# Patient Record
Sex: MALE | Race: WHITE | NOT HISPANIC OR LATINO
[De-identification: names, ages, dates, MRNs, and addresses within clinical notes are randomized per-mention and may not be internally consistent; named-entity substitution may affect disease eponyms.]

---

## 2014-11-19 RX ORDER — SOTALOL HCL 120 MG
0.5 TABLET ORAL
Qty: 0 | Refills: 0 | COMMUNITY
Start: 2014-11-19

## 2017-04-03 ENCOUNTER — RESULT REVIEW (OUTPATIENT)
Age: 82
End: 2017-04-03

## 2017-04-03 ENCOUNTER — INPATIENT (INPATIENT)
Facility: HOSPITAL | Age: 82
LOS: 4 days | Discharge: NURSING, REHAB OR LONG-TERM CARE | DRG: 378 | End: 2017-04-08
Attending: FAMILY MEDICINE | Admitting: FAMILY MEDICINE
Payer: MEDICARE

## 2017-04-03 VITALS
RESPIRATION RATE: 18 BRPM | SYSTOLIC BLOOD PRESSURE: 112 MMHG | HEIGHT: 71 IN | HEART RATE: 96 BPM | OXYGEN SATURATION: 98 % | TEMPERATURE: 98 F | DIASTOLIC BLOOD PRESSURE: 64 MMHG | WEIGHT: 164.91 LBS

## 2017-04-03 DIAGNOSIS — N18.9 CHRONIC KIDNEY DISEASE, UNSPECIFIED: ICD-10-CM

## 2017-04-03 DIAGNOSIS — K27.7 CHRONIC PEPTIC ULCER, SITE UNSPECIFIED, WITHOUT HEMORRHAGE OR PERFORATION: Chronic | ICD-10-CM

## 2017-04-03 DIAGNOSIS — E03.9 HYPOTHYROIDISM, UNSPECIFIED: ICD-10-CM

## 2017-04-03 DIAGNOSIS — D64.9 ANEMIA, UNSPECIFIED: ICD-10-CM

## 2017-04-03 DIAGNOSIS — I48.91 UNSPECIFIED ATRIAL FIBRILLATION: ICD-10-CM

## 2017-04-03 DIAGNOSIS — K92.2 GASTROINTESTINAL HEMORRHAGE, UNSPECIFIED: ICD-10-CM

## 2017-04-03 LAB
ALBUMIN SERPL ELPH-MCNC: 3.1 G/DL — LOW (ref 3.3–5)
ALP SERPL-CCNC: 70 U/L — SIGNIFICANT CHANGE UP (ref 30–120)
ALT FLD-CCNC: 26 U/L DA — SIGNIFICANT CHANGE UP (ref 10–60)
ANION GAP SERPL CALC-SCNC: 5 MMOL/L — SIGNIFICANT CHANGE UP (ref 5–17)
APTT BLD: 38.1 SEC — HIGH (ref 27.5–37.4)
AST SERPL-CCNC: 25 U/L — SIGNIFICANT CHANGE UP (ref 10–40)
BASOPHILS # BLD AUTO: 0.1 K/UL — SIGNIFICANT CHANGE UP (ref 0–0.2)
BASOPHILS NFR BLD AUTO: 0.7 % — SIGNIFICANT CHANGE UP (ref 0–2)
BILIRUB SERPL-MCNC: 0.3 MG/DL — SIGNIFICANT CHANGE UP (ref 0.2–1.2)
BUN SERPL-MCNC: 60 MG/DL — HIGH (ref 7–23)
CALCIUM SERPL-MCNC: 8.9 MG/DL — SIGNIFICANT CHANGE UP (ref 8.4–10.5)
CHLORIDE SERPL-SCNC: 109 MMOL/L — HIGH (ref 96–108)
CK SERPL-CCNC: 72 U/L — SIGNIFICANT CHANGE UP (ref 39–308)
CO2 SERPL-SCNC: 28 MMOL/L — SIGNIFICANT CHANGE UP (ref 22–31)
CREAT SERPL-MCNC: 1.92 MG/DL — HIGH (ref 0.5–1.3)
D DIMER BLD IA.RAPID-MCNC: <150 NG/ML DDU — SIGNIFICANT CHANGE UP
EOSINOPHIL # BLD AUTO: 0.1 K/UL — SIGNIFICANT CHANGE UP (ref 0–0.5)
EOSINOPHIL NFR BLD AUTO: 1.5 % — SIGNIFICANT CHANGE UP (ref 0–6)
GLUCOSE SERPL-MCNC: 106 MG/DL — HIGH (ref 70–99)
HCT VFR BLD CALC: 25.3 % — LOW (ref 39–50)
HGB BLD-MCNC: 7.7 G/DL — LOW (ref 13–17)
INR BLD: 2.45 RATIO — HIGH (ref 0.88–1.16)
LYMPHOCYTES # BLD AUTO: 1.8 K/UL — SIGNIFICANT CHANGE UP (ref 1–3.3)
LYMPHOCYTES # BLD AUTO: 20.6 % — SIGNIFICANT CHANGE UP (ref 13–44)
MCHC RBC-ENTMCNC: 30.5 PG — SIGNIFICANT CHANGE UP (ref 27–34)
MCHC RBC-ENTMCNC: 30.6 GM/DL — LOW (ref 32–36)
MCV RBC AUTO: 99.5 FL — SIGNIFICANT CHANGE UP (ref 80–100)
MONOCYTES # BLD AUTO: 0.6 K/UL — SIGNIFICANT CHANGE UP (ref 0–0.9)
MONOCYTES NFR BLD AUTO: 6.9 % — SIGNIFICANT CHANGE UP (ref 2–14)
NEUTROPHILS # BLD AUTO: 6.1 K/UL — SIGNIFICANT CHANGE UP (ref 1.8–7.4)
NEUTROPHILS NFR BLD AUTO: 70.2 % — SIGNIFICANT CHANGE UP (ref 43–77)
NT-PROBNP SERPL-SCNC: 1886 PG/ML — HIGH (ref 0–450)
PLATELET # BLD AUTO: 120 K/UL — LOW (ref 150–400)
POTASSIUM SERPL-MCNC: 4.3 MMOL/L — SIGNIFICANT CHANGE UP (ref 3.5–5.3)
POTASSIUM SERPL-SCNC: 4.3 MMOL/L — SIGNIFICANT CHANGE UP (ref 3.5–5.3)
PROT SERPL-MCNC: 6.2 G/DL — SIGNIFICANT CHANGE UP (ref 6–8.3)
PROTHROM AB SERPL-ACNC: 27.2 SEC — HIGH (ref 9.8–12.7)
RBC # BLD: 2.54 M/UL — LOW (ref 4.2–5.8)
RBC # FLD: 15.3 % — HIGH (ref 10.3–14.5)
SODIUM SERPL-SCNC: 142 MMOL/L — SIGNIFICANT CHANGE UP (ref 135–145)
TROPONIN I SERPL-MCNC: 0.04 NG/ML — SIGNIFICANT CHANGE UP (ref 0.02–0.06)
WBC # BLD: 8.7 K/UL — SIGNIFICANT CHANGE UP (ref 3.8–10.5)
WBC # FLD AUTO: 8.7 K/UL — SIGNIFICANT CHANGE UP (ref 3.8–10.5)

## 2017-04-03 PROCEDURE — 71020: CPT | Mod: 26

## 2017-04-03 PROCEDURE — 93010 ELECTROCARDIOGRAM REPORT: CPT

## 2017-04-03 PROCEDURE — 99284 EMERGENCY DEPT VISIT MOD MDM: CPT

## 2017-04-03 PROCEDURE — 74176 CT ABD & PELVIS W/O CONTRAST: CPT | Mod: 26

## 2017-04-03 PROCEDURE — 99223 1ST HOSP IP/OBS HIGH 75: CPT | Mod: AI

## 2017-04-03 RX ORDER — PHYTONADIONE (VIT K1) 5 MG
5 TABLET ORAL ONCE
Qty: 0 | Refills: 0 | Status: DISCONTINUED | OUTPATIENT
Start: 2017-04-03 | End: 2017-04-03

## 2017-04-03 RX ORDER — DOCUSATE SODIUM 100 MG
100 CAPSULE ORAL THREE TIMES A DAY
Qty: 0 | Refills: 0 | Status: DISCONTINUED | OUTPATIENT
Start: 2017-04-03 | End: 2017-04-08

## 2017-04-03 RX ORDER — PHYTONADIONE (VIT K1) 5 MG
5 TABLET ORAL ONCE
Qty: 0 | Refills: 0 | Status: COMPLETED | OUTPATIENT
Start: 2017-04-03 | End: 2017-04-03

## 2017-04-03 RX ORDER — TAMSULOSIN HYDROCHLORIDE 0.4 MG/1
0.4 CAPSULE ORAL AT BEDTIME
Qty: 0 | Refills: 0 | Status: DISCONTINUED | OUTPATIENT
Start: 2017-04-03 | End: 2017-04-08

## 2017-04-03 RX ORDER — SODIUM CHLORIDE 9 MG/ML
1000 INJECTION, SOLUTION INTRAVENOUS
Qty: 0 | Refills: 0 | Status: DISCONTINUED | OUTPATIENT
Start: 2017-04-03 | End: 2017-04-04

## 2017-04-03 RX ORDER — SODIUM CHLORIDE 9 MG/ML
3 INJECTION INTRAMUSCULAR; INTRAVENOUS; SUBCUTANEOUS ONCE
Qty: 0 | Refills: 0 | Status: COMPLETED | OUTPATIENT
Start: 2017-04-03 | End: 2017-04-03

## 2017-04-03 RX ORDER — LEVOTHYROXINE SODIUM 125 MCG
75 TABLET ORAL DAILY
Qty: 0 | Refills: 0 | Status: DISCONTINUED | OUTPATIENT
Start: 2017-04-03 | End: 2017-04-08

## 2017-04-03 RX ORDER — SENNA PLUS 8.6 MG/1
2 TABLET ORAL AT BEDTIME
Qty: 0 | Refills: 0 | Status: DISCONTINUED | OUTPATIENT
Start: 2017-04-03 | End: 2017-04-08

## 2017-04-03 RX ORDER — SODIUM CHLORIDE 9 MG/ML
500 INJECTION INTRAMUSCULAR; INTRAVENOUS; SUBCUTANEOUS ONCE
Qty: 0 | Refills: 0 | Status: DISCONTINUED | OUTPATIENT
Start: 2017-04-03 | End: 2017-04-03

## 2017-04-03 RX ORDER — PANTOPRAZOLE SODIUM 20 MG/1
8 TABLET, DELAYED RELEASE ORAL
Qty: 80 | Refills: 0 | Status: COMPLETED | OUTPATIENT
Start: 2017-04-03 | End: 2017-04-06

## 2017-04-03 RX ORDER — FUROSEMIDE 40 MG
40 TABLET ORAL ONCE
Qty: 0 | Refills: 0 | Status: DISCONTINUED | OUTPATIENT
Start: 2017-04-03 | End: 2017-04-03

## 2017-04-03 RX ORDER — ONDANSETRON 8 MG/1
4 TABLET, FILM COATED ORAL EVERY 6 HOURS
Qty: 0 | Refills: 0 | Status: DISCONTINUED | OUTPATIENT
Start: 2017-04-03 | End: 2017-04-08

## 2017-04-03 RX ORDER — SOTALOL HCL 120 MG
80 TABLET ORAL EVERY 12 HOURS
Qty: 0 | Refills: 0 | Status: DISCONTINUED | OUTPATIENT
Start: 2017-04-03 | End: 2017-04-08

## 2017-04-03 RX ADMIN — Medication 100 MILLIGRAM(S): at 21:26

## 2017-04-03 RX ADMIN — SODIUM CHLORIDE 3 MILLILITER(S): 9 INJECTION INTRAMUSCULAR; INTRAVENOUS; SUBCUTANEOUS at 11:15

## 2017-04-03 RX ADMIN — TAMSULOSIN HYDROCHLORIDE 0.4 MILLIGRAM(S): 0.4 CAPSULE ORAL at 21:26

## 2017-04-03 RX ADMIN — SODIUM CHLORIDE 60 MILLILITER(S): 9 INJECTION, SOLUTION INTRAVENOUS at 16:58

## 2017-04-03 RX ADMIN — PANTOPRAZOLE SODIUM 10 MG/HR: 20 TABLET, DELAYED RELEASE ORAL at 16:35

## 2017-04-03 RX ADMIN — Medication 5 MILLIGRAM(S): at 18:22

## 2017-04-03 RX ADMIN — Medication 75 MICROGRAM(S): at 18:22

## 2017-04-03 NOTE — PROGRESS NOTE ADULT - SUBJECTIVE AND OBJECTIVE BOX
Patient is a 88y old  Male who presents with a chief complaint of SOB/weakness (03 Apr 2017 15:54)    PAST MEDICAL & SURGICAL HISTORY:  Mitral valve disorder  Kidney stone  Diverticulosis  CKD (chronic kidney disease)  Afib  Hypothyroidism  BPH (benign prostatic hyperplasia)  Chronic peptic ulcer: S/P surgery more than 10 yrs ago  No significant past surgical history      BRIEF HOSPITAL COURSE:    88M hx a –fib on Coumadin hypothyroid CKD PUD  Melena HGB7.7 tarry stools .  Hgb was 13 10 days ago,  FFP VIT K prbc's to be given.       Review of Systems:       Mild SOB on exertion         All other ROS are negative.    ICU Vital Signs Last 24 Hrs  T(C): 36.4, Max: 36.9 (04-03 @ 16:50)  T(F): 97.6, Max: 98.4 (04-03 @ 16:50)  HR: 87 (72 - 96)  BP: 111/57 (93/59 - 113/72)  BP(mean): 69 (69 - 86)  ABP: --  ABP(mean): --  RR: 19 (16 - 19)  SpO2: 91% (91% - 100%)    Physical Examination:    General: no distress    HEENT: no JVD     PULM: bilateral BS clear    CVS: irreg     ABD: soft    EXT: no edema     SKIN: warm    Neuro: alert non focal          LABS:                        7.7    8.7   )-----------( 120      ( 03 Apr 2017 12:24 )             25.3     03 Apr 2017 12:24    142    |  109    |  60     ----------------------------<  106    4.3     |  28     |  1.92     Ca    8.9        03 Apr 2017 12:24    TPro  6.2    /  Alb  3.1    /  TBili  0.3    /  DBili  x      /  AST  25     /  ALT  26     /  AlkPhos  70     03 Apr 2017 12:24      CARDIAC MARKERS ( 03 Apr 2017 12:24 )  .037 ng/mL / x     / 72 U/L / x     / x          CAPILLARY BLOOD GLUCOSE    CAPILLARY BLOOD GLUCOSE    PT/INR - ( 03 Apr 2017 12:24 )   PT: 27.2 sec;   INR: 2.45 ratio         PTT - ( 03 Apr 2017 12:24 )  PTT:38.1 sec    CULTURES:      Medications:    furosemide   Injectable 40milliGRAM(s) IV Push once  tamsulosin 0.4milliGRAM(s) Oral at bedtime  sotalol 80milliGRAM(s) Oral every 12 hours  ondansetron Injectable 4milliGRAM(s) IV Push every 6 hours PRN  pantoprazole Infusion 8mG/Hr IV Continuous <Continuous>  senna 2Tablet(s) Oral at bedtime PRN  docusate sodium 100milliGRAM(s) Oral three times a day  levothyroxine 75MICROGram(s) Oral daily  dextrose 5% + sodium chloride 0.45%. 1000milliLiter(s) IV Continuous <Continuous>  sodium chloride 0.9% Bolus 500milliLiter(s) IV Bolus once            RADIOLOGY/IMAGING/ECHO      CT of the Abdomen and Pelvis was performed without intravenous contrast.  Intravenous contrast: None.  Oral contrast: None.  Sagittal and coronal reformats were performed.    FINDINGS:    LOWER CHEST: Within normal limits.    LIVER: Within normal limits.  BILE DUCTS: Normal caliber.  GALLBLADDER: Within normal limits.  SPLEEN: Within normal limits.  PANCREAS: Within normal limits.  ADRENALS: Within normal limits.  KIDNEYS/URETERS: Left renal cysts. No hydronephrosis.    BLADDER: Within normal limits.  REPRODUCTIVE ORGANS: The prostate is enlarged, measuring 6.5 cm in  transverse diameter.    BOWEL: Surgical clips at the gastroesophageal junction. No bowel  obstruction. Normal appendix.  PERITONEUM: No ascites.  VESSELS: Atherosclerotic change of the abdominal aorta and its branches.  RETROPERITONEUM: No lymphadenopathy.  ABDOMINAL WALL: Within normal limits.  BONES: Degenerative changes of the spine.     No retroperitoneal hematoma.    Critical care point of care ultrasound:N/A    Assessment/Plan:    88M hx a –fib on Coumadin hypothyroid CKD PUD  Melena HGB7.7 tarry stools .  Hgb was 13 10 days ago.    Sounds like UGIB  ABLA with coagulopathy due to warfarin.  Massive duodenal GIB 30yrs ago requiring sx (?[facility]roth).   Volume depletion resulting in low BP      PPI drip  Transfuse FFP PRBC to HGB ~8 ovoid overtransfusion.   Serial CBC   upper endoscopy in AM      Critical Care time: 35  (Reviewing data, imaging, discussing with multidisciplinary team, non inclusive of procedures, discussing goals of care with patient/family)

## 2017-04-03 NOTE — H&P ADULT - NEGATIVE NEUROLOGICAL SYMPTOMS
no vertigo/no generalized seizures/no tremors/no syncope/no transient paralysis/no paresthesias/no focal seizures

## 2017-04-03 NOTE — CONSULT NOTE ADULT - PROBLEM SELECTOR RECOMMENDATION 9
GI Bleed  on AC  s/p FFP, PRBC and Vit K  am LABS ordered  LASIX between transfusions  GI consult reviewed  for possible EGD in am  serial H and H  oral hygiene  IVF for GABO, has hx of CKD,   monitor on tele in ICU  I and Os  SCD for DVT P

## 2017-04-03 NOTE — ED PROVIDER NOTE - GASTROINTESTINAL, MLM
Abdomen soft, non-tender, no guarding. Abdomen soft, non-tender, no guarding. Rectal exam stool dark brown positive for Guiac

## 2017-04-03 NOTE — H&P ADULT - PROBLEM SELECTOR PLAN 1
Suspected UGIB.  Will transfuse 2 units of RBC, 1 unit of FFP, and Po Vit K.  Risks, benefits, and alternative of blood transfusion were discussed with patient and wife in details.  Seems to understand, and in agreement.  Will start patient on clear Liquid diet, and IV PPI.  Chronic thrombocytopenia as per PMD.  Monitor H/H closely.  GI consult.

## 2017-04-03 NOTE — H&P ADULT - ATTENDING COMMENTS
Code status full  DVT proph compression stocking  Plan of care was discussed with patient, and wife in great details, All questions were answered to their satisfaction.  Seems to understand, and in agreement

## 2017-04-03 NOTE — ED PROVIDER NOTE - CARE PLAN
Principal Discharge DX:	Gastrointestinal hemorrhage, unspecified gastrointestinal hemorrhage type  Secondary Diagnosis:	Anemia, unspecified type

## 2017-04-03 NOTE — ED PROVIDER NOTE - NS ED MD SCRIBE ATTENDING SCRIBE SECTIONS
VITAL SIGNS( Pullset)/REVIEW OF SYSTEMS/PHYSICAL EXAM/PAST MEDICAL/SURGICAL/SOCIAL HISTORY/DISPOSITION/HISTORY OF PRESENT ILLNESS

## 2017-04-03 NOTE — ED PROVIDER NOTE - MEDICAL DECISION MAKING DETAILS
determine the cause of increasing exertional dyspnea ordered blood work and ekg and corollate finding clinically

## 2017-04-03 NOTE — H&P ADULT - HISTORY OF PRESENT ILLNESS
Patient is 89 yo male with hx of Atrial fib, and CKD presenting with a complaint of SOB on exertion, and Generalized weakness that has been going on for the past several days, and has been progressively worsen.  Patient reports that he feel against a wooden fence several days ago. Hemoglobin was 13.1 on 3/21 as per PMD.  + dark tarry stool over the past several days.      Patient denies any headache, dizziness, blurry vision, chest pain, palpitation, abdominal pain, N/v/D, fever, chills, numbness fever, chills, or localized weakness

## 2017-04-03 NOTE — H&P ADULT - PMH
Afib    BPH (benign prostatic hyperplasia)    CKD (chronic kidney disease)    Diverticulosis    Hypothyroidism    Kidney stone    Mitral valve disorder

## 2017-04-03 NOTE — H&P ADULT - PROBLEM SELECTOR PLAN 4
Continue with sotalol with holding parameter.  Hold anticoagulation in the setting of GIB.  Risks, and benefits of holding medications were discussed with patient, and wife in details.  Seems to understand, and in agreement

## 2017-04-03 NOTE — H&P ADULT - PROBLEM SELECTOR PLAN 2
Baseline creatinine 1.5.  Creatinine mildly elevated, Probably due to dehydration.  Continue with Slow hydration, and monitor renal function

## 2017-04-04 ENCOUNTER — TRANSCRIPTION ENCOUNTER (OUTPATIENT)
Age: 82
End: 2017-04-04

## 2017-04-04 LAB
ANION GAP SERPL CALC-SCNC: 7 MMOL/L — SIGNIFICANT CHANGE UP (ref 5–17)
APTT BLD: 32.7 SEC — SIGNIFICANT CHANGE UP (ref 27.5–37.4)
BUN SERPL-MCNC: 54 MG/DL — HIGH (ref 7–23)
CALCIUM SERPL-MCNC: 8 MG/DL — LOW (ref 8.4–10.5)
CHLORIDE SERPL-SCNC: 110 MMOL/L — HIGH (ref 96–108)
CO2 SERPL-SCNC: 25 MMOL/L — SIGNIFICANT CHANGE UP (ref 22–31)
CREAT SERPL-MCNC: 1.45 MG/DL — HIGH (ref 0.5–1.3)
GLUCOSE SERPL-MCNC: 125 MG/DL — HIGH (ref 70–99)
HCT VFR BLD CALC: 23.8 % — LOW (ref 39–50)
HCT VFR BLD CALC: 24.9 % — LOW (ref 39–50)
HCT VFR BLD CALC: 26.2 % — LOW (ref 39–50)
HGB BLD-MCNC: 8.1 G/DL — LOW (ref 13–17)
HGB BLD-MCNC: 8.2 G/DL — LOW (ref 13–17)
HGB BLD-MCNC: 8.8 G/DL — LOW (ref 13–17)
INR BLD: 1.58 RATIO — HIGH (ref 0.88–1.16)
INR BLD: 1.66 RATIO — HIGH (ref 0.88–1.16)
INR BLD: 1.93 RATIO — HIGH (ref 0.88–1.16)
MCHC RBC-ENTMCNC: 31.4 PG — SIGNIFICANT CHANGE UP (ref 27–34)
MCHC RBC-ENTMCNC: 31.7 PG — SIGNIFICANT CHANGE UP (ref 27–34)
MCHC RBC-ENTMCNC: 32 PG — SIGNIFICANT CHANGE UP (ref 27–34)
MCHC RBC-ENTMCNC: 32.8 GM/DL — SIGNIFICANT CHANGE UP (ref 32–36)
MCHC RBC-ENTMCNC: 33.8 GM/DL — SIGNIFICANT CHANGE UP (ref 32–36)
MCHC RBC-ENTMCNC: 33.8 GM/DL — SIGNIFICANT CHANGE UP (ref 32–36)
MCV RBC AUTO: 93.8 FL — SIGNIFICANT CHANGE UP (ref 80–100)
MCV RBC AUTO: 94.5 FL — SIGNIFICANT CHANGE UP (ref 80–100)
MCV RBC AUTO: 95.6 FL — SIGNIFICANT CHANGE UP (ref 80–100)
PLATELET # BLD AUTO: 96 K/UL — LOW (ref 150–400)
PLATELET # BLD AUTO: 98 K/UL — LOW (ref 150–400)
PLATELET # BLD AUTO: 99 K/UL — LOW (ref 150–400)
POTASSIUM SERPL-MCNC: 3.8 MMOL/L — SIGNIFICANT CHANGE UP (ref 3.5–5.3)
POTASSIUM SERPL-SCNC: 3.8 MMOL/L — SIGNIFICANT CHANGE UP (ref 3.5–5.3)
PROTHROM AB SERPL-ACNC: 17.4 SEC — HIGH (ref 9.8–12.7)
PROTHROM AB SERPL-ACNC: 18.3 SEC — HIGH (ref 9.8–12.7)
PROTHROM AB SERPL-ACNC: 21.3 SEC — HIGH (ref 9.8–12.7)
RBC # BLD: 2.52 M/UL — LOW (ref 4.2–5.8)
RBC # BLD: 2.6 M/UL — LOW (ref 4.2–5.8)
RBC # BLD: 2.79 M/UL — LOW (ref 4.2–5.8)
RBC # FLD: 14.8 % — HIGH (ref 10.3–14.5)
RBC # FLD: 15.4 % — HIGH (ref 10.3–14.5)
RBC # FLD: 15.4 % — HIGH (ref 10.3–14.5)
SODIUM SERPL-SCNC: 142 MMOL/L — SIGNIFICANT CHANGE UP (ref 135–145)
WBC # BLD: 13 K/UL — HIGH (ref 3.8–10.5)
WBC # BLD: 13.2 K/UL — HIGH (ref 3.8–10.5)
WBC # BLD: 9.7 K/UL — SIGNIFICANT CHANGE UP (ref 3.8–10.5)
WBC # FLD AUTO: 13 K/UL — HIGH (ref 3.8–10.5)
WBC # FLD AUTO: 13.2 K/UL — HIGH (ref 3.8–10.5)
WBC # FLD AUTO: 9.7 K/UL — SIGNIFICANT CHANGE UP (ref 3.8–10.5)

## 2017-04-04 PROCEDURE — 99233 SBSQ HOSP IP/OBS HIGH 50: CPT

## 2017-04-04 PROCEDURE — 88305 TISSUE EXAM BY PATHOLOGIST: CPT | Mod: 26

## 2017-04-04 PROCEDURE — 88312 SPECIAL STAINS GROUP 1: CPT | Mod: 26

## 2017-04-04 RX ORDER — PHYTONADIONE (VIT K1) 5 MG
1.25 TABLET ORAL ONCE
Qty: 0 | Refills: 0 | Status: COMPLETED | OUTPATIENT
Start: 2017-04-04 | End: 2017-04-04

## 2017-04-04 RX ORDER — SUCRALFATE 1 G
1 TABLET ORAL THREE TIMES A DAY
Qty: 0 | Refills: 0 | Status: DISCONTINUED | OUTPATIENT
Start: 2017-04-04 | End: 2017-04-08

## 2017-04-04 RX ORDER — PHYTONADIONE (VIT K1) 5 MG
2.5 TABLET ORAL ONCE
Qty: 0 | Refills: 0 | Status: DISCONTINUED | OUTPATIENT
Start: 2017-04-04 | End: 2017-04-04

## 2017-04-04 RX ORDER — SODIUM CHLORIDE 9 MG/ML
1000 INJECTION, SOLUTION INTRAVENOUS
Qty: 0 | Refills: 0 | Status: DISCONTINUED | OUTPATIENT
Start: 2017-04-04 | End: 2017-04-04

## 2017-04-04 RX ORDER — ACETAMINOPHEN 500 MG
650 TABLET ORAL EVERY 6 HOURS
Qty: 0 | Refills: 0 | Status: DISCONTINUED | OUTPATIENT
Start: 2017-04-04 | End: 2017-04-08

## 2017-04-04 RX ORDER — SODIUM CHLORIDE 9 MG/ML
1000 INJECTION, SOLUTION INTRAVENOUS
Qty: 0 | Refills: 0 | Status: DISCONTINUED | OUTPATIENT
Start: 2017-04-04 | End: 2017-04-05

## 2017-04-04 RX ADMIN — Medication 100 MILLIGRAM(S): at 06:07

## 2017-04-04 RX ADMIN — Medication 1.25 MILLIGRAM(S): at 11:10

## 2017-04-04 RX ADMIN — Medication 75 MICROGRAM(S): at 06:07

## 2017-04-04 RX ADMIN — SODIUM CHLORIDE 80 MILLILITER(S): 9 INJECTION, SOLUTION INTRAVENOUS at 18:20

## 2017-04-04 RX ADMIN — Medication 80 MILLIGRAM(S): at 17:40

## 2017-04-04 RX ADMIN — Medication 1 GRAM(S): at 17:40

## 2017-04-04 RX ADMIN — SODIUM CHLORIDE 80 MILLILITER(S): 9 INJECTION, SOLUTION INTRAVENOUS at 18:04

## 2017-04-04 RX ADMIN — PANTOPRAZOLE SODIUM 10 MG/HR: 20 TABLET, DELAYED RELEASE ORAL at 01:54

## 2017-04-04 RX ADMIN — SODIUM CHLORIDE 30 MILLILITER(S): 9 INJECTION, SOLUTION INTRAVENOUS at 10:44

## 2017-04-04 RX ADMIN — Medication 650 MILLIGRAM(S): at 06:07

## 2017-04-04 RX ADMIN — TAMSULOSIN HYDROCHLORIDE 0.4 MILLIGRAM(S): 0.4 CAPSULE ORAL at 22:05

## 2017-04-04 RX ADMIN — PANTOPRAZOLE SODIUM 10 MG/HR: 20 TABLET, DELAYED RELEASE ORAL at 16:41

## 2017-04-04 NOTE — PROGRESS NOTE ADULT - SUBJECTIVE AND OBJECTIVE BOX
Patient is a 88y old  Male who presents with a chief complaint of SOB/weakness (03 Apr 2017 15:54)      BRIEF HOSPITAL COURSE: 87 yo male, PMHx CKF, AFib on coumadin, hypothyroid, PUD, presented with shortness of breath, weakness, and melanotic tarry stools. Found to have acute blood loss anemia, received 2 units PRBCs and 3 units FFP to reverse Coumadin.     Events last 24 hours: S/p EDG with nonbleeding ulcer at GJ junction, bleeds when touched, no clips due to location, injected with dilute epi. 1 melanotic stool during day shift, none since.     PAST MEDICAL & SURGICAL HISTORY:  Mitral valve disorder  Kidney stone  Diverticulosis  CKD (chronic kidney disease)  Afib  Hypothyroidism  BPH (benign prostatic hyperplasia)  Chronic peptic ulcer: S/P surgery more than 10 yrs ago  No significant past surgical history      Review of Systems:  CONSTITUTIONAL: No fever, chills, or fatigue  EYES: No eye pain, visual disturbances, or discharge  ENMT:  No difficulty hearing, tinnitus, vertigo; No sinus or throat pain  NECK: No pain or stiffness  RESPIRATORY: No cough, wheezing, chills or hemoptysis; No shortness of breath  CARDIOVASCULAR: No chest pain, palpitations, dizziness, or leg swelling  GASTROINTESTINAL: No abdominal or epigastric pain. No nausea, vomiting, or hematemesis; No diarrhea or constipation. No melena or hematochezia.  GENITOURINARY: No dysuria, frequency, hematuria, or incontinence  NEUROLOGICAL: No headaches, memory loss, loss of strength, numbness, or tremors  SKIN: No itching, burning, rashes, or lesions   MUSCULOSKELETAL: No joint pain or swelling; No muscle, back, or extremity pain  PSYCHIATRIC: No depression, anxiety, mood swings, or difficulty sleeping      Medications:    tamsulosin 0.4milliGRAM(s) Oral at bedtime  sotalol 80milliGRAM(s) Oral every 12 hours    ondansetron Injectable 4milliGRAM(s) IV Push every 6 hours PRN  acetaminophen   Tablet 650milliGRAM(s) Oral every 6 hours PRN  acetaminophen   Tablet. 650milliGRAM(s) Oral every 6 hours PRN    pantoprazole Infusion 8mG/Hr IV Continuous <Continuous>  senna 2Tablet(s) Oral at bedtime PRN  docusate sodium 100milliGRAM(s) Oral three times a day  sucralfate suspension 1Gram(s) Oral three times a day    levothyroxine 75MICROGram(s) Oral daily    lactated ringers. 1000milliLiter(s) IV Continuous <Continuous>      ICU Vital Signs Last 24 Hrs  T(C): 37.4, Max: 37.7 (04-04 @ 04:27)  T(F): 99.4, Max: 99.8 (04-04 @ 04:27)  HR: 63 (57 - 100)  BP: 111/63 (92/54 - 140/83)  BP(mean): 78 (66 - 99)  ABP: --  ABP(mean): --  RR: 17 (12 - 24)  SpO2: 95% (92% - 100%)      I&O's Detail  I & Os for 24h ending 04 Apr 2017 07:00  =============================================  IN:    FFP (Fresh Frozen Plasma): 1007 ml    dextrose 5% + sodium chloride 0.45%.: 960 ml    Oral Fluid: 300 ml    Packed Red Blood Cells: 298 ml    pantoprazole Infusion: 160 ml    Total IN: 2725 ml  ---------------------------------------------  OUT:    Voided: 200 ml    Total OUT: 200 ml  ---------------------------------------------  Total NET: 2525 ml    I & Os for current day (as of 04 Apr 2017 22:45)  =============================================  IN:    Oral Fluid: 460 ml    lactated ringers.: 320 ml    dextrose 5% + sodium chloride 0.45%.: 300 ml    lactated ringers.: 200 ml    pantoprazole Infusion: 140 ml    Total IN: 1420 ml  ---------------------------------------------  OUT:    Voided: 1200 ml    Total OUT: 1200 ml  ---------------------------------------------  Total NET: 220 ml        LABS:                        8.2    13.2  )-----------( 96       ( 04 Apr 2017 11:42 )             24.9     04 Apr 2017 03:27    142    |  110    |  54     ----------------------------<  125    3.8     |  25     |  1.45     Ca    8.0        04 Apr 2017 03:27    TPro  6.2    /  Alb  3.1    /  TBili  0.3    /  DBili  x      /  AST  25     /  ALT  26     /  AlkPhos  70     03 Apr 2017 12:24      CARDIAC MARKERS ( 03 Apr 2017 12:24 )  .037 ng/mL / x     / 72 U/L / x     / x          CAPILLARY BLOOD GLUCOSE    PT/INR - ( 04 Apr 2017 11:42 )   PT: 17.4 sec;   INR: 1.58 ratio         PTT - ( 04 Apr 2017 11:42 )  PTT:32.7 sec    CULTURES:      Physical Examination:    General: No acute distress.      HEENT: Pupils equal, reactive to light. Symmetric.    PULM: Decreased inspiratory effort. Clear to auscultation bilaterally, no significant sputum production    CVS: Regular rate and rhythm, +S1, S2, no murmurs, rubs, or gallops    ABD: Soft, nondistended, nontender, normoactive bowel sounds, no masses    EXT: No edema, nontender    SKIN: Warm and well perfused, no rashes noted.    NEURO: Alert, oriented, interactive, nonfocal    RADIOLOGY:   INTERPRETATION:  EXAMINATION DATE: April 3, 2017 at 1646 hours.  CLINICAL INFORMATION: Fall, anemia.    COMPARISON: None.    PROCEDURE:   CT of the Abdomen and Pelvis was performed without intravenous contrast.   Intravenous contrast: None.  Oral contrast: None.  Sagittal and coronal reformats were performed.    FINDINGS:    LOWER CHEST: Within normal limits.    LIVER: Within normal limits.  BILE DUCTS: Normal caliber.   GALLBLADDER: Within normal limits.  SPLEEN: Within normal limits.  PANCREAS: Within normal limits.  ADRENALS: Within normal limits.  KIDNEYS/URETERS: Left renal cysts. No hydronephrosis.     BLADDER: Within normal limits.  REPRODUCTIVE ORGANS: The prostate is enlarged, measuring 6.5 cm in   transverse diameter.    BOWEL: Surgical clips at the gastroesophageal junction. No bowel   obstruction. Normal appendix.    PERITONEUM: No ascites.  VESSELS:  Atherosclerotic change of the abdominal aorta and its branches.  RETROPERITONEUM: No lymphadenopathy.    ABDOMINAL WALL: Within normal limits.  BONES: Degenerative changes of the spine.    IMPRESSION: No retroperitoneal hematoma.    CRITICAL CARE TIME SPENT: I have spent 32 minutes of critical care time evaluating and treating this patient, including reviewing charts, labs, imagining studies, and collaborating with interdisciplinary team.

## 2017-04-04 NOTE — PROGRESS NOTE ADULT - PROBLEM SELECTOR PLAN 1
Suspected UGIB.  Received 2 units of RBC, 3 unit of FFP, and Po Vit K.  INR is 1.6.  Vit K X 1 dose.  repeat CBC, INR/PT/PTT in afternoon.  Possible EGD today as per cardiology.  Continue with IV PPI, and NPO.  Cleared from cardiac point of view for procedure.  t. Suspected UGIB.  Received 2 units of RBC, 3 unit of FFP, and Po Vit K.  INR is 1.6.  Vit K X 1 dose.  repeat CBC, INR/PT/PTT in afternoon.  Possible EGD today as per cardiology.  Continue with IV PPI, and NPO.  Cleared from cardiac point of view for procedure.  No contraindication for procedure

## 2017-04-04 NOTE — PROGRESS NOTE ADULT - SUBJECTIVE AND OBJECTIVE BOX
Date/Time Patient Seen:  		  Referring MD:   Data Reviewed	       Patient is a 88y old  Male who presents with a chief complaint of SOB/weakness (03 Apr 2017 15:54)  pt in bed  alert  s/p 2 PRBC  awaiting next FFP transfusion  got Vit K yesterday, INR remains elevated, H and H post transfusion noted  no Bloody BM overnight      Subjective/HPI       Medication list         MEDICATIONS  (STANDING):  pantoprazole Infusion 8mG/Hr IV Continuous <Continuous>  docusate sodium 100milliGRAM(s) Oral three times a day  dextrose 5% + sodium chloride 0.45%. 1000milliLiter(s) IV Continuous <Continuous>  tamsulosin 0.4milliGRAM(s) Oral at bedtime  sotalol 80milliGRAM(s) Oral every 12 hours  levothyroxine 75MICROGram(s) Oral daily    MEDICATIONS  (PRN):  ondansetron Injectable 4milliGRAM(s) IV Push every 6 hours PRN Nausea  senna 2Tablet(s) Oral at bedtime PRN Constipation  acetaminophen   Tablet 650milliGRAM(s) Oral every 6 hours PRN For Temp greater than 38 C (100.4 F)  acetaminophen   Tablet. 650milliGRAM(s) Oral every 6 hours PRN Mild Pain (1 - 3)         Vitals log        ICU Vital Signs Last 24 Hrs  T(C): 37.7, Max: 37.7 (04-04 @ 04:27)  T(F): 99.8, Max: 99.8 (04-04 @ 04:27)  HR: 70 (55 - 96)  BP: 104/55 (81/54 - 116/64)  BP(mean): 70 (62 - 86)  ABP: --  ABP(mean): --  RR: 22 (14 - 22)  SpO2: 95% (91% - 100%)           Input and Output:  I&O's Detail    I & Os for current day (as of 04 Apr 2017 05:52)  =============================================  IN:    dextrose 5% + sodium chloride 0.45%.: 840 ml    FFP (Fresh Frozen Plasma): 310 ml    Oral Fluid: 300 ml    Packed Red Blood Cells: 298 ml    pantoprazole Infusion: 140 ml    Total IN: 1888 ml  ---------------------------------------------  OUT:    Voided: 200 ml    Total OUT: 200 ml  ---------------------------------------------  Total NET: 1688 ml      Lab Data                        8.8    13.0  )-----------( 98       ( 04 Apr 2017 03:27 )             26.2     04 Apr 2017 03:27    142    |  110    |  54     ----------------------------<  125    3.8     |  25     |  1.45     Ca    8.0        04 Apr 2017 03:27    TPro  6.2    /  Alb  3.1    /  TBili  0.3    /  DBili  x      /  AST  25     /  ALT  26     /  AlkPhos  70     03 Apr 2017 12:24      CARDIAC MARKERS ( 03 Apr 2017 12:24 )  .037 ng/mL / x     / 72 U/L / x     / x            Review of Systems	      Objective       Resp dec BS    CV s1s2       GI    Extremities chr changes       Neuro       Skin         Pertinent Lab findings & Imaging      Apoorva:  NO   Adequate UO     I&O's Detail    I & Os for current day (as of 04 Apr 2017 05:52)  =============================================  IN:    dextrose 5% + sodium chloride 0.45%.: 840 ml    FFP (Fresh Frozen Plasma): 310 ml    Oral Fluid: 300 ml    Packed Red Blood Cells: 298 ml    pantoprazole Infusion: 140 ml    Total IN: 1888 ml  ---------------------------------------------  OUT:    Voided: 200 ml    Total OUT: 200 ml  ---------------------------------------------  Total NET: 1688 ml           Discussed with:     Cultures:	        Radiology

## 2017-04-04 NOTE — DIETITIAN INITIAL EVALUATION ADULT. - OTHER INFO
88M adm c sob, weakness and dark tarry stools x 1 week. Pt states pt had been constipated about a week ago and was straining to produce a BM and when he did it was dark in color which continued on/off for the past week. Pt noted to have loose stool today that was tarry as well. Pt npo at present for endoscopy. Noted GIB is suspected to be upper. Pt on coumadin pta for hx of afib, states wife does the cooking and monitors his vit k intake at mealtimes. Coumadin held at present. Pt reports wt has been stable, generally has a good appetite pta. Briefly discussed diet progression. Skin intact; geovanny score 17.

## 2017-04-04 NOTE — PROGRESS NOTE ADULT - PROBLEM SELECTOR PLAN 1
serial INR  serial H and H  vit k  ffp  prbc  GI will scope today  pt is NPO except meds  on AC for AF, holding AC, cvs regimen, BP control, on IVF for GABO  monitor I and O  oral and skin hygiene  SCD for DVT p - since pt will be completely reversed  assess for pain  assess for discomfort  cont supportive ICU care  will follow  Pall care eval for MOLST

## 2017-04-04 NOTE — CONSULT NOTE ADULT - ASSESSMENT
Dyspnea, melenic stools on coumadin w/ anemia  Transfuse 2 unit prbc, FFP/vit k given per medicine  Holding coumadin  f/u CT as ordered by medicine  PPI drip, 8mg /hr  clear liquids, npo after MN-for EGD to evaluate further in am- risks reviewed.   CBC in am
The patient is an 88 year old male with a history of chronic AF on warfarin, hypothyroidism, BPH, CKD who presents with dyspnea on exertion and weakness in the setting of GI bleed.    Plan:  - Hold warfarin given active bleeding. Resumption of anticoagulation to be based on endoscopic findings.  - Continue sotalol 80 mg q12h for maintenance of sinus rhythm (ECG on admission likely AF, today in sinus rhythm on telemetry)  - There are no active cardiac issues. The patient is optimized to proceed for endoscopic procedures without additional cardiac testing.

## 2017-04-04 NOTE — PROGRESS NOTE ADULT - ASSESSMENT
89 yo male, PMHx CKF, AFib on coumadin, hypothyroid, PUD, presented with shortness of breath, weakness, and melanotic tarry stools. Found to have acute blood loss anemia, received 2 units PRBCs and 3 units FFP to reverse Coumadin. Now s/p EDG with ulcer at GJ junction, no clips due to location, injected with dilute epi.   UGIB- No further 87 yo male, PMHx CKF, AFib on coumadin, hypothyroid, PUD, presented with shortness of breath, weakness, and melanotic tarry stools. Found to have acute blood loss anemia, received 2 units PRBCs and 3 units FFP to reverse Coumadin. Now s/p EDG with ulcer at GJ junction, no clips due to location, injected with dilute epi.   UGIB- No further bleeding at this time. Continue protonix ggt, carafate, NPO. Trend CBC 87 yo male, PMHx CKF, AFib on coumadin, hypothyroid, PUD, presented with shortness of breath, weakness, and melanotic tarry stools. Found to have acute blood loss anemia, received 2 units PRBCs and 3 units FFP to reverse Coumadin. Now s/p EDG with ulcer at GJ junction, no clips due to location, injected with dilute epi.    UGIB- No further bleeding at this time but will monitor closely for rebleeding. Continue protonix ggt, carafate, NPO. Will trend serial H/H and transfuse PRN symptomatic anemia or Hgb < 7. Continue to trend serial INR and give FFP PRN for full reversal. Continue to hold Coumadin x 7 days.    AFib- Coumadin on hold; INR fully reversed due to UGIB.     GABO- has baseline CKD. Continue IV fluids, trend BUN/Cr and monitor electrolytes; will replace electrolytes as needed    Hypothyroid- continue home levothyroxine

## 2017-04-04 NOTE — PROGRESS NOTE ADULT - SUBJECTIVE AND OBJECTIVE BOX
Patient reports that he feels better after blood tranfusion.  Offers no other complaints.  No BM overnight        Constitutional: No fever, fatigue or weight loss.  Skin: No rash.  Eyes: No recent vision problems or eye pain.  ENT: No congestion, ear pain, or sore throat.  Endocrine: No thyroid problems.  Cardiovascular: No chest pain or palpation.  Respiratory: No cough, shortness of breath, congestion, or wheezing.  Gastrointestinal: No abdominal pain, nausea, vomiting, or diarrhea.  Genitourinary: No dysuria.  Musculoskeletal: No joint swelling.  Neurologic: No headache.        T 99 P 76  /61 RR 19 SPO2 97% on RA      PHYSICAL EXAM-  GENERAL: NAD, well-groomed, well-developed  HEAD:  Atraumatic, Normocephalic  EYES: EOMI, PERRLA, conjunctiva and sclera clear  NECK: Supple, No JVD, Normal thyroid  NERVOUS SYSTEM:  Alert & Oriented X3, Motor Strength 5/5 B/L upper and lower extremities; DTRs 2+ intact and symmetric  CHEST/LUNG: Clear to percussion bilaterally; No rales, rhonchi, wheezing, or rubs  HEART: Regular rate and rhythm; No murmurs, rubs, or gallops  ABDOMEN: Soft, Nontender, Nondistended; Bowel sounds present  SKIN: No rashes or lesions                              8.8    13.0  )-----------( 98       ( 04 Apr 2017 03:27 )             26.2     04 Apr 2017 03:27    142    |  110    |  54     ----------------------------<  125    3.8     |  25     |  1.45     Ca    8.0        04 Apr 2017 03:27    TPro  6.2    /  Alb  3.1    /  TBili  0.3    /  DBili  x      /  AST  25     /  ALT  26     /  AlkPhos  70     03 Apr 2017 12:24    CARDIAC MARKERS ( 03 Apr 2017 12:24 )  .037 ng/mL / x     / 72 U/L / x     / x              PT/INR - ( 04 Apr 2017 08:58 )   PT: 18.3 sec;   INR: 1.66 ratio         PTT - ( 03 Apr 2017 12:24 )  PTT:38.1 sec      MEDICATIONS  (STANDING):  pantoprazole Infusion 8mG/Hr IV Continuous <Continuous>  docusate sodium 100milliGRAM(s) Oral three times a day  dextrose 5% + sodium chloride 0.45%. 1000milliLiter(s) IV Continuous <Continuous>  tamsulosin 0.4milliGRAM(s) Oral at bedtime  sotalol 80milliGRAM(s) Oral every 12 hours  levothyroxine 75MICROGram(s) Oral daily    MEDICATIONS  (PRN):  ondansetron Injectable 4milliGRAM(s) IV Push every 6 hours PRN Nausea  senna 2Tablet(s) Oral at bedtime PRN Constipation  acetaminophen   Tablet 650milliGRAM(s) Oral every 6 hours PRN For Temp greater than 38 C (100.4 F)  acetaminophen   Tablet. 650milliGRAM(s) Oral every 6 hours PRN Mild Pain (1 - 3)

## 2017-04-04 NOTE — CHART NOTE - NSCHARTNOTEFT_GEN_A_CORE
post EGD  ulcer at gastrojejunostomy, not actively bleeding but did bleed with contact  bx for HP  injected ulcer w/ 6cc dilute epi  unable to clip due to position.     iv ppi drip  carafate  cbc daily  hold coumadin 7 days  clear liquids  f/u HP path

## 2017-04-04 NOTE — CONSULT NOTE ADULT - SUBJECTIVE AND OBJECTIVE BOX
Chief Complaint:  Patient is a 88y old  Male who presents with a chief complaint of SOB/weakness (03 Apr 2017 15:54)    Mitral valve disorder  Kidney stone  Diverticulosis  CKD (chronic kidney disease)  Afib  Hypothyroidism  BPH (benign prostatic hyperplasia)  Chronic peptic ulcer  No significant past surgical history     HPI:  Patient is 87 yo male with hx of Atrial fib on coumadin, and CKD presenting with a complaint of SOB on exertion, and Generalized weakness that has been going on for the past several days, and has been progressively worsen.  Hemoglobin was 13.1 on 3/21 as per PMD.  + dark tarry stool over the past several days.  on exam today stools in rectal vault brown.   SBP 80-95 in ER  awake and alert, comfortable.   History includes duodenal ulcer 20 yr ago requiring surgery for bleeding. no recent EGD or colonoscopy    Patient denies any headache, dizziness, blurry vision, chest pain, palpitation, abdominal pain, N/v/D, fever, chills, numbness fever, chills, or localized weakness (03 Apr 2017 15:54)      No Known Allergies      pantoprazole Infusion 8mG/Hr IV Continuous <Continuous>  ondansetron Injectable 4milliGRAM(s) IV Push every 6 hours PRN  senna 2Tablet(s) Oral at bedtime PRN  docusate sodium 100milliGRAM(s) Oral three times a day  phytonadione   Solution 5milliGRAM(s) Oral once  furosemide   Injectable 40milliGRAM(s) IV Push once  dextrose 5% + sodium chloride 0.45%. 1000milliLiter(s) IV Continuous <Continuous>  tamsulosin 0.4milliGRAM(s) Oral at bedtime  sotalol 80milliGRAM(s) Oral every 12 hours  levothyroxine 75MICROGram(s) Oral daily        FAMILY HISTORY:  No pertinent family history in first degree relatives        Review of Systems:    General:  No wt loss, fevers, chills, night sweats,fatigue,   Eyes:  Good vision, no reported pain  ENT:  No sore throat, pain, runny nose, dysphagia  CV:  No pain, palpitatioins, hypo/hypertension  Resp:  No dyspnea, cough, tachypnea, wheezing  GI:  No pain, No nausea, No vomiting, No diarrhea, No constipatiion, No weight loss, No fever, No pruritis, No rectal bleeding, No tarry stools, No dysphagia,  :  No pain, bleeding, incontinence, nocturia  Muscle:  No pain, weakness  Breast:  No pain, abscess, mass, discharge  Neuro:  No weakness, tingling, memory problems  Psych:  No fatigue, insomnia, mood problems, depression  Endocrine:  No polyuria, polydypsia, cold/heat intolerance  Heme:  No petechiae, ecchymosis, easy bruisability  Skin:  No rash, tattoos, scars, edema    Relevant Family History:       Relevant Social History:       Physical Exam:    Vital Signs:  Vital Signs Last 24 Hrs  T(C): 36.7, Max: 36.7 (04-03 @ 11:22)  T(F): 98.1, Max: 98.1 (04-03 @ 11:22)  HR: 73 (73 - 96)  BP: 93/59 (93/59 - 112/64)  BP(mean): 70 (70 - 70)  RR: 16 (16 - 18)  SpO2: 98% (98% - 100%)  Daily Height in cm: 180.34 (03 Apr 2017 11:22)    Daily     General:  Appears stated age, well-groomed, well-nourished, no distress  HEENT:  NC/AT,  conjunctivae clear and pink, no thyromegaly, nodules, adenopathy, no JVD  Chest:  Full & symmetric excursion, no increased effort, breath sounds clear  Cardiovascular:  Regular rhythm, S1, S2, no murmur/rub/S3/S4, no abdominal bruit, no edema  Abdomen:  Soft, non-tender, non-distended, normoactive bowel sounds,  no masses ,no hepatosplenomeagaly, no signs of chronic liver disease  Extremities:  no cyanosis,clubbing or edema  Skin:  No rash/erythema/ecchymoses/petechiae/wounds/abscess/warm/dry  Neuro/Psych:  Alert, oriented, no asterixis, no tremor, no encephalopathy  Rectal - no mass, good tone, stools liquid brown  Laboratory:                            7.7    8.7   )-----------( 120      ( 03 Apr 2017 12:24 )             25.3     03 Apr 2017 12:24    142    |  109    |  60     ----------------------------<  106    4.3     |  28     |  1.92     Ca    8.9        03 Apr 2017 12:24    TPro  6.2    /  Alb  3.1    /  TBili  0.3    /  DBili  x      /  AST  25     /  ALT  26     /  AlkPhos  70     03 Apr 2017 12:24    LIVER FUNCTIONS - ( 03 Apr 2017 12:24 )  Alb: 3.1 g/dL / Pro: 6.2 g/dL / ALK PHOS: 70 U/L / ALT: 26 U/L DA / AST: 25 U/L / GGT: x           PT/INR - ( 03 Apr 2017 12:24 )   PT: 27.2 sec;   INR: 2.45 ratio         PTT - ( 03 Apr 2017 12:24 )  PTT:38.1 sec      Imaging:      Assessment:      Plan:
Critical Care  CONSULT NOTE      HARSHAL [place]  [id number]    Patient is a 88y old  Male who presents with a chief complaint of SOB/weakness (03 Apr 2017 15:54)  pt in bed  seen and examined  admitted for GI Bleed  progressive SOB a/w dark tarry stool  pt has hx of PUD  on AC for AFIB  found to have low HGB in ED  got FFP and Vit K, scheduled for 2 units of PRBC  NPO after MN for possible EGD, GI consult noted  pt is awake and alert x 3      HISTORY OF PRESENT ILLNESS:    MEDICATIONS  (STANDING):  pantoprazole Infusion 8mG/Hr IV Continuous <Continuous>  docusate sodium 100milliGRAM(s) Oral three times a day  furosemide   Injectable 40milliGRAM(s) IV Push once  dextrose 5% + sodium chloride 0.45%. 1000milliLiter(s) IV Continuous <Continuous>  tamsulosin 0.4milliGRAM(s) Oral at bedtime  sotalol 80milliGRAM(s) Oral every 12 hours  levothyroxine 75MICROGram(s) Oral daily      MEDICATIONS  (PRN):  ondansetron Injectable 4milliGRAM(s) IV Push every 6 hours PRN Nausea  senna 2Tablet(s) Oral at bedtime PRN Constipation      Allergies    No Known Allergies    Intolerances        PAST MEDICAL & SURGICAL HISTORY:  Mitral valve disorder  Kidney stone  Diverticulosis  CKD (chronic kidney disease)  Afib  Hypothyroidism  BPH (benign prostatic hyperplasia)  Chronic peptic ulcer: S/P surgery more than 10 yrs ago  No significant past surgical history      FAMILY HISTORY:  No pertinent family history in first degree relatives      SOCIAL HISTORY  Smoking History:     REVIEW OF SYSTEMS:    REVIEW OF SYSTEMS      General:	alert    Skin/Breast:  	  Ophthalmologic:  	  ENMT:	    Respiratory and Thorax: MCCAULEY  	  Cardiovascular:	s1s2    Gastrointestinal:	 dark stool    Genitourinary:	    Musculoskeletal:	    Neurological:	    Psychiatric:	    Hematology/Lymphatics:	    Endocrine:	    Allergic/Immunologic:	    Vital Signs Last 24 Hrs  T(C): 36.4, Max: 36.9 (04-03 @ 16:50)  T(F): 97.6, Max: 98.4 (04-03 @ 16:50)  HR: 87 (72 - 96)  BP: 111/57 (93/59 - 113/72)  BP(mean): 69 (69 - 86)  RR: 19 (16 - 19)  SpO2: 91% (91% - 100%)    PHYSICAL EXAMINATION:  PHYSICAL EXAM:      Constitutional:    Eyes:    ENMT:    Neck:    Breasts:    Back:    Respiratory: dec BS    Cardiovascular: s1s2    Gastrointestinal: soft and positive BS    Genitourinary:    Rectal:    Extremities: edema is noted in LE    Vascular:    Neurological:    Skin:    Lymph Nodes:    Musculoskeletal:    Psychiatric:          LABS:                        7.7    8.7   )-----------( 120      ( 03 Apr 2017 12:24 )             25.3     03 Apr 2017 12:24    142    |  109    |  60     ----------------------------<  106    4.3     |  28     |  1.92     Ca    8.9        03 Apr 2017 12:24    TPro  6.2    /  Alb  3.1    /  TBili  0.3    /  DBili  x      /  AST  25     /  ALT  26     /  AlkPhos  70     03 Apr 2017 12:24    PT/INR - ( 03 Apr 2017 12:24 )   PT: 27.2 sec;   INR: 2.45 ratio         PTT - ( 03 Apr 2017 12:24 )  PTT:38.1 sec      CARDIAC MARKERS ( 03 Apr 2017 12:24 )  .037 ng/mL / x     / 72 U/L / x     / x          D-Dimer Assay, Quantitative: <150 ng/mL DDU (04-03 @ 12:24)    Serum Pro-Brain Natriuretic Peptide: 1886 pg/mL (04-03 @ 12:24)          MICROBIOLOGY:    RADIOLOGY & ADDITIONAL STUDIES:
History of Present Illness: The patient is an 88 year old male with a history of chronic AF on warfarin, hypothyroidism, BPH, CKD who presents with dyspnea on exertion when walking up stairs and weakness. His symptoms have progressed over the past few days. He denies chest pain, dizziness, palpitations. Last week, he noted dark stools that later turned pink in color. No recent cardiac testing. No prior bleeding on warfarin. Yesterday received pRBC, FFP, vitamin K.    Past Medica/Surgical History:  Chronic AF on warfarin, hypothyroidism, BPH, CKD    Medications:  MEDICATIONS  (STANDING):  pantoprazole Infusion 8mG/Hr IV Continuous <Continuous>  docusate sodium 100milliGRAM(s) Oral three times a day  dextrose 5% + sodium chloride 0.45%. 1000milliLiter(s) IV Continuous <Continuous>  tamsulosin 0.4milliGRAM(s) Oral at bedtime  sotalol 80milliGRAM(s) Oral every 12 hours  levothyroxine 75MICROGram(s) Oral daily    MEDICATIONS  (PRN):  ondansetron Injectable 4milliGRAM(s) IV Push every 6 hours PRN Nausea  senna 2Tablet(s) Oral at bedtime PRN Constipation  acetaminophen   Tablet 650milliGRAM(s) Oral every 6 hours PRN For Temp greater than 38 C (100.4 F)  acetaminophen   Tablet. 650milliGRAM(s) Oral every 6 hours PRN Mild Pain (1 - 3)      Family History: Non-contributory family history of premature cardiovascular atherosclerotic disease    Social History: No tobacco, alcohol or drug use    Review of Systems:  General: No fevers, chills, weight loss or gain  Skin: No rashes, color changes  Cardiovascular: No chest pain, orthopnea  Respiratory: No shortness of breath, cough  Gastrointestinal: No nausea, abdominal pain  Genitourinary: No incontinence, pain with urination  Musculoskeletal: No pain, swelling, decreased range of motion  Neurological: No headache, weakness  Psychiatric: No depression, anxiety  Endocrine: No weight loss or gain, increased thirst  All other systems are comprehensively negative.    Physical Exam:  Vitals:        Vital Signs Last 24 Hrs  T(C): 37.7, Max: 37.7 (04-04 @ 04:27)  T(F): 99.8, Max: 99.8 (04-04 @ 04:27)  HR: 66 (55 - 96)  BP: 92/54 (81/54 - 116/64)  BP(mean): 66 (62 - 86)  RR: 22 (14 - 24)  SpO2: 97% (91% - 100%)  General: NAD  Neck: No JVD, no carotid bruit  Lungs: CTAB  CV: RRR, nl S1/S2, no M/R/G  Abdomen: S/NT/ND, +BS  Extremities: 1+ LE edema, no cyanosis    Labs:                        8.8    13.0  )-----------( 98       ( 04 Apr 2017 03:27 )             26.2     04 Apr 2017 03:27    142    |  110    |  54     ----------------------------<  125    3.8     |  25     |  1.45     Ca    8.0        04 Apr 2017 03:27    TPro  6.2    /  Alb  3.1    /  TBili  0.3    /  DBili  x      /  AST  25     /  ALT  26     /  AlkPhos  70     03 Apr 2017 12:24    CARDIAC MARKERS ( 03 Apr 2017 12:24 )  .037 ng/mL / x     / 72 U/L / x     / x          PT/INR - ( 04 Apr 2017 04:09 )   PT: 21.3 sec;   INR: 1.93 ratio         PTT - ( 03 Apr 2017 12:24 )  PTT:38.1 sec    ECG: AF

## 2017-04-05 LAB
ANION GAP SERPL CALC-SCNC: 6 MMOL/L — SIGNIFICANT CHANGE UP (ref 5–17)
BUN SERPL-MCNC: 39 MG/DL — HIGH (ref 7–23)
CALCIUM SERPL-MCNC: 8.4 MG/DL — SIGNIFICANT CHANGE UP (ref 8.4–10.5)
CHLORIDE SERPL-SCNC: 113 MMOL/L — HIGH (ref 96–108)
CO2 SERPL-SCNC: 27 MMOL/L — SIGNIFICANT CHANGE UP (ref 22–31)
CREAT SERPL-MCNC: 1.7 MG/DL — HIGH (ref 0.5–1.3)
GLUCOSE SERPL-MCNC: 102 MG/DL — HIGH (ref 70–99)
HCT VFR BLD CALC: 23.7 % — LOW (ref 39–50)
HCT VFR BLD CALC: 28.3 % — LOW (ref 39–50)
HGB BLD-MCNC: 7.9 G/DL — LOW (ref 13–17)
HGB BLD-MCNC: 9 G/DL — LOW (ref 13–17)
INR BLD: 1.44 RATIO — HIGH (ref 0.88–1.16)
MCHC RBC-ENTMCNC: 30.3 PG — SIGNIFICANT CHANGE UP (ref 27–34)
MCHC RBC-ENTMCNC: 31.6 GM/DL — LOW (ref 32–36)
MCHC RBC-ENTMCNC: 32.2 PG — SIGNIFICANT CHANGE UP (ref 27–34)
MCHC RBC-ENTMCNC: 33.3 GM/DL — SIGNIFICANT CHANGE UP (ref 32–36)
MCV RBC AUTO: 96 FL — SIGNIFICANT CHANGE UP (ref 80–100)
MCV RBC AUTO: 96.8 FL — SIGNIFICANT CHANGE UP (ref 80–100)
PLATELET # BLD AUTO: 94 K/UL — LOW (ref 150–400)
PLATELET # BLD AUTO: 99 K/UL — LOW (ref 150–400)
POTASSIUM SERPL-MCNC: 4 MMOL/L — SIGNIFICANT CHANGE UP (ref 3.5–5.3)
POTASSIUM SERPL-SCNC: 4 MMOL/L — SIGNIFICANT CHANGE UP (ref 3.5–5.3)
PROTHROM AB SERPL-ACNC: 15.8 SEC — HIGH (ref 9.8–12.7)
RBC # BLD: 2.44 M/UL — LOW (ref 4.2–5.8)
RBC # BLD: 2.95 M/UL — LOW (ref 4.2–5.8)
RBC # FLD: 15.1 % — HIGH (ref 10.3–14.5)
RBC # FLD: 15.7 % — HIGH (ref 10.3–14.5)
SODIUM SERPL-SCNC: 146 MMOL/L — HIGH (ref 135–145)
WBC # BLD: 10.6 K/UL — HIGH (ref 3.8–10.5)
WBC # BLD: 9.8 K/UL — SIGNIFICANT CHANGE UP (ref 3.8–10.5)
WBC # FLD AUTO: 10.6 K/UL — HIGH (ref 3.8–10.5)
WBC # FLD AUTO: 9.8 K/UL — SIGNIFICANT CHANGE UP (ref 3.8–10.5)

## 2017-04-05 PROCEDURE — 99233 SBSQ HOSP IP/OBS HIGH 50: CPT

## 2017-04-05 RX ORDER — SODIUM CHLORIDE 9 MG/ML
1000 INJECTION, SOLUTION INTRAVENOUS
Qty: 0 | Refills: 0 | Status: DISCONTINUED | OUTPATIENT
Start: 2017-04-05 | End: 2017-04-07

## 2017-04-05 RX ADMIN — SODIUM CHLORIDE 80 MILLILITER(S): 9 INJECTION, SOLUTION INTRAVENOUS at 06:15

## 2017-04-05 RX ADMIN — Medication 80 MILLIGRAM(S): at 06:16

## 2017-04-05 RX ADMIN — Medication 100 MILLIGRAM(S): at 06:17

## 2017-04-05 RX ADMIN — Medication 1 GRAM(S): at 21:34

## 2017-04-05 RX ADMIN — Medication 80 MILLIGRAM(S): at 18:11

## 2017-04-05 RX ADMIN — PANTOPRAZOLE SODIUM 10 MG/HR: 20 TABLET, DELAYED RELEASE ORAL at 13:54

## 2017-04-05 RX ADMIN — Medication 1 GRAM(S): at 13:37

## 2017-04-05 RX ADMIN — Medication 1 GRAM(S): at 06:16

## 2017-04-05 RX ADMIN — TAMSULOSIN HYDROCHLORIDE 0.4 MILLIGRAM(S): 0.4 CAPSULE ORAL at 21:34

## 2017-04-05 RX ADMIN — Medication 75 MICROGRAM(S): at 06:16

## 2017-04-05 RX ADMIN — SODIUM CHLORIDE 60 MILLILITER(S): 9 INJECTION, SOLUTION INTRAVENOUS at 12:37

## 2017-04-05 RX ADMIN — PANTOPRAZOLE SODIUM 10 MG/HR: 20 TABLET, DELAYED RELEASE ORAL at 01:55

## 2017-04-05 NOTE — PROGRESS NOTE ADULT - PROBLEM SELECTOR PLAN 2
Baseline creatinine 1.5.  Renal function is improving.  continue with IVF, and monitor renal function.

## 2017-04-05 NOTE — PROGRESS NOTE ADULT - SUBJECTIVE AND OBJECTIVE BOX
Chief Complaint: GI bleed    Interval Events: Underwent EGD yesterday showing ulcer. No complaints today.    Review of Systems:  General: No fevers, chills, weight loss or gain  Skin: No rashes, color changes  Cardiovascular: No chest pain, orthopnea  Respiratory: No shortness of breath, cough  Gastrointestinal: No nausea, abdominal pain  Genitourinary: No incontinence, pain with urination  Musculoskeletal: No pain, swelling, decreased range of motion  Neurological: No headache, weakness  Psychiatric: No depression, anxiety  Endocrine: No weight loss or gain, increased thirst  All other systems are comprehensively negative.    Physical Exam:  Vitals:        Vital Signs Last 24 Hrs  T(C): 36.9, Max: 37.4 (04-04 @ 20:01)  T(F): 98.4, Max: 99.4 (04-04 @ 20:01)  HR: 67 (63 - 100)  BP: 123/68 (103/52 - 140/83)  BP(mean): 85 (66 - 99)  RR: 15 (12 - 22)  SpO2: 100% (90% - 100%)  General: NAD  Neck: No JVD, no carotid bruit  Lungs: CTAB  CV: RRR, nl S1/S2, no M/R/G  Abdomen: S/NT/ND, +BS  Extremities: No LE edema, no cyanosis    Labs:                        7.9    9.8   )-----------( 94       ( 05 Apr 2017 06:40 )             23.7     04-05    146<H>  |  113<H>  |  39<H>  ----------------------------<  102<H>  4.0   |  27  |  1.70<H>    Ca    8.4      05 Apr 2017 06:40    TPro  6.2  /  Alb  3.1<L>  /  TBili  0.3  /  DBili  x   /  AST  25  /  ALT  26  /  AlkPhos  70  04-03    CARDIAC MARKERS ( 03 Apr 2017 12:24 )  .037 ng/mL / x     / 72 U/L / x     / x          PT/INR - ( 05 Apr 2017 06:40 )   PT: 15.8 sec;   INR: 1.44 ratio         PTT - ( 04 Apr 2017 11:42 )  PTT:32.7 sec

## 2017-04-05 NOTE — PROGRESS NOTE ADULT - ASSESSMENT
The patient is an 88 year old male with a history of chronic AF on warfarin, hypothyroidism, BPH, CKD who presents with dyspnea on exertion and weakness in the setting of GI bleed.    Plan:  - Holding warfarin for 7 days as per GI  - Continue sotalol 80 mg q12h  - Monitor hemoglobin  - GI follow-up

## 2017-04-05 NOTE — PROGRESS NOTE ADULT - PROBLEM SELECTOR PLAN 1
GE junction ulcer  injected   cont PPI gtt  cont carafate  GI follow up   trend H and H  VKA on hold for 7 days  scd dvt p  dec IVF  I and O  out of bed  consider downgrade to [facility]  overall doing better

## 2017-04-05 NOTE — PROGRESS NOTE ADULT - ASSESSMENT
gi bleeding, anemia w/ melena in pt on coumadin  s/p prbc   history of bilroth II  - EGD w/ gastrojej ulcer w/o active bleeding s/p injection w/ epi   _f/u path re HP   - full liquids today    - f/u CBC daily   -Holding coumadin for 7 days   -cont w/ IV PPI drip today w/ Carafate

## 2017-04-05 NOTE — PROGRESS NOTE ADULT - SUBJECTIVE AND OBJECTIVE BOX
Date/Time Patient Seen:  		  Referring MD:   Data Reviewed	       Patient is a 88y old  Male who presents with a chief complaint of SOB/weakness (03 Apr 2017 15:54)  pt in bed  seen and examined  EGD report reviewed  GI recs appreciated  cont PPI and Carafate, AC VKA on hold for 7 days  vs and meds reviewed      Subjective/HPI       Medication list         MEDICATIONS  (STANDING):  pantoprazole Infusion 8mG/Hr IV Continuous <Continuous>  docusate sodium 100milliGRAM(s) Oral three times a day  tamsulosin 0.4milliGRAM(s) Oral at bedtime  sotalol 80milliGRAM(s) Oral every 12 hours  levothyroxine 75MICROGram(s) Oral daily  sucralfate suspension 1Gram(s) Oral three times a day  lactated ringers. 1000milliLiter(s) IV Continuous <Continuous>    MEDICATIONS  (PRN):  ondansetron Injectable 4milliGRAM(s) IV Push every 6 hours PRN Nausea  senna 2Tablet(s) Oral at bedtime PRN Constipation  acetaminophen   Tablet 650milliGRAM(s) Oral every 6 hours PRN For Temp greater than 38 C (100.4 F)  acetaminophen   Tablet. 650milliGRAM(s) Oral every 6 hours PRN Mild Pain (1 - 3)         Vitals log        ICU Vital Signs Last 24 Hrs  T(C): 36.9, Max: 37.4 (04-04 @ 20:01)  T(F): 98.4, Max: 99.4 (04-04 @ 20:01)  HR: 77 (57 - 100)  BP: 132/73 (93/54 - 140/83)  BP(mean): 92 (66 - 99)  ABP: --  ABP(mean): --  RR: 22 (12 - 23)  SpO2: 96% (90% - 100%)           Input and Output:  I&O's Detail  I & Os for 24h ending 04 Apr 2017 07:00  =============================================  IN:    FFP (Fresh Frozen Plasma): 1007 ml    dextrose 5% + sodium chloride 0.45%.: 960 ml    Oral Fluid: 300 ml    Packed Red Blood Cells: 298 ml    pantoprazole Infusion: 160 ml    Total IN: 2725 ml  ---------------------------------------------  OUT:    Voided: 200 ml    Total OUT: 200 ml  ---------------------------------------------  Total NET: 2525 ml    I & Os for current day (as of 05 Apr 2017 06:28)  =============================================  IN:    lactated ringers.: 1040 ml    Oral Fluid: 580 ml    dextrose 5% + sodium chloride 0.45%.: 300 ml    pantoprazole Infusion: 230 ml    lactated ringers.: 200 ml    Total IN: 2350 ml  ---------------------------------------------  OUT:    Voided: 1600 ml    Total OUT: 1600 ml  ---------------------------------------------  Total NET: 750 ml      Lab Data                        8.1    9.7   )-----------( 99       ( 04 Apr 2017 22:39 )             23.8     04 Apr 2017 03:27    142    |  110    |  54     ----------------------------<  125    3.8     |  25     |  1.45     Ca    8.0        04 Apr 2017 03:27    TPro  6.2    /  Alb  3.1    /  TBili  0.3    /  DBili  x      /  AST  25     /  ALT  26     /  AlkPhos  70     03 Apr 2017 12:24      CARDIAC MARKERS ( 03 Apr 2017 12:24 )  .037 ng/mL / x     / 72 U/L / x     / x            Review of Systems	      Objective       Resp dec BS    CV       GI    Extremities edema       Neuro       Skin         Pertinent Lab findings & Imaging      Apoorva:  NO   Adequate UO     I&O's Detail  I & Os for 24h ending 04 Apr 2017 07:00  =============================================  IN:    FFP (Fresh Frozen Plasma): 1007 ml    dextrose 5% + sodium chloride 0.45%.: 960 ml    Oral Fluid: 300 ml    Packed Red Blood Cells: 298 ml    pantoprazole Infusion: 160 ml    Total IN: 2725 ml  ---------------------------------------------  OUT:    Voided: 200 ml    Total OUT: 200 ml  ---------------------------------------------  Total NET: 2525 ml    I & Os for current day (as of 05 Apr 2017 06:28)  =============================================  IN:    lactated ringers.: 1040 ml    Oral Fluid: 580 ml    dextrose 5% + sodium chloride 0.45%.: 300 ml    pantoprazole Infusion: 230 ml    lactated ringers.: 200 ml    Total IN: 2350 ml  ---------------------------------------------  OUT:    Voided: 1600 ml    Total OUT: 1600 ml  ---------------------------------------------  Total NET: 750 ml           Discussed with:     Cultures:	        Radiology

## 2017-04-05 NOTE — PROGRESS NOTE ADULT - SUBJECTIVE AND OBJECTIVE BOX
Patient reports that he feels better.  +fatigue.  SOB resolved.  Denies any chest pain, SOB, palpitation, abdominal pain, N/V/D, numbness or weakness.  + dark tarry stool overnight        Constitutional: No fever  Skin: No rash.  Eyes: No recent vision problems or eye pain.  ENT: No congestion, ear pain, or sore throat.  Cardiovascular: No chest pain or palpation.  Respiratory: No cough, shortness of breath, congestion, or wheezing.  Gastrointestinal: No abdominal pain, nausea, vomiting, or diarrhea.  Genitourinary: No dysuria.  Musculoskeletal: No joint swelling.  Neurologic: No headache.    T 98.4 P 82  /69  RR 17 Spo2 97 RA      PHYSICAL EXAM-  GENERAL: NAD, well-groomed  HEAD:  Atraumatic, Normocephalic  EYES: EOMI, PERRLA, conjunctiva and sclera clear  NECK: Supple, No JVD, Normal thyroid  NERVOUS SYSTEM:  Alert & Oriented X3, Motor Strength 5/5 B/L upper and lower extremities  CHEST/LUNG: Clear to percussion bilaterally; No rales, rhonchi, wheezing, or rubs  HEART: Regular rate and rhythm; No murmurs, rubs, or gallops  ABDOMEN: Soft, Nontender, Nondistended; Bowel sounds present  EXTREMITIES:  2+ Peripheral Pulses, No clubbing, cyanosis, or edema  SKIN: No rashes or lesions                            7.9    9.8   )-----------( 94       ( 05 Apr 2017 06:40 )             23.7     04-05    146<H>  |  113<H>  |  39<H>  ----------------------------<  102<H>  4.0   |  27  |  1.70<H>    Ca    8.4      05 Apr 2017 06:40    TPro  6.2  /  Alb  3.1<L>  /  TBili  0.3  /  DBili  x   /  AST  25  /  ALT  26  /  AlkPhos  70  04-03    CARDIAC MARKERS ( 03 Apr 2017 12:24 )  .037 ng/mL / x     / 72 U/L / x     / x              PT/INR - ( 05 Apr 2017 06:40 )   PT: 15.8 sec;   INR: 1.44 ratio         PTT - ( 04 Apr 2017 11:42 )  PTT:32.7 sec      MEDICATIONS  (STANDING):  pantoprazole Infusion 8mG/Hr IV Continuous <Continuous>  docusate sodium 100milliGRAM(s) Oral three times a day  tamsulosin 0.4milliGRAM(s) Oral at bedtime  sotalol 80milliGRAM(s) Oral every 12 hours  levothyroxine 75MICROGram(s) Oral daily  sucralfate suspension 1Gram(s) Oral three times a day  dextrose 5% + sodium chloride 0.45%. 1000milliLiter(s) IV Continuous <Continuous>    MEDICATIONS  (PRN):  ondansetron Injectable 4milliGRAM(s) IV Push every 6 hours PRN Nausea  senna 2Tablet(s) Oral at bedtime PRN Constipation  acetaminophen   Tablet 650milliGRAM(s) Oral every 6 hours PRN For Temp greater than 38 C (100.4 F)  acetaminophen   Tablet. 650milliGRAM(s) Oral every 6 hours PRN Mild Pain (1 - 3)

## 2017-04-05 NOTE — PROGRESS NOTE ADULT - SUBJECTIVE AND OBJECTIVE BOX
Chief Complaint:  stools dark today  post egd 4/4      INTERVAL HPI/OVERNIGHT EVENTS:  none      MEDICATIONS  (STANDING):  pantoprazole Infusion 8mG/Hr IV Continuous <Continuous>  docusate sodium 100milliGRAM(s) Oral three times a day  tamsulosin 0.4milliGRAM(s) Oral at bedtime  sotalol 80milliGRAM(s) Oral every 12 hours  levothyroxine 75MICROGram(s) Oral daily  sucralfate suspension 1Gram(s) Oral three times a day  dextrose 5% + sodium chloride 0.45%. 1000milliLiter(s) IV Continuous <Continuous>    MEDICATIONS  (PRN):  ondansetron Injectable 4milliGRAM(s) IV Push every 6 hours PRN Nausea  senna 2Tablet(s) Oral at bedtime PRN Constipation  acetaminophen   Tablet 650milliGRAM(s) Oral every 6 hours PRN For Temp greater than 38 C (100.4 F)  acetaminophen   Tablet. 650milliGRAM(s) Oral every 6 hours PRN Mild Pain (1 - 3)            Vital Signs Last 24 Hrs  T(C): 36.9, Max: 37.4 (04-04 @ 20:01)  T(F): 98.4, Max: 99.4 (04-04 @ 20:01)  HR: 82 (57 - 100)  BP: 125/69 (93/54 - 140/83)  BP(mean): 87 (66 - 99)  RR: 17 (12 - 23)  SpO2: 97% (90% - 100%)    Physical exam:            LABS:                        7.9    9.8   )-----------( 94       ( 05 Apr 2017 06:40 )             23.7     04-05    146<H>  |  113<H>  |  39<H>  ----------------------------<  102<H>  4.0   |  27  |  1.70<H>    Ca    8.4      05 Apr 2017 06:40    TPro  6.2  /  Alb  3.1<L>  /  TBili  0.3  /  DBili  x   /  AST  25  /  ALT  26  /  AlkPhos  70  04-03    PT/INR - ( 05 Apr 2017 06:40 )   PT: 15.8 sec;   INR: 1.44 ratio         PTT - ( 04 Apr 2017 11:42 )  PTT:32.7 sec      RADIOLOGY & ADDITIONAL TESTS:

## 2017-04-06 ENCOUNTER — TRANSCRIPTION ENCOUNTER (OUTPATIENT)
Age: 82
End: 2017-04-06

## 2017-04-06 LAB
ANION GAP SERPL CALC-SCNC: 6 MMOL/L — SIGNIFICANT CHANGE UP (ref 5–17)
BUN SERPL-MCNC: 32 MG/DL — HIGH (ref 7–23)
CALCIUM SERPL-MCNC: 8.2 MG/DL — LOW (ref 8.4–10.5)
CHLORIDE SERPL-SCNC: 111 MMOL/L — HIGH (ref 96–108)
CO2 SERPL-SCNC: 27 MMOL/L — SIGNIFICANT CHANGE UP (ref 22–31)
CREAT SERPL-MCNC: 1.61 MG/DL — HIGH (ref 0.5–1.3)
GLUCOSE SERPL-MCNC: 120 MG/DL — HIGH (ref 70–99)
HCT VFR BLD CALC: 26.5 % — LOW (ref 39–50)
HGB BLD-MCNC: 8.8 G/DL — LOW (ref 13–17)
INR BLD: 1.31 RATIO — HIGH (ref 0.88–1.16)
MCHC RBC-ENTMCNC: 31.8 PG — SIGNIFICANT CHANGE UP (ref 27–34)
MCHC RBC-ENTMCNC: 33.4 GM/DL — SIGNIFICANT CHANGE UP (ref 32–36)
MCV RBC AUTO: 95.5 FL — SIGNIFICANT CHANGE UP (ref 80–100)
PLATELET # BLD AUTO: 93 K/UL — LOW (ref 150–400)
POTASSIUM SERPL-MCNC: 3.7 MMOL/L — SIGNIFICANT CHANGE UP (ref 3.5–5.3)
POTASSIUM SERPL-SCNC: 3.7 MMOL/L — SIGNIFICANT CHANGE UP (ref 3.5–5.3)
PROTHROM AB SERPL-ACNC: 14.4 SEC — HIGH (ref 9.8–12.7)
RBC # BLD: 2.77 M/UL — LOW (ref 4.2–5.8)
RBC # FLD: 14.8 % — HIGH (ref 10.3–14.5)
SODIUM SERPL-SCNC: 144 MMOL/L — SIGNIFICANT CHANGE UP (ref 135–145)
WBC # BLD: 8.9 K/UL — SIGNIFICANT CHANGE UP (ref 3.8–10.5)
WBC # FLD AUTO: 8.9 K/UL — SIGNIFICANT CHANGE UP (ref 3.8–10.5)

## 2017-04-06 PROCEDURE — 99232 SBSQ HOSP IP/OBS MODERATE 35: CPT

## 2017-04-06 RX ORDER — FERROUS SULFATE 325(65) MG
1 TABLET ORAL
Qty: 90 | Refills: 0 | OUTPATIENT
Start: 2017-04-06 | End: 2017-05-06

## 2017-04-06 RX ORDER — SENNA PLUS 8.6 MG/1
2 TABLET ORAL
Qty: 0 | Refills: 0 | COMMUNITY
Start: 2017-04-06

## 2017-04-06 RX ORDER — SUCRALFATE 1 G
10 TABLET ORAL
Qty: 900 | Refills: 0 | OUTPATIENT
Start: 2017-04-06 | End: 2017-05-06

## 2017-04-06 RX ORDER — PANTOPRAZOLE SODIUM 20 MG/1
1 TABLET, DELAYED RELEASE ORAL
Qty: 60 | Refills: 0 | OUTPATIENT
Start: 2017-04-06 | End: 2017-05-06

## 2017-04-06 RX ORDER — ACETAMINOPHEN 500 MG
2 TABLET ORAL
Qty: 0 | Refills: 0 | COMMUNITY
Start: 2017-04-06

## 2017-04-06 RX ORDER — DOCUSATE SODIUM 100 MG
1 CAPSULE ORAL
Qty: 0 | Refills: 0 | COMMUNITY
Start: 2017-04-06

## 2017-04-06 RX ORDER — WARFARIN SODIUM 2.5 MG/1
1 TABLET ORAL
Qty: 0 | Refills: 0 | COMMUNITY

## 2017-04-06 RX ADMIN — Medication 80 MILLIGRAM(S): at 05:32

## 2017-04-06 RX ADMIN — TAMSULOSIN HYDROCHLORIDE 0.4 MILLIGRAM(S): 0.4 CAPSULE ORAL at 21:47

## 2017-04-06 RX ADMIN — PANTOPRAZOLE SODIUM 10 MG/HR: 20 TABLET, DELAYED RELEASE ORAL at 17:04

## 2017-04-06 RX ADMIN — SODIUM CHLORIDE 60 MILLILITER(S): 9 INJECTION, SOLUTION INTRAVENOUS at 17:04

## 2017-04-06 RX ADMIN — PANTOPRAZOLE SODIUM 10 MG/HR: 20 TABLET, DELAYED RELEASE ORAL at 05:32

## 2017-04-06 RX ADMIN — SODIUM CHLORIDE 60 MILLILITER(S): 9 INJECTION, SOLUTION INTRAVENOUS at 05:32

## 2017-04-06 RX ADMIN — Medication 1 GRAM(S): at 05:32

## 2017-04-06 RX ADMIN — Medication 1 GRAM(S): at 11:06

## 2017-04-06 RX ADMIN — Medication 1 GRAM(S): at 21:47

## 2017-04-06 RX ADMIN — Medication 75 MICROGRAM(S): at 05:32

## 2017-04-06 NOTE — DISCHARGE NOTE ADULT - HOME CARE AGENCY
[facility] - [phone]  Nurse to visit the day after hospital discharge. Please contact the home care agency at the above phone number if you have not heard from them by 12 noon on the day after your hospital discharge. [facility] - [phone]  Nurse to visit the day after hospital discharge. Please contact the home care agency at the above phone number if you have not heard from them by 12 noon on the day after your hospital discharge.

## 2017-04-06 NOTE — PROGRESS NOTE ADULT - PROBLEM SELECTOR PLAN 1
anemia  post EGD  PUD  off VKA  monitor H and H  am LABS pending  transfer pt to [facility]

## 2017-04-06 NOTE — DISCHARGE NOTE ADULT - CARE PROVIDER_API CALL
Donald Mariee), Internal Medicine  [address]  [place] [address]  Phone: [phone]  Fax: [phone]    Rip Beavers), Gastroenterology  [address]  [place] [address]  Phone: [phone]  Fax: [phone]

## 2017-04-06 NOTE — PROGRESS NOTE ADULT - SUBJECTIVE AND OBJECTIVE BOX
Feels better.  Ambulating ok W/O Any SOB.  Offers no other complaints      Constitutional: No fever, fatigue or weight loss.  Skin: No rash.  Eyes: No recent vision problems or eye pain.  ENT: No congestion, ear pain, or sore throat.  Endocrine: No thyroid problems.  Cardiovascular: No chest pain or palpation.  Respiratory: No cough, shortness of breath, congestion, or wheezing.  Gastrointestinal: No abdominal pain, nausea, vomiting, or diarrhea.  No further blood per stool  Genitourinary: No dysuria.  Musculoskeletal: No joint swelling.  Neurologic: No headache.      T 98.2 P 58  RR 23  /76  SPO2 100 Ra      PHYSICAL EXAM-  GENERAL: NAD  HEAD:  Atraumatic, Normocephalic  EYES: EOMI, PERRLA, conjunctiva and sclera clear  NECK: Supple, No JVD, Normal thyroid  NERVOUS SYSTEM:  Alert & Oriented X3, Motor Strength 5/5 B/L upper and lower extremities; DTRs 2+ intact and symmetric  CHEST/LUNG: Clear to percussion bilaterally; No rales, rhonchi, wheezing, or rubs  HEART: Irregular rhythm normal rate systolic murmur  ABDOMEN: Soft, Nontender, Nondistended; Bowel sounds present  SKIN: No rashes or lesions                              8.8    8.9   )-----------( 93       ( 06 Apr 2017 06:40 )             26.5     04-06    144  |  111<H>  |  32<H>  ----------------------------<  120<H>  3.7   |  27  |  1.61<H>    Ca    8.2<L>      06 Apr 2017 06:40              PT/INR - ( 06 Apr 2017 06:40 )   PT: 14.4 sec;   INR: 1.31 ratio        MEDICATIONS  (STANDING):  pantoprazole Infusion 8mG/Hr IV Continuous <Continuous>  docusate sodium 100milliGRAM(s) Oral three times a day  tamsulosin 0.4milliGRAM(s) Oral at bedtime  sotalol 80milliGRAM(s) Oral every 12 hours  levothyroxine 75MICROGram(s) Oral daily  sucralfate suspension 1Gram(s) Oral three times a day  dextrose 5% + sodium chloride 0.45%. 1000milliLiter(s) IV Continuous <Continuous>    MEDICATIONS  (PRN):  ondansetron Injectable 4milliGRAM(s) IV Push every 6 hours PRN Nausea  senna 2Tablet(s) Oral at bedtime PRN Constipation  acetaminophen   Tablet 650milliGRAM(s) Oral every 6 hours PRN For Temp greater than 38 C (100.4 F)  acetaminophen   Tablet. 650milliGRAM(s) Oral every 6 hours PRN Mild Pain (1 - 3)         PTT - ( 04 Apr 2017 11:42 )  PTT:32.7 sec

## 2017-04-06 NOTE — DISCHARGE NOTE ADULT - [id number]_GEN_ALL_CORE
rehabilitation services/observation and assessment/medication teaching and assessment/teaching and training

## 2017-04-06 NOTE — DISCHARGE NOTE ADULT - ADDITIONAL INSTRUCTIONS
Please call to schedule an appointment with your doctor in one week  Please call to schedule an appointment with gastroenterology in 1-2 weeks  Please come back to the hospital if you notice any blood per stool. dizziness, weakness. or any new symptoms or concerns Please call to schedule an appointment with your PCP in one week  Please call to schedule an appointment with gastroenterology in 1-2 weeks  Please come back to the hospital if you notice any blood per stool. dizziness, weakness. or any new symptoms or concerns

## 2017-04-06 NOTE — PROGRESS NOTE ADULT - ASSESSMENT
The patient is an 88 year old male with a history of chronic AF on warfarin, hypothyroidism, BPH, CKD who presents with dyspnea on exertion and weakness in the setting of GI bleed.    Plan:  - Holding warfarin for 7 days as per GI  - Continue sotalol 80 mg q12h  - Monitor hemoglobin  - GI follow-up  - Likely discharge in am

## 2017-04-06 NOTE — PROGRESS NOTE ADULT - SUBJECTIVE AND OBJECTIVE BOX
Chief Complaint:no bleeding overnight  hgb stable        INTERVAL HPI/OVERNIGHT EVENTS:  none      MEDICATIONS  (STANDING):  pantoprazole Infusion 8mG/Hr IV Continuous <Continuous>  docusate sodium 100milliGRAM(s) Oral three times a day  tamsulosin 0.4milliGRAM(s) Oral at bedtime  sotalol 80milliGRAM(s) Oral every 12 hours  levothyroxine 75MICROGram(s) Oral daily  sucralfate suspension 1Gram(s) Oral three times a day  dextrose 5% + sodium chloride 0.45%. 1000milliLiter(s) IV Continuous <Continuous>    MEDICATIONS  (PRN):  ondansetron Injectable 4milliGRAM(s) IV Push every 6 hours PRN Nausea  senna 2Tablet(s) Oral at bedtime PRN Constipation  acetaminophen   Tablet 650milliGRAM(s) Oral every 6 hours PRN For Temp greater than 38 C (100.4 F)  acetaminophen   Tablet. 650milliGRAM(s) Oral every 6 hours PRN Mild Pain (1 - 3)            Vital Signs Last 24 Hrs  T(C): 36.8, Max: 37.2 (04-05 @ 16:04)  T(F): 98.2, Max: 99 (04-05 @ 16:04)  HR: 58 (53 - 67)  BP: 111/52 (93/47 - 130/67)  BP(mean): 70 (60 - 86)  RR: 21 (15 - 24)  SpO2: 100% (91% - 100%)    Physical exam:      abd soft, non tender. bs+      LABS:                        8.8    8.9   )-----------( 93       ( 06 Apr 2017 06:40 )             26.5     04-06    144  |  111<H>  |  32<H>  ----------------------------<  120<H>  3.7   |  27  |  1.61<H>    Ca    8.2<L>      06 Apr 2017 06:40      PT/INR - ( 06 Apr 2017 06:40 )   PT: 14.4 sec;   INR: 1.31 ratio         PTT - ( 04 Apr 2017 11:42 )  PTT:32.7 sec      RADIOLOGY & ADDITIONAL TESTS:
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
## 2017-04-06 NOTE — DISCHARGE NOTE ADULT - MEDICATION SUMMARY - MEDICATIONS TO STOP TAKING
I will STOP taking the medications listed below when I get home from the hospital:    Coumadin 5 mg oral tablet  -- 1 tab(s) by mouth 2 times a week    Coumadin 2.5 mg oral tablet  -- 1 tab(s) by mouth 5 times a week

## 2017-04-06 NOTE — DISCHARGE NOTE ADULT - HOSPITAL COURSE
Patient is 89 yo male with hx of PUD, Atrial Fib on coumadin presenting with     1.  Symptomatic anemia, EGD shows Gastrojej ulcer w/o active bleeding s/p injection w/ epi.  S/P 3 units of RBC, 3 unit of FFP, and Po Vit K.  H/H stable.  Continue with IV PPI, Carafate and advance diet as tolerated.  Monitor H/H.     2.  Acute on CKD, Baseline Creatinine 1.5.  Renal function is improving.  continue with IVF, and monitor renal function.     3.  Hypothyroidism, continue with levothyroxine.    4. Afib, Continue with sotalol with holding parameter.  Hold coumadin for 7 days as per GI.       Plan of care was discussed with patient, wife, and PMD in details.  Seems to understand, and in agreement Patient is 87 yo male with hx of PUD, Atrial Fib on coumadin presenting with     1.  Symptomatic anemia, EGD shows Gastrojej ulcer w/o active bleeding s/p injection w/ epi.  S/P 3 units of RBC, 3 unit of FFP, and Po Vit K.  H/H stable.  Continue with IV PPI, Carafate and advance diet as tolerated.  Monitor Hb weekly for now.    2.  Acute on CKD, Baseline Creatinine 1.5.  Renal function stable.    3.  Hypothyroidism, continue with levothyroxine.    4. Afib, Continue with sotalol with holding parameter.  Resume coumadin April 13.      Plan of care was discussed with patient, daughter, and PMD in details.  Seems to understand, and in agreement.  High risk for CVA until coumadin resumed - but needs to be off coumadin to help with ulcer healing.  Family understands. Patient is 89 yo male with hx of PUD, Atrial Fib on coumadin presenting with     1.  Symptomatic anemia, EGD shows Gastrojej ulcer w/o active bleeding s/p injection w/ epi.  S/P 3 units of RBC, 3 unit of FFP, and Po Vit K.  H/H stable.  Continue with IV PPI, Carafate and advance diet as tolerated.  Monitor Hb weekly for now.    2.  Acute on CKD, Baseline Creatinine 1.5.  Renal function stable.    3.  Hypothyroidism, continue with levothyroxine.    4. Afib, Continue with sotalol with holding parameter.  Resume coumadin April 13.      Plan of care was discussed with patient, daughter, and PMD in details.  Seems to understand, and in agreement.  High risk for CVA until coumadin resumed - but needs to be off coumadin to help with ulcer healing.  Family understands.    pt seen on day of discharge.  vitals stable  NAD  lungs clear  irregular rhythm  abd soft - had brown stool earlier today  chronic mild pitting edema - for months per patient

## 2017-04-06 NOTE — DISCHARGE NOTE ADULT - CARE PLAN
Principal Discharge DX:	Gastrointestinal hemorrhage, unspecified gastrointestinal hemorrhage type  Goal:	Resolved.  Continue with Protonix, and Carafate.  Follow up with Gastroenterology in 1 week  Instructions for follow-up, activity and diet:	cardiac diet  Secondary Diagnosis:	Afib  Goal:	Continue with sotalol.  hold coumadin for 7 days  Secondary Diagnosis:	CKD (chronic kidney disease)  Goal:	stable  Secondary Diagnosis:	Symptomatic anemia  Goal:	Continue with iron supplement Principal Discharge DX:	Gastrointestinal hemorrhage, unspecified gastrointestinal hemorrhage type  Goal:	Resolved.  Continue with Protonix, and Carafate.  Follow up with Gastroenterology in 1 week  Instructions for follow-up, activity and diet:	cardiac diet  Secondary Diagnosis:	Afib  Goal:	Continue with sotalol.  hold coumadin until April 12  Secondary Diagnosis:	CKD (chronic kidney disease)  Goal:	stable  Secondary Diagnosis:	Symptomatic anemia  Goal:	Continue with iron supplement

## 2017-04-06 NOTE — PROGRESS NOTE ADULT - PROBLEM SELECTOR PLAN 2
pt has CKD  caution with FLUIDS  monitor for volume overload  currently on PO clears, consider tapering off on the IVF

## 2017-04-06 NOTE — DISCHARGE NOTE ADULT - CARE PROVIDERS DIRECT ADDRESSES
[email or web address],DirectAddress_Unknown

## 2017-04-06 NOTE — PROGRESS NOTE ADULT - ASSESSMENT
· Assessment		  gi bleeding, anemia w/ melena in pt on coumadin  s/p prbc   history of bilroth II  - EGD w/ gastrojej ulcer w/o active bleeding s/p injection w/ epi   _f/u path re HP   - to regular diet today   - f/u CBC daily   -Holding coumadin for 7 days   -cont w/ IV PPI drip today w/ Carafate

## 2017-04-06 NOTE — PROGRESS NOTE ADULT - SUBJECTIVE AND OBJECTIVE BOX
Chief Complaint: GI bleed    Interval Events: No events overnight. No complaints this am.    Review of Systems:  General: No fevers, chills, weight loss or gain  Skin: No rashes, color changes  Cardiovascular: No chest pain, orthopnea  Respiratory: No shortness of breath, cough  Gastrointestinal: No nausea, abdominal pain  Genitourinary: No incontinence, pain with urination  Musculoskeletal: No pain, swelling, decreased range of motion  Neurological: No headache, weakness  Psychiatric: No depression, anxiety  Endocrine: No weight loss or gain, increased thirst  All other systems are comprehensively negative.    Physical Exam:  Vital Signs Last 24 Hrs  T(C): 36.8, Max: 37.2 (04-05 @ 16:04)  T(F): 98.2, Max: 99 (04-05 @ 16:04)  HR: 60 (53 - 66)  BP: 125/76 (93/47 - 130/67)  BP(mean): 92 (60 - 92)  RR: 23 (15 - 24)  SpO2: 100% (91% - 100%)  General: NAD  Neck: No JVD, no carotid bruit  Lungs: CTAB  CV: RRR, nl S1/S2, no M/R/G  Abdomen: S/NT/ND, +BS  Extremities: No LE edema, no cyanosis    Labs:    04-06    144  |  111<H>  |  32<H>  ----------------------------<  120<H>  3.7   |  27  |  1.61<H>    Ca    8.2<L>      06 Apr 2017 06:40                          8.8    8.9   )-----------( 93       ( 06 Apr 2017 06:40 )             26.5

## 2017-04-06 NOTE — DISCHARGE NOTE ADULT - PATIENT PORTAL LINK FT
“You can access the [employer] Patient Portal, offered by [facility], by registering with the following website: [email or web address]”

## 2017-04-06 NOTE — DISCHARGE NOTE ADULT - PLAN OF CARE
Resolved.  Continue with Protonix, and Carafate.  Follow up with Gastroenterology in 1 week cardiac diet Continue with sotalol.  hold coumadin for 7 days stable Continue with iron supplement Continue with sotalol.  hold coumadin until April 12

## 2017-04-06 NOTE — PHYSICAL THERAPY INITIAL EVALUATION ADULT - BED MOBILITY TRAINING, PT EVAL
Goals: (3-5 days): Sup<->sit independent             Stairs:Up/down 1 flight with railing independently

## 2017-04-06 NOTE — PROGRESS NOTE ADULT - SUBJECTIVE AND OBJECTIVE BOX
Date/Time Patient Seen:  		  Referring MD:   Data Reviewed	       Patient is a 88y old  Male who presents with a chief complaint of SOB/weakness (03 Apr 2017 15:54)  pt in bed  on room air  on IVF  awake and alert  vs and meds reviewed      Subjective/HPI       Medication list         MEDICATIONS  (STANDING):  pantoprazole Infusion 8mG/Hr IV Continuous <Continuous>  docusate sodium 100milliGRAM(s) Oral three times a day  tamsulosin 0.4milliGRAM(s) Oral at bedtime  sotalol 80milliGRAM(s) Oral every 12 hours  levothyroxine 75MICROGram(s) Oral daily  sucralfate suspension 1Gram(s) Oral three times a day  dextrose 5% + sodium chloride 0.45%. 1000milliLiter(s) IV Continuous <Continuous>    MEDICATIONS  (PRN):  ondansetron Injectable 4milliGRAM(s) IV Push every 6 hours PRN Nausea  senna 2Tablet(s) Oral at bedtime PRN Constipation  acetaminophen   Tablet 650milliGRAM(s) Oral every 6 hours PRN For Temp greater than 38 C (100.4 F)  acetaminophen   Tablet. 650milliGRAM(s) Oral every 6 hours PRN Mild Pain (1 - 3)         Vitals log        ICU Vital Signs Last 24 Hrs  T(C): 37.1, Max: 37.2 (04-05 @ 16:04)  T(F): 98.8, Max: 99 (04-05 @ 16:04)  HR: 54 (53 - 82)  BP: 112/65 (93/47 - 130/67)  BP(mean): 79 (60 - 87)  ABP: --  ABP(mean): --  RR: 17 (15 - 24)  SpO2: 97% (91% - 100%)           Input and Output:  I&O's Detail  I & Os for 24h ending 05 Apr 2017 07:00  =============================================  IN:    lactated ringers.: 1000 ml    Oral Fluid: 580 ml    dextrose 5% + sodium chloride 0.45%.: 300 ml    pantoprazole Infusion: 230 ml    lactated ringers.: 200 ml    Total IN: 2310 ml  ---------------------------------------------  OUT:    Voided: 1600 ml    Total OUT: 1600 ml  ---------------------------------------------  Total NET: 710 ml    I & Os for current day (as of 06 Apr 2017 06:11)  =============================================  IN:    dextrose 5% + sodium chloride 0.45%.: 960 ml    Packed Red Blood Cells: 360 ml    Oral Fluid: 270 ml    pantoprazole Infusion: 230 ml    lactated ringers.: 80 ml    Total IN: 1900 ml  ---------------------------------------------  OUT:    Voided: 500 ml    Total OUT: 500 ml  ---------------------------------------------  Total NET: 1400 ml      Lab Data                        9.0    10.6  )-----------( 99       ( 05 Apr 2017 20:17 )             28.3     04-05    146<H>  |  113<H>  |  39<H>  ----------------------------<  102<H>  4.0   |  27  |  1.70<H>    Ca    8.4      05 Apr 2017 06:40              Review of Systems	      Objective       Resp no wheezing    CV s1s2       GI    Extremities edema       Neuro       Skin         Pertinent Lab findings & Imaging      Apoorva:  NO   Adequate UO     I&O's Detail  I & Os for 24h ending 05 Apr 2017 07:00  =============================================  IN:    lactated ringers.: 1000 ml    Oral Fluid: 580 ml    dextrose 5% + sodium chloride 0.45%.: 300 ml    pantoprazole Infusion: 230 ml    lactated ringers.: 200 ml    Total IN: 2310 ml  ---------------------------------------------  OUT:    Voided: 1600 ml    Total OUT: 1600 ml  ---------------------------------------------  Total NET: 710 ml    I & Os for current day (as of 06 Apr 2017 06:11)  =============================================  IN:    dextrose 5% + sodium chloride 0.45%.: 960 ml    Packed Red Blood Cells: 360 ml    Oral Fluid: 270 ml    pantoprazole Infusion: 230 ml    lactated ringers.: 80 ml    Total IN: 1900 ml  ---------------------------------------------  OUT:    Voided: 500 ml    Total OUT: 500 ml  ---------------------------------------------  Total NET: 1400 ml           Discussed with:     Cultures:	        Radiology

## 2017-04-06 NOTE — PROGRESS NOTE ADULT - PROBLEM SELECTOR PLAN 1
EGD shows Gastrojej ulcer w/o active bleeding s/p injection w/ epi.  S/P 3 units of RBC, 3 unit of FFP, and Po Vit K.  H/H stable.  Continue with IV PPI, Carafate, and advance diet as tolerated.  Monitor H/H

## 2017-04-07 LAB
ANION GAP SERPL CALC-SCNC: 9 MMOL/L — SIGNIFICANT CHANGE UP (ref 5–17)
BUN SERPL-MCNC: 27 MG/DL — HIGH (ref 7–23)
CALCIUM SERPL-MCNC: 7.8 MG/DL — LOW (ref 8.4–10.5)
CHLORIDE SERPL-SCNC: 109 MMOL/L — HIGH (ref 96–108)
CO2 SERPL-SCNC: 26 MMOL/L — SIGNIFICANT CHANGE UP (ref 22–31)
CREAT SERPL-MCNC: 1.51 MG/DL — HIGH (ref 0.5–1.3)
GLUCOSE SERPL-MCNC: 104 MG/DL — HIGH (ref 70–99)
HCT VFR BLD CALC: 26.8 % — LOW (ref 39–50)
HGB BLD-MCNC: 8.8 G/DL — LOW (ref 13–17)
MCHC RBC-ENTMCNC: 31.8 PG — SIGNIFICANT CHANGE UP (ref 27–34)
MCHC RBC-ENTMCNC: 32.6 GM/DL — SIGNIFICANT CHANGE UP (ref 32–36)
MCV RBC AUTO: 97.5 FL — SIGNIFICANT CHANGE UP (ref 80–100)
PLATELET # BLD AUTO: 98 K/UL — LOW (ref 150–400)
POTASSIUM SERPL-MCNC: 3.4 MMOL/L — LOW (ref 3.5–5.3)
POTASSIUM SERPL-SCNC: 3.4 MMOL/L — LOW (ref 3.5–5.3)
RBC # BLD: 2.75 M/UL — LOW (ref 4.2–5.8)
RBC # FLD: 14.6 % — HIGH (ref 10.3–14.5)
SODIUM SERPL-SCNC: 144 MMOL/L — SIGNIFICANT CHANGE UP (ref 135–145)
WBC # BLD: 7.7 K/UL — SIGNIFICANT CHANGE UP (ref 3.8–10.5)
WBC # FLD AUTO: 7.7 K/UL — SIGNIFICANT CHANGE UP (ref 3.8–10.5)

## 2017-04-07 PROCEDURE — 99232 SBSQ HOSP IP/OBS MODERATE 35: CPT

## 2017-04-07 RX ORDER — PANTOPRAZOLE SODIUM 20 MG/1
40 TABLET, DELAYED RELEASE ORAL
Qty: 0 | Refills: 0 | Status: DISCONTINUED | OUTPATIENT
Start: 2017-04-07 | End: 2017-04-08

## 2017-04-07 RX ORDER — FERROUS SULFATE 325(65) MG
325 TABLET ORAL
Qty: 0 | Refills: 0 | Status: DISCONTINUED | OUTPATIENT
Start: 2017-04-07 | End: 2017-04-08

## 2017-04-07 RX ORDER — POTASSIUM CHLORIDE 20 MEQ
20 PACKET (EA) ORAL ONCE
Qty: 0 | Refills: 0 | Status: COMPLETED | OUTPATIENT
Start: 2017-04-07 | End: 2017-04-07

## 2017-04-07 RX ADMIN — TAMSULOSIN HYDROCHLORIDE 0.4 MILLIGRAM(S): 0.4 CAPSULE ORAL at 21:50

## 2017-04-07 RX ADMIN — Medication 1 GRAM(S): at 06:45

## 2017-04-07 RX ADMIN — PANTOPRAZOLE SODIUM 40 MILLIGRAM(S): 20 TABLET, DELAYED RELEASE ORAL at 16:08

## 2017-04-07 RX ADMIN — Medication 325 MILLIGRAM(S): at 17:00

## 2017-04-07 RX ADMIN — Medication 75 MICROGRAM(S): at 06:45

## 2017-04-07 RX ADMIN — Medication 80 MILLIGRAM(S): at 06:45

## 2017-04-07 RX ADMIN — Medication 1 GRAM(S): at 13:33

## 2017-04-07 RX ADMIN — Medication 20 MILLIEQUIVALENT(S): at 09:16

## 2017-04-07 RX ADMIN — Medication 1 GRAM(S): at 21:50

## 2017-04-07 RX ADMIN — PANTOPRAZOLE SODIUM 40 MILLIGRAM(S): 20 TABLET, DELAYED RELEASE ORAL at 09:16

## 2017-04-07 NOTE — PROGRESS NOTE ADULT - PROBLEM SELECTOR PLAN 1
off VKA  serial LABS  h and h stable  on Carafate  CVS regimen  out of bed  mobilize  transfer to 1 e  consider dc IVF  pt has CKD most likely

## 2017-04-07 NOTE — PROGRESS NOTE ADULT - PROBLEM SELECTOR PLAN 2
Baseline creatinine 1.5.  Renal function is back to baseline.  Will D/C IVF.  Monitor renal function.

## 2017-04-07 NOTE — PROGRESS NOTE ADULT - ASSESSMENT
gi bleeding, anemia w/ melena in pt on coumadin  s/p prbc   history of bilroth II  - EGD w/ gastrojej ulcer w/o active bleeding s/p injection w/ epi   _f/u path re HP   - regular diet tolerated   - Hgb noted stable   -Holding coumadin for 7 days   -cont w/ PPI BID  w/ Carafate for 2 weeks- daily PPI thereafter.   outpt f/u in 2 wks.   d/c planning for am

## 2017-04-07 NOTE — PROGRESS NOTE ADULT - PROBLEM SELECTOR PLAN 1
EGD shows Gastrojej ulcer w/o active bleeding s/p injection w/ epi.  S/P 3 units of RBC, 3 unit of FFP, and Po Vit K.  H/H stable.  Continue with Carafate, current diet, and will switch IV PPI to PO PPI.  Monitor patient for one more day as per GI.  Monitor H/H EGD shows Gastrojej ulcer w/o active bleeding s/p injection w/ epi.  S/P 3 units of RBC, 3 unit of FFP, and Po Vit K.  H/H stable.  Continue with Carafate, current diet, and will switch IV PPI to PO PPI.  Monitor patient for one more day as per GI.  Monitor H/H  Iron supplement

## 2017-04-07 NOTE — PROGRESS NOTE ADULT - SUBJECTIVE AND OBJECTIVE BOX
Patient feels better.  Offers no complaints.  Stool is brown in color.  denies any blood per stool, N/V/D, abdominal pain, fever, chills, numbness or weakness      Constitutional: No fever, fatigue or weight loss.  Skin: No rash.  Eyes: No recent vision problems or eye pain.  ENT: No congestion, ear pain, or sore throat.  Endocrine: No thyroid problems.  Cardiovascular: No chest pain or palpation.  Respiratory: No cough, shortness of breath, congestion, or wheezing.  Gastrointestinal: No abdominal pain, nausea, vomiting, or diarrhea.  Genitourinary: No dysuria.  Musculoskeletal: No joint swelling.  Neurologic: No headache.      T 97.9 P 51  /74  RR 15  SPO2 95 RA      PHYSICAL EXAM-  GENERAL: NAD, well-groomed, well-developed  HEAD:  Atraumatic, Normocephalic  EYES: EOMI, PERRLA, conjunctiva and sclera clear  NECK: Supple, No JVD, Normal thyroid  NERVOUS SYSTEM:  Alert & Oriented X3, Motor Strength 5/5 B/L upper and lower extremities; DTRs 2+ intact and symmetric  CHEST/LUNG: Clear to percussion bilaterally; No rales, rhonchi, wheezing, or rubs  HEART: Regular rate and rhythm; No murmurs, rubs, or gallops  ABDOMEN: Soft, Nontender, Nondistended; Bowel sounds present  EXTREMITIES:  2+ Peripheral Pulses, No clubbing, cyanosis, or edema  SKIN: No rashes or lesions                              8.8    7.7   )-----------( 98       ( 07 Apr 2017 06:39 )             26.8     04-07    144  |  109<H>  |  27<H>  ----------------------------<  104<H>  3.4<L>   |  26  |  1.51<H>    Ca    7.8<L>      07 Apr 2017 06:39              PT/INR - ( 06 Apr 2017 06:40 )   PT: 14.4 sec;   INR: 1.31 ratio             MEDICATIONS  (STANDING):  docusate sodium 100milliGRAM(s) Oral three times a day  tamsulosin 0.4milliGRAM(s) Oral at bedtime  sotalol 80milliGRAM(s) Oral every 12 hours  levothyroxine 75MICROGram(s) Oral daily  sucralfate suspension 1Gram(s) Oral three times a day  dextrose 5% + sodium chloride 0.45%. 1000milliLiter(s) IV Continuous <Continuous>  ferrous    sulfate 325milliGRAM(s) Oral two times a day with meals  pantoprazole    Tablet 40milliGRAM(s) Oral two times a day before meals    MEDICATIONS  (PRN):  ondansetron Injectable 4milliGRAM(s) IV Push every 6 hours PRN Nausea  senna 2Tablet(s) Oral at bedtime PRN Constipation  acetaminophen   Tablet 650milliGRAM(s) Oral every 6 hours PRN For Temp greater than 38 C (100.4 F)  acetaminophen   Tablet. 650milliGRAM(s) Oral every 6 hours PRN Mild Pain (1 - 3)

## 2017-04-07 NOTE — PROGRESS NOTE ADULT - SUBJECTIVE AND OBJECTIVE BOX
Date/Time Patient Seen:  		  Referring MD:   Data Reviewed	       Patient is a 88y old  Male who presents with a chief complaint of SOB/weakness (06 Apr 2017 09:58)  in bed  seen and examined  vs and meds reviewed  on Carafate      Subjective/HPI       Medication list         MEDICATIONS  (STANDING):  docusate sodium 100milliGRAM(s) Oral three times a day  tamsulosin 0.4milliGRAM(s) Oral at bedtime  sotalol 80milliGRAM(s) Oral every 12 hours  levothyroxine 75MICROGram(s) Oral daily  sucralfate suspension 1Gram(s) Oral three times a day  dextrose 5% + sodium chloride 0.45%. 1000milliLiter(s) IV Continuous <Continuous>    MEDICATIONS  (PRN):  ondansetron Injectable 4milliGRAM(s) IV Push every 6 hours PRN Nausea  senna 2Tablet(s) Oral at bedtime PRN Constipation  acetaminophen   Tablet 650milliGRAM(s) Oral every 6 hours PRN For Temp greater than 38 C (100.4 F)  acetaminophen   Tablet. 650milliGRAM(s) Oral every 6 hours PRN Mild Pain (1 - 3)         Vitals log        ICU Vital Signs Last 24 Hrs  T(C): 36.6, Max: 36.8 (04-06 @ 08:58)  T(F): 97.9, Max: 98.3 (04-06 @ 12:09)  HR: 83 (52 - 83)  BP: 133/74 (105/63 - 133/74)  BP(mean): 91 (70 - 92)  ABP: --  ABP(mean): --  RR: 26 (14 - 26)  SpO2: 93% (93% - 100%)           Input and Output:  I&O's Detail  I & Os for 24h ending 06 Apr 2017 07:00  =============================================  IN:    dextrose 5% + sodium chloride 0.45%.: 1020 ml    Oral Fluid: 390 ml    Packed Red Blood Cells: 360 ml    pantoprazole Infusion: 240 ml    lactated ringers.: 80 ml    Total IN: 2090 ml  ---------------------------------------------  OUT:    Voided: 800 ml    Voided: 300 ml    Total OUT: 1100 ml  ---------------------------------------------  Total NET: 990 ml    I & Os for current day (as of 07 Apr 2017 06:07)  =============================================  IN:    dextrose 5% + sodium chloride 0.45%.: 1200 ml    Oral Fluid: 240 ml    pantoprazole Infusion: 90 ml    Total IN: 1530 ml  ---------------------------------------------  OUT:    Voided: 300 ml    Total OUT: 300 ml  ---------------------------------------------  Total NET: 1230 ml      Lab Data                        8.8    8.9   )-----------( 93       ( 06 Apr 2017 06:40 )             26.5     04-06    144  |  111<H>  |  32<H>  ----------------------------<  120<H>  3.7   |  27  |  1.61<H>    Ca    8.2<L>      06 Apr 2017 06:40              Review of Systems	      Objective       Resp    CV       GI    Extremities       Neuro       Skin         Pertinent Lab findings & Imaging      Apoorva:  NO   Adequate UO     I&O's Detail  I & Os for 24h ending 06 Apr 2017 07:00  =============================================  IN:    dextrose 5% + sodium chloride 0.45%.: 1020 ml    Oral Fluid: 390 ml    Packed Red Blood Cells: 360 ml    pantoprazole Infusion: 240 ml    lactated ringers.: 80 ml    Total IN: 2090 ml  ---------------------------------------------  OUT:    Voided: 800 ml    Voided: 300 ml    Total OUT: 1100 ml  ---------------------------------------------  Total NET: 990 ml    I & Os for current day (as of 07 Apr 2017 06:07)  =============================================  IN:    dextrose 5% + sodium chloride 0.45%.: 1200 ml    Oral Fluid: 240 ml    pantoprazole Infusion: 90 ml    Total IN: 1530 ml  ---------------------------------------------  OUT:    Voided: 300 ml    Total OUT: 300 ml  ---------------------------------------------  Total NET: 1230 ml           Discussed with:     Cultures:	        Radiology

## 2017-04-07 NOTE — PROGRESS NOTE ADULT - PROBLEM SELECTOR PROBLEM 2
CKD (chronic kidney disease)

## 2017-04-07 NOTE — PROGRESS NOTE ADULT - ASSESSMENT
The patient is an 88 year old male with a history of chronic AF on warfarin, hypothyroidism, BPH, CKD who presents with dyspnea on exertion and weakness in the setting of GI bleed.    Plan:  - Holding warfarin for 5 more days  - Continue sotalol 80 mg q12h  - Discharge planning

## 2017-04-07 NOTE — PROGRESS NOTE ADULT - SUBJECTIVE AND OBJECTIVE BOX
INTERVAL HPI/OVERNIGHT EVENTS:  no significant events overnight reported  stools brown      MEDICATIONS  (STANDING):  docusate sodium 100milliGRAM(s) Oral three times a day  tamsulosin 0.4milliGRAM(s) Oral at bedtime  sotalol 80milliGRAM(s) Oral every 12 hours  levothyroxine 75MICROGram(s) Oral daily  sucralfate suspension 1Gram(s) Oral three times a day  ferrous    sulfate 325milliGRAM(s) Oral two times a day with meals  pantoprazole    Tablet 40milliGRAM(s) Oral two times a day before meals    MEDICATIONS  (PRN):  ondansetron Injectable 4milliGRAM(s) IV Push every 6 hours PRN Nausea  senna 2Tablet(s) Oral at bedtime PRN Constipation  acetaminophen   Tablet 650milliGRAM(s) Oral every 6 hours PRN For Temp greater than 38 C (100.4 F)  acetaminophen   Tablet. 650milliGRAM(s) Oral every 6 hours PRN Mild Pain (1 - 3)            Vital Signs Last 24 Hrs  T(C): 36.3, Max: 36.8 (04-06 @ 12:09)  T(F): 97.4, Max: 98.3 (04-06 @ 12:09)  HR: 63 (51 - 83)  BP: 115/66 (105/63 - 133/74)  BP(mean): 82 (77 - 91)  RR: 22 (14 - 26)  SpO2: 96% (93% - 100%)    Physical exam:    abd soft,n on tender. bs+        LABS:                        8.8    7.7   )-----------( 98       ( 07 Apr 2017 06:39 )             26.8     04-07    144  |  109<H>  |  27<H>  ----------------------------<  104<H>  3.4<L>   |  26  |  1.51<H>    Ca    7.8<L>      07 Apr 2017 06:39      PT/INR - ( 06 Apr 2017 06:40 )   PT: 14.4 sec;   INR: 1.31 ratio               RADIOLOGY & ADDITIONAL TESTS:

## 2017-04-07 NOTE — PROGRESS NOTE ADULT - SUBJECTIVE AND OBJECTIVE BOX
Chief Complaint: GI bleed    Interval Events: No events overnight. No complaints this am.    Review of Systems:  General: No fevers, chills, weight loss or gain  Skin: No rashes, color changes  Cardiovascular: No chest pain, orthopnea  Respiratory: No shortness of breath, cough  Gastrointestinal: No nausea, abdominal pain  Genitourinary: No incontinence, pain with urination  Musculoskeletal: No pain, swelling, decreased range of motion  Neurological: No headache, weakness  Psychiatric: No depression, anxiety  Endocrine: No weight loss or gain, increased thirst  All other systems are comprehensively negative.    Physical Exam:  Vital Signs Last 24 Hrs  T(C): 36.3, Max: 36.8 (04-06 @ 12:09)  T(F): 97.4, Max: 98.3 (04-06 @ 12:09)  HR: 61 (51 - 83)  BP: 127/69 (105/63 - 133/74)  BP(mean): 87 (77 - 92)  RR: 19 (14 - 26)  SpO2: 100% (93% - 100%)  General: NAD  Neck: No JVD, no carotid bruit  Lungs: CTAB  CV: RRR, nl S1/S2, no M/R/G  Abdomen: S/NT/ND, +BS  Extremities: No LE edema, no cyanosis    Labs:    04-07    144  |  109<H>  |  27<H>  ----------------------------<  104<H>  3.4<L>   |  26  |  1.51<H>    Ca    7.8<L>      07 Apr 2017 06:39                          8.8    7.7   )-----------( 98       ( 07 Apr 2017 06:39 )             26.8

## 2017-04-08 VITALS
HEART RATE: 63 BPM | TEMPERATURE: 98 F | RESPIRATION RATE: 19 BRPM | DIASTOLIC BLOOD PRESSURE: 77 MMHG | SYSTOLIC BLOOD PRESSURE: 123 MMHG | OXYGEN SATURATION: 100 %

## 2017-04-08 LAB
ANION GAP SERPL CALC-SCNC: 9 MMOL/L — SIGNIFICANT CHANGE UP (ref 5–17)
BUN SERPL-MCNC: 23 MG/DL — SIGNIFICANT CHANGE UP (ref 7–23)
CALCIUM SERPL-MCNC: 8.4 MG/DL — SIGNIFICANT CHANGE UP (ref 8.4–10.5)
CHLORIDE SERPL-SCNC: 108 MMOL/L — SIGNIFICANT CHANGE UP (ref 96–108)
CO2 SERPL-SCNC: 25 MMOL/L — SIGNIFICANT CHANGE UP (ref 22–31)
CREAT SERPL-MCNC: 1.42 MG/DL — HIGH (ref 0.5–1.3)
GLUCOSE SERPL-MCNC: 91 MG/DL — SIGNIFICANT CHANGE UP (ref 70–99)
HCT VFR BLD CALC: 27.7 % — LOW (ref 39–50)
HGB BLD-MCNC: 9.5 G/DL — LOW (ref 13–17)
MCHC RBC-ENTMCNC: 32.9 PG — SIGNIFICANT CHANGE UP (ref 27–34)
MCHC RBC-ENTMCNC: 34.4 GM/DL — SIGNIFICANT CHANGE UP (ref 32–36)
MCV RBC AUTO: 95.7 FL — SIGNIFICANT CHANGE UP (ref 80–100)
PLATELET # BLD AUTO: 124 K/UL — LOW (ref 150–400)
POTASSIUM SERPL-MCNC: 3.5 MMOL/L — SIGNIFICANT CHANGE UP (ref 3.5–5.3)
POTASSIUM SERPL-SCNC: 3.5 MMOL/L — SIGNIFICANT CHANGE UP (ref 3.5–5.3)
RBC # BLD: 2.9 M/UL — LOW (ref 4.2–5.8)
RBC # FLD: 14.7 % — HIGH (ref 10.3–14.5)
SODIUM SERPL-SCNC: 142 MMOL/L — SIGNIFICANT CHANGE UP (ref 135–145)
WBC # BLD: 6.9 K/UL — SIGNIFICANT CHANGE UP (ref 3.8–10.5)
WBC # FLD AUTO: 6.9 K/UL — SIGNIFICANT CHANGE UP (ref 3.8–10.5)

## 2017-04-08 PROCEDURE — P9016: CPT

## 2017-04-08 PROCEDURE — 97161 PT EVAL LOW COMPLEX 20 MIN: CPT

## 2017-04-08 PROCEDURE — 88305 TISSUE EXAM BY PATHOLOGIST: CPT

## 2017-04-08 PROCEDURE — 85027 COMPLETE CBC AUTOMATED: CPT

## 2017-04-08 PROCEDURE — 85730 THROMBOPLASTIN TIME PARTIAL: CPT

## 2017-04-08 PROCEDURE — 97530 THERAPEUTIC ACTIVITIES: CPT

## 2017-04-08 PROCEDURE — 82550 ASSAY OF CK (CPK): CPT

## 2017-04-08 PROCEDURE — 84484 ASSAY OF TROPONIN QUANT: CPT

## 2017-04-08 PROCEDURE — P9059: CPT

## 2017-04-08 PROCEDURE — 86900 BLOOD TYPING SEROLOGIC ABO: CPT

## 2017-04-08 PROCEDURE — 86901 BLOOD TYPING SEROLOGIC RH(D): CPT

## 2017-04-08 PROCEDURE — 97116 GAIT TRAINING THERAPY: CPT

## 2017-04-08 PROCEDURE — 99285 EMERGENCY DEPT VISIT HI MDM: CPT | Mod: 25

## 2017-04-08 PROCEDURE — 85379 FIBRIN DEGRADATION QUANT: CPT

## 2017-04-08 PROCEDURE — 83880 ASSAY OF NATRIURETIC PEPTIDE: CPT

## 2017-04-08 PROCEDURE — 36415 COLL VENOUS BLD VENIPUNCTURE: CPT

## 2017-04-08 PROCEDURE — 93005 ELECTROCARDIOGRAM TRACING: CPT

## 2017-04-08 PROCEDURE — 36430 TRANSFUSION BLD/BLD COMPNT: CPT

## 2017-04-08 PROCEDURE — 80048 BASIC METABOLIC PNL TOTAL CA: CPT

## 2017-04-08 PROCEDURE — 71046 X-RAY EXAM CHEST 2 VIEWS: CPT

## 2017-04-08 PROCEDURE — 85610 PROTHROMBIN TIME: CPT

## 2017-04-08 PROCEDURE — 88312 SPECIAL STAINS GROUP 1: CPT

## 2017-04-08 PROCEDURE — 80053 COMPREHEN METABOLIC PANEL: CPT

## 2017-04-08 PROCEDURE — 74176 CT ABD & PELVIS W/O CONTRAST: CPT

## 2017-04-08 PROCEDURE — 86920 COMPATIBILITY TEST SPIN: CPT

## 2017-04-08 PROCEDURE — 99239 HOSP IP/OBS DSCHRG MGMT >30: CPT

## 2017-04-08 PROCEDURE — 86850 RBC ANTIBODY SCREEN: CPT

## 2017-04-08 RX ORDER — FERROUS SULFATE 325(65) MG
1 TABLET ORAL
Qty: 60 | Refills: 0 | OUTPATIENT
Start: 2017-04-08 | End: 2017-05-08

## 2017-04-08 RX ORDER — SUCRALFATE 1 G
10 TABLET ORAL
Qty: 420 | Refills: 0 | OUTPATIENT
Start: 2017-04-08 | End: 2017-04-22

## 2017-04-08 RX ORDER — POTASSIUM CHLORIDE 20 MEQ
20 PACKET (EA) ORAL ONCE
Qty: 0 | Refills: 0 | Status: COMPLETED | OUTPATIENT
Start: 2017-04-08 | End: 2017-04-08

## 2017-04-08 RX ADMIN — Medication 325 MILLIGRAM(S): at 16:28

## 2017-04-08 RX ADMIN — Medication 20 MILLIEQUIVALENT(S): at 12:42

## 2017-04-08 RX ADMIN — Medication 1 GRAM(S): at 14:35

## 2017-04-08 RX ADMIN — PANTOPRAZOLE SODIUM 40 MILLIGRAM(S): 20 TABLET, DELAYED RELEASE ORAL at 16:28

## 2017-04-08 RX ADMIN — Medication 100 MILLIGRAM(S): at 05:54

## 2017-04-08 RX ADMIN — Medication 1 GRAM(S): at 05:54

## 2017-04-08 RX ADMIN — Medication 80 MILLIGRAM(S): at 05:54

## 2017-04-08 RX ADMIN — Medication 100 MILLIGRAM(S): at 14:35

## 2017-04-08 RX ADMIN — PANTOPRAZOLE SODIUM 40 MILLIGRAM(S): 20 TABLET, DELAYED RELEASE ORAL at 05:53

## 2017-04-08 RX ADMIN — Medication 325 MILLIGRAM(S): at 07:41

## 2017-04-08 RX ADMIN — Medication 75 MICROGRAM(S): at 05:53

## 2017-04-08 NOTE — PROGRESS NOTE ADULT - PROBLEM SELECTOR PROBLEM 1
Symptomatic anemia
Gastrointestinal hemorrhage, unspecified gastrointestinal hemorrhage type

## 2017-04-08 NOTE — PROGRESS NOTE ADULT - SUBJECTIVE AND OBJECTIVE BOX
no significant events overnight reported            MEDICATIONS  (STANDING):  docusate sodium 100milliGRAM(s) Oral three times a day  tamsulosin 0.4milliGRAM(s) Oral at bedtime  sotalol 80milliGRAM(s) Oral every 12 hours  levothyroxine 75MICROGram(s) Oral daily  sucralfate suspension 1Gram(s) Oral three times a day  ferrous    sulfate 325milliGRAM(s) Oral two times a day with meals  pantoprazole    Tablet 40milliGRAM(s) Oral two times a day before meals    MEDICATIONS  (PRN):  ondansetron Injectable 4milliGRAM(s) IV Push every 6 hours PRN Nausea  senna 2Tablet(s) Oral at bedtime PRN Constipation  acetaminophen   Tablet 650milliGRAM(s) Oral every 6 hours PRN For Temp greater than 38 C (100.4 F)  acetaminophen   Tablet. 650milliGRAM(s) Oral every 6 hours PRN Mild Pain (1 - 3)            Vital Signs Last 24 Hrs  T(C): 36.8, Max: 36.9 (04-07 @ 15:58)  T(F): 98.3, Max: 98.4 (04-07 @ 15:58)  HR: 72 (55 - 72)  BP: 114/59 (111/53 - 127/69)  BP(mean): 74 (66 - 90)  RR: 20 (16 - 22)  SpO2: 100% (96% - 100%)    Physical exam:      abd soft, non tender.       LABS:                        9.5    6.9   )-----------( 124      ( 08 Apr 2017 06:36 )             27.7     04-08    142  |  108  |  23  ----------------------------<  91  3.5   |  25  |  1.42<H>    Ca    8.4      08 Apr 2017 06:36            RADIOLOGY & ADDITIONAL TESTS:

## 2017-04-08 NOTE — PROGRESS NOTE ADULT - SUBJECTIVE AND OBJECTIVE BOX
Patient is a 88y Male with a known history of :  Gastrointestinal hemorrhage, unspecified gastrointestinal hemorrhage type (K92.2)  Afib (I48.91)  Hypothyroidism (E03.9)  CKD (chronic kidney disease) (N18.9)  Symptomatic anemia (D64.9)    HPI:  Patient is 89 yo male with hx of Atrial fib, and CKD presenting with a complaint of SOB on exertion, and Generalized weakness that has been going on for the past several days, and has been progressively worsen.  Patient reports that he feel against a wooden fence several days ago. Hemoglobin was 13.1 on 3/21 as per PMD.  + dark tarry stool over the past several days.      Patient denies any headache, dizziness, blurry vision, chest pain, palpitation, abdominal pain, N/v/D, fever, chills, numbness fever, chills, or localized weakness (03 Apr 2017 15:54)      REVIEW OF SYSTEMS:    CONSTITUTIONAL: No fever, weight loss, or fatigue  EYES: No eye pain, visual disturbances, or discharge  ENMT:  No difficulty hearing, tinnitus, vertigo; No sinus or throat pain  NECK: No pain or stiffness  BREASTS: No pain, masses, or nipple discharge  RESPIRATORY: No cough, wheezing, chills or hemoptysis; No shortness of breath  CARDIOVASCULAR: No chest pain, palpitations, dizziness, or leg swelling  GASTROINTESTINAL: No abdominal or epigastric pain. No nausea, vomiting, or hematemesis; No diarrhea or constipation. No melena or hematochezia.  GENITOURINARY: No dysuria, frequency, hematuria, or incontinence  NEUROLOGICAL: No headaches, memory loss, loss of strength, numbness, or tremors  SKIN: No itching, burning, rashes, or lesions   LYMPH NODES: No enlarged glands  ENDOCRINE: No heat or cold intolerance; No hair loss  MUSCULOSKELETAL: No joint pain or swelling; No muscle, back, or extremity pain  PSYCHIATRIC: No depression, anxiety, mood swings, or difficulty sleeping  HEME/LYMPH: No easy bruising, or bleeding gums  ALLERGY AND IMMUNOLOGIC: No hives or eczema    MEDICATIONS  (STANDING):  docusate sodium 100milliGRAM(s) Oral three times a day  tamsulosin 0.4milliGRAM(s) Oral at bedtime  sotalol 80milliGRAM(s) Oral every 12 hours  levothyroxine 75MICROGram(s) Oral daily  sucralfate suspension 1Gram(s) Oral three times a day  ferrous    sulfate 325milliGRAM(s) Oral two times a day with meals  pantoprazole    Tablet 40milliGRAM(s) Oral two times a day before meals    MEDICATIONS  (PRN):  ondansetron Injectable 4milliGRAM(s) IV Push every 6 hours PRN Nausea  senna 2Tablet(s) Oral at bedtime PRN Constipation  acetaminophen   Tablet 650milliGRAM(s) Oral every 6 hours PRN For Temp greater than 38 C (100.4 F)  acetaminophen   Tablet. 650milliGRAM(s) Oral every 6 hours PRN Mild Pain (1 - 3)      ALLERGIES: No Known Allergies      FAMILY HISTORY:  No pertinent family history in first degree relatives      Social history:  Alochol:   Smoking:   Drug Use:   Marital Status:     PHYSICAL EXAMINATION:  -----------------------------  T(C): 36.6, Max: 36.9 (04-07 @ 15:58)  HR: 67 (55 - 72)  BP: 117/79 (112/68 - 121/77)  RR: 18 (16 - 20)  SpO2: 98% (98% - 100%)  Wt(kg): --    I & Os for current day (as of 04-08 @ 12:43)  =============================================  IN:    Oral Fluid: 620 ml    dextrose 5% + sodium chloride 0.45%.: 60 ml    Total IN: 680 ml  ---------------------------------------------  OUT:    Voided: 1000 ml    Total OUT: 1000 ml  ---------------------------------------------  Total NET: -320 ml        Constitutional: well developed, normal appearance, well groomed, well nourished, no deformities and no acute distress.   Eyes: the conjunctiva exhibited no abnormalities and the eyelids demonstrated no xanthelasmas.   HEENT: normal oral mucosa, no oral pallor and no oral cyanosis.   Neck: normal jugular venous A waves present, normal jugular venous V waves present and no jugular venous geller A waves.   Pulmonary: no respiratory distress, normal respiratory rhythm and effort, no accessory muscle use and lungs were clear to auscultation bilaterally.   Cardiovascular: heart rate and rhythm were normal, normal S1 and S2 and no murmur, gallop, rub, heave or thrill are present.   Abdomen: soft, non-tender, no hepato-splenomegaly and no abdominal mass palpated.   Musculoskeletal: the gait could not be assessed..   Extremities: no clubbing of the fingernails, no localized cyanosis, no petechial hemorrhages and no ischemic changes.   Skin: normal skin color and pigmentation, no rash, no venous stasis, no skin lesions, no skin ulcer and no xanthoma was observed.   Psychiatric: oriented to person, place, and time, the affect was normal, the mood was normal and not feeling anxious.     LABS:   --------  04-08    142  |  108  |  23  ----------------------------<  91  3.5   |  25  |  1.42<H>    Ca    8.4      08 Apr 2017 06:36                           9.5    6.9   )-----------( 124      ( 08 Apr 2017 06:36 )             27.7                   Radiology: Patient is a 88y Male with a known history of :  Gastrointestinal hemorrhage, unspecified gastrointestinal hemorrhage type (K92.2)  Afib (I48.91)  Hypothyroidism (E03.9)  CKD (chronic kidney disease) (N18.9)  Symptomatic anemia (D64.9)    HPI:  Patient is 87 yo male with hx of Atrial fib, and CKD presenting with a complaint of SOB on exertion, and Generalized weakness that has been going on for the past several days, and has been progressively worsen.  Patient reports that he feel against a wooden fence several days ago. Hemoglobin was 13.1 on 3/21 as per PMD.  + dark tarry stool over the past several days.      Patient denies any headache, dizziness, blurry vision, chest pain, palpitation, abdominal pain, N/v/D, fever, chills, numbness fever, chills, or localized weakness (03 Apr 2017 15:54)      REVIEW OF SYSTEMS:    CONSTITUTIONAL: No fever, weight loss, or fatigue  EYES: No eye pain, visual disturbances, or discharge  ENMT:  No difficulty hearing, tinnitus, vertigo; No sinus or throat pain  NECK: No pain or stiffness  BREASTS: No pain, masses, or nipple discharge  RESPIRATORY: No cough, wheezing, chills or hemoptysis; No shortness of breath  CARDIOVASCULAR: No chest pain, palpitations, dizziness, or leg swelling  GASTROINTESTINAL: No abdominal or epigastric pain. No nausea, vomiting, or hematemesis; No diarrhea or constipation. No melena or hematochezia.  GENITOURINARY: No dysuria, frequency, hematuria, or incontinence  NEUROLOGICAL: No headaches, memory loss, loss of strength, numbness, or tremors  SKIN: No itching, burning, rashes, or lesions   LYMPH NODES: No enlarged glands  ENDOCRINE: No heat or cold intolerance; No hair loss  MUSCULOSKELETAL: No joint pain or swelling; No muscle, back, or extremity pain  PSYCHIATRIC: No depression, anxiety, mood swings, or difficulty sleeping  HEME/LYMPH: No easy bruising, or bleeding gums  ALLERGY AND IMMUNOLOGIC: No hives or eczema    MEDICATIONS  (STANDING):  docusate sodium 100milliGRAM(s) Oral three times a day  tamsulosin 0.4milliGRAM(s) Oral at bedtime  sotalol 80milliGRAM(s) Oral every 12 hours  levothyroxine 75MICROGram(s) Oral daily  sucralfate suspension 1Gram(s) Oral three times a day  ferrous    sulfate 325milliGRAM(s) Oral two times a day with meals  pantoprazole    Tablet 40milliGRAM(s) Oral two times a day before meals    MEDICATIONS  (PRN):  ondansetron Injectable 4milliGRAM(s) IV Push every 6 hours PRN Nausea  senna 2Tablet(s) Oral at bedtime PRN Constipation  acetaminophen   Tablet 650milliGRAM(s) Oral every 6 hours PRN For Temp greater than 38 C (100.4 F)  acetaminophen   Tablet. 650milliGRAM(s) Oral every 6 hours PRN Mild Pain (1 - 3)      ALLERGIES: No Known Allergies      FAMILY HISTORY:  No pertinent family history in first degree relatives      Social history:  Alochol:   Smoking:   Drug Use:   Marital Status:     PHYSICAL EXAMINATION:  -----------------------------  T(C): 36.6, Max: 36.9 (04-07 @ 15:58)  HR: 67 (55 - 72)  BP: 117/79 (112/68 - 121/77)  RR: 18 (16 - 20)  SpO2: 98% (98% - 100%)  Wt(kg): --    I & Os for current day (as of 04-08 @ 12:43)  =============================================  IN:    Oral Fluid: 620 ml    dextrose 5% + sodium chloride 0.45%.: 60 ml    Total IN: 680 ml  ---------------------------------------------  OUT:    Voided: 1000 ml    Total OUT: 1000 ml  ---------------------------------------------  Total NET: -320 ml        Constitutional: well developed, normal appearance, well groomed, well nourished, no deformities and no acute distress.   Eyes: the conjunctiva exhibited no abnormalities and the eyelids demonstrated no xanthelasmas.   HEENT: normal oral mucosa, no oral pallor and no oral cyanosis.   Neck: normal jugular venous A waves present, normal jugular venous V waves present and no jugular venous geller A waves.   Pulmonary: no respiratory distress, normal respiratory rhythm and effort, no accessory muscle use and lungs were clear to auscultation bilaterally.   Cardiovascular: heart rate and rhythm were normal, normal S1 and S2 and no murmur, gallop, rub, heave or thrill are present.   Abdomen: soft, non-tender, no hepato-splenomegaly and no abdominal mass palpated.   Musculoskeletal: the gait could not be assessed..   Extremities: B/L 1-Plus edema.  Skin: normal skin color and pigmentation, no rash, no venous stasis, no skin lesions, no skin ulcer and no xanthoma was observed.   Psychiatric: oriented to person, place, and time, the affect was normal, the mood was normal and not feeling anxious.     LABS:   --------  04-08    142  |  108  |  23  ----------------------------<  91  3.5   |  25  |  1.42<H>    Ca    8.4      08 Apr 2017 06:36                           9.5    6.9   )-----------( 124      ( 08 Apr 2017 06:36 )             27.7                   Radiology:

## 2017-04-08 NOTE — PROGRESS NOTE ADULT - ASSESSMENT
The patient is an 88 year old male with a history of chronic AF on warfarin, hypothyroidism, BPH, CKD who presents with dyspnea on exertion and weakness in the setting of GI bleed.  Patient with prior history s/p Bilroth II    4/8/17 Patient sitting in chair.  No complaints.  Daughter at bedside.    Plan:  - Holding warfarin for 5 more days per GI and then question which anticoagulant to use.  - Continue sotalol 80 mg q12h  - Discharge planning The patient is an 88 year old male with a history of chronic AF on warfarin, hypothyroidism, BPH, CKD who presents with dyspnea on exertion and weakness in the setting of GI bleed.  Patient with prior history s/p Bilroth II    4/8/17 Patient sitting in chair.  No complaints.  Monitor compatible with NSR, APC  Daughter at bedside.    Plan:  - Holding warfarin for 5 more days per GI and then question which anticoagulant to use.  - Continue sotalol 80 mg q12h  - Discharge planning

## 2017-04-08 NOTE — PROGRESS NOTE ADULT - PROBLEM SELECTOR PLAN 1
PUD  s/p EGD  H and H stable  cont PPI  oral and skin care  out of bed  PT  monitor Sat  supportive icu care  AM Labs pending  overall much better  dc planning

## 2017-04-08 NOTE — PROGRESS NOTE ADULT - ASSESSMENT
· Assessment		  gi bleeding, anemia w/ melena in pt on coumadin  s/p prbc   history of bilroth II  - EGD w/ gastrojej ulcer w/o active bleeding s/p injection w/ epi   _f/u path re HP----negative.    - regular diet tolerated   - Hgb noted stable   -Holding coumadin for 7 days- can resume this coming wednesday.    -cont w/ PPI BID  w/ Carafate for 2 weeks- daily PPI thereafter.   outpt f/u in 2 wks.   d/c planning, cleared for d/c today from gi perspective.

## 2017-04-08 NOTE — PROGRESS NOTE ADULT - PROVIDER SPECIALTY LIST ADULT
Cardiology
Critical Care
Gastroenterology
Hospitalist
Pulmonology
Critical Care

## 2017-04-08 NOTE — PROGRESS NOTE ADULT - SUBJECTIVE AND OBJECTIVE BOX
Date/Time Patient Seen:  		  Referring MD:   Data Reviewed	       Patient is a 88y old  Male who presents with a chief complaint of SOB/weakness (06 Apr 2017 09:58)    pt in bed  seen and examined  vs and meds reviewed  on PPI  H and H stable    Subjective/HPI       Medication list         MEDICATIONS  (STANDING):  docusate sodium 100milliGRAM(s) Oral three times a day  tamsulosin 0.4milliGRAM(s) Oral at bedtime  sotalol 80milliGRAM(s) Oral every 12 hours  levothyroxine 75MICROGram(s) Oral daily  sucralfate suspension 1Gram(s) Oral three times a day  ferrous    sulfate 325milliGRAM(s) Oral two times a day with meals  pantoprazole    Tablet 40milliGRAM(s) Oral two times a day before meals    MEDICATIONS  (PRN):  ondansetron Injectable 4milliGRAM(s) IV Push every 6 hours PRN Nausea  senna 2Tablet(s) Oral at bedtime PRN Constipation  acetaminophen   Tablet 650milliGRAM(s) Oral every 6 hours PRN For Temp greater than 38 C (100.4 F)  acetaminophen   Tablet. 650milliGRAM(s) Oral every 6 hours PRN Mild Pain (1 - 3)         Vitals log        ICU Vital Signs Last 24 Hrs  T(C): 36.8, Max: 36.9 (04-07 @ 15:58)  T(F): 98.3, Max: 98.4 (04-07 @ 15:58)  HR: 72 (51 - 72)  BP: 114/59 (111/53 - 127/69)  BP(mean): 74 (66 - 90)  ABP: --  ABP(mean): --  RR: 20 (15 - 22)  SpO2: 100% (95% - 100%)           Input and Output:  I&O's Detail  I & Os for 24h ending 07 Apr 2017 07:00  =============================================  IN:    dextrose 5% + sodium chloride 0.45%.: 1260 ml    Oral Fluid: 240 ml    pantoprazole Infusion: 90 ml    Total IN: 1590 ml  ---------------------------------------------  OUT:    Voided: 300 ml    Total OUT: 300 ml  ---------------------------------------------  Total NET: 1290 ml    I & Os for current day (as of 08 Apr 2017 06:10)  =============================================  IN:    Oral Fluid: 620 ml    dextrose 5% + sodium chloride 0.45%.: 60 ml    Total IN: 680 ml  ---------------------------------------------  OUT:    Voided: 1000 ml    Total OUT: 1000 ml  ---------------------------------------------  Total NET: -320 ml      Lab Data                        8.8    7.7   )-----------( 98       ( 07 Apr 2017 06:39 )             26.8     04-07    144  |  109<H>  |  27<H>  ----------------------------<  104<H>  3.4<L>   |  26  |  1.51<H>    Ca    7.8<L>      07 Apr 2017 06:39              Review of Systems	      Objective       Resp  dec BS  no wheezing  no resp distress    CV       GI    Extremities     edema  Neuro       Skin         Pertinent Lab findings & Imaging      Apoorva:  NO   Adequate UO     I&O's Detail  I & Os for 24h ending 07 Apr 2017 07:00  =============================================  IN:    dextrose 5% + sodium chloride 0.45%.: 1260 ml    Oral Fluid: 240 ml    pantoprazole Infusion: 90 ml    Total IN: 1590 ml  ---------------------------------------------  OUT:    Voided: 300 ml    Total OUT: 300 ml  ---------------------------------------------  Total NET: 1290 ml    I & Os for current day (as of 08 Apr 2017 06:10)  =============================================  IN:    Oral Fluid: 620 ml    dextrose 5% + sodium chloride 0.45%.: 60 ml    Total IN: 680 ml  ---------------------------------------------  OUT:    Voided: 1000 ml    Total OUT: 1000 ml  ---------------------------------------------  Total NET: -320 ml           Discussed with:     Cultures:	        Radiology

## 2017-04-25 ENCOUNTER — INPATIENT (INPATIENT)
Facility: HOSPITAL | Age: 82
LOS: 5 days | Discharge: HOME | DRG: 291 | End: 2017-05-01
Attending: FAMILY MEDICINE | Admitting: INTERNAL MEDICINE
Payer: MEDICARE

## 2017-04-25 VITALS
OXYGEN SATURATION: 97 % | RESPIRATION RATE: 18 BRPM | HEIGHT: 73 IN | HEART RATE: 69 BPM | WEIGHT: 162.04 LBS | DIASTOLIC BLOOD PRESSURE: 74 MMHG | TEMPERATURE: 98 F | SYSTOLIC BLOOD PRESSURE: 119 MMHG

## 2017-04-25 DIAGNOSIS — N18.9 CHRONIC KIDNEY DISEASE, UNSPECIFIED: ICD-10-CM

## 2017-04-25 DIAGNOSIS — D50.9 IRON DEFICIENCY ANEMIA, UNSPECIFIED: ICD-10-CM

## 2017-04-25 DIAGNOSIS — S72.009A FRACTURE OF UNSPECIFIED PART OF NECK OF UNSPECIFIED FEMUR, INITIAL ENCOUNTER FOR CLOSED FRACTURE: ICD-10-CM

## 2017-04-25 DIAGNOSIS — J18.9 PNEUMONIA, UNSPECIFIED ORGANISM: ICD-10-CM

## 2017-04-25 DIAGNOSIS — I50.9 HEART FAILURE, UNSPECIFIED: ICD-10-CM

## 2017-04-25 DIAGNOSIS — Z29.9 ENCOUNTER FOR PROPHYLACTIC MEASURES, UNSPECIFIED: ICD-10-CM

## 2017-04-25 DIAGNOSIS — N40.0 BENIGN PROSTATIC HYPERPLASIA WITHOUT LOWER URINARY TRACT SYMPTOMS: ICD-10-CM

## 2017-04-25 DIAGNOSIS — K92.2 GASTROINTESTINAL HEMORRHAGE, UNSPECIFIED: ICD-10-CM

## 2017-04-25 DIAGNOSIS — I48.91 UNSPECIFIED ATRIAL FIBRILLATION: ICD-10-CM

## 2017-04-25 DIAGNOSIS — E03.9 HYPOTHYROIDISM, UNSPECIFIED: ICD-10-CM

## 2017-04-25 DIAGNOSIS — K27.7 CHRONIC PEPTIC ULCER, SITE UNSPECIFIED, WITHOUT HEMORRHAGE OR PERFORATION: Chronic | ICD-10-CM

## 2017-04-25 LAB
ALBUMIN SERPL ELPH-MCNC: 2.6 G/DL — LOW (ref 3.3–5)
ALP SERPL-CCNC: 75 U/L — SIGNIFICANT CHANGE UP (ref 30–120)
ALT FLD-CCNC: 12 U/L DA — SIGNIFICANT CHANGE UP (ref 10–60)
ANION GAP SERPL CALC-SCNC: 9 MMOL/L — SIGNIFICANT CHANGE UP (ref 5–17)
APTT BLD: 30.9 SEC — SIGNIFICANT CHANGE UP (ref 27.5–37.4)
AST SERPL-CCNC: 22 U/L — SIGNIFICANT CHANGE UP (ref 10–40)
BASOPHILS # BLD AUTO: 0 K/UL — SIGNIFICANT CHANGE UP (ref 0–0.2)
BASOPHILS NFR BLD AUTO: 0.3 % — SIGNIFICANT CHANGE UP (ref 0–2)
BILIRUB SERPL-MCNC: 0.7 MG/DL — SIGNIFICANT CHANGE UP (ref 0.2–1.2)
BUN SERPL-MCNC: 31 MG/DL — HIGH (ref 7–23)
CALCIUM SERPL-MCNC: 9.3 MG/DL — SIGNIFICANT CHANGE UP (ref 8.4–10.5)
CHLORIDE SERPL-SCNC: 106 MMOL/L — SIGNIFICANT CHANGE UP (ref 96–108)
CK SERPL-CCNC: 52 U/L — SIGNIFICANT CHANGE UP (ref 39–308)
CO2 SERPL-SCNC: 27 MMOL/L — SIGNIFICANT CHANGE UP (ref 22–31)
CREAT SERPL-MCNC: 1.61 MG/DL — HIGH (ref 0.5–1.3)
EOSINOPHIL # BLD AUTO: 0.1 K/UL — SIGNIFICANT CHANGE UP (ref 0–0.5)
EOSINOPHIL NFR BLD AUTO: 1.1 % — SIGNIFICANT CHANGE UP (ref 0–6)
GLUCOSE SERPL-MCNC: 107 MG/DL — HIGH (ref 70–99)
HCT VFR BLD CALC: 35.6 % — LOW (ref 39–50)
HGB BLD-MCNC: 11.4 G/DL — LOW (ref 13–17)
INR BLD: 1.4 RATIO — HIGH (ref 0.88–1.16)
LACTATE SERPL-SCNC: 1.1 MMOL/L — SIGNIFICANT CHANGE UP (ref 0.7–2)
LYMPHOCYTES # BLD AUTO: 1.2 K/UL — SIGNIFICANT CHANGE UP (ref 1–3.3)
LYMPHOCYTES # BLD AUTO: 8.8 % — LOW (ref 13–44)
MCHC RBC-ENTMCNC: 31.2 PG — SIGNIFICANT CHANGE UP (ref 27–34)
MCHC RBC-ENTMCNC: 31.9 GM/DL — LOW (ref 32–36)
MCV RBC AUTO: 97.9 FL — SIGNIFICANT CHANGE UP (ref 80–100)
MONOCYTES # BLD AUTO: 0.9 K/UL — SIGNIFICANT CHANGE UP (ref 0–0.9)
MONOCYTES NFR BLD AUTO: 6.7 % — SIGNIFICANT CHANGE UP (ref 2–14)
NEUTROPHILS # BLD AUTO: 11.6 K/UL — HIGH (ref 1.8–7.4)
NEUTROPHILS NFR BLD AUTO: 83.2 % — HIGH (ref 43–77)
NT-PROBNP SERPL-SCNC: HIGH PG/ML (ref 0–450)
PLATELET # BLD AUTO: 100 K/UL — LOW (ref 150–400)
POTASSIUM SERPL-MCNC: 3.6 MMOL/L — SIGNIFICANT CHANGE UP (ref 3.5–5.3)
POTASSIUM SERPL-SCNC: 3.6 MMOL/L — SIGNIFICANT CHANGE UP (ref 3.5–5.3)
PROCALCITONIN SERPL-MCNC: 27.52 NG/ML — HIGH (ref 0–0.05)
PROT SERPL-MCNC: 6.3 G/DL — SIGNIFICANT CHANGE UP (ref 6–8.3)
PROTHROM AB SERPL-ACNC: 15.4 SEC — HIGH (ref 9.8–12.7)
RBC # BLD: 3.64 M/UL — LOW (ref 4.2–5.8)
RBC # FLD: 14.4 % — SIGNIFICANT CHANGE UP (ref 10.3–14.5)
SODIUM SERPL-SCNC: 142 MMOL/L — SIGNIFICANT CHANGE UP (ref 135–145)
TROPONIN I SERPL-MCNC: 0.73 NG/ML — HIGH (ref 0.02–0.06)
WBC # BLD: 14 K/UL — HIGH (ref 3.8–10.5)
WBC # FLD AUTO: 14 K/UL — HIGH (ref 3.8–10.5)

## 2017-04-25 PROCEDURE — 71250 CT THORAX DX C-: CPT | Mod: 26

## 2017-04-25 PROCEDURE — 99284 EMERGENCY DEPT VISIT MOD MDM: CPT

## 2017-04-25 PROCEDURE — 93010 ELECTROCARDIOGRAM REPORT: CPT

## 2017-04-25 PROCEDURE — 71020: CPT | Mod: 26

## 2017-04-25 RX ORDER — PIPERACILLIN AND TAZOBACTAM 4; .5 G/20ML; G/20ML
3.38 INJECTION, POWDER, LYOPHILIZED, FOR SOLUTION INTRAVENOUS ONCE
Qty: 0 | Refills: 0 | Status: COMPLETED | OUTPATIENT
Start: 2017-04-25 | End: 2017-04-25

## 2017-04-25 RX ORDER — SODIUM CHLORIDE 9 MG/ML
3 INJECTION INTRAMUSCULAR; INTRAVENOUS; SUBCUTANEOUS ONCE
Qty: 0 | Refills: 0 | Status: COMPLETED | OUTPATIENT
Start: 2017-04-25 | End: 2017-04-25

## 2017-04-25 RX ORDER — SOTALOL HCL 120 MG
80 TABLET ORAL EVERY 12 HOURS
Qty: 0 | Refills: 0 | Status: DISCONTINUED | OUTPATIENT
Start: 2017-04-25 | End: 2017-05-01

## 2017-04-25 RX ORDER — TAMSULOSIN HYDROCHLORIDE 0.4 MG/1
0.4 CAPSULE ORAL AT BEDTIME
Qty: 0 | Refills: 0 | Status: DISCONTINUED | OUTPATIENT
Start: 2017-04-25 | End: 2017-05-01

## 2017-04-25 RX ORDER — LACTOBACILLUS ACIDOPHILUS 100MM CELL
1 CAPSULE ORAL
Qty: 0 | Refills: 0 | Status: DISCONTINUED | OUTPATIENT
Start: 2017-04-25 | End: 2017-05-01

## 2017-04-25 RX ORDER — FUROSEMIDE 40 MG
40 TABLET ORAL EVERY 12 HOURS
Qty: 0 | Refills: 0 | Status: DISCONTINUED | OUTPATIENT
Start: 2017-04-25 | End: 2017-04-26

## 2017-04-25 RX ORDER — FUROSEMIDE 40 MG
60 TABLET ORAL ONCE
Qty: 0 | Refills: 0 | Status: COMPLETED | OUTPATIENT
Start: 2017-04-25 | End: 2017-04-25

## 2017-04-25 RX ORDER — VANCOMYCIN HCL 1 G
1000 VIAL (EA) INTRAVENOUS EVERY 24 HOURS
Qty: 0 | Refills: 0 | Status: DISCONTINUED | OUTPATIENT
Start: 2017-04-25 | End: 2017-04-27

## 2017-04-25 RX ORDER — WARFARIN SODIUM 2.5 MG/1
2.5 TABLET ORAL ONCE
Qty: 0 | Refills: 0 | Status: COMPLETED | OUTPATIENT
Start: 2017-04-25 | End: 2017-04-25

## 2017-04-25 RX ORDER — FERROUS SULFATE 325(65) MG
325 TABLET ORAL
Qty: 0 | Refills: 0 | Status: DISCONTINUED | OUTPATIENT
Start: 2017-04-25 | End: 2017-05-01

## 2017-04-25 RX ORDER — IPRATROPIUM/ALBUTEROL SULFATE 18-103MCG
3 AEROSOL WITH ADAPTER (GRAM) INHALATION EVERY 6 HOURS
Qty: 0 | Refills: 0 | Status: DISCONTINUED | OUTPATIENT
Start: 2017-04-25 | End: 2017-05-01

## 2017-04-25 RX ORDER — LEVOTHYROXINE SODIUM 125 MCG
75 TABLET ORAL DAILY
Qty: 0 | Refills: 0 | Status: DISCONTINUED | OUTPATIENT
Start: 2017-04-25 | End: 2017-05-01

## 2017-04-25 RX ORDER — SENNA PLUS 8.6 MG/1
2 TABLET ORAL AT BEDTIME
Qty: 0 | Refills: 0 | Status: DISCONTINUED | OUTPATIENT
Start: 2017-04-25 | End: 2017-05-01

## 2017-04-25 RX ORDER — CHOLECALCIFEROL (VITAMIN D3) 125 MCG
1000 CAPSULE ORAL DAILY
Qty: 0 | Refills: 0 | Status: DISCONTINUED | OUTPATIENT
Start: 2017-04-25 | End: 2017-05-01

## 2017-04-25 RX ORDER — SUCRALFATE 1 G
1 TABLET ORAL
Qty: 0 | Refills: 0 | Status: DISCONTINUED | OUTPATIENT
Start: 2017-04-25 | End: 2017-05-01

## 2017-04-25 RX ORDER — PIPERACILLIN AND TAZOBACTAM 4; .5 G/20ML; G/20ML
3.38 INJECTION, POWDER, LYOPHILIZED, FOR SOLUTION INTRAVENOUS EVERY 12 HOURS
Qty: 0 | Refills: 0 | Status: DISCONTINUED | OUTPATIENT
Start: 2017-04-25 | End: 2017-04-30

## 2017-04-25 RX ORDER — DOCUSATE SODIUM 100 MG
100 CAPSULE ORAL THREE TIMES A DAY
Qty: 0 | Refills: 0 | Status: DISCONTINUED | OUTPATIENT
Start: 2017-04-25 | End: 2017-05-01

## 2017-04-25 RX ORDER — ENOXAPARIN SODIUM 100 MG/ML
70 INJECTION SUBCUTANEOUS DAILY
Qty: 0 | Refills: 0 | Status: DISCONTINUED | OUTPATIENT
Start: 2017-04-25 | End: 2017-04-30

## 2017-04-25 RX ORDER — PANTOPRAZOLE SODIUM 20 MG/1
40 TABLET, DELAYED RELEASE ORAL
Qty: 0 | Refills: 0 | Status: DISCONTINUED | OUTPATIENT
Start: 2017-04-25 | End: 2017-05-01

## 2017-04-25 RX ORDER — ACETAMINOPHEN 500 MG
650 TABLET ORAL EVERY 6 HOURS
Qty: 0 | Refills: 0 | Status: DISCONTINUED | OUTPATIENT
Start: 2017-04-25 | End: 2017-05-01

## 2017-04-25 RX ADMIN — Medication 60 MILLIGRAM(S): at 16:34

## 2017-04-25 RX ADMIN — Medication 1 GRAM(S): at 23:50

## 2017-04-25 RX ADMIN — Medication 40 MILLIGRAM(S): at 17:56

## 2017-04-25 RX ADMIN — PIPERACILLIN AND TAZOBACTAM 200 GRAM(S): 4; .5 INJECTION, POWDER, LYOPHILIZED, FOR SOLUTION INTRAVENOUS at 17:13

## 2017-04-25 RX ADMIN — TAMSULOSIN HYDROCHLORIDE 0.4 MILLIGRAM(S): 0.4 CAPSULE ORAL at 22:59

## 2017-04-25 RX ADMIN — WARFARIN SODIUM 2.5 MILLIGRAM(S): 2.5 TABLET ORAL at 22:58

## 2017-04-25 RX ADMIN — Medication 1 TABLET(S): at 17:56

## 2017-04-25 RX ADMIN — ENOXAPARIN SODIUM 70 MILLIGRAM(S): 100 INJECTION SUBCUTANEOUS at 17:55

## 2017-04-25 RX ADMIN — Medication 80 MILLIGRAM(S): at 17:56

## 2017-04-25 RX ADMIN — Medication 250 MILLIGRAM(S): at 18:00

## 2017-04-25 RX ADMIN — Medication 100 MILLIGRAM(S): at 22:59

## 2017-04-25 RX ADMIN — Medication 325 MILLIGRAM(S): at 17:56

## 2017-04-25 RX ADMIN — Medication 1 GRAM(S): at 17:56

## 2017-04-25 RX ADMIN — SODIUM CHLORIDE 3 MILLILITER(S): 9 INJECTION INTRAMUSCULAR; INTRAVENOUS; SUBCUTANEOUS at 16:23

## 2017-04-25 NOTE — ED PROVIDER NOTE - MEDICAL DECISION MAKING DETAILS
Evaluate the cause of sob and coughing and leg edema getting worse, corollate clinical finding with lab and Chest x ray.

## 2017-04-25 NOTE — CONSULT NOTE ADULT - SUBJECTIVE AND OBJECTIVE BOX
Date/Time Patient Seen:  		  Referring MD:   Data Reviewed	       Patient is a 88y old  Male who presents with a chief complaint of Shortness of breath (25 Apr 2017 17:14)  in bed  seen and examined  vs and meds reviewed  family at the BS  pt was in rehab recently, now with sob sequeira cough and weakness and decreased appetite  pt is known to me from recent stay in [facility], pmd is dr ratliff and cardiologist is dr adames      Subjective/HPI       Medication list         MEDICATIONS  (STANDING):  piperacillin/tazobactam IVPB. 3.375Gram(s) IV Intermittent every 12 hours  vancomycin  IVPB 1000milliGRAM(s) IV Intermittent every 24 hours  furosemide   Injectable 40milliGRAM(s) IV Push every 12 hours  tamsulosin 0.4milliGRAM(s) Oral at bedtime  sotalol 80milliGRAM(s) Oral every 12 hours  ferrous    sulfate 325milliGRAM(s) Oral two times a day with meals  docusate sodium 100milliGRAM(s) Oral three times a day  sucralfate suspension 1Gram(s) Oral four times a day  pantoprazole    Tablet 40milliGRAM(s) Oral before breakfast  levothyroxine 75MICROGram(s) Oral daily  multivitamin 1Tablet(s) Oral daily  cholecalciferol 1000Unit(s) Oral daily  warfarin 2.5milliGRAM(s) Oral once  enoxaparin Injectable 70milliGRAM(s) SubCutaneous daily  lactobacillus acidophilus 1Tablet(s) Oral three times a day with meals    MEDICATIONS  (PRN):  acetaminophen   Tablet. 650milliGRAM(s) Oral every 6 hours PRN Mild Pain (1 - 3)  senna 2Tablet(s) Oral at bedtime PRN Constipation  ALBUTerol/ipratropium for Nebulization 3milliLiter(s) Nebulizer every 6 hours PRN Shortness of Breath and/or Wheezing         Vitals log        ICU Vital Signs Last 24 Hrs  T(C): 36.9, Max: 36.9 (04-25 @ 14:54)  T(F): 98.4, Max: 98.4 (04-25 @ 14:54)  HR: 66 (61 - 69)  BP: 131/67 (118/65 - 131/67)  BP(mean): --  ABP: --  ABP(mean): --  RR: 16 (16 - 18)  SpO2: 100% (95% - 100%)           Input and Output:  I&O's Detail    I & Os for current day (as of 25 Apr 2017 19:02)  =============================================  IN:    Total IN: 0 ml  ---------------------------------------------  OUT:    Voided: 800 ml    Total OUT: 800 ml  ---------------------------------------------  Total NET: -800 ml      Lab Data                        11.4   14.0  )-----------( 100      ( 25 Apr 2017 16:19 )             35.6     04-25    142  |  106  |  31<H>  ----------------------------<  107<H>  3.6   |  27  |  1.61<H>    Ca    9.3      25 Apr 2017 16:19    TPro  6.3  /  Alb  2.6<L>  /  TBili  0.7  /  DBili  x   /  AST  22  /  ALT  12  /  AlkPhos  75  04-25      CARDIAC MARKERS ( 25 Apr 2017 16:19 )  .731 ng/mL / x     / 52 U/L / x     / x          non smoker,   non drinker  no recr. drugs    retired  lives at home      Review of Systems	  sob, cough, weakness, dec appetite      Objective     Physical Examination  extr - chr changes, some edema, lungs - dec BS, abd - soft, cn grossly int, moves all extr, verbalizes without difficulty, on room air        Pertinent Lab findings & Imaging      Guerin:  NO   Adequate UO     I&O's Detail    I & Os for current day (as of 25 Apr 2017 19:02)  =============================================  IN:    Total IN: 0 ml  ---------------------------------------------  OUT:    Voided: 800 ml    Total OUT: 800 ml  ---------------------------------------------  Total NET: -800 ml           Discussed with:     Cultures:	        Radiology

## 2017-04-25 NOTE — H&P ADULT - PROBLEM SELECTOR PLAN 1
Possible Acute on chronic diastolic heart failure with valvular heart disease.   Will place patient on Lasix IV and monitor I/O.  Continue beta blocker (sotalol). No ACE/ARB due to renal failure.   Will check 2 D Echo to assess LV fx.  Cardiology consult called.   Monitor serial CPK and Troponin.   Further management as per patient's clinical course.

## 2017-04-25 NOTE — H&P ADULT - PROBLEM SELECTOR PLAN 2
Patient with possible HCAP vs aspiration pneumonia.  Will place on broad spectrum IV antibiotics.  Will get CT chest for further evaluation of pneumonia.   Will get ID and Pulmonary to see patient.  Further management as per patient's clinical course.

## 2017-04-25 NOTE — ED PROVIDER NOTE - CARE PLAN
Principal Discharge DX:	Acute on chronic congestive heart failure, unspecified congestive heart failure type  Secondary Diagnosis:	Pneumonia due to infectious organism, unspecified laterality, unspecified part of lung

## 2017-04-25 NOTE — H&P ADULT - ASSESSMENT
88 years old male with history of Atrial Fibrillation, Hypertension, recent GI bleed with anemia of blood loss, valvular heart disease, who now comes in with shortness of breath. Patient has possible combination of CHF and Pneumonia. He is being admitted for further care.

## 2017-04-25 NOTE — ED PROVIDER NOTE - OBJECTIVE STATEMENT
89 y/o M pt with hx of AFib, CKD, hypothyroidism presents to ED c/o cough, SOB for 1 week. Pt was recently seen and placed in Rehab facility, he states his symptoms are worsening. No fevers, chills, chest pain. No further complaints at this time.   Cardiology: Dr. Cabrera

## 2017-04-25 NOTE — H&P ADULT - NSHPPHYSICALEXAM_GEN_ALL_CORE
PHYSICAL EXAM:  GENERAL: well-groomed, well-developed  HEAD:  Atraumatic, Normocephalic  EYES: EOMI, PERRLA, conjunctiva and sclera clear, Pallor +  ENMT: No tonsillar erythema, exudates, or enlargement; Moist mucous membranes, Good dentition, No lesions  NECK: Supple, No JVD, Normal thyroid  CHEST/LUNG: Decreased breath sounds at bases, L>R, occasional rhonchi +  HEART: Irregularly, irregular, SM +  ABDOMEN: Soft, Nontender, Nondistended; Bowel sounds present  EXTREMITIES:  1+ Peripheral Pulses, 2 + Edema of ankles.   MS: No joint swelling or deformity.   LYMPH: No lymphadenopathy noted  SKIN: No rashes or lesions  NERVOUS SYSTEM:  Motor Strength 4/5 B/L upper and lower extremities; DTRs 2+ intact and symmetric  PSYCH:  Alert & Oriented X3, Good concentration.

## 2017-04-25 NOTE — H&P ADULT - NSHPLABSRESULTS_GEN_ALL_CORE
11.4   14.0  )-----------( 100      ( 25 Apr 2017 16:19 )             35.6     04-25    142  |  106  |  31<H>  ----------------------------<  107<H>  3.6   |  27  |  1.61<H>    Ca    9.3      25 Apr 2017 16:19    TPro  6.3  /  Alb  2.6<L>  /  TBili  0.7  /  DBili  x   /  AST  22  /  ALT  12  /  AlkPhos  75  04-25    CAPILLARY BLOOD GLUCOSE    PT/INR - ( 25 Apr 2017 16:19 )   PT: 15.4 sec;   INR: 1.40 ratio         PTT - ( 25 Apr 2017 16:19 )  PTT:30.9 sec        Thyroid

## 2017-04-25 NOTE — H&P ADULT - HISTORY OF PRESENT ILLNESS
88 years old male with history of Atrial Fibrillation, Hypertension, recent GI bleed with anemia of blood loss, 88 years old male with history of Atrial Fibrillation, Hypertension, recent GI bleed with anemia of blood loss, valvular heart disease, who was at rehab and developed vomiting. Patient then developed increasing shortness of breath and cough. He also had some fever. Patient was placed on antibiotics at [facility] but he was brought in to ER at family's request for further eval and management.     In ER patient continues to c/o cough and some shortness of breath on minimal activity. He also c/o being weak and tired. He denies any chest pain or pressure. No nausea or vomiting now.     patient is found to have possible pneumonia and possible CHF exacerbation. He is being admitted for same.

## 2017-04-25 NOTE — H&P ADULT - NSHPREVIEWOFSYSTEMS_GEN_ALL_CORE
REVIEW OF SYSTEMS:  CONSTITUTIONAL: + fatigue. + fever yesterday at [facility]  EYES: No eye pain, visual disturbances, or discharge  ENMT:  No difficulty hearing, tinnitus, vertigo; No sinus or throat pain  NECK: No pain or stiffness  BREASTS: No pain, masses, or nipple discharge  RESPIRATORY: + Cough, + Shortness of breath.   CARDIOVASCULAR: No chest pain, palpitations, dizziness, or leg swelling  GASTROINTESTINAL: No abdominal or epigastric pain. No nausea, vomiting, or hematemesis; No diarrhea or constipation. No melena or hematochezia.  GENITOURINARY: No dysuria, frequency, hematuria, or incontinence  NEUROLOGICAL: No headaches, memory loss, loss of strength, numbness, or tremors  SKIN: No itching, burning, rashes, or lesions   LYMPH NODES: No enlarged glands  ENDOCRINE: No heat or cold intolerance; No hair loss; No polydipsia or polyuria  MUSCULOSKELETAL: No joint pain or swelling; No muscle, back, or extremity pain  PSYCHIATRIC: No depression, anxiety, mood swings, or difficulty sleeping  HEME/LYMPH: No easy bruising, or bleeding gums  ALLERGY AND IMMUNOLOGIC: No hives or eczema

## 2017-04-25 NOTE — ED PROVIDER NOTE - NS ED MD SCRIBE ATTENDING SCRIBE SECTIONS
PHYSICAL EXAM/REVIEW OF SYSTEMS/HISTORY OF PRESENT ILLNESS/PAST MEDICAL/SURGICAL/SOCIAL HISTORY/DISPOSITION/VITAL SIGNS( Pullset) HISTORY OF PRESENT ILLNESS/PHYSICAL EXAM/RESULTS/REVIEW OF SYSTEMS/PAST MEDICAL/SURGICAL/SOCIAL HISTORY/VITAL SIGNS( Pullset)/DISPOSITION

## 2017-04-25 NOTE — CONSULT NOTE ADULT - PROBLEM SELECTOR RECOMMENDATION 3
on LASIX  cvs regimen  monitor cr and lytes  I and O  echo done, report pending, has hx of diastolic HF and valvular heart disease

## 2017-04-25 NOTE — H&P ADULT - PROBLEM SELECTOR PLAN 3
Continue Sotalol for rate control.  Will place patient on Lovenox and Coumadin till INR therapeutic.   Monitor on tele.

## 2017-04-25 NOTE — INPATIENT CERTIFICATION FOR MEDICARE PATIENTS - RISKS OF ADVERSE EVENTS
Concern for delay in diagnosis and treatment/Concern for cardiopulmonary deterioration/Concern for worsening infectious process

## 2017-04-26 DIAGNOSIS — E83.42 HYPOMAGNESEMIA: ICD-10-CM

## 2017-04-26 DIAGNOSIS — E87.6 HYPOKALEMIA: ICD-10-CM

## 2017-04-26 DIAGNOSIS — N18.3 CHRONIC KIDNEY DISEASE, STAGE 3 (MODERATE): ICD-10-CM

## 2017-04-26 DIAGNOSIS — R06.02 SHORTNESS OF BREATH: ICD-10-CM

## 2017-04-26 DIAGNOSIS — R74.8 ABNORMAL LEVELS OF OTHER SERUM ENZYMES: ICD-10-CM

## 2017-04-26 DIAGNOSIS — R06.00 DYSPNEA, UNSPECIFIED: ICD-10-CM

## 2017-04-26 DIAGNOSIS — J18.9 PNEUMONIA, UNSPECIFIED ORGANISM: ICD-10-CM

## 2017-04-26 DIAGNOSIS — I38 ENDOCARDITIS, VALVE UNSPECIFIED: ICD-10-CM

## 2017-04-26 DIAGNOSIS — I48.0 PAROXYSMAL ATRIAL FIBRILLATION: ICD-10-CM

## 2017-04-26 LAB
ANION GAP SERPL CALC-SCNC: 8 MMOL/L — SIGNIFICANT CHANGE UP (ref 5–17)
ANION GAP SERPL CALC-SCNC: 8 MMOL/L — SIGNIFICANT CHANGE UP (ref 5–17)
BASOPHILS # BLD AUTO: 0 K/UL — SIGNIFICANT CHANGE UP (ref 0–0.2)
BASOPHILS NFR BLD AUTO: 0.4 % — SIGNIFICANT CHANGE UP (ref 0–2)
BUN SERPL-MCNC: 29 MG/DL — HIGH (ref 7–23)
BUN SERPL-MCNC: 35 MG/DL — HIGH (ref 7–23)
CALCIUM SERPL-MCNC: 9.1 MG/DL — SIGNIFICANT CHANGE UP (ref 8.4–10.5)
CALCIUM SERPL-MCNC: 9.4 MG/DL — SIGNIFICANT CHANGE UP (ref 8.4–10.5)
CHLORIDE SERPL-SCNC: 100 MMOL/L — SIGNIFICANT CHANGE UP (ref 96–108)
CHLORIDE SERPL-SCNC: 101 MMOL/L — SIGNIFICANT CHANGE UP (ref 96–108)
CHOLEST SERPL-MCNC: 99 MG/DL — SIGNIFICANT CHANGE UP (ref 10–199)
CK SERPL-CCNC: 26 U/L — LOW (ref 39–308)
CK SERPL-CCNC: 35 U/L — LOW (ref 39–308)
CK SERPL-CCNC: 41 U/L — SIGNIFICANT CHANGE UP (ref 39–308)
CO2 SERPL-SCNC: 30 MMOL/L — SIGNIFICANT CHANGE UP (ref 22–31)
CO2 SERPL-SCNC: 32 MMOL/L — HIGH (ref 22–31)
CREAT SERPL-MCNC: 1.88 MG/DL — HIGH (ref 0.5–1.3)
CREAT SERPL-MCNC: 2.15 MG/DL — HIGH (ref 0.5–1.3)
CRP SERPL-MCNC: 15.81 MG/DL — HIGH (ref 0–0.4)
EOSINOPHIL # BLD AUTO: 0.2 K/UL — SIGNIFICANT CHANGE UP (ref 0–0.5)
EOSINOPHIL NFR BLD AUTO: 2.1 % — SIGNIFICANT CHANGE UP (ref 0–6)
GLUCOSE SERPL-MCNC: 113 MG/DL — HIGH (ref 70–99)
GLUCOSE SERPL-MCNC: 160 MG/DL — HIGH (ref 70–99)
HBA1C BLD-MCNC: 4.9 % — SIGNIFICANT CHANGE UP (ref 4–5.6)
HCT VFR BLD CALC: 37.6 % — LOW (ref 39–50)
HDLC SERPL-MCNC: 35 MG/DL — LOW (ref 40–125)
HGB BLD-MCNC: 12.7 G/DL — LOW (ref 13–17)
INR BLD: 1.32 RATIO — HIGH (ref 0.88–1.16)
LIPID PNL WITH DIRECT LDL SERPL: 45 MG/DL — SIGNIFICANT CHANGE UP
LYMPHOCYTES # BLD AUTO: 1.2 K/UL — SIGNIFICANT CHANGE UP (ref 1–3.3)
LYMPHOCYTES # BLD AUTO: 10.1 % — LOW (ref 13–44)
MAGNESIUM SERPL-MCNC: 1.4 MG/DL — LOW (ref 1.6–2.6)
MAGNESIUM SERPL-MCNC: 2.2 MG/DL — SIGNIFICANT CHANGE UP (ref 1.6–2.6)
MCHC RBC-ENTMCNC: 32.5 PG — SIGNIFICANT CHANGE UP (ref 27–34)
MCHC RBC-ENTMCNC: 33.8 GM/DL — SIGNIFICANT CHANGE UP (ref 32–36)
MCV RBC AUTO: 96 FL — SIGNIFICANT CHANGE UP (ref 80–100)
MONOCYTES # BLD AUTO: 0.7 K/UL — SIGNIFICANT CHANGE UP (ref 0–0.9)
MONOCYTES NFR BLD AUTO: 5.9 % — SIGNIFICANT CHANGE UP (ref 2–14)
NEUTROPHILS # BLD AUTO: 9.6 K/UL — HIGH (ref 1.8–7.4)
NEUTROPHILS NFR BLD AUTO: 81.5 % — HIGH (ref 43–77)
PHOSPHATE SERPL-MCNC: 3.6 MG/DL — SIGNIFICANT CHANGE UP (ref 2.5–4.5)
PLATELET # BLD AUTO: 107 K/UL — LOW (ref 150–400)
POTASSIUM SERPL-MCNC: 2.6 MMOL/L — CRITICAL LOW (ref 3.5–5.3)
POTASSIUM SERPL-MCNC: 3.4 MMOL/L — LOW (ref 3.5–5.3)
POTASSIUM SERPL-SCNC: 2.6 MMOL/L — CRITICAL LOW (ref 3.5–5.3)
POTASSIUM SERPL-SCNC: 3.4 MMOL/L — LOW (ref 3.5–5.3)
PROTHROM AB SERPL-ACNC: 14.5 SEC — HIGH (ref 9.8–12.7)
RBC # BLD: 3.91 M/UL — LOW (ref 4.2–5.8)
RBC # FLD: 13.9 % — SIGNIFICANT CHANGE UP (ref 10.3–14.5)
SODIUM SERPL-SCNC: 139 MMOL/L — SIGNIFICANT CHANGE UP (ref 135–145)
SODIUM SERPL-SCNC: 140 MMOL/L — SIGNIFICANT CHANGE UP (ref 135–145)
T3 SERPL-MCNC: 50 NG/DL — LOW (ref 80–200)
T4 AB SER-ACNC: 6.2 UG/DL — SIGNIFICANT CHANGE UP (ref 4.6–12)
TOTAL CHOLESTEROL/HDL RATIO MEASUREMENT: 2.8 RATIO — LOW (ref 3.4–9.6)
TRIGL SERPL-MCNC: 97 MG/DL — SIGNIFICANT CHANGE UP (ref 10–149)
TROPONIN I SERPL-MCNC: 0.62 NG/ML — HIGH (ref 0.02–0.06)
TROPONIN I SERPL-MCNC: 0.73 NG/ML — HIGH (ref 0.02–0.06)
TROPONIN I SERPL-MCNC: 0.9 NG/ML — HIGH (ref 0.02–0.06)
TSH SERPL-MCNC: 4.1 UIU/ML — SIGNIFICANT CHANGE UP (ref 0.27–4.2)
WBC # BLD: 11.8 K/UL — HIGH (ref 3.8–10.5)
WBC # FLD AUTO: 11.8 K/UL — HIGH (ref 3.8–10.5)

## 2017-04-26 PROCEDURE — 93010 ELECTROCARDIOGRAM REPORT: CPT

## 2017-04-26 PROCEDURE — 93306 TTE W/DOPPLER COMPLETE: CPT | Mod: 26

## 2017-04-26 PROCEDURE — 99223 1ST HOSP IP/OBS HIGH 75: CPT | Mod: 25

## 2017-04-26 PROCEDURE — 76770 US EXAM ABDO BACK WALL COMP: CPT | Mod: 26

## 2017-04-26 RX ORDER — POTASSIUM CHLORIDE 20 MEQ
10 PACKET (EA) ORAL
Qty: 0 | Refills: 0 | Status: COMPLETED | OUTPATIENT
Start: 2017-04-26 | End: 2017-04-26

## 2017-04-26 RX ORDER — POTASSIUM CHLORIDE 20 MEQ
40 PACKET (EA) ORAL EVERY 4 HOURS
Qty: 0 | Refills: 0 | Status: DISCONTINUED | OUTPATIENT
Start: 2017-04-26 | End: 2017-04-26

## 2017-04-26 RX ORDER — POTASSIUM CHLORIDE 20 MEQ
40 PACKET (EA) ORAL ONCE
Qty: 0 | Refills: 0 | Status: COMPLETED | OUTPATIENT
Start: 2017-04-26 | End: 2017-04-26

## 2017-04-26 RX ORDER — MAGNESIUM SULFATE 500 MG/ML
2 VIAL (ML) INJECTION ONCE
Qty: 0 | Refills: 0 | Status: COMPLETED | OUTPATIENT
Start: 2017-04-26 | End: 2017-04-26

## 2017-04-26 RX ORDER — WARFARIN SODIUM 2.5 MG/1
2.5 TABLET ORAL ONCE
Qty: 0 | Refills: 0 | Status: COMPLETED | OUTPATIENT
Start: 2017-04-26 | End: 2017-04-26

## 2017-04-26 RX ADMIN — Medication 1 GRAM(S): at 17:25

## 2017-04-26 RX ADMIN — Medication 75 MICROGRAM(S): at 05:40

## 2017-04-26 RX ADMIN — PIPERACILLIN AND TAZOBACTAM 25 GRAM(S): 4; .5 INJECTION, POWDER, LYOPHILIZED, FOR SOLUTION INTRAVENOUS at 00:17

## 2017-04-26 RX ADMIN — Medication 40 MILLIEQUIVALENT(S): at 19:40

## 2017-04-26 RX ADMIN — Medication 250 MILLIGRAM(S): at 17:25

## 2017-04-26 RX ADMIN — Medication 325 MILLIGRAM(S): at 17:25

## 2017-04-26 RX ADMIN — Medication 1 GRAM(S): at 05:39

## 2017-04-26 RX ADMIN — Medication 100 MILLIEQUIVALENT(S): at 11:29

## 2017-04-26 RX ADMIN — TAMSULOSIN HYDROCHLORIDE 0.4 MILLIGRAM(S): 0.4 CAPSULE ORAL at 21:37

## 2017-04-26 RX ADMIN — Medication 1 TABLET(S): at 17:25

## 2017-04-26 RX ADMIN — Medication 40 MILLIGRAM(S): at 05:40

## 2017-04-26 RX ADMIN — WARFARIN SODIUM 2.5 MILLIGRAM(S): 2.5 TABLET ORAL at 21:37

## 2017-04-26 RX ADMIN — Medication 325 MILLIGRAM(S): at 08:25

## 2017-04-26 RX ADMIN — Medication 50 GRAM(S): at 08:31

## 2017-04-26 RX ADMIN — ENOXAPARIN SODIUM 70 MILLIGRAM(S): 100 INJECTION SUBCUTANEOUS at 11:31

## 2017-04-26 RX ADMIN — Medication 1 TABLET(S): at 08:25

## 2017-04-26 RX ADMIN — Medication 1 TABLET(S): at 12:51

## 2017-04-26 RX ADMIN — Medication 40 MILLIEQUIVALENT(S): at 09:07

## 2017-04-26 RX ADMIN — Medication 100 MILLIEQUIVALENT(S): at 10:25

## 2017-04-26 RX ADMIN — Medication 1000 UNIT(S): at 11:30

## 2017-04-26 RX ADMIN — Medication 100 MILLIGRAM(S): at 05:40

## 2017-04-26 RX ADMIN — PANTOPRAZOLE SODIUM 40 MILLIGRAM(S): 20 TABLET, DELAYED RELEASE ORAL at 05:39

## 2017-04-26 RX ADMIN — Medication 1 TABLET(S): at 11:30

## 2017-04-26 RX ADMIN — Medication 100 MILLIGRAM(S): at 21:37

## 2017-04-26 RX ADMIN — Medication 1 GRAM(S): at 11:31

## 2017-04-26 RX ADMIN — PIPERACILLIN AND TAZOBACTAM 25 GRAM(S): 4; .5 INJECTION, POWDER, LYOPHILIZED, FOR SOLUTION INTRAVENOUS at 12:51

## 2017-04-26 NOTE — DIETITIAN INITIAL EVALUATION ADULT. - PERTINENT LABORATORY DATA
4/26- WBC 11.8H, H/H 12.7L/37.6L, Chol/Trig WNL, HgbA1c 4.9% WNL, K+ 2.6L, BUN 29H, Creat 1.88H, Glucose 113H, GFR 31L

## 2017-04-26 NOTE — PROVIDER CONTACT NOTE (CRITICAL VALUE NOTIFICATION) - ACTION/TREATMENT ORDERED:
Dr. Yancey made aware. Continue cardiac monitoring and cardiac enzyme levels.
Ordered KCL 40 meq q4h [id number] and Mg 2 gm IVPB x1 dose
ordered 40 meq KCL po

## 2017-04-26 NOTE — CONSULT NOTE ADULT - SUBJECTIVE AND OBJECTIVE BOX
HPI:  88 years old male with history of Atrial Fibrillation, Hypertension, recent GI bleed with anemia of blood loss, valvular heart disease, who was at rehab and developed vomiting. Patient then developed increasing shortness of breath and cough. He also had some fever. Patient was placed on antibiotics at [facility] but he was brought in to ER at family's request for further eval and management.     In ER patient continues to c/o cough and some shortness of breath on minimal activity. He also c/o being weak and tired. He denies  chest pain, pressure or discomfort. No  nausea or recurrence of vomiting. He was felt to have possible pneumonia and is being treated with antibiotics. There is concern that he may also have CHF. Mild elevation of troponin was demonstrated prompting cardiology evaluation.    Patient denies exertional chest pain, palpitations, presyncope or syncope. Occasional ankle edema.     He presently relates that he is feeling " better". Denies SOB at rest. No chest discomfort. Still with mild cough. No hemoptysis.      PAST MEDICAL & SURGICAL HISTORY:  Mitral valve disorder  Kidney stone  Diverticulosis  CKD (chronic kidney disease)  Afib   Hypothyroidism  BPH (benign prostatic hyperplasia)  Chronic peptic ulcer: S/P surgery more than 10 yrs ago  No significant past surgical history      Allergies    No Known Allergies    SOCIAL HISTORY: remote smoking hx. No ETOH.     FAMILY HISTORY:  No pertinent family history in first degree relatives      MEDICATIONS:  acetaminophen   Tablet. 650milliGRAM(s) Oral every 6 hours PRN  piperacillin/tazobactam IVPB. 3.375Gram(s) IV Intermittent every 12 hours  vancomycin  IVPB 1000milliGRAM(s) IV Intermittent every 24 hours  furosemide   Injectable 40milliGRAM(s) IV Push every 12 hours  tamsulosin 0.4milliGRAM(s) Oral at bedtime  sotalol 80milliGRAM(s) Oral every 12 hours  ferrous    sulfate 325milliGRAM(s) Oral two times a day with meals  docusate sodium 100milliGRAM(s) Oral three times a day  senna 2Tablet(s) Oral at bedtime PRN  sucralfate suspension 1Gram(s) Oral four times a day  pantoprazole    Tablet 40milliGRAM(s) Oral before breakfast  levothyroxine 75MICROGram(s) Oral daily  multivitamin 1Tablet(s) Oral daily  cholecalciferol 1000Unit(s) Oral daily  enoxaparin Injectable 70milliGRAM(s) SubCutaneous daily  ALBUTerol/ipratropium for Nebulization 3milliLiter(s) Nebulizer every 6 hours PRN  lactobacillus acidophilus 1Tablet(s) Oral three times a day with meals  guaiFENesin    Syrup 200milliGRAM(s) Oral every 6 hours PRN      REVIEW OF SYSTEMS:    CONSTITUTIONAL: generalized  weakness, Had chills prior to admission.   EYES/ENT: No visual changes;   RESPIRATORY: see HPI.   CARDIOVASCULAR: see HPI  GASTROINTESTINAL: No abdominal pain. See HPI.     All other review of systems is negative/ not contributory  unless indicated above    Vital Signs Last 24 Hrs  T(C): 36.5, Max: 37 (04-25 @ 20:09)  T(F): 97.7, Max: 98.6 (04-25 @ 20:09)  HR: 75 (56 - 80)  BP: 92/64 (92/64 - 138/80)  BP(mean): --  RR: 18 (16 - 19)  SpO2: 92% (92% - 100%)    I&O's Summary  I & Os for 24h ending 26 Apr 2017 07:00  =============================================  IN: 460 ml / OUT: 3650 ml / NET: -3190 ml    I & Os for current day (as of 26 Apr 2017 12:48)  =============================================  IN: 240 ml / OUT: 0 ml / NET: 240 ml      PHYSICAL EXAM:    Constitutional: NAD, awake and alert, well-developed  HEENT: NC/AT; anicteric . No oral cyanosis.  Neck:  No JVD  Respiratory: Breath sounds are clear with mild decrease at the left base.   Cardiovascular: S1 and S2, regular rate and rhythm, 1/6 systolic murmur LSB.   Gastrointestinal: Bowel Sounds present, soft, nontender.   Extremities: 1+  peripheral edema at the ankles.  No clubbing or cyanosis.  Neurological: Awake and alert.   Musculoskeletal: no calf tenderness.  Skin: No rashes.      LABS: All Labs Reviewed:                        12.7   11.8  )-----------( 107      ( 26 Apr 2017 07:08 )             37.6                         11.4   14.0  )-----------( 100      ( 25 Apr 2017 16:19 )             35.6     26 Apr 2017 07:08    140    |  100    |  29     ----------------------------<  113    2.6     |  32     |  1.88   25 Apr 2017 16:19    142    |  106    |  31     ----------------------------<  107    3.6     |  27     |  1.61     Ca    9.4        26 Apr 2017 07:08  Ca    9.3        25 Apr 2017 16:19  Phos  3.6       26 Apr 2017 07:08  Mg     1.4       26 Apr 2017 07:08    TPro  6.3    /  Alb  2.6    /  TBili  0.7    /  DBili  x      /  AST  22     /  ALT  12     /  AlkPhos  75     25 Apr 2017 16:19    PT/INR - ( 26 Apr 2017 07:08 )   PT: 14.5 sec;   INR: 1.32 ratio         PTT - ( 25 Apr 2017 16:19 )  PTT:30.9 sec  CARDIAC MARKERS ( 26 Apr 2017 07:10 )  .729 ng/mL / x     / 35 U/L / x     / x      CARDIAC MARKERS ( 26 Apr 2017 00:21 )  .900 ng/mL / x     / 41 U/L / x     / x      CARDIAC MARKERS ( 25 Apr 2017 16:19 )  .731 ng/mL / x     / 52 U/L / x     / x          Blood Culture:   04-25 @ 16:19  Pro Bnp [id number]      EXAM:  CHEST PA & LAT                          PROCEDURE DATE:  04/25/2017        INTERPRETATION:  Clinical information: Chest pain    2 views, frontal and lateral    Comparison study dated 4/3/2017.    Airspace disease at the left lung base, effusion-consolidation. Right   lung normally expanded. No vascular congestion or cardiac enlargement.   Hilar regions, mediastinal contours intact. No acute osseous   abnormalities. Surgical clips visible midline upper abdomen.    IMPRESSION:    See above report.    CONCLUSIONS: Echocardiogram :    1. Left ventricle appears normal in size with mild concentric left   ventricular hypertrophy. The endocardial surface is not reliably   demonstrated; left ventricular ejection fraction estimated at 50%.  2. Right ventricle seen in limited fashion; grossly normal size and   systolic function.  3. Calcified mitral annulus. Mitral valve leaflets are thickened. No   significant stenosis noted. Mild to moderate mitral regurgitation.   4 sclerotic aortic valve without stenosis. Minimal aortic insufficiency.  5. Mild tricuspid regurgitation  6. Trace pulmonic insufficiency  7 borderline pulmonary hypertension      STEFANO HERNANDEZ M.D., ATTENDING CARDIOLOGIST  This document has been electronically signed. Apr 26 2017  8:14AM        EKG - 04/25/17 - Sinus rhythm 69 BPM. Low voltage. Cannot r/o septal infarct indeterminate age. Prolonged QT interval.     EKG - 04/26/17 - No significant change    rhythm - sinus,     Lexiscan nuclear stress test - 12/2014 - no ischemia.

## 2017-04-26 NOTE — PROGRESS NOTE ADULT - SUBJECTIVE AND OBJECTIVE BOX
Patient is a 88y old  Male who presents with a chief complaint of Shortness of breath (25 Apr 2017 17:14)      INTERVAL HPI/OVERNIGHT EVENTS: Patient seen and examined. NAD. No complaints.      Vital Signs Last 24 Hrs  T(C): 36.5, Max: 37 (04-25 @ 20:09)  T(F): 97.7, Max: 98.6 (04-25 @ 20:09)  HR: 75 (56 - 80)  BP: 92/64 (92/64 - 138/80)  BP(mean): --  RR: 18 (16 - 19)  SpO2: 92% (92% - 100%)I&O's Summary  I & Os for 24h ending 26 Apr 2017 07:00  =============================================  IN: 460 ml / OUT: 3650 ml / NET: -3190 ml    I & Os for current day (as of 26 Apr 2017 14:02)  =============================================  IN: 240 ml / OUT: 0 ml / NET: 240 ml      LABS:                        12.7   11.8  )-----------( 107      ( 26 Apr 2017 07:08 )             37.6     04-26    140  |  100  |  29<H>  ----------------------------<  113<H>  2.6<LL>   |  32<H>  |  1.88<H>    Ca    9.4      26 Apr 2017 07:08  Phos  3.6     04-26  Mg     1.4     04-26    TPro  6.3  /  Alb  2.6<L>  /  TBili  0.7  /  DBili  x   /  AST  22  /  ALT  12  /  AlkPhos  75  04-25    PT/INR - ( 26 Apr 2017 07:08 )   PT: 14.5 sec;   INR: 1.32 ratio         PTT - ( 25 Apr 2017 16:19 )  PTT:30.9 sec    CAPILLARY BLOOD GLUCOSE          acetaminophen   Tablet. 650milliGRAM(s) Oral every 6 hours PRN  piperacillin/tazobactam IVPB. 3.375Gram(s) IV Intermittent every 12 hours  vancomycin  IVPB 1000milliGRAM(s) IV Intermittent every 24 hours  furosemide   Injectable 40milliGRAM(s) IV Push every 12 hours  tamsulosin 0.4milliGRAM(s) Oral at bedtime  sotalol 80milliGRAM(s) Oral every 12 hours  ferrous    sulfate 325milliGRAM(s) Oral two times a day with meals  docusate sodium 100milliGRAM(s) Oral three times a day  senna 2Tablet(s) Oral at bedtime PRN  sucralfate suspension 1Gram(s) Oral four times a day  pantoprazole    Tablet 40milliGRAM(s) Oral before breakfast  levothyroxine 75MICROGram(s) Oral daily  multivitamin 1Tablet(s) Oral daily  cholecalciferol 1000Unit(s) Oral daily  enoxaparin Injectable 70milliGRAM(s) SubCutaneous daily  ALBUTerol/ipratropium for Nebulization 3milliLiter(s) Nebulizer every 6 hours PRN  lactobacillus acidophilus 1Tablet(s) Oral three times a day with meals  guaiFENesin    Syrup 200milliGRAM(s) Oral every 6 hours PRN      REVIEW OF SYSTEMS:  CONSTITUTIONAL: No fever, weight loss, or fatigue  NECK: No pain or stiffness  RESPIRATORY: No cough, wheezing, chills or hemoptysis; No shortness of breath  CARDIOVASCULAR: No chest pain, palpitations, dizziness, or leg swelling  GASTROINTESTINAL: No abdominal or epigastric pain. No nausea, vomiting, or hematemesis; No diarrhea or constipation. No melena or hematochezia.  GENITOURINARY: No dysuria, frequency, hematuria, or incontinence  NEUROLOGICAL: No headaches, loss of strength, numbness, or tremors  SKIN: No itching, burning  MUSCULOSKELETAL: No joint pain or swelling; No muscle, back, or extremity pain  PSYCHIATRIC: No depression, mood swings, HEME/LYMPH: No easy bruising, or bleeding gums  ALLERY AND IMMUNOLOGIC: No hives     RADIOLOGY & ADDITIONAL TESTS:    Imaging Personally Reviewed:  [ ] YES  [ ] NO    Consultant(s) Notes Reviewed:  [ ] YES  [ ] NO    PHYSICAL EXAM:  GENERAL: NAD, well-groomed, well-developed  HEAD:  Atraumatic, Normocephalic  EYES: EOMI, PERRLA, conjunctiva and sclera clear  ENMT: No tonsillar erythema, exudates, or enlargement; Moist mucous membranes  NECK: Supple, No JVD  NERVOUS SYSTEM:  Awake & alert, Good concentration;   CHEST/LUNG: Clear to auscultation bilaterally; No rales, rhonchi, wheezing,  HEART: Regular rate and rhythm  ABDOMEN: Soft, Nontender, Nondistended; Bowel sounds present  EXTREMITIES:  No clubbing, cyanosis  LYMPH: No lymphadenopathy noted  SKIN: No rashes    Care Discussed with Consultants/Other Providers [ ] YES  [ ] NO

## 2017-04-26 NOTE — CONSULT NOTE ADULT - PROBLEM SELECTOR RECOMMENDATION 4
No chest pain. EKG is without ischemic changes ( similar to old EKG other than prolonged QTc). Not consistent with ACS. Is present in the setting of renal insufficiency;  consider demand ischemia. Will repeat level  and EKG in am. Discussed with Dr. Pino. Would defer use of  antiplatelet agents  in this setting  as he is systemically anticoagulated for hx. of atrial  fibrillation and has recent hx. of GI bleed.

## 2017-04-26 NOTE — PROGRESS NOTE ADULT - SUBJECTIVE AND OBJECTIVE BOX
Date/Time Patient Seen:  		  Referring MD:   Data Reviewed	       Patient is a 88y old  Male who presents with a chief complaint of Shortness of breath (25 Apr 2017 17:14)  in bed  seen and examined  on room air  vs and meds reviewed  procalcitonin result reviewed      Subjective/HPI       Medication list         MEDICATIONS  (STANDING):  piperacillin/tazobactam IVPB. 3.375Gram(s) IV Intermittent every 12 hours  vancomycin  IVPB 1000milliGRAM(s) IV Intermittent every 24 hours  furosemide   Injectable 40milliGRAM(s) IV Push every 12 hours  tamsulosin 0.4milliGRAM(s) Oral at bedtime  sotalol 80milliGRAM(s) Oral every 12 hours  ferrous    sulfate 325milliGRAM(s) Oral two times a day with meals  docusate sodium 100milliGRAM(s) Oral three times a day  sucralfate suspension 1Gram(s) Oral four times a day  pantoprazole    Tablet 40milliGRAM(s) Oral before breakfast  levothyroxine 75MICROGram(s) Oral daily  multivitamin 1Tablet(s) Oral daily  cholecalciferol 1000Unit(s) Oral daily  enoxaparin Injectable 70milliGRAM(s) SubCutaneous daily  lactobacillus acidophilus 1Tablet(s) Oral three times a day with meals    MEDICATIONS  (PRN):  acetaminophen   Tablet. 650milliGRAM(s) Oral every 6 hours PRN Mild Pain (1 - 3)  senna 2Tablet(s) Oral at bedtime PRN Constipation  ALBUTerol/ipratropium for Nebulization 3milliLiter(s) Nebulizer every 6 hours PRN Shortness of Breath and/or Wheezing  guaiFENesin    Syrup 200milliGRAM(s) Oral every 6 hours PRN Cough         Vitals log        ICU Vital Signs Last 24 Hrs  T(C): 36.8, Max: 37 (04-25 @ 20:09)  T(F): 98.3, Max: 98.6 (04-25 @ 20:09)  HR: 56 (56 - 80)  BP: 111/68 (111/68 - 138/80)  BP(mean): --  ABP: --  ABP(mean): --  RR: 18 (16 - 19)  SpO2: 96% (94% - 100%)           Input and Output:  I&O's Detail    I & Os for current day (as of 26 Apr 2017 07:17)  =============================================  IN:    Oral Fluid: 360 ml    IV PiggyBack: 100 ml    Total IN: 460 ml  ---------------------------------------------  OUT:    Voided: 3650 ml    Total OUT: 3650 ml  ---------------------------------------------  Total NET: -3190 ml      Lab Data                        11.4   14.0  )-----------( 100      ( 25 Apr 2017 16:19 )             35.6     04-25    142  |  106  |  31<H>  ----------------------------<  107<H>  3.6   |  27  |  1.61<H>    Ca    9.3      25 Apr 2017 16:19    TPro  6.3  /  Alb  2.6<L>  /  TBili  0.7  /  DBili  x   /  AST  22  /  ALT  12  /  AlkPhos  75  04-25      CARDIAC MARKERS ( 26 Apr 2017 00:21 )  .900 ng/mL / x     / 41 U/L / x     / x      CARDIAC MARKERS ( 25 Apr 2017 16:19 )  .731 ng/mL / x     / 52 U/L / x     / x            Review of Systems	      Objective     Physical Examination    head t  heart - s1s2  lungs - no wheezing  abd - soft  moves all extr  cn grossly int      Pertinent Lab findings & Imaging      Apoorva:  NO   Adequate UO     I&O's Detail    I & Os for current day (as of 26 Apr 2017 07:17)  =============================================  IN:    Oral Fluid: 360 ml    IV PiggyBack: 100 ml    Total IN: 460 ml  ---------------------------------------------  OUT:    Voided: 3650 ml    Total OUT: 3650 ml  ---------------------------------------------  Total NET: -3190 ml           Discussed with:     Cultures:	        Radiology

## 2017-04-26 NOTE — CONSULT NOTE ADULT - SUBJECTIVE AND OBJECTIVE BOX
HARSHAL [id number] 88y Male      HPI:  88 years old male with history of Atrial Fibrillation, Hypertension, recent GI bleed with anemia of blood loss, valvular heart disease admitted from [facility]   with chief complaint of  increasing shortness of breath and cough +fever. Per pt he had n/v/ few days ago. Patient was placed on antibiotics at [facility] but he was   brought in to ER at family's request for further eval and management.     In ER pt is afebrile. WBC 14K. +cough and some shortness of breath on minimal activity. He also c/o being weak and tired. He denies any chest pain or pressure. No nausea or vomiting now.   procalcitonin 27. CT chest with multifocal pneumonia. No recent travel or sick contacts.    Infectious Disease consult was called to evaluate pt and for antibiotic management.      PAST MEDICAL & SURGICAL HISTORY:  Mitral valve disorder  Kidney stone  Diverticulosis  CKD (chronic kidney disease)  Afib  Hypothyroidism  BPH (benign prostatic hyperplasia)  Chronic peptic ulcer: S/P surgery more than 10 yrs ago  No significant past surgical history      Home Medications:   * Patient Currently Takes Medications as of 08-Apr-2017 10:44 documented in Prescription Writer  · 	sucralfate 1 g/10 mL oral suspension: 10 milliliter(s) orally 3 times a day for 14 days  · 	ferrous sulfate 325 mg (65 mg elemental iron) oral delayed release tablet: 1 tab(s) orally 2 times a day  · 	acetaminophen 325 mg oral tablet: 2 tab(s) orally every 6 hours, As needed, For Temp greater than 38 C (100.4 F)  · 	senna oral tablet: 2 tab(s) orally once a day (at bedtime), As needed, Constipation  · 	docusate sodium 100 mg oral capsule: 1 cap(s) orally 3 times a day  · 	pantoprazole 40 mg oral delayed release tablet: 1 tab(s) orally 2 times a day  · 	sotalol 80 mg oral tablet: 1 tab(s) orally every 12 hours  · 	Vitamin D3 1000 intl units oral capsule: 1 cap(s) orally once a day  · 	Synthroid 75 mcg (0.075 mg) oral tablet: 1 tab(s) orally once a day  · 	Flomax 0.4 mg oral capsule: 1 cap(s) orally once a day  · 	multivitamin: 1 tab(s) orally once a day    Allergies  No Known Allergies    MEDICATIONS  (STANDING):  piperacillin/tazobactam IVPB. 3.375Gram(s) IV Intermittent every 12 hours  vancomycin  IVPB 1000milliGRAM(s) IV Intermittent every 24 hours  furosemide   Injectable 40milliGRAM(s) IV Push every 12 hours  tamsulosin 0.4milliGRAM(s) Oral at bedtime  sotalol 80milliGRAM(s) Oral every 12 hours  ferrous    sulfate 325milliGRAM(s) Oral two times a day with meals  docusate sodium 100milliGRAM(s) Oral three times a day  sucralfate suspension 1Gram(s) Oral four times a day  pantoprazole    Tablet 40milliGRAM(s) Oral before breakfast  levothyroxine 75MICROGram(s) Oral daily  multivitamin 1Tablet(s) Oral daily  cholecalciferol 1000Unit(s) Oral daily  enoxaparin Injectable 70milliGRAM(s) SubCutaneous daily  lactobacillus acidophilus 1Tablet(s) Oral three times a day with meals    MEDICATIONS  (PRN):  acetaminophen   Tablet. 650milliGRAM(s) Oral every 6 hours PRN Mild Pain (1 - 3)  senna 2Tablet(s) Oral at bedtime PRN Constipation  ALBUTerol/ipratropium for Nebulization 3milliLiter(s) Nebulizer every 6 hours PRN Shortness of Breath and/or Wheezing  guaiFENesin    Syrup 200milliGRAM(s) Oral every 6 hours PRN Cough      ANTIBIOTICS  piperacillin/tazobactam IVPB. 3.375Gram(s) IV Intermittent every 12 hours  vancomycin  IVPB 1000milliGRAM(s) IV Intermittent every 24 hours    SOCIAL HISTORY:  No smoking   NO ETOH  Retired     FAMILY HISTORY:  No pertinent family history in first degree relatives    Drug Dosing Weight  Height (cm): 182.9 (25 Apr 2017 21:00)  Weight (kg): 75.2 (25 Apr 2017 21:00)  BMI (kg/m2): 22.5 (25 Apr 2017 21:00)  BSA (m2): 1.97 (25 Apr 2017 21:00)      REVIEW OF SYSTEMS:    CONSTITUTIONAL:  As per HPI. Fever weakness     HEENT:  Eyes:  No diplopia or blurred vision. ENT:  No earache, sore throat or runny nose.    CARDIOVASCULAR:  No pressure, squeezing, strangling, tightness, heaviness or aching about the chest, neck, axilla or epigastrium.    RESPIRATORY:  + cough, shortness of breath, No PND or orthopnea.    GASTROINTESTINAL:  No nausea, vomiting or diarrhea.    GENITOURINARY:  No dysuria, frequency or urgency.    MUSCULOSKELETAL:  As per HPI.    SKIN:  No change in skin, hair or nails.    NEUROLOGIC:  No paresthesias, fasciculations, seizures or weakness.    PSYCHIATRIC:  No disorder of thought or mood.    ENDOCRINE:  No heat or cold intolerance, polyuria or polydipsia.    HEMATOLOGICAL:  No easy bruising or bleeding.       Vital Signs Last 24 Hrs  T(C): 36.5, Max: 37 (04-25 @ 20:09)  T(F): 97.7, Max: 98.6 (04-25 @ 20:09)  HR: 75 (56 - 80)  BP: 92/64 (92/64 - 138/80)  BP(mean): --  RR: 18 (16 - 19)  SpO2: 92% (92% - 100%)    PHYSICAL EXAMINATION:    GENERAL: The patient is a well-developed, well-nourished in no apparent distress. Alert and oriented x3.    HEENT: Head is normocephalic and atraumatic. Extraocular muscles are intact. Pupils are equal, round, and reactive to light and accommodation. Nares appeared normal. Mouth is well hydrated and without lesions.     NECK: Supple. No carotid bruits.  No lymphadenopathy or thyromegaly.    LUNGS: Decreased ramses to auscultation.    HEART: Regular rate and rhythm without murmur.    ABDOMEN: Soft, nontender, and nondistended.  Positive bowel sounds.  No hepatosplenomegaly was noted.    EXTREMITIES: Without any cyanosis, clubbing, rash, lesions or edema.    NEUROLOGIC: Cranial nerves II through XII are grossly intact. No focal deficit    SKIN: No ulceration or induration present.    LABS:                        12.7   11.8  )-----------( 107      ( 26 Apr 2017 07:08 )             37.6     04-26    140  |  100  |  29<H>  ----------------------------<  113<H>  2.6<LL>   |  32<H>  |  1.88<H>    Ca    9.4      26 Apr 2017 07:08  Phos  3.6     04-26  Mg     1.4     04-26    TPro  6.3  /  Alb  2.6<L>  /  TBili  0.7  /  DBili  x   /  AST  22  /  ALT  12  /  AlkPhos  75  04-25    PT/INR - ( 26 Apr 2017 07:08 )   PT: 14.5 sec;   INR: 1.32 ratio         PTT - ( 25 Apr 2017 16:19 )  PTT:30.9 sec      RADIOLOGY & ADDITIONAL STUDIES:    PROCEDURE DATE:  04/25/2017        INTERPRETATION:  Clinical information: Shortness of breath    Axial images obtained, coronal and sagittal images computer reformatted.   Noncontrast study limits evaluation of hilar and mediastinal regions.    Comparison study dated 11/14/2014.   Small bibasilar effusions present. Multifocal airspace disease present,   groundglass opacities present. Findings most pronounced at the left lung   base adjacent to the effusion. Pleural parenchymal scarring present both   lung apices with calcifications. No pneumothorax. No pneumomediastinum.   No vascular congestion.    No evidence of pericardial effusion. Coronary artery calcifications   present. Central airway intact. Thyroid gland not enlarged. No   mediastinal or hilar lesions, evaluation limited due to lack of IV   contrast.    Surgical clips visible in the upper abdomen at approximately the EG   junction, correlate clinically. The spleen is not enlarged.   Postcholecystectomy clips are present. No adrenal lesions are identified.    No acute osseous abnormalities.    IMPRESSION: Small bibasilar effusions. Multifocal airspace disease,   findings most pronounced at the left lung base.        ASSESSMENT:  88 year old male admitted from rehab with multifocal pna  ?aspiration vs HCAP  + troponin likely demand ischemia  CHF  CKD    PLAN:  Cont Vancomycin and Zosyn  Monitor Vancomycin trough  Trend temps and wbc  Fu cultures  Get legionella and pneumoccal urinary antigen  Increase activity

## 2017-04-26 NOTE — CONSULT NOTE ADULT - SUBJECTIVE AND OBJECTIVE BOX
Patient is a 88y old  Male who presents with a chief complaint of Shortness of breath.     HPI:  88 years old male with history of Atrial Fibrillation, Hypertension, recent GI bleed with anemia of blood loss, valvular heart disease, who was at rehab and developed vomiting. Patient then developed increasing shortness of breath and cough. He also had some fever. Patient was placed on antibiotics at [facility] but he was brought in to ER at family's request for further eval and management.     In ER patient continues to c/o cough and some shortness of breath on minimal activity. He also c/o being weak and tired. He denies any chest pain or pressure. No nausea or vomiting now.     patient is found to have possible pneumonia and possible CHF exacerbation. Renal consult called for worsening renal function. On IV lasix. Pt states his breathing is better.       PAST MEDICAL HISTORY:  Mitral valve disorder  Kidney stone  Diverticulosis  CKD (chronic kidney disease)  Afib  Hypothyroidism  BPH (benign prostatic hyperplasia)      PAST SURGICAL HISTORY:  Chronic peptic ulcer  No significant past surgical history      FAMILY HISTORY:  No pertinent family history in first degree relatives      SOCIAL HISTORY: No smoking or alcohol use.     Allergies    No Known Allergies    Intolerances      MEDICATIONS  (STANDING):  piperacillin/tazobactam IVPB. 3.375Gram(s) IV Intermittent every 12 hours  vancomycin  IVPB 1000milliGRAM(s) IV Intermittent every 24 hours  furosemide   Injectable 40milliGRAM(s) IV Push every 12 hours  tamsulosin 0.4milliGRAM(s) Oral at bedtime  sotalol 80milliGRAM(s) Oral every 12 hours  ferrous    sulfate 325milliGRAM(s) Oral two times a day with meals  docusate sodium 100milliGRAM(s) Oral three times a day  sucralfate suspension 1Gram(s) Oral four times a day  pantoprazole    Tablet 40milliGRAM(s) Oral before breakfast  levothyroxine 75MICROGram(s) Oral daily  multivitamin 1Tablet(s) Oral daily  cholecalciferol 1000Unit(s) Oral daily  enoxaparin Injectable 70milliGRAM(s) SubCutaneous daily  lactobacillus acidophilus 1Tablet(s) Oral three times a day with meals  potassium chloride    Tablet ER 40milliEquivalent(s) Oral every 4 hours    MEDICATIONS  (PRN):  acetaminophen   Tablet. 650milliGRAM(s) Oral every 6 hours PRN Mild Pain (1 - 3)  senna 2Tablet(s) Oral at bedtime PRN Constipation  ALBUTerol/ipratropium for Nebulization 3milliLiter(s) Nebulizer every 6 hours PRN Shortness of Breath and/or Wheezing  guaiFENesin    Syrup 200milliGRAM(s) Oral every 6 hours PRN Cough      REVIEW OF SYSTEMS:  General: NAD  Respiratory: + SOB  Cardiovascular: No CP or Palpitations	  Gastrointestinal: No nausea, Vomiting. No diarrhea  Genitourinary: No urinary complaints	  Musculoskeletal: No new rash or lesions	      T(F): 98.3, Max: 98.6 (04-25 @ 20:09)  HR: 56 (56 - 80)  BP: 111/68 (111/68 - 138/80)  RR: 18 (16 - 19)  SpO2: 96% (94% - 100%)  Wt(kg): --    PHYSICAL EXAM:  General: NAD  Respiratory: b/l air entry  Cardiovascular: S1 S2  Gastrointestinal: soft  Extremities: Edema        04-26    140  |  100  |  29<H>  ----------------------------<  113<H>  2.6<LL>   |  32<H>  |  1.88<H>    Ca    9.4      26 Apr 2017 07:08  Phos  3.6     04-26  Mg     1.4     04-26    TPro  6.3  /  Alb  2.6<L>  /  TBili  0.7  /  DBili  x   /  AST  22  /  ALT  12  /  AlkPhos  75  04-25                          12.7   11.8  )-----------( 107      ( 26 Apr 2017 07:08 )             37.6       Calcium, Total Serum: 9.4 mg/dL (04-26 @ 07:08)  Potassium, Serum: 2.6 mmol/L (04-26 @ 07:08)  Blood Urea Nitrogen, Serum: 29 mg/dL (04-26 @ 07:08)  Hematocrit: 37.6 % (04-26 @ 07:08)      Creatinine, Serum: 1.88 (04-26 @ 07:08)  Creatinine, Serum: 1.61 (04-25 @ 16:19)        LIVER FUNCTIONS - ( 25 Apr 2017 16:19 )  Alb: 2.6 g/dL / Pro: 6.3 g/dL / ALK PHOS: 75 U/L / ALT: 12 U/L DA / AST: 22 U/L / GGT: x           CARDIAC MARKERS ( 26 Apr 2017 07:10 )  .729 ng/mL / x     / 35 U/L / x     / x      CARDIAC MARKERS ( 26 Apr 2017 00:21 )  .900 ng/mL / x     / 41 U/L / x     / x      CARDIAC MARKERS ( 25 Apr 2017 16:19 )  .731 ng/mL / x     / 52 U/L / x     / x              I&O's Detail  I & Os for 24h ending 26 Apr 2017 07:00  =============================================  IN:    Oral Fluid: 360 ml    IV PiggyBack: 100 ml    Total IN: 460 ml  ---------------------------------------------  OUT:    Voided: 3650 ml    Total OUT: 3650 ml  ---------------------------------------------  Total NET: -3190 ml    I & Os for current day (as of 26 Apr 2017 09:30)  =============================================  IN:    Oral Fluid: 240 ml    Total IN: 240 ml  ---------------------------------------------  OUT:    Total OUT: 0 ml  ---------------------------------------------  Total NET: 240 ml    EXAM:  CT CHEST                                  PROCEDURE DATE:  04/25/2017        INTERPRETATION:  Clinical information: Shortness of breath    Axial images obtained, coronal and sagittal images computer reformatted.   Noncontrast study limits evaluation of hilar and mediastinal regions.    Comparison study dated 11/14/2014.   Small bibasilar effusions present. Multifocal airspace disease present,   groundglass opacities present. Findings most pronounced at the left lung   base adjacent to the effusion. Pleural parenchymal scarring present both   lung apices with calcifications. No pneumothorax. No pneumomediastinum.   No vascular congestion.    No evidence of pericardial effusion. Coronary artery calcifications   present. Central airway intact. Thyroid gland not enlarged. No   mediastinal or hilar lesions, evaluation limited due to lack of IV   contrast.    Surgical clips visible in the upper abdomen at approximately the EG   junction, correlate clinically. The spleen is not enlarged.   Postcholecystectomy clips are present. No adrenal lesions are identified.    No acute osseous abnormalities.    IMPRESSION: Small bibasilar effusions. Multifocal airspace disease,   findings most pronounced at the left lung base.

## 2017-04-26 NOTE — DIETITIAN INITIAL EVALUATION ADULT. - OTHER INFO
88yr old male admit with CHF/poss PNA; DASH/TLC diet is appropriate; pt admits to using salt @ home; educated pt on the risks associated with high salt intake & CHF exaceration; also, pt aware of coumadin drug & drug interactions, as he has been on coumadin > 6 months; provided written edu for pt and wife to review; pt claims that appetite is not great, but slowly improving; monitored for +3 edema ramses feet; minor wt loss anticipated due to edema;

## 2017-04-26 NOTE — CONSULT NOTE ADULT - PROBLEM SELECTOR RECOMMENDATION 9
likely to be multifactorial. Appears to be predominantly pulmonary given the history and cxr findings. May have certainly had a component of diastolic heart failure. He has received diuretic with excellent negative fluid balance. Renal function is worsening. Would suggest holding further diuretic for now and monitoring clinically, radiographically. Monitor mg and BMP ( deficiencies have been supplemented; will repeat later today). Is recieving antibiotics. ID will be seeing.

## 2017-04-26 NOTE — CONSULT NOTE ADULT - PROBLEM SELECTOR RECOMMENDATION 9
Stable renal function. Creatinine close to baseline. Mild elevation on crea with IV lasix. To continue current meds. Avoid nephrotoxic meds as possible.

## 2017-04-27 LAB
ANION GAP SERPL CALC-SCNC: 8 MMOL/L — SIGNIFICANT CHANGE UP (ref 5–17)
BASOPHILS # BLD AUTO: 0 K/UL — SIGNIFICANT CHANGE UP (ref 0–0.2)
BASOPHILS NFR BLD AUTO: 0.5 % — SIGNIFICANT CHANGE UP (ref 0–2)
BUN SERPL-MCNC: 34 MG/DL — HIGH (ref 7–23)
CALCIUM SERPL-MCNC: 9 MG/DL — SIGNIFICANT CHANGE UP (ref 8.4–10.5)
CHLORIDE SERPL-SCNC: 103 MMOL/L — SIGNIFICANT CHANGE UP (ref 96–108)
CO2 SERPL-SCNC: 32 MMOL/L — HIGH (ref 22–31)
CREAT SERPL-MCNC: 2.13 MG/DL — HIGH (ref 0.5–1.3)
EOSINOPHIL # BLD AUTO: 0.3 K/UL — SIGNIFICANT CHANGE UP (ref 0–0.5)
EOSINOPHIL NFR BLD AUTO: 2.6 % — SIGNIFICANT CHANGE UP (ref 0–6)
GLUCOSE SERPL-MCNC: 97 MG/DL — SIGNIFICANT CHANGE UP (ref 70–99)
HCT VFR BLD CALC: 32.4 % — LOW (ref 39–50)
HGB BLD-MCNC: 10.8 G/DL — LOW (ref 13–17)
INR BLD: 1.43 RATIO — HIGH (ref 0.88–1.16)
LYMPHOCYTES # BLD AUTO: 1.6 K/UL — SIGNIFICANT CHANGE UP (ref 1–3.3)
LYMPHOCYTES # BLD AUTO: 15.3 % — SIGNIFICANT CHANGE UP (ref 13–44)
MAGNESIUM SERPL-MCNC: 1.9 MG/DL — SIGNIFICANT CHANGE UP (ref 1.6–2.6)
MCHC RBC-ENTMCNC: 31.4 PG — SIGNIFICANT CHANGE UP (ref 27–34)
MCHC RBC-ENTMCNC: 33.2 GM/DL — SIGNIFICANT CHANGE UP (ref 32–36)
MCV RBC AUTO: 94.6 FL — SIGNIFICANT CHANGE UP (ref 80–100)
MONOCYTES # BLD AUTO: 0.9 K/UL — SIGNIFICANT CHANGE UP (ref 0–0.9)
MONOCYTES NFR BLD AUTO: 8.2 % — SIGNIFICANT CHANGE UP (ref 2–14)
NEUTROPHILS # BLD AUTO: 7.8 K/UL — HIGH (ref 1.8–7.4)
NEUTROPHILS NFR BLD AUTO: 73.4 % — SIGNIFICANT CHANGE UP (ref 43–77)
NT-PROBNP SERPL-SCNC: 5945 PG/ML — HIGH (ref 0–450)
PLATELET # BLD AUTO: 104 K/UL — LOW (ref 150–400)
POTASSIUM SERPL-MCNC: 3.6 MMOL/L — SIGNIFICANT CHANGE UP (ref 3.5–5.3)
POTASSIUM SERPL-SCNC: 3.6 MMOL/L — SIGNIFICANT CHANGE UP (ref 3.5–5.3)
PROTHROM AB SERPL-ACNC: 15.7 SEC — HIGH (ref 9.8–12.7)
RBC # BLD: 3.42 M/UL — LOW (ref 4.2–5.8)
RBC # FLD: 14 % — SIGNIFICANT CHANGE UP (ref 10.3–14.5)
SODIUM SERPL-SCNC: 143 MMOL/L — SIGNIFICANT CHANGE UP (ref 135–145)
TROPONIN I SERPL-MCNC: 0.42 NG/ML — HIGH (ref 0.02–0.06)
WBC # BLD: 10.7 K/UL — HIGH (ref 3.8–10.5)
WBC # FLD AUTO: 10.7 K/UL — HIGH (ref 3.8–10.5)

## 2017-04-27 PROCEDURE — 93010 ELECTROCARDIOGRAM REPORT: CPT | Mod: 76

## 2017-04-27 PROCEDURE — 99233 SBSQ HOSP IP/OBS HIGH 50: CPT

## 2017-04-27 RX ORDER — POTASSIUM CHLORIDE 20 MEQ
20 PACKET (EA) ORAL
Qty: 0 | Refills: 0 | Status: COMPLETED | OUTPATIENT
Start: 2017-04-27 | End: 2017-04-27

## 2017-04-27 RX ORDER — WARFARIN SODIUM 2.5 MG/1
3.5 TABLET ORAL ONCE
Qty: 0 | Refills: 0 | Status: COMPLETED | OUTPATIENT
Start: 2017-04-27 | End: 2017-04-27

## 2017-04-27 RX ADMIN — Medication 1 TABLET(S): at 11:05

## 2017-04-27 RX ADMIN — Medication 1 GRAM(S): at 17:41

## 2017-04-27 RX ADMIN — Medication 1 TABLET(S): at 17:41

## 2017-04-27 RX ADMIN — Medication 100 MILLIGRAM(S): at 14:00

## 2017-04-27 RX ADMIN — Medication 1 GRAM(S): at 23:51

## 2017-04-27 RX ADMIN — Medication 325 MILLIGRAM(S): at 07:24

## 2017-04-27 RX ADMIN — ENOXAPARIN SODIUM 70 MILLIGRAM(S): 100 INJECTION SUBCUTANEOUS at 11:05

## 2017-04-27 RX ADMIN — Medication 1 GRAM(S): at 11:05

## 2017-04-27 RX ADMIN — TAMSULOSIN HYDROCHLORIDE 0.4 MILLIGRAM(S): 0.4 CAPSULE ORAL at 21:47

## 2017-04-27 RX ADMIN — Medication 75 MICROGRAM(S): at 05:45

## 2017-04-27 RX ADMIN — WARFARIN SODIUM 3.5 MILLIGRAM(S): 2.5 TABLET ORAL at 21:55

## 2017-04-27 RX ADMIN — Medication 1 TABLET(S): at 07:24

## 2017-04-27 RX ADMIN — PIPERACILLIN AND TAZOBACTAM 25 GRAM(S): 4; .5 INJECTION, POWDER, LYOPHILIZED, FOR SOLUTION INTRAVENOUS at 01:01

## 2017-04-27 RX ADMIN — Medication 100 MILLIGRAM(S): at 05:47

## 2017-04-27 RX ADMIN — Medication 1000 UNIT(S): at 11:05

## 2017-04-27 RX ADMIN — Medication 1 GRAM(S): at 05:47

## 2017-04-27 RX ADMIN — Medication 325 MILLIGRAM(S): at 17:41

## 2017-04-27 RX ADMIN — Medication 80 MILLIGRAM(S): at 17:41

## 2017-04-27 RX ADMIN — PIPERACILLIN AND TAZOBACTAM 25 GRAM(S): 4; .5 INJECTION, POWDER, LYOPHILIZED, FOR SOLUTION INTRAVENOUS at 14:00

## 2017-04-27 RX ADMIN — Medication 20 MILLIEQUIVALENT(S): at 17:41

## 2017-04-27 RX ADMIN — Medication 20 MILLIEQUIVALENT(S): at 14:00

## 2017-04-27 RX ADMIN — PANTOPRAZOLE SODIUM 40 MILLIGRAM(S): 20 TABLET, DELAYED RELEASE ORAL at 06:42

## 2017-04-27 RX ADMIN — Medication 100 MILLIGRAM(S): at 21:47

## 2017-04-27 NOTE — PROGRESS NOTE ADULT - SUBJECTIVE AND OBJECTIVE BOX
[facility] examined and chart reviewed.   Patient being followed for multilobar pneumonia    Subjective:  Doing well.  Afebrile.  + dry cough   Denies pleuritic chest pain.      ROS:  ROS:  CONSTITUTIONAL:  Negative fever or chills, feels well, good appetite  EYES:  Negative  blurry vision or double vision  CARDIOVASCULAR:  Negative for chest pain or palpitations  RESPIRATORY:  Negative for cough, wheezing, or SOB   GASTROINTESTINAL:  Negative for nausea, vomiting, diarrhea, constipation, or abdominal pain  GENITOURINARY:  Negative frequency, urgency or dysuria  NEUROLOGIC:  No headache, confusion, dizziness, lightheadedness    No Known Allergies      ANTIBIOTICS/RELEVANT:  piperacillin/tazobactam IVPB. 3.375Gram(s) IV Intermittent every 12 hours  vancomycin  IVPB 1000milliGRAM(s) IV Intermittent every 24 hours  lactobacillus acidophilus 1Tablet(s) Oral three times a day with meals      acetaminophen   Tablet. 650milliGRAM(s) Oral every 6 hours PRN  tamsulosin 0.4milliGRAM(s) Oral at bedtime  sotalol 80milliGRAM(s) Oral every 12 hours  ferrous    sulfate 325milliGRAM(s) Oral two times a day with meals  docusate sodium 100milliGRAM(s) Oral three times a day  senna 2Tablet(s) Oral at bedtime PRN  sucralfate suspension 1Gram(s) Oral four times a day  pantoprazole    Tablet 40milliGRAM(s) Oral before breakfast  levothyroxine 75MICROGram(s) Oral daily  multivitamin 1Tablet(s) Oral daily  cholecalciferol 1000Unit(s) Oral daily  enoxaparin Injectable 70milliGRAM(s) SubCutaneous daily  ALBUTerol/ipratropium for Nebulization 3milliLiter(s) Nebulizer every 6 hours PRN  guaiFENesin    Syrup 200milliGRAM(s) Oral every 6 hours PRN  potassium chloride    Tablet ER 20milliEquivalent(s) Oral every 2 hours      Objective:  I & Os for 24h ending 04-27 @ 07:00  =============================================  IN: 590 ml / OUT: 1000 ml / NET: -410 ml    I & Os for current day (as of 04-27 @ 11:34)  =============================================  IN: 420 ml / OUT: 200 ml / NET: 220 ml    T(C): 36.9, Max: 37 (04-26 @ 21:25)  HR: 61 (53 - 73)  BP: 95/64 (91/59 - 167/91)  RR: 18 (16 - 19)  SpO2: 93% (92% - 97%)  Wt(kg): --    PHYSICAL EXAM:  Constitutional: NAD  Eyes:EV, EOMI  Ear/Nose/Throat: no oral lesion, no sinus tenderness on percussion	  Neck:no JVD, no lymphadenopathy, supple  Respiratory: CTA ramses  Cardiovascular: S1S2 RRR, no murmurs  Gastrointestinal:soft, (+) BS, NTND  Extremities:no e/e/c  CNS: No focal deficit    LABS:                        10.8   10.7  )-----------( 104      ( 27 Apr 2017 07:23 )             32.4     04-27    143  |  103  |  34<H>  ----------------------------<  97  3.6   |  32<H>  |  2.13<H>    Ca    9.0      27 Apr 2017 07:23  Phos  3.6     04-26  Mg     1.9     04-27    TPro  6.3  /  Alb  2.6<L>  /  TBili  0.7  /  DBili  x   /  AST  22  /  ALT  12  /  AlkPhos  75  04-25    PT/INR - ( 27 Apr 2017 07:23 )   PT: 15.7 sec;   INR: 1.43 ratio         PTT - ( 25 Apr 2017 16:19 )  PTT:30.9 sec      04-25 @ 21:41  Culture-urine --  Culture results   No growth to date.  method type --  Organism --  Organism Identification --  Specimen source .Blood Blood-Venous  04-25 @ 21:38  Culture-urine --  Culture results   No growth to date.  method type --  Organism --  Organism Identification --  Specimen source .Blood Blood-Venous           04-25 @ 21:41  Culture blood --  Culture results   No growth to date.  Gram stain --  Gram stain blood --  Method type --  Organism --  Organism identification --  Specimen source .Blood Blood-Venous   04-25 @ 21:38  Culture blood --  Culture results   No growth to date.  Gram stain --  Gram stain blood --  Method type --  Organism --  Organism identification --  Specimen source .Blood Blood-Venous        RADIOLOGY & ADDITIONAL STUDIES:  PROCEDURE DATE:  04/25/2017        INTERPRETATION:  Clinical information: Shortness of breath    Axial images obtained, coronal and sagittal images computer reformatted.   Noncontrast study limits evaluation of hilar and mediastinal regions.    Comparison study dated 11/14/2014.   Small bibasilar effusions present. Multifocal airspace disease present,   groundglass opacities present. Findings most pronounced at the left lung   base adjacent to the effusion. Pleural parenchymal scarring present both   lung apices with calcifications. No pneumothorax. No pneumomediastinum.   No vascular congestion.    No evidence of pericardial effusion. Coronary artery calcifications   present. Central airway intact. Thyroid gland not enlarged. No   mediastinal or hilar lesions, evaluation limited due to lack of IV   contrast.    Surgical clips visible in the upper abdomen at approximately the EG   junction, correlate clinically. The spleen is not enlarged.   Postcholecystectomy clips are present. No adrenal lesions are identified.    No acute osseous abnormalities.    IMPRESSION: Small bibasilar effusions. Multifocal airspace disease,   findings most pronounced at the left lung base.        ASSESSMENT:  88 year old male admitted from rehab with multifocal pna  Had N/V few days prior   ?aspiration vs HCAP  + troponin likely demand ischemia  CHF  GABO on CKD    PLAN:  Cont  Zosyn day 2  Will dc Vancomycin   Trend temps and wbc  Fu cultures  Fu legionella and pneumoccal urinary antigen  Increase activity

## 2017-04-27 NOTE — CONSULT NOTE ADULT - PROBLEM SELECTOR RECOMMENDATION 2
He is in sinus rhythm. Is receiving Sotalol. QTc is prolonged and contributed to by low K and Mg levels. ( can also occur re: to Sotalol). Continue Sotalol and replete k to >= 4.0 and Mg to >= 2.o. Repeat EKG in the am. Continue rhythm monitoring.
Replete potassium. Get repeat potassium levels after supplementation.   Encourage PO intake.
possible PNA, although clinically does not appear like a PNA  Ct chest reviewed - multifocal airspace disease, diff dx - viral illness, and or CHF  on empiric ABX  cx pending  lactate normal  will await biomarkers  will consider de escalation of ABX when cx and lab data are back  discussed with family  monitor o2 sat and resp rate and increase activity as tolerated
diures & f/u RFT as per cardio

## 2017-04-27 NOTE — PROGRESS NOTE ADULT - SUBJECTIVE AND OBJECTIVE BOX
Date/Time Patient Seen:  		  Referring MD:   Data Reviewed	       Patient is a 88y old  Male who presents with a chief complaint of Shortness of breath (25 Apr 2017 17:14)  in bed  on o2  vs and meds reviewed  verbalizes without diff  id follow up noted      Subjective/HPI       Medication list         MEDICATIONS  (STANDING):  piperacillin/tazobactam IVPB. 3.375Gram(s) IV Intermittent every 12 hours  vancomycin  IVPB 1000milliGRAM(s) IV Intermittent every 24 hours  tamsulosin 0.4milliGRAM(s) Oral at bedtime  sotalol 80milliGRAM(s) Oral every 12 hours  ferrous    sulfate 325milliGRAM(s) Oral two times a day with meals  docusate sodium 100milliGRAM(s) Oral three times a day  sucralfate suspension 1Gram(s) Oral four times a day  pantoprazole    Tablet 40milliGRAM(s) Oral before breakfast  levothyroxine 75MICROGram(s) Oral daily  multivitamin 1Tablet(s) Oral daily  cholecalciferol 1000Unit(s) Oral daily  enoxaparin Injectable 70milliGRAM(s) SubCutaneous daily  lactobacillus acidophilus 1Tablet(s) Oral three times a day with meals    MEDICATIONS  (PRN):  acetaminophen   Tablet. 650milliGRAM(s) Oral every 6 hours PRN Mild Pain (1 - 3)  senna 2Tablet(s) Oral at bedtime PRN Constipation  ALBUTerol/ipratropium for Nebulization 3milliLiter(s) Nebulizer every 6 hours PRN Shortness of Breath and/or Wheezing  guaiFENesin    Syrup 200milliGRAM(s) Oral every 6 hours PRN Cough         Vitals log        ICU Vital Signs Last 24 Hrs  T(C): 36.5, Max: 37 (04-26 @ 21:25)  T(F): 97.7, Max: 98.6 (04-26 @ 21:25)  HR: 62 (60 - 75)  BP: 98/59 (91/59 - 167/91)  BP(mean): --  ABP: --  ABP(mean): --  RR: 17 (16 - 19)  SpO2: 92% (92% - 96%)           Input and Output:  I&O's Detail  I & Os for 24h ending 26 Apr 2017 07:00  =============================================  IN:    Oral Fluid: 360 ml    IV PiggyBack: 100 ml    Total IN: 460 ml  ---------------------------------------------  OUT:    Voided: 3650 ml    Total OUT: 3650 ml  ---------------------------------------------  Total NET: -3190 ml    I & Os for current day (as of 27 Apr 2017 06:34)  =============================================  IN:    IV PiggyBack: 275 ml    Oral Fluid: 240 ml    Total IN: 515 ml  ---------------------------------------------  OUT:    Voided: 1000 ml    Total OUT: 1000 ml  ---------------------------------------------  Total NET: -485 ml      Lab Data                        12.7   11.8  )-----------( 107      ( 26 Apr 2017 07:08 )             37.6     04-26    139  |  101  |  35<H>  ----------------------------<  160<H>  3.4<L>   |  30  |  2.15<H>    Ca    9.1      26 Apr 2017 18:03  Phos  3.6     04-26  Mg     2.2     04-26    TPro  6.3  /  Alb  2.6<L>  /  TBili  0.7  /  DBili  x   /  AST  22  /  ALT  12  /  AlkPhos  75  04-25      CARDIAC MARKERS ( 26 Apr 2017 17:24 )  .622 ng/mL / x     / 26 U/L / x     / x      CARDIAC MARKERS ( 26 Apr 2017 07:10 )  .729 ng/mL / x     / 35 U/L / x     / x      CARDIAC MARKERS ( 26 Apr 2017 00:21 )  .900 ng/mL / x     / 41 U/L / x     / x      CARDIAC MARKERS ( 25 Apr 2017 16:19 )  .731 ng/mL / x     / 52 U/L / x     / x            Review of Systems	      Objective     Physical Examination  heart - s1s2  lungs - dec BS  abd - soft  extr - no gross edema        Pertinent Lab findings & Imaging      Apoorva:  NO   Adequate UO     I&O's Detail  I & Os for 24h ending 26 Apr 2017 07:00  =============================================  IN:    Oral Fluid: 360 ml    IV PiggyBack: 100 ml    Total IN: 460 ml  ---------------------------------------------  OUT:    Voided: 3650 ml    Total OUT: 3650 ml  ---------------------------------------------  Total NET: -3190 ml    I & Os for current day (as of 27 Apr 2017 06:34)  =============================================  IN:    IV PiggyBack: 275 ml    Oral Fluid: 240 ml    Total IN: 515 ml  ---------------------------------------------  OUT:    Voided: 1000 ml    Total OUT: 1000 ml  ---------------------------------------------  Total NET: -485 ml           Discussed with:     Cultures:	        Radiology

## 2017-04-27 NOTE — CONSULT NOTE ADULT - SUBJECTIVE AND OBJECTIVE BOX
88 years old male with history of Atrial Fibrillation, Hypertension, recent GI bleed with anemia of blood loss, valvular heart disease, who was at rehab and developed vomiting. Patient then developed increasing shortness of breath and cough. He also had some fever. Patient was placed on antibiotics at [facility] but he was brought in to ER at family's request for further eval and management.     In ER patient continues to c/o cough and some shortness of breath on minimal activity. He also c/o being weak and tired. He denies any chest pain or pressure. No nausea or vomiting now.     patient is found to have possible pneumonia and possible CHF exacerbation. He is being admitted for same.     Home Medications:   * Patient Currently Takes Medications as of 08-Apr-2017 10:44 documented in Prescription Writer  · 	sucralfate 1 g/10 mL oral suspension: 10 milliliter(s) orally 3 times a day for 14 days  · 	ferrous sulfate 325 mg (65 mg elemental iron) oral delayed release tablet: 1 tab(s) orally 2 times a day  · 	acetaminophen 325 mg oral tablet: 2 tab(s) orally every 6 hours, As needed, For Temp greater than 38 C (100.4 F)  · 	senna oral tablet: 2 tab(s) orally once a day (at bedtime), As needed, Constipation  · 	docusate sodium 100 mg oral capsule: 1 cap(s) orally 3 times a day  · 	pantoprazole 40 mg oral delayed release tablet: 1 tab(s) orally 2 times a day  · 	sotalol 80 mg oral tablet: 1 tab(s) orally every 12 hours  · 	Vitamin D3 1000 intl units oral capsule: 1 cap(s) orally once a day  · 	Synthroid 75 mcg (0.075 mg) oral tablet: 1 tab(s) orally once a day  · 	Flomax 0.4 mg oral capsule: 1 cap(s) orally once a day  · 	multivitamin: 1 tab(s) orally once a day  Past Medical History:  Afib    BPH (benign prostatic hyperplasia)    CKD (chronic kidney disease)    Diverticulosis    Hypothyroidism    Kidney stone    Mitral valve disorder.    Past Surgical History:  Chronic peptic ulcer  S/P surgery more than 10 yrs ago.    Present MEDICATIONS:  acetaminophen   Tablet. 650milliGRAM(s) Oral every 6 hours PRN  piperacillin/tazobactam IVPB. 3.375Gram(s) IV Intermittent every 12 hours  vancomycin  IVPB 1000milliGRAM(s) IV Intermittent every 24 hours  furosemide   Injectable 40milliGRAM(s) IV Push every 12 hours  tamsulosin 0.4milliGRAM(s) Oral at bedtime  sotalol 80milliGRAM(s) Oral every 12 hours  ferrous    sulfate 325milliGRAM(s) Oral two times a day with meals  docusate sodium 100milliGRAM(s) Oral three times a day  senna 2Tablet(s) Oral at bedtime PRN  sucralfate suspension 1Gram(s) Oral four times a day  pantoprazole    Tablet 40milliGRAM(s) Oral before breakfast  levothyroxine 75MICROGram(s) Oral daily  multivitamin 1Tablet(s) Oral daily  cholecalciferol 1000Unit(s) Oral daily  enoxaparin Injectable 70milliGRAM(s) SubCutaneous daily  Albuterol/ ipratropium for Nebulization 3milliLiter(s) Nebulizer every 6 hours PRN  lactobacillus acidophilus 1Tablet(s) Oral three times a day with meals  guaiFENesin    Syrup 200milliGRAM(s) Oral every 6 hours PRN      REVIEW OF SYSTEMS:    CONSTITUTIONAL: generalized  weakness, Had chills prior to admission.   EYES/ENT: No visual changes;   RESPIRATORY: see HPI.   CARDIOVASCULAR: see HPI  GASTROINTESTINAL: No abdominal pain. See HPI    I  Vital Signs Last 24 Hrs  T(C): 36.6, Max: 37 (04-26 @ 21:25)  T(F): 97.9, Max: 98.6 (04-26 @ 21:25)  HR: 93 (53 - 93)  BP: 105/66 (95/64 - 167/91)  BP(mean): --  RR: 18 (16 - 18)  SpO2: 96% (92% - 97%)  Weight  Weight (kg): 75.2 (04-25 @ 21:00)    PHYSICAL EXAM:    Constitutional: NAD, awake and alert, well-developed  HEENT: No central fascial weakness; apale, anicteric, normal oral hydration, coating ( no thrush, aphthous), halithosis.  Neck:  supple,  No JVD/ bruit. No LN or thyroid mass or pain.  Respiratory: Breath sounds are clear bilaterally, No wheezing, rales or rhonchi.  Cardiovascular: S1 and S2, regular rate and rhythm, no S3-4, Murmurs, gallops or rubs,   Gastrointestinal: Bowel Sounds present, soft, nontender, No HS^, HJR, bruit or pulsation, organomegaly or hernia.  Extremities: No peripheral edema. No clubbing or cyanosis, DVT (Sky's sx or cord), or cellulitis.  Vascular: 2+ peripheral pulses  Neurological: A/O x 3, no focal deficits  Psych: appropriate affect & behaviour.  Musculoskeletal: no calf tenderness, erythema, calor, effusion, LROM.  Skin: Nl turgor & integrity. No rashes or decubitus  Tele:    I&O's Detail  I & Os for 24h ending 27 Apr 2017 07:00  =============================================  IN:    IV PiggyBack: 350 ml    Oral Fluid: 240 ml    Total IN: 590 ml  ---------------------------------------------  OUT:    Voided: 1000 ml    Total OUT: 1000 ml  ---------------------------------------------  Total NET: -410 ml    I & Os for current day (as of 27 Apr 2017 18:19)  =============================================  IN:    Oral Fluid: 420 ml    Total IN: 420 ml  ---------------------------------------------  OUT:    Voided: 200 ml    Total OUT: 200 ml  ---------------------------------------------  Total NET: 220 ml      LABS:  CAPILLARY BLOOD GLUCOSE                          10.8   10.7  )-----------( 104      ( 27 Apr 2017 07:23 )             32.4     04-27    143  |  103  |  34<H>  ----------------------------<  97  3.6   |  32<H>  |  2.13<H>    Ca    9.0      27 Apr 2017 07:23  Phos  3.6     04-26  Mg     1.9     04-27      PT/INR - ( 27 Apr 2017 07:23 )   PT: 15.7 sec;   INR: 1.43 ratio      CARDIAC MARKERS ( 26 Apr 2017 07:10 )  .729 ng/mL / x     / 35 U/L / x     / x      CARDIAC MARKERS ( 26 Apr 2017 00:21 )  .900 ng/mL / x     / 41 U/L / x     / x      CARDIAC MARKERS ( 25 Apr 2017 16:19 )  .731 ng/mL / x     / 52 U/L / x     / x              RADIOLOGY & ADDITIONAL TESTS:    EXAM:  CT CHEST  IMPRESSION: Small bibasilar effusions. Multifocal airspace disease,   findings most pronounced at the left lung base.    04/25/2017  Airspace disease at the left lung base, effusion-consolidation. Right   lung normally expanded. No vascular congestion or cardiac enlargement.   Hilar regions, mediastinal contours intact. No acute osseous   abnormalities. Surgical clips visible midline upper abdomen 88 years old male with history of Atrial Fibrillation, Hypertension, recent GI bleed with anemia of blood loss, valvular heart disease, who was at rehab and developed vomiting. Patient then developed increasing shortness of breath and cough. He also had some fever. Patient was placed on antibiotics at [facility] but he was brought in to ER at family's request for further eval and management.     In ER patient continues to c/o cough and some shortness of breath on minimal activity. He also c/o being weak and tired. He denies any chest pain or pressure. No nausea or vomiting now.     patient is found to have possible pneumonia and possible CHF exacerbation. He is being admitted for same.     Home Medications:   * Patient Currently Takes Medications as of 08-Apr-2017 10:44 documented in Prescription Writer  · 	sucralfate 1 g/10 mL oral suspension: 10 milliliter(s) orally 3 times a day for 14 days  · 	ferrous sulfate 325 mg (65 mg elemental iron) oral delayed release tablet: 1 tab(s) orally 2 times a day  · 	acetaminophen 325 mg oral tablet: 2 tab(s) orally every 6 hours, As needed, For Temp greater than 38 C (100.4 F)  · 	senna oral tablet: 2 tab(s) orally once a day (at bedtime), As needed, Constipation  · 	docusate sodium 100 mg oral capsule: 1 cap(s) orally 3 times a day  · 	pantoprazole 40 mg oral delayed release tablet: 1 tab(s) orally 2 times a day  · 	sotalol 80 mg oral tablet: 1 tab(s) orally every 12 hours  · 	Vitamin D3 1000 intl units oral capsule: 1 cap(s) orally once a day  · 	Synthroid 75 mcg (0.075 mg) oral tablet: 1 tab(s) orally once a day  · 	Flomax 0.4 mg oral capsule: 1 cap(s) orally once a day  · 	multivitamin: 1 tab(s) orally once a day  Past Medical History:  Afib    BPH (benign prostatic hyperplasia)    CKD (chronic kidney disease)    Diverticulosis    Hypothyroidism    Kidney stone    Mitral valve disorder.    Past Surgical History:  Chronic peptic ulcer  S/P surgery more than 10 yrs ago.    Present MEDICATIONS:  acetaminophen   Tablet. 650milliGRAM(s) Oral every 6 hours PRN  piperacillin/tazobactam IVPB. 3.375Gram(s) IV Intermittent every 12 hours  vancomycin  IVPB 1000milliGRAM(s) IV Intermittent every 24 hours  furosemide   Injectable 40milliGRAM(s) IV Push every 12 hours  tamsulosin 0.4milliGRAM(s) Oral at bedtime  sotalol 80milliGRAM(s) Oral every 12 hours  ferrous    sulfate 325milliGRAM(s) Oral two times a day with meals  docusate sodium 100milliGRAM(s) Oral three times a day  senna 2Tablet(s) Oral at bedtime PRN  sucralfate suspension 1Gram(s) Oral four times a day  pantoprazole    Tablet 40milliGRAM(s) Oral before breakfast  levothyroxine 75MICROGram(s) Oral daily  multivitamin 1Tablet(s) Oral daily  cholecalciferol 1000Unit(s) Oral daily  enoxaparin Injectable 70milliGRAM(s) SubCutaneous daily  Albuterol/ ipratropium for Nebulization 3milliLiter(s) Nebulizer every 6 hours PRN  lactobacillus acidophilus 1Tablet(s) Oral three times a day with meals  guaiFENesin    Syrup 200milliGRAM(s) Oral every 6 hours PRN      REVIEW OF SYSTEMS:    CONSTITUTIONAL: generalized  weakness, Had chills prior to admission.   EYES/ENT: No visual changes;   RESPIRATORY: see HPI.   CARDIOVASCULAR: see HPI  GASTROINTESTINAL: No abdominal pain. See HPI    I  Vital Signs Last 24 Hrs  T(C): 36.6, Max: 37 (04-26 @ 21:25)  T(F): 97.9, Max: 98.6 (04-26 @ 21:25)  HR: 93 (53 - 93)  BP: 105/66 (95/64 - 167/91)  BP(mean): --  RR: 18 (16 - 18)  SpO2: 96% (92% - 97%)  Weight  Weight (kg): 75.2 (04-25 @ 21:00)    PHYSICAL EXAM:    Constitutional: NAD, awake and alert, thin but well-developed  HEENT: No central fascial weakness; apale, anicteric, normal oral hydration, coating ( no thrush, aphthous), halithosis.  Neck:  supple,  No JVD/ bruit. No LN or thyroid mass or pain.  Respiratory: Breath sounds diminished in left base /c egophony & some coarse sound, No wheezing, rales or rhonchi.  Cardiovascular: S1 and S2, MVR  appears rate and rhythm, no S3-4, Murmurs, gallops or rubs,   Gastrointestinal: Bowel Sounds present, soft, nontender, No HS^, HJR, bruit or pulsation, organomegaly or hernia.  Extremities: 1+ distal premalleolar & pedal edema Lt>lt pitting peripheral edema. No clubbing or cyanosis, DVT (Sky's sx or cord), or cellulitis.  Vascular: 2+ peripheral pulses  Neurological: A/O x 3, no focal deficits  Psych: appropriate affect & behaviour.  Musculoskeletal: no calf tenderness, erythema, calor, effusion, LROM.  Skin: Nl turgor & integrity. No rashes or decubitus  Tele:    I&O's Detail  I & Os for 24h ending 27 Apr 2017 07:00  =============================================  IN:    IV PiggyBack: 350 ml    Oral Fluid: 240 ml    Total IN: 590 ml  ---------------------------------------------  OUT:    Voided: 1000 ml    Total OUT: 1000 ml  ---------------------------------------------  Total NET: -410 ml    I & Os for current day (as of 27 Apr 2017 18:19)  =============================================  IN:    Oral Fluid: 420 ml    Total IN: 420 ml  ---------------------------------------------  OUT:    Voided: 200 ml    Total OUT: 200 ml  ---------------------------------------------  Total NET: 220 ml      LABS:  CAPILLARY BLOOD GLUCOSE                          10.8   10.7  )-----------( 104      ( 27 Apr 2017 07:23 )             32.4     04-27    143  |  103  |  34<H>  ----------------------------<  97  3.6   |  32<H>  |  2.13<H>    Ca    9.0      27 Apr 2017 07:23  Phos  3.6     04-26  Mg     1.9     04-27      PT/INR - ( 27 Apr 2017 07:23 )   PT: 15.7 sec;   INR: 1.43 ratio      CARDIAC MARKERS ( 26 Apr 2017 07:10 )  .729 ng/mL / x     / 35 U/L / x     / x      CARDIAC MARKERS ( 26 Apr 2017 00:21 )  .900 ng/mL / x     / 41 U/L / x     / x      CARDIAC MARKERS ( 25 Apr 2017 16:19 )  .731 ng/mL / x     / 52 U/L / x     / x              RADIOLOGY & ADDITIONAL TESTS:    EXAM:  CT CHEST  IMPRESSION: Small bibasilar effusions. Multifocal airspace disease,   findings most pronounced at the left lung base.    04/25/2017  Airspace disease at the left lung base, effusion-consolidation. Right   lung normally expanded. No vascular congestion or cardiac enlargement.   Hilar regions, mediastinal contours intact. No acute osseous   abnormalities. Surgical clips visible midline upper abdomen

## 2017-04-27 NOTE — CONSULT NOTE ADULT - CONSULT REASON
Asked to admit transfer of physician service from [facility] Rendon to my service, as per family/ pt request. Case discussed /c primary caretaker Dr Rendon.  Admission from [facility] for PNA/ CHF where he was being treated. Asked to admit transfer of physician service from [facility] Pino to my service, as per family/ pt request. Case discussed /c primary caretaker Dr Pino.  Admission from [facility] for PNA/ CHF where he was being treated.

## 2017-04-27 NOTE — PROGRESS NOTE ADULT - SUBJECTIVE AND OBJECTIVE BOX
Patient seen in follow up for     PAST MEDICAL HISTORY:  Mitral valve disorder  Kidney stone  Diverticulosis  CKD (chronic kidney disease)  Afib  Hypothyroidism  BPH (benign prostatic hyperplasia)    MEDICATIONS  (STANDING):  piperacillin/tazobactam IVPB. 3.375Gram(s) IV Intermittent every 12 hours  tamsulosin 0.4milliGRAM(s) Oral at bedtime  sotalol 80milliGRAM(s) Oral every 12 hours  ferrous    sulfate 325milliGRAM(s) Oral two times a day with meals  docusate sodium 100milliGRAM(s) Oral three times a day  sucralfate suspension 1Gram(s) Oral four times a day  pantoprazole    Tablet 40milliGRAM(s) Oral before breakfast  levothyroxine 75MICROGram(s) Oral daily  multivitamin 1Tablet(s) Oral daily  cholecalciferol 1000Unit(s) Oral daily  enoxaparin Injectable 70milliGRAM(s) SubCutaneous daily  lactobacillus acidophilus 1Tablet(s) Oral three times a day with meals  potassium chloride    Tablet ER 20milliEquivalent(s) Oral every 2 hours    MEDICATIONS  (PRN):  acetaminophen   Tablet. 650milliGRAM(s) Oral every 6 hours PRN Mild Pain (1 - 3)  senna 2Tablet(s) Oral at bedtime PRN Constipation  ALBUTerol/ipratropium for Nebulization 3milliLiter(s) Nebulizer every 6 hours PRN Shortness of Breath and/or Wheezing  guaiFENesin    Syrup 200milliGRAM(s) Oral every 6 hours PRN Cough    T(C): 36.9, Max: 37 (04-26 @ 21:25)  HR: 61 (53 - 75)  BP: 95/64 (91/59 - 167/91)  RR: 18 (16 - 19)  SpO2: 93% (92% - 97%)  Wt(kg): --  I&O's Detail  I & Os for 24h ending 27 Apr 2017 07:00  =============================================  IN:    IV PiggyBack: 350 ml    Oral Fluid: 240 ml    Total IN: 590 ml  ---------------------------------------------  OUT:    Voided: 1000 ml    Total OUT: 1000 ml  ---------------------------------------------  Total NET: -410 ml    I & Os for current day (as of 27 Apr 2017 14:20)  =============================================  IN:    Oral Fluid: 420 ml    Total IN: 420 ml  ---------------------------------------------  OUT:    Voided: 200 ml    Total OUT: 200 ml  ---------------------------------------------  Total NET: 220 ml      PHYSICAL EXAM:  General: NAD  Respiratory: b/l air entry  Cardiovascular: S1 S2  Gastrointestinal: soft  Extremities:                            10.8   10.7  )-----------( 104      ( 27 Apr 2017 07:23 )             32.4     04-27    143  |  103  |  34<H>  ----------------------------<  97  3.6   |  32<H>  |  2.13<H>    Ca    9.0      27 Apr 2017 07:23  Phos  3.6     04-26  Mg     1.9     04-27    TPro  6.3  /  Alb  2.6<L>  /  TBili  0.7  /  DBili  x   /  AST  22  /  ALT  12  /  AlkPhos  75  04-25    CARDIAC MARKERS ( 27 Apr 2017 07:23 )  .423 ng/mL / x     / x     / x     / x      CARDIAC MARKERS ( 26 Apr 2017 17:24 )  .622 ng/mL / x     / 26 U/L / x     / x      CARDIAC MARKERS ( 26 Apr 2017 07:10 )  .729 ng/mL / x     / 35 U/L / x     / x      CARDIAC MARKERS ( 26 Apr 2017 00:21 )  .900 ng/mL / x     / 41 U/L / x     / x      CARDIAC MARKERS ( 25 Apr 2017 16:19 )  .731 ng/mL / x     / 52 U/L / x     / x          LIVER FUNCTIONS - ( 25 Apr 2017 16:19 )  Alb: 2.6 g/dL / Pro: 6.3 g/dL / ALK PHOS: 75 U/L / ALT: 12 U/L DA / AST: 22 U/L / GGT: x                   Sodium, Serum: 143 (04-27 @ 07:23)  Sodium, Serum: 139 (04-26 @ 18:03)  Sodium, Serum: 140 (04-26 @ 07:08)  Sodium, Serum: 142 (04-25 @ 16:19)    Creatinine, Serum: 2.13 (04-27 @ 07:23)  Creatinine, Serum: 2.15 (04-26 @ 18:03)  Creatinine, Serum: 1.88 (04-26 @ 07:08)  Creatinine, Serum: 1.61 (04-25 @ 16:19)    Potassium, Serum: 3.6 (04-27 @ 07:23)  Potassium, Serum: 3.4 (04-26 @ 18:03)  Potassium, Serum: 2.6 (04-26 @ 07:08)  Potassium, Serum: 3.6 (04-25 @ 16:19)    Hemoglobin: 10.8 (04-27 @ 07:23)  Hemoglobin: 12.7 (04-26 @ 07:08)  Hemoglobin: 11.4 (04-25 @ 16:19)

## 2017-04-27 NOTE — CHART NOTE - NSCHARTNOTEFT_GEN_A_CORE
Family requesting change in attending.  Dr. Sams is willing to take over care.  Case discussed with Dr. Sams on phone.  Will sign off care and transfer to Dr. Sams's service.

## 2017-04-27 NOTE — PROGRESS NOTE ADULT - SUBJECTIVE AND OBJECTIVE BOX
HPI:  88 years old male with history of Atrial Fibrillation, Hypertension, recent GI bleed with anemia of blood loss, valvular heart disease, who was at rehab and developed vomiting. Patient then developed increasing shortness of breath and cough. He also had some fever. Patient was placed on antibiotics at [facility] but he was brought in to ER at family's request for further eval and management.     In ER patient continues to c/o cough and some shortness of breath on minimal activity. He also c/o being weak and tired. He denies  chest pain, pressure or discomfort. No  nausea or recurrence of vomiting. He was felt to have possible pneumonia and is being treated with antibiotics. There is concern that he may also have CHF. Mild elevation of troponin was demonstrated prompting cardiology evaluation.    Patient denies exertional chest pain, palpitations, presyncope or syncope. Occasional ankle edema.     He presently relates that he is feeling " better". Denies SOB at rest. No chest discomfort. Still with mild cough. No hemoptysis.    4/27/2017  Patient is feeling better , denies any chest pain or shortness of breath , has mild cough ,  patients monitor showed brief pause ? AV block? low voltage QRS while he was asleep , patient denies any prior syncopal episodes.  His EKG showed improving QT interval this AM  ,      PAST MEDICAL & SURGICAL HISTORY:  Mitral valve disorder  Kidney stone  Diverticulosis  CKD (chronic kidney disease)  Afib   Hypothyroidism  BPH (benign prostatic hyperplasia)  Chronic peptic ulcer: S/P surgery more than 10 yrs ago  No significant past surgical history      Allergies    No Known Allergies    SOCIAL HISTORY: remote smoking hx. No ETOH.     FAMILY HISTORY:  No pertinent family history in first degree relatives      MEDICATIONS  (STANDING):  piperacillin/tazobactam IVPB. 3.375Gram(s) IV Intermittent every 12 hours  vancomycin  IVPB 1000milliGRAM(s) IV Intermittent every 24 hours  tamsulosin 0.4milliGRAM(s) Oral at bedtime  sotalol 80milliGRAM(s) Oral every 12 hours  ferrous    sulfate 325milliGRAM(s) Oral two times a day with meals  docusate sodium 100milliGRAM(s) Oral three times a day  sucralfate suspension 1Gram(s) Oral four times a day  pantoprazole    Tablet 40milliGRAM(s) Oral before breakfast  levothyroxine 75MICROGram(s) Oral daily  multivitamin 1Tablet(s) Oral daily  cholecalciferol 1000Unit(s) Oral daily  enoxaparin Injectable 70milliGRAM(s) SubCutaneous daily  lactobacillus acidophilus 1Tablet(s) Oral three times a day with meals    MEDICATIONS  (PRN):  acetaminophen   Tablet. 650milliGRAM(s) Oral every 6 hours PRN Mild Pain (1 - 3)  senna 2Tablet(s) Oral at bedtime PRN Constipation  ALBUTerol/ipratropium for Nebulization 3milliLiter(s) Nebulizer every 6 hours PRN Shortness of Breath and/or Wheezing  guaiFENesin    Syrup 200milliGRAM(s) Oral every 6 hours PRN Cough      REVIEW OF SYSTEMS:    CONSTITUTIONAL: generalized  weakness, Had chills prior to admission.   EYES/ENT: No visual changes;   RESPIRATORY: see HPI.   CARDIOVASCULAR: see HPI  GASTROINTESTINAL: No abdominal pain. See HPI.     All other review of systems is negative/ not contributory  unless indicated above    Vital Signs Last 24 Hrs  T(C): 36.9, Max: 37 (04-26 @ 21:25)  T(F): 98.4, Max: 98.6 (04-26 @ 21:25)  HR: 61 (53 - 73)  BP: 95/64 (91/59 - 167/91)  BP(mean): --  RR: 18 (16 - 19)  SpO2: 93% (92% - 97%)    I&O's Summary  I & Os for 24h ending 27 Apr 2017 07:00  =============================================  IN: 590 ml / OUT: 1000 ml / NET: -410 ml    I & Os for current day (as of 27 Apr 2017 10:44)  =============================================  IN: 420 ml / OUT: 200 ml / NET: 220 ml        PHYSICAL EXAM:    Constitutional: NAD, awake and alert, well-developed  HEENT: NC/AT; anicteric . No oral cyanosis.  Neck:  No JVD  Respiratory: Breath sounds are clear with mild decrease at the left base.   Cardiovascular: S1 and S2, regular rate and rhythm, 1/6 systolic murmur LSB.   Gastrointestinal: Bowel Sounds present, soft, nontender.   Extremities: 1+  peripheral edema at the ankles.  No clubbing or cyanosis.  Neurological: Awake and alert.   Musculoskeletal: no calf tenderness.  Skin: No rashes.      LABS: All Labs Reviewed:                         10.8   10.7  )-----------( 104      ( 27 Apr 2017 07:23 )             32.4     04-27    143  |  103  |  34<H>  ----------------------------<  97  3.6   |  32<H>  |  2.13<H>    Ca    9.0      27 Apr 2017 07:23  Phos  3.6     04-26  Mg     1.9     04-27    TPro  6.3  /  Alb  2.6<L>  /  TBili  0.7  /  DBili  x   /  AST  22  /  ALT  12  /  AlkPhos  75  04-25    CARDIAC MARKERS ( 27 Apr 2017 07:23 )  .423 ng/mL / x     / x     / x     / x      CARDIAC MARKERS ( 26 Apr 2017 17:24 )  .622 ng/mL / x     / 26 U/L / x     / x      CARDIAC MARKERS ( 26 Apr 2017 07:10 )  .729 ng/mL / x     / 35 U/L / x     / x      CARDIAC MARKERS ( 26 Apr 2017 00:21 )  .900 ng/mL / x     / 41 U/L / x     / x      CARDIAC MARKERS ( 25 Apr 2017 16:19 )  .731 ng/mL / x     / 52 U/L / x     / x          LIVER FUNCTIONS - ( 25 Apr 2017 16:19 )  Alb: 2.6 g/dL / Pro: 6.3 g/dL / ALK PHOS: 75 U/L / ALT: 12 U/L DA / AST: 22 U/L / GGT: x           PT/INR - ( 27 Apr 2017 07:23 )   PT: 15.7 sec;   INR: 1.43 ratio         PTT - ( 25 Apr 2017 16:19 )  PTT:30.9 sec      EXAM:  CHEST PA & LAT                          PROCEDURE DATE:  04/25/2017        INTERPRETATION:  Clinical information: Chest pain    2 views, frontal and lateral    Comparison study dated 4/3/2017.    Airspace disease at the left lung base, effusion-consolidation. Right   lung normally expanded. No vascular congestion or cardiac enlargement.   Hilar regions, mediastinal contours intact. No acute osseous   abnormalities. Surgical clips visible midline upper abdomen.    IMPRESSION:    See above report.    CONCLUSIONS: Echocardiogram :    1. Left ventricle appears normal in size with mild concentric left   ventricular hypertrophy. The endocardial surface is not reliably   demonstrated; left ventricular ejection fraction estimated at 50%.  2. Right ventricle seen in limited fashion; grossly normal size and   systolic function.  3. Calcified mitral annulus. Mitral valve leaflets are thickened. No   significant stenosis noted. Mild to moderate mitral regurgitation.   4 sclerotic aortic valve without stenosis. Minimal aortic insufficiency.  5. Mild tricuspid regurgitation  6. Trace pulmonic insufficiency  7 borderline pulmonary hypertension      STEFANO HERNANDEZ M.D., ATTENDING CARDIOLOGIST  This document has been electronically signed. Apr 26 2017  8:14AM        EKG - 04/25/17 - Sinus rhythm 69 BPM. Low voltage. Cannot r/o septal infarct indeterminate age. Prolonged QT interval.     EKG - 04/26/17 - No significant change    4/27/2017  nsr 62 bpm  qt 460 m sec   improved from prior QT linterval     rhythm - sinus,     Lexiscan nuclear stress test - 12/2014 - no ischemia.

## 2017-04-27 NOTE — CONSULT NOTE ADULT - ASSESSMENT
Assessment and Recommendation:   Problem/Plan - 1:  ·  Problem: HCAP (healthcare-associated pneumonia).  Plan: procalcitonin 27  on dual abx for HCAP  oral hygiene  BLOOD cx pending  am WBC pending  out of bed  monitor sat  clinically does not appear toxic   manage comorbidities, CHF, echo pending, on AC for AF  cvs regimen and LASIX, monitor cr and lytes  optimize nutrition and assess functional capacity.    Problem/Recommendation - 2:  Problem: SOB (shortness of breath). Recommendation: likely to be multifactorial. Appears to be predominantly pulmonary given the history and cxr findings. May have certainly had a component of diastolic heart failure. He has received diuretic with excellent negative fluid balance. Renal function is worsening. Would suggest holding further diuretic for now and monitoring clinically, radiographically. Monitor mg and BMP ( deficiencies have been supplemented; will repeat later today). Is recieving antibiotics. ID will be seeing.    Problem/Recommendation - 3:  ·  Problem: Paroxysmal a-fib.  Recommendation: He is in sinus rhythm. Is receiving Sotalol. QTc is prolonged and contributed to by low K and Mg levels. ( can also occur re: to Sotalol). Continue Sotalol and replete k to >= 4.0 and Mg to >= 2.o. Repeat EKG in the am. Continue rhythm monitoring.     Problem/Recommendation - 4:  ·  Problem: Valvular heart disease.  Recommendation: see echo report above.     Problem/Recommendation - 5:  ·  Problem: Troponin level elevated.  Recommendation: No chest pain. EKG is without ischemic changes ( similar to old EKG other than prolonged QTc). Not consistent with ACS. Is present in the setting of renal insufficiency;  consider demand ischemia. Will repeat level  and EKG in am. Discussed with Dr. Pino. Would defer use of  antiplatelet agents  in this setting  as he is systemically anticoagulated for hx. of atrial  fibrillation and has recent hx. of GI bleed.    Problem/Recommendation - 6:  Problem: Chronic kidney disease, stage III (moderate). Recommendation: Stable renal function. Creatinine close to baseline. Mild elevation on crea with IV lasix. To continue current meds. Avoid nephrotoxic meds as possible.    Problem/Recommendation - 7:  ·  Problem: Hypokalemia.  Recommendation: Replete potassium. Get repeat potassium levels after supplementation.   Encourage PO intake.       Problem/Recommendation - 8:  ·  Problem: Hypomagnesemia.  Recommendation: Replete magnesium as needed. Check repeat levels.

## 2017-04-28 DIAGNOSIS — I48.2 CHRONIC ATRIAL FIBRILLATION: ICD-10-CM

## 2017-04-28 DIAGNOSIS — R94.31 ABNORMAL ELECTROCARDIOGRAM [ECG] [EKG]: ICD-10-CM

## 2017-04-28 DIAGNOSIS — Z51.81 ENCOUNTER FOR THERAPEUTIC DRUG LEVEL MONITORING: ICD-10-CM

## 2017-04-28 LAB
INR BLD: 1.74 RATIO — HIGH (ref 0.88–1.16)
PROTHROM AB SERPL-ACNC: 19.2 SEC — HIGH (ref 9.8–12.7)

## 2017-04-28 PROCEDURE — 99233 SBSQ HOSP IP/OBS HIGH 50: CPT

## 2017-04-28 PROCEDURE — 93010 ELECTROCARDIOGRAM REPORT: CPT

## 2017-04-28 RX ORDER — POTASSIUM CHLORIDE 20 MEQ
20 PACKET (EA) ORAL
Qty: 0 | Refills: 0 | Status: COMPLETED | OUTPATIENT
Start: 2017-04-28 | End: 2017-04-28

## 2017-04-28 RX ORDER — WARFARIN SODIUM 2.5 MG/1
3 TABLET ORAL ONCE
Qty: 0 | Refills: 0 | Status: COMPLETED | OUTPATIENT
Start: 2017-04-28 | End: 2017-04-28

## 2017-04-28 RX ADMIN — WARFARIN SODIUM 3 MILLIGRAM(S): 2.5 TABLET ORAL at 21:08

## 2017-04-28 RX ADMIN — Medication 20 MILLIEQUIVALENT(S): at 11:01

## 2017-04-28 RX ADMIN — Medication 1 GRAM(S): at 17:35

## 2017-04-28 RX ADMIN — PANTOPRAZOLE SODIUM 40 MILLIGRAM(S): 20 TABLET, DELAYED RELEASE ORAL at 06:04

## 2017-04-28 RX ADMIN — Medication 1 GRAM(S): at 11:00

## 2017-04-28 RX ADMIN — Medication 20 MILLIEQUIVALENT(S): at 11:50

## 2017-04-28 RX ADMIN — Medication 100 MILLIGRAM(S): at 06:04

## 2017-04-28 RX ADMIN — Medication 1000 UNIT(S): at 11:01

## 2017-04-28 RX ADMIN — PIPERACILLIN AND TAZOBACTAM 25 GRAM(S): 4; .5 INJECTION, POWDER, LYOPHILIZED, FOR SOLUTION INTRAVENOUS at 12:08

## 2017-04-28 RX ADMIN — Medication 1 TABLET(S): at 11:51

## 2017-04-28 RX ADMIN — ENOXAPARIN SODIUM 70 MILLIGRAM(S): 100 INJECTION SUBCUTANEOUS at 11:01

## 2017-04-28 RX ADMIN — PIPERACILLIN AND TAZOBACTAM 25 GRAM(S): 4; .5 INJECTION, POWDER, LYOPHILIZED, FOR SOLUTION INTRAVENOUS at 01:13

## 2017-04-28 RX ADMIN — Medication 1 TABLET(S): at 17:35

## 2017-04-28 RX ADMIN — Medication 1 TABLET(S): at 07:59

## 2017-04-28 RX ADMIN — Medication 1 GRAM(S): at 06:04

## 2017-04-28 RX ADMIN — TAMSULOSIN HYDROCHLORIDE 0.4 MILLIGRAM(S): 0.4 CAPSULE ORAL at 21:07

## 2017-04-28 RX ADMIN — Medication 75 MICROGRAM(S): at 06:04

## 2017-04-28 RX ADMIN — Medication 325 MILLIGRAM(S): at 07:59

## 2017-04-28 RX ADMIN — Medication 325 MILLIGRAM(S): at 17:35

## 2017-04-28 RX ADMIN — Medication 1 TABLET(S): at 11:01

## 2017-04-28 NOTE — PROGRESS NOTE ADULT - ASSESSMENT
80 yo male s/p respiratory infection, complicated as left HCAP, reactive left pleural effusion, possible CHF (diastolic?), possible slight PHTN/ COPD exacerbation.  Subtherapeutic A/C x chronic AF  s/p Hypokalemia, corrected  CKD stg III-IV  Mild NN anemia & Thrombocytopenia of chronic & renal disease + infection    Mild ^ Trop I 2ary to Renal/ CHF  Wide QT 2ary to hypomagnesemia & hypokalemia.

## 2017-04-28 NOTE — PROGRESS NOTE ADULT - SUBJECTIVE AND OBJECTIVE BOX
INTERVAL HPI/OVERNIGHT EVENTS:CC  Patient is a 88y old  Male who presents with a chief complaint of Shortness of breath (25 Apr 2017 17:14)     HPI:  88 years old male with history of Atrial Fibrillation, Hypertension, recent GI bleed with anemia of blood loss, valvular heart disease, who was at rehab and developed vomiting. Patient then developed increasing shortness of breath and cough. He also had some fever. Patient was placed on antibiotics at [facility] but he was brought in to ER at family's request for further eval and management.     In ER patient continues to c/o cough and some shortness of breath on minimal activity. He also c/o being weak and tired. He denies any chest pain or pressure. No nausea or vomiting now.     patient is found to have possible pneumonia and possible CHF exacerbation. He is being admitted for same. (25 Apr 2017 17:14)    MEDICATIONS  (STANDING):  piperacillin/tazobactam IVPB. 3.375Gram(s) IV Intermittent every 12 hours  tamsulosin 0.4milliGRAM(s) Oral at bedtime  sotalol 80milliGRAM(s) Oral every 12 hours  ferrous    sulfate 325milliGRAM(s) Oral two times a day with meals  docusate sodium 100milliGRAM(s) Oral three times a day  sucralfate suspension 1Gram(s) Oral four times a day  pantoprazole    Tablet 40milliGRAM(s) Oral before breakfast  levothyroxine 75MICROGram(s) Oral daily  multivitamin 1Tablet(s) Oral daily  cholecalciferol 1000Unit(s) Oral daily  enoxaparin Injectable 70milliGRAM(s) SubCutaneous daily  lactobacillus acidophilus 1Tablet(s) Oral three times a day with meals    MEDICATIONS  (PRN):  acetaminophen   Tablet. 650milliGRAM(s) Oral every 6 hours PRN Mild Pain (1 - 3)  senna 2Tablet(s) Oral at bedtime PRN Constipation  ALBUTerol/ipratropium for Nebulization 3milliLiter(s) Nebulizer every 6 hours PRN Shortness of Breath and/or Wheezing  guaiFENesin    Syrup 200milliGRAM(s) Oral every 6 hours PRN Cough      Allergies    No Known Allergies    Intolerances          REVIEW OF SYSTEMS:    CONSTITUTIONAL: No weakness, fatigue, fevers or chills  EYES/ENT: No visual changes;  No vertigo or throat pain   NECK: No pain or stiffness  RESPIRATORY: No cough, wheezing, hemoptysis; No shortness of breath  CARDIOVASCULAR: No excertional, @ rest, postural or orthostatic  chest discomfort, SOB, dizziness, fluttering or palpitations, swelling, myalgias or claudication  GASTROINTESTINAL: No abdominal or epigastric pain. No nausea, vomiting, or hematemesis; No diarrhea or constipation. No melena or hematochezia. Date of last bm., & consistency.  GENITOURINARY: No dysuria, frequency or hematuria  NEUROLOGICAL: No numbness, weakness or tremors.  SKIN: No itching, burning, rashes, lesions or sores.   IMMUNOLOGY/ ALLERGY: Hives, wheezing, swelling lips/ tongue/ throat, face, rash- vasculitis, SOB, HA, dizziness, chest pain, abdominal pain, Nausea, vomiting, diarrhea, arthralgias- effusion  All other review of systems is negative unless indicated above    Vital Signs Last 24 Hrs  T(C): 36.7, Max: 36.9 (04-27 @ 21:14)  T(F): 98, Max: 98.5 (04-27 @ 21:14)  HR: 67 (60 - 93)  BP: 99/59 (94/59 - 114/70)  BP(mean): --  RR: 16 (16 - 19)  SpO2: 96% (96% - 97%)  Weight  Weight (kg): 75.2 (04-25 @ 21:00)    PHYSICAL EXAM:    Constitutional: NAD, awake and alert, well-developed  HEENT: No central fascial weakness; apale, anicteric, normal oral hydration, coating ( no thrush, aphthous), halithosis.  Neck:  supple,  No JVD/ bruit. No LN or thyroid mass or pain.  Respiratory: Breath sounds are clear bilaterally, No wheezing, rales or rhonchi.  Cardiovascular: S1 and S2, regular rate and rhythm, no S3-4, Murmurs, gallops or rubs,   Gastrointestinal: Bowel Sounds present, soft, nontender, No HS^, HJR, bruit or pulsation, organomegaly or hernia.  Extremities: No peripheral edema. No clubbing or cyanosis, DVT (Sky's sx or cord), or cellulitis.  Vascular: 2+ peripheral pulses  Neurological: A/O x 3, no focal deficits  Psych: appropriate affect & behaviour.  Musculoskeletal: no calf tenderness, erythema, calor, effusion, LROM.  Skin: Nl turgor & integrity. No rashes or decubitus  Tele:    I&O's Detail    I & Os for current day (as of 28 Apr 2017 13:07)  =============================================  IN:    Oral Fluid: 560 ml    IV PiggyBack: 25 ml    Total IN: 585 ml  ---------------------------------------------  OUT:    Voided: 1120 ml    Total OUT: 1120 ml  ---------------------------------------------  Total NET: -535 ml      LABS:  CAPILLARY BLOOD GLUCOSE                          10.8   10.7  )-----------( 104      ( 27 Apr 2017 07:23 )             32.4     04-27    143  |  103  |  34<H>  ----------------------------<  97  3.6   |  32<H>  |  2.13<H>    Ca    9.0      27 Apr 2017 07:23  Mg     1.9     04-27      PT/INR - ( 27 Apr 2017 07:23 )   PT: 15.7 sec;   INR: 1.43 ratio               RADIOLOGY & ADDITIONAL TESTS: INTERVAL HPI /OVERNIGHT EVENTS: CC Suspect PNA/ CHF/ COPD exacerbation admission  Patient is a 88y old  Male who presents with a chief complaint of Shortness of breath (25 Apr 2017 17:14)     HPI:  88 years old male with history of Atrial Fibrillation, Hypertension, recent GI bleed with anemia of blood loss, valvular heart disease, who was at rehab and developed vomiting. Patient then developed increasing shortness of breath and cough. He also had some fever. Patient was placed on antibiotics at [facility] but he was brought in to ER at family's request for further eval and management.     In ER patient continues to c/o cough and some shortness of breath on minimal activity. He also c/o being weak and tired. He denies any chest pain or pressure. No nausea or vomiting now.     patient is found to have possible pneumonia and possible CHF exacerbation. He is being admitted for same. (25 Apr 2017 17:14)    MEDICATIONS  (STANDING):  piperacillin/tazobactam IVPB. 3.375Gram(s) IV Intermittent every 12 hours  tamsulosin 0.4milliGRAM(s) Oral at bedtime  sotalol 80milliGRAM(s) Oral every 12 hours  ferrous    sulfate 325milliGRAM(s) Oral two times a day with meals  docusate sodium 100milliGRAM(s) Oral three times a day  sucralfate suspension 1Gram(s) Oral four times a day  pantoprazole    Tablet 40milliGRAM(s) Oral before breakfast  levothyroxine 75MICROGram(s) Oral daily  multivitamin 1Tablet(s) Oral daily  cholecalciferol 1000Unit(s) Oral daily  enoxaparin Injectable 70milliGRAM(s) SubCutaneous daily  lactobacillus acidophilus 1Tablet(s) Oral three times a day with meals    MEDICATIONS  (PRN):  acetaminophen   Tablet. 650milliGRAM(s) Oral every 6 hours PRN Mild Pain (1 - 3)  senna 2Tablet(s) Oral at bedtime PRN Constipation  Albuterol/ ipratropium for Nebulization 3milliLiter(s) Nebulizer every 6 hours PRN Shortness of Breath and/or Wheezing  Guaifenesin    Syrup 200milliGRAM(s) Oral every 6 hours PRN Cough      Allergies    No Known Allergies    Intolerances          REVIEW OF SYSTEMS:    CONSTITUTIONAL: No weakness, fatigue, fevers or chills  EYES/ENT: No visual changes;  No vertigo or throat pain   NECK: No pain or stiffness  RESPIRATORY: No cough, wheezing, hemoptysis; No shortness of breath  CARDIOVASCULAR: No exertional @ rest, postural or orthostatic  chest discomfort, SOB, dizziness, fluttering or palpitations, swelling, myalgias or claudication  GASTROINTESTINAL: No abdominal or epigastric pain. No nausea, vomiting, or hematemesis; No diarrhea or constipation. No melena or hematochezia. Date of last bm., & consistency.  GENITOURINARY: No dysuria, frequency or hematuria  NEUROLOGICAL: No numbness, weakness or tremors.  SKIN: No itching, burning, rashes, lesions or sores.   IMMUNOLOGY/ ALLERGY: Hives, wheezing, swelling lips/ tongue/ throat, face, rash- vasculitis, SOB, HA, dizziness, chest pain, abdominal pain, Nausea, vomiting, diarrhea, arthralgias- effusion  All other review of systems is negative unless indicated above    Vital Signs Last 24 Hrs  T(C): 36.7, Max: 36.9 (04-27 @ 21:14)  T(F): 98, Max: 98.5 (04-27 @ 21:14)  HR: 67 (60 - 93)  BP: 99/59 (94/59 - 114/70)  BP(mean): --  RR: 16 (16 - 19)  SpO2: 96% (96% - 97%)  Weight  Weight (kg): 75.2 (04-25 @ 21:00)    PHYSICAL EXAM:    Constitutional: NAD, awake and alert, well-developed  HEENT: No central fascial weakness; apale, anicteric, normal oral hydration, coating ( no thrush, aphthous), halitosis  Neck:  supple,  No JVD/ bruit. No LN or thyroid mass or pain.  Respiratory: Breath sounds are clear bilaterally but diminished left vs rt side, also egophonic on left base, No wheezing, rales or rhonchi.  Cardiovascular: S1 and S2, regular rate and rhythm, no S3-4, Murmurs, gallops or rubs,   Gastrointestinal: Bowel Sounds present, soft, nontender, No HS^, HJR, bruit or pulsation, organomegaly or hernia.  Extremities: No peripheral edema. No clubbing or cyanosis, DVT (Sky's sx or cord), or cellulitis.  Vascular: 2+ peripheral pulses  Neurological: A/O x 3, no focal deficits  Psych: appropriate affect & behaviour.  Musculoskeletal: no calf tenderness, erythema, calor, effusion, LROM.  Skin: Nl turgor & integrity. No rashes or decubitus  Tele:    I&O's Detail    I & Os for current day (as of 28 Apr 2017 13:07)  =============================================  IN:    Oral Fluid: 560 ml    IV PiggyBack: 25 ml    Total IN: 585 ml  ---------------------------------------------  OUT:    Voided: 1120 ml    Total OUT: 1120 ml  ---------------------------------------------  Total NET: -535 ml      LABS:  CAPILLARY BLOOD GLUCOSE                          10.8   10.7  )-----------( 104      ( 27 Apr 2017 07:23 )             32.4     04-27    143  |  103  |  34<H>  ----------------------------<  97  3.6/3.4|  32<H>  |  2.13<H>    Ca    9.0      27 Apr 2017 07:23y  Mg     1.9     04-27      PT/INR - ( 27 Apr 2017 07:23 )   PT: 15.7 sec;   INR: 1.43 ratio      proBNP- <6k             RADIOLOGY & ADDITIONAL TESTS:    EXAM:  US TTE W DOPPLER COMPLETE LVEF Approximately 50%  RVSP: Approximately 39 mmHgeft Ventricle: The left ventricle appears normal in size. There is mild   concentric left ventricular hypertrophy.Mild to moderate mitral   regurgitation is present mild tricuspid regurgitation.    EXAM:  US KIDNEYS AND BLADDER   No hydronephrotic changes, Urinary bladder contained 104 cc of urine prevoid, post void image shows   12 cc residual. Prostate is enlarged. Right pleural effusion  Pulmonic Valve: Trace pulmonic insufficiency.

## 2017-04-28 NOTE — PROGRESS NOTE ADULT - SUBJECTIVE AND OBJECTIVE BOX
Patient seen in follow up for CKD 3. No new complaints. Resting in bed. Comfortable.     PAST MEDICAL HISTORY:  Mitral valve disorder  Kidney stone  Diverticulosis  CKD (chronic kidney disease)  Afib  Hypothyroidism  BPH (benign prostatic hyperplasia)    MEDICATIONS  (STANDING):  piperacillin/tazobactam IVPB. 3.375Gram(s) IV Intermittent every 12 hours  tamsulosin 0.4milliGRAM(s) Oral at bedtime  sotalol 80milliGRAM(s) Oral every 12 hours  ferrous    sulfate 325milliGRAM(s) Oral two times a day with meals  docusate sodium 100milliGRAM(s) Oral three times a day  sucralfate suspension 1Gram(s) Oral four times a day  pantoprazole    Tablet 40milliGRAM(s) Oral before breakfast  levothyroxine 75MICROGram(s) Oral daily  multivitamin 1Tablet(s) Oral daily  cholecalciferol 1000Unit(s) Oral daily  enoxaparin Injectable 70milliGRAM(s) SubCutaneous daily  lactobacillus acidophilus 1Tablet(s) Oral three times a day with meals    MEDICATIONS  (PRN):  acetaminophen   Tablet. 650milliGRAM(s) Oral every 6 hours PRN Mild Pain (1 - 3)  senna 2Tablet(s) Oral at bedtime PRN Constipation  ALBUTerol/ipratropium for Nebulization 3milliLiter(s) Nebulizer every 6 hours PRN Shortness of Breath and/or Wheezing  guaiFENesin    Syrup 200milliGRAM(s) Oral every 6 hours PRN Cough    T(C): 36.7, Max: 37 (04-26 @ 21:25)  HR: 67 (53 - 93)  BP: 99/59 (91/59 - 167/91)  RR: 16  SpO2: 96%  Wt(kg): --  I&O's Detail    I & Os for current day (as of 28 Apr 2017 14:06)  =============================================  IN:    Oral Fluid: 560 ml    IV PiggyBack: 25 ml    Total IN: 585 ml  ---------------------------------------------  OUT:    Voided: 1120 ml    Total OUT: 1120 ml  ---------------------------------------------  Total NET: -535 ml    PHYSICAL EXAM:  General: NAD  Respiratory: b/l air entry  Cardiovascular: S1 S2  Gastrointestinal: soft  Extremities:       LABORATORY:                          10.8   10.7  )-----------( 104      ( 27 Apr 2017 07:23 )             32.4     04-27    143  |  103  |  34<H>  ----------------------------<  97  3.6   |  32<H>  |  2.13<H>    Ca    9.0      27 Apr 2017 07:23  Mg     1.9     04-27      CARDIAC MARKERS ( 27 Apr 2017 07:23 )  .423 ng/mL / x     / x     / x     / x      CARDIAC MARKERS ( 26 Apr 2017 17:24 )  .622 ng/mL / x     / 26 U/L / x     / x          Sodium, Serum: 143 mmol/L (04-27 @ 07:23)  Sodium, Serum: 139 mmol/L (04-26 @ 18:03)    Potassium, Serum: 3.6 mmol/L (04-27 @ 07:23)  Potassium, Serum: 3.4 mmol/L (04-26 @ 18:03)    Hemoglobin: 10.8 g/dL (04-27 @ 07:23)  Hemoglobin: 12.7 g/dL (04-26 @ 07:08)  Hemoglobin: 11.4 g/dL (04-25 @ 16:19)    Creatinine, Serum: 2704-27 @ 07:23  Creatinine, Serum: 2704-26 @ 18:03

## 2017-04-28 NOTE — PROGRESS NOTE ADULT - SUBJECTIVE AND OBJECTIVE BOX
[facility] [place] [facility] patient examined and chart reviewed.   Patient being followed for multilobar pneumonia    Subjective:  Doing well.  Afebrile.  Denies pleuritic chest pain.  No events.    ROS:  CONSTITUTIONAL:  Negative fever or chills, feels well, good appetite  EYES:  Negative  blurry vision or double vision  CARDIOVASCULAR:  Negative for chest pain or palpitations  RESPIRATORY:  Negative for cough, wheezing, or SOB   GASTROINTESTINAL:  Negative for nausea, vomiting, diarrhea, constipation, or abdominal pain  GENITOURINARY:  Negative frequency, urgency or dysuria  NEUROLOGIC:  No headache, confusion, dizziness, lightheadedness    No Known Allergies      ANTIBIOTICS/RELEVANT:  piperacillin/tazobactam IVPB. 3.375Gram(s) IV Intermittent every 12 hours  lactobacillus acidophilus 1Tablet(s) Oral three times a day with meals      acetaminophen   Tablet. 650milliGRAM(s) Oral every 6 hours PRN  tamsulosin 0.4milliGRAM(s) Oral at bedtime  sotalol 80milliGRAM(s) Oral every 12 hours  ferrous    sulfate 325milliGRAM(s) Oral two times a day with meals  docusate sodium 100milliGRAM(s) Oral three times a day  senna 2Tablet(s) Oral at bedtime PRN  sucralfate suspension 1Gram(s) Oral four times a day  pantoprazole    Tablet 40milliGRAM(s) Oral before breakfast  levothyroxine 75MICROGram(s) Oral daily  multivitamin 1Tablet(s) Oral daily  cholecalciferol 1000Unit(s) Oral daily  enoxaparin Injectable 70milliGRAM(s) SubCutaneous daily  ALBUTerol/ipratropium for Nebulization 3milliLiter(s) Nebulizer every 6 hours PRN  guaiFENesin    Syrup 200milliGRAM(s) Oral every 6 hours PRN      Objective:    I & Os for current day (as of 04-28 @ 11:58)  =============================================  IN: 585 ml / OUT: 1120 ml / NET: -535 ml    T(C): 36.7, Max: 36.9 (04-27 @ 21:14)  HR: 67 (60 - 93)  BP: 99/59 (94/59 - 114/70)  RR: 16 (16 - 19)  SpO2: 96% (96% - 97%)  Wt(kg): --    PHYSICAL EXAM:  Constitutional: NAD  Eyes:EV, EOMI  Ear/Nose/Throat: no oral lesion, no sinus tenderness on percussion	  Neck:no JVD, no lymphadenopathy, supple  Respiratory: Decreased ramses  Cardiovascular: S1S2 RRR, no murmurs  Gastrointestinal:soft, (+) BS, NTND  Extremities:no e/e/c  CNS: No focal deficit    LABS:                        10.8   10.7  )-----------( 104      ( 27 Apr 2017 07:23 )             32.4     04-27    143  |  103  |  34<H>  ----------------------------<  97  3.6   |  32<H>  |  2.13<H>    Ca    9.0      27 Apr 2017 07:23  Mg     1.9     04-27      PT/INR - ( 27 Apr 2017 07:23 )   PT: 15.7 sec;   INR: 1.43 ratio          04-25 @ 21:41  Culture-urine --  Culture results   No growth to date.  method type --  Organism --  Organism Identification --  Specimen source .Blood Blood-Venous  04-25 @ 21:38  Culture-urine --  Culture results   No growth to date.  method type --  Organism --  Organism Identification --  Specimen source .Blood Blood-Venous    04-25 @ 21:41  Culture blood --  Culture results   No growth to date.  Gram stain --  Gram stain blood --  Method type --  Organism --  Organism identification --  Specimen source .Blood Blood-Venous   04-25 @ 21:38  Culture blood --  Culture results   No growth to date.  Gram stain --  Gram stain blood --  Method type --  Organism --  Organism identification --  Specimen source .Blood Blood-Venous      ASSESSMENT:  88 year old male admitted from rehab with multifocal pna  Had N/V few days prior   ?aspiration vs HCAP  + troponin likely demand ischemia  CHF  GABO on CKD  Clinically better    PLAN:  Cont Zosyn day 3  If remains stable can switch to po antibx in 48 hours  Trend temps and wbc  Trend renal fx  Increase activity    Dr SU Snell to cover over weekend 4/29-30.

## 2017-04-28 NOTE — PROGRESS NOTE ADULT - SUBJECTIVE AND OBJECTIVE BOX
Date/Time Patient Seen:  		  Referring MD:   Data Reviewed	       Patient is a 88y old  Male who presents with a chief complaint of Shortness of breath (25 Apr 2017 17:14)  in bed  seen and examined  vs and meds reviewed  pt reports feeling better  on room air at present      Subjective/HPI       Medication list         MEDICATIONS  (STANDING):  piperacillin/tazobactam IVPB. 3.375Gram(s) IV Intermittent every 12 hours  tamsulosin 0.4milliGRAM(s) Oral at bedtime  sotalol 80milliGRAM(s) Oral every 12 hours  ferrous    sulfate 325milliGRAM(s) Oral two times a day with meals  docusate sodium 100milliGRAM(s) Oral three times a day  sucralfate suspension 1Gram(s) Oral four times a day  pantoprazole    Tablet 40milliGRAM(s) Oral before breakfast  levothyroxine 75MICROGram(s) Oral daily  multivitamin 1Tablet(s) Oral daily  cholecalciferol 1000Unit(s) Oral daily  enoxaparin Injectable 70milliGRAM(s) SubCutaneous daily  lactobacillus acidophilus 1Tablet(s) Oral three times a day with meals    MEDICATIONS  (PRN):  acetaminophen   Tablet. 650milliGRAM(s) Oral every 6 hours PRN Mild Pain (1 - 3)  senna 2Tablet(s) Oral at bedtime PRN Constipation  ALBUTerol/ipratropium for Nebulization 3milliLiter(s) Nebulizer every 6 hours PRN Shortness of Breath and/or Wheezing  guaiFENesin    Syrup 200milliGRAM(s) Oral every 6 hours PRN Cough         Vitals log        ICU Vital Signs Last 24 Hrs  T(C): 36.8, Max: 36.9 (04-27 @ 09:23)  T(F): 98.3, Max: 98.5 (04-27 @ 21:14)  HR: 60 (60 - 93)  BP: 114/70 (94/59 - 114/70)  BP(mean): --  ABP: --  ABP(mean): --  RR: 18 (16 - 19)  SpO2: 97% (93% - 97%)           Input and Output:  I&O's Detail  I & Os for 24h ending 27 Apr 2017 07:00  =============================================  IN:    IV PiggyBack: 350 ml    Oral Fluid: 240 ml    Total IN: 590 ml  ---------------------------------------------  OUT:    Voided: 1000 ml    Total OUT: 1000 ml  ---------------------------------------------  Total NET: -410 ml    I & Os for current day (as of 28 Apr 2017 05:59)  =============================================  IN:    Oral Fluid: 560 ml    IV PiggyBack: 25 ml    Total IN: 585 ml  ---------------------------------------------  OUT:    Voided: 520 ml    Total OUT: 520 ml  ---------------------------------------------  Total NET: 65 ml      Lab Data                        10.8   10.7  )-----------( 104      ( 27 Apr 2017 07:23 )             32.4     04-27    143  |  103  |  34<H>  ----------------------------<  97  3.6   |  32<H>  |  2.13<H>    Ca    9.0      27 Apr 2017 07:23  Phos  3.6     04-26  Mg     1.9     04-27        CARDIAC MARKERS ( 27 Apr 2017 07:23 )  .423 ng/mL / x     / x     / x     / x      CARDIAC MARKERS ( 26 Apr 2017 17:24 )  .622 ng/mL / x     / 26 U/L / x     / x      CARDIAC MARKERS ( 26 Apr 2017 07:10 )  .729 ng/mL / x     / 35 U/L / x     / x            Review of Systems	      Objective     Physical Examination    heart - s1s2  lungs - dec BS  abd - soft  extr - no gross edema  cn grossly int      Pertinent Lab findings & Imaging      Apoorva:  NO   Adequate UO     I&O's Detail  I & Os for 24h ending 27 Apr 2017 07:00  =============================================  IN:    IV PiggyBack: 350 ml    Oral Fluid: 240 ml    Total IN: 590 ml  ---------------------------------------------  OUT:    Voided: 1000 ml    Total OUT: 1000 ml  ---------------------------------------------  Total NET: -410 ml    I & Os for current day (as of 28 Apr 2017 05:59)  =============================================  IN:    Oral Fluid: 560 ml    IV PiggyBack: 25 ml    Total IN: 585 ml  ---------------------------------------------  OUT:    Voided: 520 ml    Total OUT: 520 ml  ---------------------------------------------  Total NET: 65 ml           Discussed with:     Cultures:	        Radiology

## 2017-04-28 NOTE — PROGRESS NOTE ADULT - SUBJECTIVE AND OBJECTIVE BOX
HPI:  88 years old male with history of Atrial Fibrillation, Hypertension, recent GI bleed with anemia of blood loss, valvular heart disease, who was at rehab and developed vomiting. Patient then developed increasing shortness of breath and cough. He also had some fever. Patient was placed on antibiotics at [facility] but he was brought in to ER at family's request for further eval and management.     In ER patient continues to c/o cough and some shortness of breath on minimal activity. He also c/o being weak and tired. He denies  chest pain, pressure or discomfort. No  nausea or recurrence of vomiting. He was felt to have possible pneumonia and is being treated with antibiotics. There is concern that he may also have CHF. Mild elevation of troponin was demonstrated prompting cardiology evaluation.    Patient denies exertional chest pain, palpitations, presyncope or syncope. Occasional ankle edema.     He presently relates that he is feeling " better". Denies SOB at rest. No chest discomfort. Still with mild cough. No hemoptysis.    4/27/2017  Patient is feeling better , denies any chest pain or shortness of breath , has mild cough ,  patients monitor showed brief pause ? AV block? low voltage QRS while he was asleep , patient denies any prior syncopal episodes.  His EKG showed improving QT interval this AM  ,    4/28/2017  Patient is feeling much better ,  rhythm is stable , denies any chest pain or shortness of breath or dizziness   INR is low       PAST MEDICAL & SURGICAL HISTORY:  Mitral valve disorder  Kidney stone  Diverticulosis  CKD (chronic kidney disease)  Afib   Hypothyroidism  BPH (benign prostatic hyperplasia)  Chronic peptic ulcer: S/P surgery more than 10 yrs ago  No significant past surgical history      Allergies    No Known Allergies  MEDICATIONS  (STANDING):  piperacillin/tazobactam IVPB. 3.375Gram(s) IV Intermittent every 12 hours  tamsulosin 0.4milliGRAM(s) Oral at bedtime  sotalol 80milliGRAM(s) Oral every 12 hours  ferrous    sulfate 325milliGRAM(s) Oral two times a day with meals  docusate sodium 100milliGRAM(s) Oral three times a day  sucralfate suspension 1Gram(s) Oral four times a day  pantoprazole    Tablet 40milliGRAM(s) Oral before breakfast  levothyroxine 75MICROGram(s) Oral daily  multivitamin 1Tablet(s) Oral daily  cholecalciferol 1000Unit(s) Oral daily  enoxaparin Injectable 70milliGRAM(s) SubCutaneous daily  lactobacillus acidophilus 1Tablet(s) Oral three times a day with meals    MEDICATIONS  (PRN):  acetaminophen   Tablet. 650milliGRAM(s) Oral every 6 hours PRN Mild Pain (1 - 3)  senna 2Tablet(s) Oral at bedtime PRN Constipation  ALBUTerol/ipratropium for Nebulization 3milliLiter(s) Nebulizer every 6 hours PRN Shortness of Breath and/or Wheezing  guaiFENesin    Syrup 200milliGRAM(s) Oral every 6 hours PRN Cough    ROS   All other review of systems is negative/ not contributory  unless indicated above    Vital Signs Last 24 Hrs  T(C): 36.8, Max: 36.9 (04-27 @ 21:14)  T(F): 98.3, Max: 98.5 (04-27 @ 21:14)  HR: 60 (60 - 93)  BP: 114/70 (94/59 - 114/70)  BP(mean): --  RR: 18 (16 - 19)  SpO2: 97% (96% - 97%)    I&O's Summary    I & Os for current day (as of 28 Apr 2017 09:36)  =============================================  IN: 585 ml / OUT: 1120 ml / NET: -535 ml        PHYSICAL EXAM:    Constitutional: NAD, awake and alert, well-developed  HEENT: NC/AT; anicteric . No oral cyanosis.  Neck:  No JVD  Respiratory: Breath sounds are clear with mild decrease at the left base.   Cardiovascular: S1 and S2, regular rate and rhythm, 1/6 systolic murmur LSB.   Gastrointestinal: Bowel Sounds present, soft, nontender.   Extremities: 1+  peripheral edema at the ankles.  No clubbing or cyanosis.  Neurological: Awake and alert.   Musculoskeletal: no calf tenderness.  Skin: No rashes.      LABS: All Labs Reviewed:                                         10.8   10.7  )-----------( 104      ( 27 Apr 2017 07:23 )             32.4     04-27    143  |  103  |  34<H>  ----------------------------<  97  3.6   |  32<H>  |  2.13<H>    Ca    9.0      27 Apr 2017 07:23  Mg     1.9     04-27      CARDIAC MARKERS ( 27 Apr 2017 07:23 )  .423 ng/mL / x     / x     / x     / x      CARDIAC MARKERS ( 26 Apr 2017 17:24 )  .622 ng/mL / x     / 26 U/L / x     / x            PT/INR - ( 27 Apr 2017 07:23 )   PT: 15.7 sec;   INR: 1.43 ratio           TPro  6.3  /  Alb  2.6<L>  /  TBili  0.7  /  DBili  x   /  AST  22  /  ALT  12  /  AlkPhos  75  04-25    CARDIAC MARKERS ( 27 Apr 2017 07:23 )  .423 ng/mL / x     / x     / x     / x      CARDIAC MARKERS ( 26 Apr 2017 17:24 )  .622 ng/mL / x     / 26 U/L / x     / x      CARDIAC MARKERS ( 26 Apr 2017 07:10 )  .729 ng/mL / x     / 35 U/L / x     / x      CARDIAC MARKERS ( 26 Apr 2017 00:21 )  .900 ng/mL / x     / 41 U/L / x     / x      CARDIAC MARKERS ( 25 Apr 2017 16:19 )  .731 ng/mL / x     / 52 U/L / x     / x          LIVER FUNCTIONS - ( 25 Apr 2017 16:19 )  Alb: 2.6 g/dL / Pro: 6.3 g/dL / ALK PHOS: 75 U/L / ALT: 12 U/L DA / AST: 22 U/L / GGT: x           PT/INR - ( 27 Apr 2017 07:23 )   PT: 15.7 sec;   INR: 1.43 ratio         PTT - ( 25 Apr 2017 16:19 )  PTT:30.9 sec      EXAM:  CHEST PA & LAT                          PROCEDURE DATE:  04/25/2017        INTERPRETATION:  Clinical information: Chest pain    2 views, frontal and lateral    Comparison study dated 4/3/2017.    Airspace disease at the left lung base, effusion-consolidation. Right   lung normally expanded. No vascular congestion or cardiac enlargement.   Hilar regions, mediastinal contours intact. No acute osseous   abnormalities. Surgical clips visible midline upper abdomen.    IMPRESSION:    See above report.    CONCLUSIONS: Echocardiogram :    1. Left ventricle appears normal in size with mild concentric left   ventricular hypertrophy. The endocardial surface is not reliably   demonstrated; left ventricular ejection fraction estimated at 50%.  2. Right ventricle seen in limited fashion; grossly normal size and   systolic function.  3. Calcified mitral annulus. Mitral valve leaflets are thickened. No   significant stenosis noted. Mild to moderate mitral regurgitation.   4 sclerotic aortic valve without stenosis. Minimal aortic insufficiency.  5. Mild tricuspid regurgitation  6. Trace pulmonic insufficiency  7 borderline pulmonary hypertension      STEFANO HERNANDEZ M.D., ATTENDING CARDIOLOGIST  This document has been electronically signed. Apr 26 2017  8:14AM        EKG - 04/25/17 - Sinus rhythm 69 BPM. Low voltage. Cannot r/o septal infarct indeterminate age. Prolonged QT interval.     EKG - 04/26/17 - No significant change    4/27/2017  nsr 62 bpm  qt 460 m sec   improved from prior QT linterval     rhythm - sinus rhythm episodes of  sinus bradycardia at night     Lexiscan nuclear stress test - 12/2014 - no ischemia.

## 2017-04-29 DIAGNOSIS — I24.8 OTHER FORMS OF ACUTE ISCHEMIC HEART DISEASE: ICD-10-CM

## 2017-04-29 LAB
ANION GAP SERPL CALC-SCNC: 7 MMOL/L — SIGNIFICANT CHANGE UP (ref 5–17)
BUN SERPL-MCNC: 30 MG/DL — HIGH (ref 7–23)
CALCIUM SERPL-MCNC: 9.2 MG/DL — SIGNIFICANT CHANGE UP (ref 8.4–10.5)
CHLORIDE SERPL-SCNC: 103 MMOL/L — SIGNIFICANT CHANGE UP (ref 96–108)
CO2 SERPL-SCNC: 31 MMOL/L — SIGNIFICANT CHANGE UP (ref 22–31)
CREAT SERPL-MCNC: 1.75 MG/DL — HIGH (ref 0.5–1.3)
GLUCOSE SERPL-MCNC: 89 MG/DL — SIGNIFICANT CHANGE UP (ref 70–99)
INR BLD: 1.81 RATIO — HIGH (ref 0.88–1.16)
POTASSIUM SERPL-MCNC: 4.1 MMOL/L — SIGNIFICANT CHANGE UP (ref 3.5–5.3)
POTASSIUM SERPL-SCNC: 4.1 MMOL/L — SIGNIFICANT CHANGE UP (ref 3.5–5.3)
PROTHROM AB SERPL-ACNC: 20 SEC — HIGH (ref 9.8–12.7)
SODIUM SERPL-SCNC: 141 MMOL/L — SIGNIFICANT CHANGE UP (ref 135–145)

## 2017-04-29 PROCEDURE — 99232 SBSQ HOSP IP/OBS MODERATE 35: CPT

## 2017-04-29 RX ORDER — WARFARIN SODIUM 2.5 MG/1
3 TABLET ORAL ONCE
Qty: 0 | Refills: 0 | Status: COMPLETED | OUTPATIENT
Start: 2017-04-29 | End: 2017-04-29

## 2017-04-29 RX ADMIN — PIPERACILLIN AND TAZOBACTAM 25 GRAM(S): 4; .5 INJECTION, POWDER, LYOPHILIZED, FOR SOLUTION INTRAVENOUS at 00:24

## 2017-04-29 RX ADMIN — Medication 1 GRAM(S): at 06:05

## 2017-04-29 RX ADMIN — WARFARIN SODIUM 3 MILLIGRAM(S): 2.5 TABLET ORAL at 21:44

## 2017-04-29 RX ADMIN — Medication 1 GRAM(S): at 17:10

## 2017-04-29 RX ADMIN — Medication 325 MILLIGRAM(S): at 17:11

## 2017-04-29 RX ADMIN — Medication 80 MILLIGRAM(S): at 17:11

## 2017-04-29 RX ADMIN — Medication 325 MILLIGRAM(S): at 07:55

## 2017-04-29 RX ADMIN — Medication 1 GRAM(S): at 11:13

## 2017-04-29 RX ADMIN — Medication 1000 UNIT(S): at 11:13

## 2017-04-29 RX ADMIN — TAMSULOSIN HYDROCHLORIDE 0.4 MILLIGRAM(S): 0.4 CAPSULE ORAL at 21:25

## 2017-04-29 RX ADMIN — ENOXAPARIN SODIUM 70 MILLIGRAM(S): 100 INJECTION SUBCUTANEOUS at 11:13

## 2017-04-29 RX ADMIN — Medication 1 TABLET(S): at 11:13

## 2017-04-29 RX ADMIN — Medication 1 TABLET(S): at 13:20

## 2017-04-29 RX ADMIN — PANTOPRAZOLE SODIUM 40 MILLIGRAM(S): 20 TABLET, DELAYED RELEASE ORAL at 06:05

## 2017-04-29 RX ADMIN — PIPERACILLIN AND TAZOBACTAM 25 GRAM(S): 4; .5 INJECTION, POWDER, LYOPHILIZED, FOR SOLUTION INTRAVENOUS at 13:20

## 2017-04-29 RX ADMIN — Medication 75 MICROGRAM(S): at 06:05

## 2017-04-29 RX ADMIN — Medication 1 TABLET(S): at 17:11

## 2017-04-29 RX ADMIN — Medication 1 GRAM(S): at 00:23

## 2017-04-29 RX ADMIN — Medication 1 GRAM(S): at 23:28

## 2017-04-29 RX ADMIN — Medication 1 TABLET(S): at 07:54

## 2017-04-29 NOTE — PROGRESS NOTE ADULT - SUBJECTIVE AND OBJECTIVE BOX
INTERVAL HPI/OVERNIGHT EVENTS:CC  Patient is a 88y old  Male who presents with a chief complaint of Shortness of breath (25 Apr 2017 17:14)     HPI:  88 years old male with history of Atrial Fibrillation, Hypertension, recent GI bleed with anemia of blood loss, valvular heart disease, who was at rehab and developed vomiting. Patient then developed increasing shortness of breath and cough. He also had some fever. Patient was placed on antibiotics at [facility] but he was brought in to ER at family's request for further eval and management.     In ER patient continues to c/o cough and some shortness of breath on minimal activity. He also c/o being weak and tired. He denies any chest pain or pressure. No nausea or vomiting now.     patient is found to have possible pneumonia and possible CHF exacerbation. He is being admitted for same. (25 Apr 2017 17:14)    MEDICATIONS  (STANDING):  piperacillin/tazobactam IVPB. 3.375Gram(s) IV Intermittent every 12 hours  tamsulosin 0.4milliGRAM(s) Oral at bedtime  sotalol 80milliGRAM(s) Oral every 12 hours  ferrous    sulfate 325milliGRAM(s) Oral two times a day with meals  docusate sodium 100milliGRAM(s) Oral three times a day  sucralfate suspension 1Gram(s) Oral four times a day  pantoprazole    Tablet 40milliGRAM(s) Oral before breakfast  levothyroxine 75MICROGram(s) Oral daily  multivitamin 1Tablet(s) Oral daily  cholecalciferol 1000Unit(s) Oral daily  enoxaparin Injectable 70milliGRAM(s) SubCutaneous daily  lactobacillus acidophilus 1Tablet(s) Oral three times a day with meals    MEDICATIONS  (PRN):  acetaminophen   Tablet. 650milliGRAM(s) Oral every 6 hours PRN Mild Pain (1 - 3)  senna 2Tablet(s) Oral at bedtime PRN Constipation  ALBUTerol/ipratropium for Nebulization 3milliLiter(s) Nebulizer every 6 hours PRN Shortness of Breath and/or Wheezing  guaiFENesin    Syrup 200milliGRAM(s) Oral every 6 hours PRN Cough      Allergies    No Known Allergies    Intolerances          REVIEW OF SYSTEMS:    CONSTITUTIONAL: No weakness, fatigue, fevers or chills  EYES/ENT: No visual changes;  No vertigo or throat pain   NECK: No pain or stiffness  RESPIRATORY: No cough, wheezing, hemoptysis; No shortness of breath  CARDIOVASCULAR: No excertional, @ rest, postural or orthostatic  chest discomfort, SOB, dizziness, fluttering or palpitations, swelling, myalgias or claudication  GASTROINTESTINAL: No abdominal or epigastric pain. No nausea, vomiting, or hematemesis; No diarrhea or constipation. No melena or hematochezia. Date of last bm., & consistency.  GENITOURINARY: No dysuria, frequency or hematuria  NEUROLOGICAL: No numbness, weakness or tremors.  SKIN: No itching, burning, rashes, lesions or sores.   IMMUNOLOGY/ ALLERGY: Hives, wheezing, swelling lips/ tongue/ throat, face, rash- vasculitis, SOB, HA, dizziness, chest pain, abdominal pain, Nausea, vomiting, diarrhea, arthralgias- effusion  All other review of systems is negative unless indicated above    Vital Signs Last 24 Hrs  T(C): 37, Max: 37 (04-29 @ 21:07)  T(F): 98.6, Max: 98.6 (04-29 @ 21:07)  HR: 62 (58 - 80)  BP: 109/76 (94/65 - 112/70)  BP(mean): --  RR: 16 (16 - 18)  SpO2: 98% (94% - 98%)  Weight      PHYSICAL EXAM:    Constitutional: NAD, awake and alert, well-developed  HEENT: No central fascial weakness; apale, anicteric, normal oral hydration, coating ( no thrush, aphthous), halithosis.  Neck:  supple,  No JVD/ bruit. No LN or thyroid mass or pain.  Respiratory: Breath sounds are clear bilaterally, No wheezing, rales or rhonchi.  Cardiovascular: S1 and S2, regular rate and rhythm, no S3-4, Murmurs, gallops or rubs,   Gastrointestinal: Bowel Sounds present, soft, nontender, No HS^, HJR, bruit or pulsation, organomegaly or hernia.  Extremities: No peripheral edema. No clubbing or cyanosis, DVT (Sky's sx or cord), or cellulitis.  Vascular: 2+ peripheral pulses  Neurological: A/O x 3, no focal deficits  Psych: appropriate affect & behaviour.  Musculoskeletal: no calf tenderness, erythema, calor, effusion, LROM.  Skin: Nl turgor & integrity. No rashes or decubitus  Tele:    I&O's Detail  I & Os for 24h ending 29 Apr 2017 07:00  =============================================  IN:    Oral Fluid: 220 ml    Total IN: 220 ml  ---------------------------------------------  OUT:    Voided: 740 ml    Total OUT: 740 ml  ---------------------------------------------  Total NET: -520 ml    I & Os for current day (as of 29 Apr 2017 22:06)  =============================================  IN:    Oral Fluid: 360 ml    IV PiggyBack: 75 ml    Total IN: 435 ml  ---------------------------------------------  OUT:    Voided: 850 ml    Total OUT: 850 ml  ---------------------------------------------  Total NET: -415 ml      LABS:  CAPILLARY BLOOD GLUCOSE      04-29    141  |  103  |  30<H>  ----------------------------<  89  4.1   |  31  |  1.75<H>    Ca    9.2      29 Apr 2017 07:40      PT/INR - ( 29 Apr 2017 07:40 )   PT: 20.0 sec;   INR: 1.81 ratio               RADIOLOGY & ADDITIONAL TESTS: INTERVAL HPI /OVERNIGHT EVENTS: CC Offers no complains, no longer SOB/ cough.  Patient is a 88y old  Male who presents with a chief complaint of Shortness of breath (25 Apr 2017 17:14)     HPI:  88 years old male with history of Atrial Fibrillation, Hypertension, recent GI bleed with anemia of blood loss, valvular heart disease, who was at rehab and developed vomiting. Patient then developed increasing shortness of breath and cough. He also had some fever. Patient was placed on antibiotics at [facility] but he was brought in to ER at family's request for further eval and management.     In ER patient continues to c/o cough and some shortness of breath on minimal activity. He also c/o being weak and tired. He denies any chest pain or pressure. No nausea or vomiting now.     patient is found to have possible pneumonia and possible CHF exacerbation. He is being admitted for same. (25 Apr 2017 17:14)    MEDICATIONS  (STANDING):  piperacillin/tazobactam IVPB. 3.375Gram(s) IV Intermittent every 12 hours  tamsulosin 0.4milliGRAM(s) Oral at bedtime  sotalol 80milliGRAM(s) Oral every 12 hours  ferrous    sulfate 325milliGRAM(s) Oral two times a day with meals  docusate sodium 100milliGRAM(s) Oral three times a day  sucralfate suspension 1Gram(s) Oral four times a day  pantoprazole    Tablet 40milliGRAM(s) Oral before breakfast  levothyroxine 75MICROGram(s) Oral daily  multivitamin 1Tablet(s) Oral daily  cholecalciferol 1000Unit(s) Oral daily  enoxaparin Injectable 70milliGRAM(s) SubCutaneous daily  lactobacillus acidophilus 1Tablet(s) Oral three times a day with meals    MEDICATIONS  (PRN):  acetaminophen   Tablet. 650milliGRAM(s) Oral every 6 hours PRN Mild Pain (1 - 3)  senna 2Tablet(s) Oral at bedtime PRN Constipation  Albuterol/ ipratropium for Nebulization 3milliLiter(s) Nebulizer every 6 hours PRN Shortness of Breath and/or Wheezing  Guaifenesin    Syrup 200milliGRAM(s) Oral every 6 hours PRN Cough      Allergies    No Known Allergies    Intolerances          REVIEW OF SYSTEMS:    CONSTITUTIONAL: No weakness, fatigue, fevers or chills  EYES/ENT: No visual changes;  No vertigo or throat pain   NECK: No pain or stiffness  RESPIRATORY: No cough, wheezing, hemoptysis; No shortness of breath  CARDIOVASCULAR: No exertional @ rest, postural or orthostatic  chest discomfort, SOB, dizziness, fluttering or palpitations, swelling, myalgias or claudication  GASTROINTESTINAL: No abdominal or epigastric pain. No nausea, vomiting, or hematemesis; No diarrhea or constipation. No melena or hematochezia. Date of last bm., & consistency.  GENITOURINARY: No dysuria, frequency or hematuria  NEUROLOGICAL: No numbness, weakness or tremors.  SKIN: No itching, burning, rashes, lesions or sores.   IMMUNOLOGY/ ALLERGY: Hives, wheezing, swelling lips/ tongue/ throat, face, rash- vasculitis, SOB, HA, dizziness, chest pain, abdominal pain, Nausea, vomiting, diarrhea, arthralgias- effusion  All other review of systems is negative unless indicated above    Vital Signs Last 24 Hrs  T(C): 37, Max: 37 (04-29 @ 21:07)  T(F): 98.6, Max: 98.6 (04-29 @ 21:07)  HR: 62 (58 - 80)  BP: 109/76 (94/65 - 112/70)  BP(mean): --  RR: 16 (16 - 18)  SpO2: 98% (94% - 98%)  Weight      PHYSICAL EXAM:    Constitutional: NAD, awake and alert, well-developed  HEENT: No central fascial weakness; apale, anicteric, normal oral hydration, coating ( no thrush, aphthous), halitosis  Neck:  supple,  No JVD/ bruit. No LN or thyroid mass or pain.  Respiratory: Breath sounds sonorous/ coarse left base diminished/ improved, egophony left base, No wheezing, rales or rhonchi.  Cardiovascular: S1 and S2, regular rate and rhythm, no S3-4, (+) JAVED gde I/V, (-) gallops or rubs, no presacral edema.  Gastrointestinal: Bowel Sounds present, soft, nontender, No HS^, HJR, bruit or pulsation, organomegaly or hernia.  Extremities: No peripheral edema. No clubbing or cyanosis, DVT (Sky's sx or cord), or cellulitis.  Vascular: 2+ peripheral pulses  Neurological: A/O x 3, no focal deficits  Psych: appropriate affect & behaviour.  Musculoskeletal: no calf tenderness, erythema, calor, effusion, LROM.  Skin: Nl turgor & integrity. No rashes or decubitus    Tele:   RSR, /c 1 run of 3 VT vs aberrant conducted (~150 bpm), moderate PVC's unifocal, coupling, bi/tri/quadrigeminy, short burst of A Fib. Not much different from priors. sinus bradycardia upper 40s while sleeping, of no significance.      I&O's Detail  I & Os for 24h ending 29 Apr 2017 07:00  =============================================  IN:    Oral Fluid: 220 ml    Total IN: 220 ml  ---------------------------------------------  OUT:    Voided: 740 ml    Total OUT: 740 ml  ---------------------------------------------  Total NET: -520 ml    I & Os for current day (as of 29 Apr 2017 22:06)  =============================================  IN:    Oral Fluid: 360 ml    IV PiggyBack: 75 ml    Total IN: 435 ml  ---------------------------------------------  OUT:    Voided: 850 ml    Total OUT: 850 ml  ---------------------------------------------  Total NET: -415 ml      LABS:  CAPILLARY BLOOD GLUCOSE      04-29    141  |  103  |  30<H>  ----------------------------<  89  4.1   |  31  |  1.75<H>    Ca    9.2      29 Apr 2017 07:40      PT/INR - ( 29 Apr 2017 07:40 )   PT: 20.0 sec;   INR: 1.81 ratio               RADIOLOGY & ADDITIONAL TESTS:

## 2017-04-29 NOTE — PROGRESS NOTE ADULT - SUBJECTIVE AND OBJECTIVE BOX
Date/Time Patient Seen:  		  Referring MD:   Data Reviewed	       Patient is a 88y old  Male who presents with a chief complaint of Shortness of breath (25 Apr 2017 17:14)  in bed  seen and examined  vs and meds reviewed  awake and alert  verbal      Subjective/HPI       Medication list         MEDICATIONS  (STANDING):  piperacillin/tazobactam IVPB. 3.375Gram(s) IV Intermittent every 12 hours  tamsulosin 0.4milliGRAM(s) Oral at bedtime  sotalol 80milliGRAM(s) Oral every 12 hours  ferrous    sulfate 325milliGRAM(s) Oral two times a day with meals  docusate sodium 100milliGRAM(s) Oral three times a day  sucralfate suspension 1Gram(s) Oral four times a day  pantoprazole    Tablet 40milliGRAM(s) Oral before breakfast  levothyroxine 75MICROGram(s) Oral daily  multivitamin 1Tablet(s) Oral daily  cholecalciferol 1000Unit(s) Oral daily  enoxaparin Injectable 70milliGRAM(s) SubCutaneous daily  lactobacillus acidophilus 1Tablet(s) Oral three times a day with meals    MEDICATIONS  (PRN):  acetaminophen   Tablet. 650milliGRAM(s) Oral every 6 hours PRN Mild Pain (1 - 3)  senna 2Tablet(s) Oral at bedtime PRN Constipation  ALBUTerol/ipratropium for Nebulization 3milliLiter(s) Nebulizer every 6 hours PRN Shortness of Breath and/or Wheezing  guaiFENesin    Syrup 200milliGRAM(s) Oral every 6 hours PRN Cough         Vitals log        ICU Vital Signs Last 24 Hrs  T(C): 36.7, Max: 36.8 (04-29 @ 00:26)  T(F): 98, Max: 98.3 (04-29 @ 00:26)  HR: 58 (58 - 71)  BP: 112/70 (94/60 - 121/78)  BP(mean): --  ABP: --  ABP(mean): --  RR: 18 (16 - 18)  SpO2: 98% (95% - 99%)           Input and Output:  I&O's Detail  I & Os for 24h ending 28 Apr 2017 07:00  =============================================  IN:    Oral Fluid: 560 ml    IV PiggyBack: 25 ml    Total IN: 585 ml  ---------------------------------------------  OUT:    Voided: 1120 ml    Total OUT: 1120 ml  ---------------------------------------------  Total NET: -535 ml    I & Os for current day (as of 29 Apr 2017 06:53)  =============================================  IN:    Oral Fluid: 220 ml    Total IN: 220 ml  ---------------------------------------------  OUT:    Voided: 740 ml    Total OUT: 740 ml  ---------------------------------------------  Total NET: -520 ml      Lab Data                        10.8   10.7  )-----------( 104      ( 27 Apr 2017 07:23 )             32.4     04-27    143  |  103  |  34<H>  ----------------------------<  97  3.6   |  32<H>  |  2.13<H>    Ca    9.0      27 Apr 2017 07:23  Mg     1.9     04-27        CARDIAC MARKERS ( 27 Apr 2017 07:23 )  .423 ng/mL / x     / x     / x     / x            Review of Systems	      Objective     Physical Examination  heart - s1s2  lungs - no wheezing no rhonchi  moves all extr        Pertinent Lab findings & Imaging      Apoorva:  NO   Adequate UO     I&O's Detail  I & Os for 24h ending 28 Apr 2017 07:00  =============================================  IN:    Oral Fluid: 560 ml    IV PiggyBack: 25 ml    Total IN: 585 ml  ---------------------------------------------  OUT:    Voided: 1120 ml    Total OUT: 1120 ml  ---------------------------------------------  Total NET: -535 ml    I & Os for current day (as of 29 Apr 2017 06:53)  =============================================  IN:    Oral Fluid: 220 ml    Total IN: 220 ml  ---------------------------------------------  OUT:    Voided: 740 ml    Total OUT: 740 ml  ---------------------------------------------  Total NET: -520 ml           Discussed with:     Cultures:	        Radiology

## 2017-04-29 NOTE — PROGRESS NOTE ADULT - SUBJECTIVE AND OBJECTIVE BOX
REASON FOR VISIT: AF, Medication management    HPI:  88 years old man with a history of Atrial Fibrillation, Hypertension, recent GI bleed, mild valvular heart disease, admitted 4/25/17 with multifocal pneumonia.    4/27/2017  Patient is feeling better , denies any chest pain or shortness of breath , has mild cough ,  patients monitor showed brief pause ? AV block? low voltage QRS while he was asleep , patient denies any prior syncopal episodes.  His EKG showed improving QT interval this AM  ,    4/28/2017  Patient is feeling much better ,  rhythm is stable , denies any chest pain or shortness of breath or dizziness   INR is low     4/29/17:  Comfortable; persistent cough; no SOB or angina.    MEDICATIONS  (STANDING):  piperacillin/tazobactam IVPB. 3.375Gram(s) IV Intermittent every 12 hours  tamsulosin 0.4milliGRAM(s) Oral at bedtime  sotalol 80milliGRAM(s) Oral every 12 hours  ferrous    sulfate 325milliGRAM(s) Oral two times a day with meals  docusate sodium 100milliGRAM(s) Oral three times a day  sucralfate suspension 1Gram(s) Oral four times a day  pantoprazole    Tablet 40milliGRAM(s) Oral before breakfast  levothyroxine 75MICROGram(s) Oral daily  multivitamin 1Tablet(s) Oral daily  cholecalciferol 1000Unit(s) Oral daily  enoxaparin Injectable 70milliGRAM(s) SubCutaneous daily  lactobacillus acidophilus 1Tablet(s) Oral three times a day with meals    Vital Signs Last 24 Hrs  T(C): 36.7, Max: 36.8 (04-29 @ 00:26)  T(F): 98, Max: 98.3 (04-29 @ 00:26)  HR: 58 (58 - 71)  BP: 112/70 (94/60 - 121/78)  BP(mean): --  RR: 18 (16 - 18)  SpO2: 98% (95% - 99%)    PHYSICAL EXAM:  Constitutional: NAD, awake and alert, seated in bedside chair  HEENT: No oral cyanosis.  Neck:  No JVD  Respiratory: Breath sounds are clear with mild decrease at the bases.   Cardiovascular: S1 and S2, regular rate and rhythm, 1/6 systolic murmur LSB.   Gastrointestinal: Bowel Sounds present, soft, nontender.   Musculoskeletal: no calf tenderness.  Skin: No rashes.    LABS:     141  |  103  |  30<H>  ----------------------------<  89  4.1   |  31  |  1.75<H>    Ca    9.2      29 Apr 2017 07:40    Echocardiogram :  1. Left ventricle appears normal in size with mild concentric left ventricular hypertrophy. The endocardial surface is not reliably demonstrated; left ventricular ejection fraction estimated at 50%.  2. Right ventricle seen in limited fashion; grossly normal size and systolic function.  3. Calcified mitral annulus. Mitral valve leaflets are thickened. No significant stenosis noted. Mild to moderate mitral regurgitation.   4 sclerotic aortic valve without stenosis. Minimal aortic insufficiency.  5. Mild tricuspid regurgitation  6. Trace pulmonic insufficiency  7 borderline pulmonary hypertension    EKG - 04/25/17 - Sinus rhythm 69 BPM. Low voltage. Cannot r/o septal infarct indeterminate age. Prolonged QT interval.     EKG - 04/26/17 - No significant change    4/27/2017  nsr 62 bpm  qt 460 m sec   improved from prior QT linterval     Tele: Sinus rhythm    Lexiscan nuclear stress test - 12/2014 - no ischemia.

## 2017-04-29 NOTE — PROGRESS NOTE ADULT - ASSESSMENT
88 white male with HTN, CAD, AF, HLD and CKD now admitted with SOB and cough.   has CKD with a baseline creatinine of 1.7 for over 3 years now. renal sonogram is normal  continue abx as ordered. medications reviewed. will follow.

## 2017-04-29 NOTE — PROGRESS NOTE ADULT - ASSESSMENT
Problem/Plan - 1:  ·  Problem: SOB (shortness of breath).  Plan: Improving dyspnea (predominant pulmonary etiology / pneumonia); continue antibiotics.     Problem/Plan - 2:  ·  Problem: Paroxysmal a-fib.  Plan: Maintaining sinus rhythm; continue Sotalol.     Problem/Plan - 3:  ·  Problem: Troponin level elevated.  Plan: No chest pain. EKG is without ischemic changes (similar to old EKG other than prolonged QTc); unlikely ACS (due to renal failure, demand ischemia)

## 2017-04-29 NOTE — PROGRESS NOTE ADULT - SUBJECTIVE AND OBJECTIVE BOX
HARSHAL COLROADO  88y  Male    Patient is a 88y old  Male who presents with a chief complaint of Shortness of breath. Feels better this AM. out of bed to chair.       PAST MEDICAL & SURGICAL HISTORY:  Mitral valve disorder  Kidney stone  Diverticulosis  CKD (chronic kidney disease)  Afib  Hypothyroidism  BPH (benign prostatic hyperplasia)  Chronic peptic ulcer: S/P surgery more than 10 yrs ago  No significant past surgical history      PHYSICAL EXAM:    T(C): 36.7, Max: 36.8 (04-29 @ 00:26)  HR: 58 (58 - 71)  BP: 112/70 (94/60 - 121/78)  RR: 18 (16 - 18)  SpO2: 98% (95% - 99%)  Wt(kg): --    I&O's Detail    I & Os for current day (as of 29 Apr 2017 09:53)  =============================================  IN:    Oral Fluid: 220 ml    Total IN: 220 ml  ---------------------------------------------  OUT:    Voided: 740 ml    Total OUT: 740 ml  ---------------------------------------------  Total NET: -520 ml      Respiratory: clear anteriorly, decreased BS at bases  Cardiovascular: S1 S2  Gastrointestinal: soft NT ND +BS  Extremities: edema   Neuro: Awake and alert    MEDICATIONS  (STANDING):  piperacillin/tazobactam IVPB. 3.375Gram(s) IV Intermittent every 12 hours  tamsulosin 0.4milliGRAM(s) Oral at bedtime  sotalol 80milliGRAM(s) Oral every 12 hours  ferrous    sulfate 325milliGRAM(s) Oral two times a day with meals  docusate sodium 100milliGRAM(s) Oral three times a day  sucralfate suspension 1Gram(s) Oral four times a day  pantoprazole    Tablet 40milliGRAM(s) Oral before breakfast  levothyroxine 75MICROGram(s) Oral daily  multivitamin 1Tablet(s) Oral daily  cholecalciferol 1000Unit(s) Oral daily  enoxaparin Injectable 70milliGRAM(s) SubCutaneous daily  lactobacillus acidophilus 1Tablet(s) Oral three times a day with meals    MEDICATIONS  (PRN):  acetaminophen   Tablet. 650milliGRAM(s) Oral every 6 hours PRN Mild Pain (1 - 3)  senna 2Tablet(s) Oral at bedtime PRN Constipation  ALBUTerol/ipratropium for Nebulization 3milliLiter(s) Nebulizer every 6 hours PRN Shortness of Breath and/or Wheezing  guaiFENesin    Syrup 200milliGRAM(s) Oral every 6 hours PRN Cough          04-29    141  |  103  |  30<H>  ----------------------------<  89  4.1   |  31  |  1.75<H>    Ca    9.2      29 Apr 2017 07:40    INTERPRETATION:  Clinical information: Acute kidney injury.    Transverse and longitudinal images obtained.    Right kidney measures 10 cm vertical height. Left kidney measures 10.7 cm   vertical height. No hydronephrotic changes, solid masses, or calculi   evident.    1.2 cm cyst present laterally situated mid polar region left kidney.    No perirenal collections present.    Urinary bladder contained 104 cc of urine prevoid, post void image shows   12 cc residual. Prostate is enlarged. Right pleural effusion incidentally   noted.    IMPRESSION: No hydronephrotic changes, see above report.

## 2017-04-30 DIAGNOSIS — R06.02 SHORTNESS OF BREATH: ICD-10-CM

## 2017-04-30 DIAGNOSIS — I47.9 PAROXYSMAL TACHYCARDIA, UNSPECIFIED: ICD-10-CM

## 2017-04-30 LAB
BASOPHILS # BLD AUTO: 0.1 K/UL — SIGNIFICANT CHANGE UP (ref 0–0.2)
BASOPHILS NFR BLD AUTO: 0.8 % — SIGNIFICANT CHANGE UP (ref 0–2)
CALCIUM SERPL-MCNC: 9.5 MG/DL — SIGNIFICANT CHANGE UP (ref 8.4–10.5)
CRP SERPL-MCNC: 2.78 MG/DL — HIGH (ref 0–0.4)
CULTURE RESULTS: SIGNIFICANT CHANGE UP
CULTURE RESULTS: SIGNIFICANT CHANGE UP
EOSINOPHIL # BLD AUTO: 0.3 K/UL — SIGNIFICANT CHANGE UP (ref 0–0.5)
EOSINOPHIL NFR BLD AUTO: 3.1 % — SIGNIFICANT CHANGE UP (ref 0–6)
ERYTHROCYTE [SEDIMENTATION RATE] IN BLOOD: 51 MM/HR — HIGH (ref 0–9)
HCT VFR BLD CALC: 37.6 % — LOW (ref 39–50)
HGB BLD-MCNC: 12.8 G/DL — LOW (ref 13–17)
INR BLD: 1.71 RATIO — HIGH (ref 0.88–1.16)
LYMPHOCYTES # BLD AUTO: 1.1 K/UL — SIGNIFICANT CHANGE UP (ref 1–3.3)
LYMPHOCYTES # BLD AUTO: 12.8 % — LOW (ref 13–44)
MCHC RBC-ENTMCNC: 32.4 PG — SIGNIFICANT CHANGE UP (ref 27–34)
MCHC RBC-ENTMCNC: 34.1 GM/DL — SIGNIFICANT CHANGE UP (ref 32–36)
MCV RBC AUTO: 94.9 FL — SIGNIFICANT CHANGE UP (ref 80–100)
MONOCYTES # BLD AUTO: 0.6 K/UL — SIGNIFICANT CHANGE UP (ref 0–0.9)
MONOCYTES NFR BLD AUTO: 7.2 % — SIGNIFICANT CHANGE UP (ref 2–14)
NEUTROPHILS # BLD AUTO: 6.5 K/UL — SIGNIFICANT CHANGE UP (ref 1.8–7.4)
NEUTROPHILS NFR BLD AUTO: 76 % — SIGNIFICANT CHANGE UP (ref 43–77)
PLATELET # BLD AUTO: 147 K/UL — LOW (ref 150–400)
PROTHROM AB SERPL-ACNC: 18.8 SEC — HIGH (ref 9.8–12.7)
RBC # BLD: 3.96 M/UL — LOW (ref 4.2–5.8)
RBC # FLD: 13.9 % — SIGNIFICANT CHANGE UP (ref 10.3–14.5)
SPECIMEN SOURCE: SIGNIFICANT CHANGE UP
SPECIMEN SOURCE: SIGNIFICANT CHANGE UP
URATE SERPL-MCNC: 6.4 MG/DL — SIGNIFICANT CHANGE UP (ref 3.4–8.8)
WBC # BLD: 8.6 K/UL — SIGNIFICANT CHANGE UP (ref 3.8–10.5)
WBC # FLD AUTO: 8.6 K/UL — SIGNIFICANT CHANGE UP (ref 3.8–10.5)

## 2017-04-30 PROCEDURE — 73630 X-RAY EXAM OF FOOT: CPT | Mod: 26,RT

## 2017-04-30 RX ORDER — KETOROLAC TROMETHAMINE 30 MG/ML
30 SYRINGE (ML) INJECTION ONCE
Qty: 0 | Refills: 0 | Status: DISCONTINUED | OUTPATIENT
Start: 2017-04-30 | End: 2017-05-01

## 2017-04-30 RX ORDER — COLCHICINE 0.6 MG
0.6 TABLET ORAL
Qty: 0 | Refills: 0 | Status: DISCONTINUED | OUTPATIENT
Start: 2017-04-30 | End: 2017-05-01

## 2017-04-30 RX ORDER — COLCHICINE 0.6 MG
0.6 TABLET ORAL
Qty: 0 | Refills: 0 | Status: DISCONTINUED | OUTPATIENT
Start: 2017-04-30 | End: 2017-04-30

## 2017-04-30 RX ORDER — WARFARIN SODIUM 2.5 MG/1
3.5 TABLET ORAL ONCE
Qty: 0 | Refills: 0 | Status: COMPLETED | OUTPATIENT
Start: 2017-04-30 | End: 2017-04-30

## 2017-04-30 RX ORDER — MULTIVIT-MIN/FERROUS GLUCONATE 9 MG/15 ML
1 LIQUID (ML) ORAL DAILY
Qty: 0 | Refills: 0 | Status: CANCELLED | OUTPATIENT
Start: 2017-04-30 | End: 2017-05-01

## 2017-04-30 RX ADMIN — Medication 0.6 MILLIGRAM(S): at 16:32

## 2017-04-30 RX ADMIN — Medication 1 TABLET(S): at 08:06

## 2017-04-30 RX ADMIN — Medication 0.6 MILLIGRAM(S): at 22:38

## 2017-04-30 RX ADMIN — Medication 1 GRAM(S): at 23:32

## 2017-04-30 RX ADMIN — WARFARIN SODIUM 3.5 MILLIGRAM(S): 2.5 TABLET ORAL at 21:22

## 2017-04-30 RX ADMIN — PIPERACILLIN AND TAZOBACTAM 25 GRAM(S): 4; .5 INJECTION, POWDER, LYOPHILIZED, FOR SOLUTION INTRAVENOUS at 00:00

## 2017-04-30 RX ADMIN — TAMSULOSIN HYDROCHLORIDE 0.4 MILLIGRAM(S): 0.4 CAPSULE ORAL at 21:14

## 2017-04-30 RX ADMIN — PANTOPRAZOLE SODIUM 40 MILLIGRAM(S): 20 TABLET, DELAYED RELEASE ORAL at 06:17

## 2017-04-30 RX ADMIN — Medication 1000 UNIT(S): at 11:03

## 2017-04-30 RX ADMIN — Medication 1 GRAM(S): at 17:40

## 2017-04-30 RX ADMIN — Medication 80 MILLIGRAM(S): at 17:40

## 2017-04-30 RX ADMIN — Medication 0.6 MILLIGRAM(S): at 20:10

## 2017-04-30 RX ADMIN — Medication 1 GRAM(S): at 06:17

## 2017-04-30 RX ADMIN — Medication 1 TABLET(S): at 11:02

## 2017-04-30 RX ADMIN — Medication 1 TABLET(S): at 17:40

## 2017-04-30 RX ADMIN — Medication 325 MILLIGRAM(S): at 17:39

## 2017-04-30 RX ADMIN — Medication 75 MICROGRAM(S): at 06:17

## 2017-04-30 RX ADMIN — Medication 0.6 MILLIGRAM(S): at 12:04

## 2017-04-30 RX ADMIN — Medication 100 MILLIGRAM(S): at 13:28

## 2017-04-30 RX ADMIN — Medication 0.6 MILLIGRAM(S): at 14:18

## 2017-04-30 RX ADMIN — ENOXAPARIN SODIUM 70 MILLIGRAM(S): 100 INJECTION SUBCUTANEOUS at 11:04

## 2017-04-30 RX ADMIN — Medication 325 MILLIGRAM(S): at 08:06

## 2017-04-30 RX ADMIN — Medication 1 GRAM(S): at 11:03

## 2017-04-30 RX ADMIN — Medication 1 TABLET(S): at 11:03

## 2017-04-30 NOTE — PROGRESS NOTE ADULT - ASSESSMENT
renal indices are close to baseline.  labs and meds reviewed.  on augmentin now.  will follow and check labs in am.

## 2017-04-30 NOTE — CHART NOTE - NSCHARTNOTEFT_GEN_A_CORE
Upon Nutritional Assessment by the Registered Dietitian your patient was determined to meet criteria / has evidence of the following diagnosis/diagnoses:          [ ]  Mild Protein Calorie Malnutrition        [ ]  Moderate Protein Calorie Malnutrition        [ x] Severe Protein Calorie Malnutrition        [ ] Unspecified Protein Calorie Malnutrition        [ ] Underweight / BMI <19        [ ] Morbid Obesity / BMI > 40      Findings as based on:  •  Comprehensive nutrition assessment and consultation  •  Calorie counts (nutrient intake analysis)  •  Food acceptance and intake status from observations by staff  •  Follow up  •  Patient education  •  Intervention secondary to interdisciplinary rounds  •   concerns  physical exam - prominent clavicles, severe loss of fat overlying ribs, moderate temporal wasting    Treatment:    The following diet has been recommended:  Ensure Enlive BID    PROVIDER Section:     By signing this assessment you are acknowledging and agree with the diagnosis/diagnoses assigned by the Registered Dietitian    Comments:

## 2017-04-30 NOTE — PROGRESS NOTE ADULT - SUBJECTIVE AND OBJECTIVE BOX
Date/Time Patient Seen:  		  Referring MD:   Data Reviewed	       Patient is a 88y old  Male who presents with a chief complaint of Shortness of breath (25 Apr 2017 17:14)  in bed  seen and examined  vs and meds reviewed  on room air      Subjective/HPI       Medication list         MEDICATIONS  (STANDING):  tamsulosin 0.4milliGRAM(s) Oral at bedtime  sotalol 80milliGRAM(s) Oral every 12 hours  ferrous    sulfate 325milliGRAM(s) Oral two times a day with meals  docusate sodium 100milliGRAM(s) Oral three times a day  sucralfate suspension 1Gram(s) Oral four times a day  pantoprazole    Tablet 40milliGRAM(s) Oral before breakfast  levothyroxine 75MICROGram(s) Oral daily  multivitamin 1Tablet(s) Oral daily  cholecalciferol 1000Unit(s) Oral daily  enoxaparin Injectable 70milliGRAM(s) SubCutaneous daily  lactobacillus acidophilus 1Tablet(s) Oral three times a day with meals  amoxicillin  875 milliGRAM(s)/clavulanate 1Tablet(s) Oral two times a day    MEDICATIONS  (PRN):  acetaminophen   Tablet. 650milliGRAM(s) Oral every 6 hours PRN Mild Pain (1 - 3)  senna 2Tablet(s) Oral at bedtime PRN Constipation  ALBUTerol/ipratropium for Nebulization 3milliLiter(s) Nebulizer every 6 hours PRN Shortness of Breath and/or Wheezing  guaiFENesin    Syrup 200milliGRAM(s) Oral every 6 hours PRN Cough         Vitals log        ICU Vital Signs Last 24 Hrs  T(C): 36.7, Max: 37 (04-29 @ 21:07)  T(F): 98, Max: 98.6 (04-29 @ 21:07)  HR: 61 (61 - 80)  BP: 108/66 (94/65 - 109/76)  BP(mean): --  ABP: --  ABP(mean): --  RR: 18 (16 - 18)  SpO2: 96% (94% - 98%)           Input and Output:  I&O's Detail  I & Os for 24h ending 29 Apr 2017 07:00  =============================================  IN:    Oral Fluid: 220 ml    Total IN: 220 ml  ---------------------------------------------  OUT:    Voided: 740 ml    Total OUT: 740 ml  ---------------------------------------------  Total NET: -520 ml    I & Os for current day (as of 30 Apr 2017 06:17)  =============================================  IN:    Oral Fluid: 360 ml    IV PiggyBack: 75 ml    Total IN: 435 ml  ---------------------------------------------  OUT:    Voided: 1050 ml    Total OUT: 1050 ml  ---------------------------------------------  Total NET: -615 ml      Lab Data    04-29    141  |  103  |  30<H>  ----------------------------<  89  4.1   |  31  |  1.75<H>    Ca    9.2      29 Apr 2017 07:40              Review of Systems	      Objective     Physical Examination  heart - s1s2  lungs - dec BS  abd - soft  cn grossly int  on room air        Pertinent Lab findings & Imaging      Apoorva:  NO   Adequate UO     I&O's Detail  I & Os for 24h ending 29 Apr 2017 07:00  =============================================  IN:    Oral Fluid: 220 ml    Total IN: 220 ml  ---------------------------------------------  OUT:    Voided: 740 ml    Total OUT: 740 ml  ---------------------------------------------  Total NET: -520 ml    I & Os for current day (as of 30 Apr 2017 06:17)  =============================================  IN:    Oral Fluid: 360 ml    IV PiggyBack: 75 ml    Total IN: 435 ml  ---------------------------------------------  OUT:    Voided: 1050 ml    Total OUT: 1050 ml  ---------------------------------------------  Total NET: -615 ml           Discussed with:     Cultures:	        Radiology

## 2017-04-30 NOTE — PROGRESS NOTE ADULT - PROBLEM SELECTOR PLAN 5
Continue /c cardiac/ pulmonary/ infectious tx
adjust, monitor
complete antibiotic, switched to Augmentin

## 2017-04-30 NOTE — PROGRESS NOTE ADULT - SUBJECTIVE AND OBJECTIVE BOX
HARSHAL [place]  88y  Male    Patient is a 88y old  Male who presents with a chief complaint of Shortness of breath   out of bed to chair. feels much better. ate well today.      PAST MEDICAL & SURGICAL HISTORY:  Mitral valve disorder  Kidney stone  Diverticulosis  CKD (chronic kidney disease)  Afib  Hypothyroidism  BPH (benign prostatic hyperplasia)  Chronic peptic ulcer: S/P surgery more than 10 yrs ago  No significant past surgical history      PHYSICAL EXAM:    T(C): 36.7, Max: 37 (04-29 @ 21:07)  HR: 61 (61 - 80)  BP: 108/66 (94/65 - 109/76)  RR: 18 (16 - 18)  SpO2: 96% (94% - 98%)  Wt(kg): --    I&O's Detail    I & Os for current day (as of 30 Apr 2017 08:41)  =============================================  IN:    Oral Fluid: 360 ml    IV PiggyBack: 75 ml    Total IN: 435 ml  ---------------------------------------------  OUT:    Voided: 1050 ml    Total OUT: 1050 ml  ---------------------------------------------  Total NET: -615 ml      Respiratory: clear anteriorly, decreased BS at bases  Cardiovascular: S1 S2  Gastrointestinal: soft NT ND +BS  Extremities: edema   Neuro: Awake and alert    MEDICATIONS  (STANDING):  tamsulosin 0.4milliGRAM(s) Oral at bedtime  sotalol 80milliGRAM(s) Oral every 12 hours  ferrous    sulfate 325milliGRAM(s) Oral two times a day with meals  docusate sodium 100milliGRAM(s) Oral three times a day  sucralfate suspension 1Gram(s) Oral four times a day  pantoprazole    Tablet 40milliGRAM(s) Oral before breakfast  levothyroxine 75MICROGram(s) Oral daily  multivitamin 1Tablet(s) Oral daily  cholecalciferol 1000Unit(s) Oral daily  enoxaparin Injectable 70milliGRAM(s) SubCutaneous daily  lactobacillus acidophilus 1Tablet(s) Oral three times a day with meals  amoxicillin  875 milliGRAM(s)/clavulanate 1Tablet(s) Oral two times a day    MEDICATIONS  (PRN):  acetaminophen   Tablet. 650milliGRAM(s) Oral every 6 hours PRN Mild Pain (1 - 3)  senna 2Tablet(s) Oral at bedtime PRN Constipation  ALBUTerol/ipratropium for Nebulization 3milliLiter(s) Nebulizer every 6 hours PRN Shortness of Breath and/or Wheezing  guaiFENesin    Syrup 200milliGRAM(s) Oral every 6 hours PRN Cough          04-29    141  |  103  |  30<H>  ----------------------------<  89  4.1   |  31  |  1.75<H>    Ca    9.2      29 Apr 2017 07:40

## 2017-04-30 NOTE — PROGRESS NOTE ADULT - SUBJECTIVE AND OBJECTIVE BOX
INTERVAL HPI /OVERNIGHT EVENTS: CC C/o big toe red, swollen & painful no h/o Gout or trauma, no fever or discharge.  Patient is a 88y old  Male who presents with a chief complaint of Shortness of breath (25 Apr 2017 17:14)     HPI:  88 years old male with history of Atrial Fibrillation, Hypertension, recent GI bleed with anemia of blood loss, valvular heart disease, who was at rehab and developed vomiting. Patient then developed increasing shortness of breath and cough. He also had some fever. Patient was placed on antibiotics at [facility] but he was brought in to ER at family's request for further eval and management.     In ER patient continues to c/o cough and some shortness of breath on minimal activity. He also c/o being weak and tired. He denies any chest pain or pressure. No nausea or vomiting now.     patient is found to have possible pneumonia and possible CHF exacerbation. He is being admitted for same. (25 Apr 2017 17:14)    MEDICATIONS  (STANDING):  tamsulosin 0.4milliGRAM(s) Oral at bedtime  Sotalol 80milliGRAM(s) Oral every 12 hours  ferrous    sulfate 325milliGRAM(s) Oral two times a day with meals  docusate sodium 100milliGRAM(s) Oral three times a day  Sucralfate suspension 1Gram(s) Oral four times a day  Pantoprazole    Tablet 40milliGRAM(s) Oral before breakfast  levothyroxine 75MICROGram(s) Oral daily  multivitamin 1Tablet(s) Oral daily  cholecalciferol 1000Unit(s) Oral daily  enoxaparin Injectable 70milliGRAM(s) SubCutaneous daily  lactobacillus acidophilus 1Tablet(s) Oral three times a day with meals  amoxicillin  875 milliGRAM(s)/clavulanate 1Tablet(s) Oral two times a day  colchicine 0.6milliGRAM(s) Oral every 2 hours    MEDICATIONS  (PRN):  acetaminophen   Tablet. 650milliGRAM(s) Oral every 6 hours PRN Mild Pain (1 - 3)  senna 2Tablet(s) Oral at bedtime PRN Constipation  ALBUTerol/ipratropium for Nebulization 3milliLiter(s) Nebulizer every 6 hours PRN Shortness of Breath and/or Wheezing  guaiFENesin    Syrup 200milliGRAM(s) Oral every 6 hours PRN Cough      Allergies    No Known Allergies    Intolerances          REVIEW OF SYSTEMS:    CONSTITUTIONAL: No weakness, fatigue, fevers or chills  EYES/ENT: No visual changes;  No vertigo or throat pain   NECK: No pain or stiffness  RESPIRATORY: No cough, wheezing, hemoptysis; No shortness of breath  CARDIOVASCULAR: No exertional @ rest, postural or orthostatic  chest discomfort, SOB, dizziness, fluttering or palpitations, swelling, myalgias or claudication  GASTROINTESTINAL: No abdominal or epigastric pain. No nausea, vomiting, or hematemesis; No diarrhea or constipation. No melena or hematochezia. Date of last bm., & consistency.  GENITOURINARY: No dysuria, frequency or hematuria  NEUROLOGICAL: No numbness, weakness or tremors.  MUSCULOSKELETAL: Painful big toe.  SKIN: No itching, burning, rashes, lesions or sores.   IMMUNOLOGY/ ALLERGY: Hives, wheezing, swelling lips/ tongue/ throat, face, rash- vasculitis, SOB, HA, dizziness, chest pain, abdominal pain, Nausea, vomiting, diarrhea, arthralgias- effusion  All other review of systems is negative unless indicated above    Vital Signs Last 24 Hrs  T(C): 36.3, Max: 37 (04-29 @ 21:07)  T(F): 97.4, Max: 98.6 (04-29 @ 21:07)  HR: 82 (61 - 82)  BP: 110/70 (94/65 - 110/70)  BP(mean): --  RR: 18 (16 - 18)  SpO2: 96% (94% - 98%)  Weight      PHYSICAL EXAM:    Constitutional: NAD, awake and alert, well-developed  HEENT: No central fascial weakness; apale, anicteric, normal oral hydration, coating ( no thrush, aphthous), halithosis.  Neck:  supple,  No JVD/ bruit. No LN or thyroid mass or pain.  Respiratory: Breath sounds are clear bilaterally, No wheezing, rales or rhonchi.  Cardiovascular: S1 and S2, regular rate and rhythm, no S3-4, Murmurs, gallops or rubs,   Gastrointestinal: Bowel Sounds present, soft, nontender, No HS^, HJR, bruit or pulsation, organomegaly or hernia.  Extremities: No peripheral edema. No clubbing or cyanosis, DVT (Sky's sx or cord), or cellulitis.  Vascular: 2+ peripheral pulses  Neurological: A/O x 3, no focal deficits  Psych: appropriate affect & behaviour.  Musculoskeletal: no calf tenderness, erythema, calor, effusion, LROM.  Skin: Nl turgor & integrity. No rashes or decubitus  Tele:    I&O's Detail    I & Os for current day (as of 30 Apr 2017 11:42)  =============================================  IN:    Oral Fluid: 360 ml    IV PiggyBack: 75 ml    Total IN: 435 ml  ---------------------------------------------  OUT:    Voided: 1050 ml    Total OUT: 1050 ml  ---------------------------------------------  Total NET: -615 ml      LABS:  CAPILLARY BLOOD GLUCOSE      04-29    141  |  103  |  30<H>  ----------------------------<  89  4.1   |  31  |  1.75<H>    Ca    9.2      29 Apr 2017 07:40      PT/INR - ( 30 Apr 2017 07:00 )   PT: 18.8 sec;   INR: 1.71/1.81/1.74 ratio               RADIOLOGY & ADDITIONAL TESTS:  INTERVAL HPI/OVERNIGHT EVENTS:CC     Prostate is enlarged. Right pleural effusion incidentally   noted. No hydronephrotic changes,    LVEF: Approximately 50%  RVSP: Approximately 39 mmHg  RA Pressure: 15 mmHg  IVC: Dilated without significant respiratory variation There is mild   concentric left ventricular hypertrophy.  Mild to moderate mitral   regurgitation is present mild tricuspid regurgitation INTERVAL HPI /OVERNIGHT EVENTS: CC C/o big right toe red, swollen & painful no h/o Gout or trauma, no fever or discharge.  Patient is a 88y old  Male who presents with a chief complaint of Shortness of breath (25 Apr 2017 17:14)     HPI:  88 years old male with history of Atrial Fibrillation, Hypertension, recent GI bleed with anemia of blood loss, valvular heart disease, who was at rehab and developed vomiting. Patient then developed increasing shortness of breath and cough. He also had some fever. Patient was placed on antibiotics at [facility] but he was brought in to ER at family's request for further eval and management.     In ER patient continues to c/o cough and some shortness of breath on minimal activity. He also c/o being weak and tired. He denies any chest pain or pressure. No nausea or vomiting now.     patient is found to have possible pneumonia and possible CHF exacerbation. He is being admitted for same. (25 Apr 2017 17:14)    MEDICATIONS  (STANDING):  tamsulosin 0.4milliGRAM(s) Oral at bedtime  Sotalol 80milliGRAM(s) Oral every 12 hours  ferrous    sulfate 325milliGRAM(s) Oral two times a day with meals  docusate sodium 100milliGRAM(s) Oral three times a day  Sucralfate suspension 1Gram(s) Oral four times a day  Pantoprazole    Tablet 40milliGRAM(s) Oral before breakfast  levothyroxine 75MICROGram(s) Oral daily  multivitamin 1Tablet(s) Oral daily  cholecalciferol 1000Unit(s) Oral daily  enoxaparin Injectable 70milliGRAM(s) SubCutaneous daily  lactobacillus acidophilus 1Tablet(s) Oral three times a day with meals  amoxicillin  875 milliGRAM(s)/clavulanate 1Tablet(s) Oral two times a day  colchicine 0.6milliGRAM(s) Oral every 2 hours (added now)    MEDICATIONS  (PRN):  acetaminophen   Tablet. 650milliGRAM(s) Oral every 6 hours PRN Mild Pain (1 - 3)  senna 2Tablet(s) Oral at bedtime PRN Constipation  Albuterol/ ipratropium for Nebulization 3milliLiter(s) Nebulizer every 6 hours PRN Shortness of Breath and/or Wheezing  Guaifenesin    Syrup 200milliGRAM(s) Oral every 6 hours PRN Cough      Allergies    No Known Allergies    Intolerances          REVIEW OF SYSTEMS:    CONSTITUTIONAL: No weakness, fatigue, fevers or chills  EYES/ENT: No visual changes;  No vertigo or throat pain   NECK: No pain or stiffness  RESPIRATORY: No cough, wheezing, hemoptysis; No shortness of breath  CARDIOVASCULAR: No exertional @ rest, postural or orthostatic  chest discomfort, SOB, dizziness, fluttering or palpitations, swelling, myalgias or claudication  GASTROINTESTINAL: No abdominal or epigastric pain. No nausea, vomiting, or hematemesis; No diarrhea or constipation. No melena or hematochezia. Date of last bm., & consistency.  GENITOURINARY: No dysuria, frequency or hematuria  NEUROLOGICAL: No numbness, weakness or tremors.  MUSCULOSKELETAL: Painful right big toe.  SKIN: Swollen, red rt 1st toe. No itching, burning, rashes, lesions or sores.   IMMUNOLOGY/ ALLERGY: Hives, wheezing, swelling lips/ tongue/ throat, face, rash- vasculitis, SOB, HA, dizziness, chest pain, abdominal pain, Nausea, vomiting, diarrhea, arthralgias- effusion  All other review of systems is negative unless indicated above    Vital Signs Last 24 Hrs  T(C): 36.3, Max: 37 (04-29 @ 21:07)  T(F): 97.4, Max: 98.6 (04-29 @ 21:07)  HR: 82 (61 - 82)  BP: 110/70 (94/65 - 110/70)  BP(mean): --  RR: 18 (16 - 18)  SpO2: 96% (94% - 98%)  Weight      PHYSICAL EXAM:    Constitutional: NAD, awake and alert, well-developed  HEENT: No central fascial weakness; apale, anicteric, normal oral hydration, coating ( no thrush, aphthous), halitosis  Neck:  supple,  No JVD/ bruit. No LN or thyroid mass or pain.  Respiratory: Breath sounds sonorous/ coarse left base diminished/ improved, egophony left base, No wheezing, rales or rhonchi.  Cardiovascular: S1 and S2, regular rate and rhythm, no S3-4, (+) JAVED gde I/V, (-) gallops or rubs, no presacral edema.  Gastrointestinal: Bowel Sounds present, soft, nontender, No HS^, HJR, bruit or pulsation, organomegaly or hernia.  Extremities: Swollen erythematous, warm, tender right 1st 1st toe & distal MT.No peripheral edema. No clubbing or cyanosis, DVT (Sky's sx or cord), or cellulitis.  Vascular: 2+ peripheral pulses  Neurological: A/O x 3, no focal deficits  Psych: appropriate affect & behaviour.  Musculoskeletal: no calf tenderness, erythema, calor, effusion, LROM.  Skin: Nl turgor & integrity. No rashes or decubitus    Tele: RSR, /c 1 run of 3 irregular wide complex VF vs AF aberrant conducted (~150 bpm) (yesterday regular NSVT vs aberrant conducted), moderate PVC's unifocal, coupling, bi/tri/quadrigeminy, short burst of A Fib. Similar to yesterdays? .Sinus bradycardia upper 40s while sleeping, of no significance.    I&O's Detail    I & Os for current day (as of 30 Apr 2017 11:42)  =============================================  IN:    Oral Fluid: 360 ml    IV PiggyBack: 75 ml    Total IN: 435 ml  ---------------------------------------------  OUT:    Voided: 1050 ml    Total OUT: 1050 ml  ---------------------------------------------  Total NET: -615 ml      LABS:  CAPILLARY BLOOD GLUCOSE      04-29    141  |  103  |  30<H>  ----------------------------<  89  4.1   |  31  |  1.75<H>    Ca    9.2      29 Apr 2017 07:40      PT/INR - ( 30 Apr 2017 07:00 )   PT: 18.8 sec;   INR: 1.71/1.81/1.74 ratio               RADIOLOGY & ADDITIONAL TESTS:  INTERVAL HPI /OVERNIGHT EVENTS: CC     Prostate is enlarged. Right pleural effusion incidentally   noted. No hydronephrotic changes,    LVEF: Approximately 50%  RVSP: Approximately 39 mmHg  RA Pressure: 15 mmHg  IVC: Dilated without significant respiratory variation There is mild   concentric left ventricular hypertrophy.  Mild to moderate mitral   regurgitation is present mild tricuspid regurgitation INTERVAL HPI /OVERNIGHT EVENTS: CC C/o big right toe red, swollen & painful no h/o Gout or trauma, no fever or discharge.  Patient is a 88y old  Male who presents with a chief complaint of Shortness of breath (25 Apr 2017 17:14)     HPI:  88 years old male with history of Atrial Fibrillation, Hypertension, recent GI bleed with anemia of blood loss, valvular heart disease, who was at rehab and developed vomiting. Patient then developed increasing shortness of breath and cough. He also had some fever. Patient was placed on antibiotics at [facility] but he was brought in to ER at family's request for further eval and management.     In ER patient continues to c/o cough and some shortness of breath on minimal activity. He also c/o being weak and tired. He denies any chest pain or pressure. No nausea or vomiting now.     patient is found to have possible pneumonia and possible CHF exacerbation. He is being admitted for same. (25 Apr 2017 17:14)    MEDICATIONS  (STANDING):  tamsulosin 0.4milliGRAM(s) Oral at bedtime  Sotalol 80milliGRAM(s) Oral every 12 hours  ferrous    sulfate 325milliGRAM(s) Oral two times a day with meals  docusate sodium 100milliGRAM(s) Oral three times a day  Sucralfate suspension 1Gram(s) Oral four times a day  Pantoprazole    Tablet 40milliGRAM(s) Oral before breakfast  levothyroxine 75MICROGram(s) Oral daily  multivitamin 1Tablet(s) Oral daily  cholecalciferol 1000Unit(s) Oral daily  enoxaparin Injectable 70milliGRAM(s) SubCutaneous daily  lactobacillus acidophilus 1Tablet(s) Oral three times a day with meals  amoxicillin  875 milliGRAM(s)/clavulanate 1Tablet(s) Oral two times a day  colchicine 0.6milliGRAM(s) Oral every 2 hours (added now)    MEDICATIONS  (PRN):  acetaminophen   Tablet. 650milliGRAM(s) Oral every 6 hours PRN Mild Pain (1 - 3)  senna 2Tablet(s) Oral at bedtime PRN Constipation  Albuterol/ ipratropium for Nebulization 3milliLiter(s) Nebulizer every 6 hours PRN Shortness of Breath and/or Wheezing  Guaifenesin    Syrup 200milliGRAM(s) Oral every 6 hours PRN Cough      Allergies    No Known Allergies    Intolerances          REVIEW OF SYSTEMS:    CONSTITUTIONAL: No weakness, fatigue, fevers or chills  EYES/ENT: No visual changes;  No vertigo or throat pain   NECK: No pain or stiffness  RESPIRATORY: No cough, wheezing, hemoptysis; No shortness of breath  CARDIOVASCULAR: No exertional @ rest, postural or orthostatic  chest discomfort, SOB, dizziness, fluttering or palpitations, swelling, myalgias or claudication  GASTROINTESTINAL: No abdominal or epigastric pain. No nausea, vomiting, or hematemesis; No diarrhea or constipation. No melena or hematochezia. Date of last bm., & consistency.  GENITOURINARY: No dysuria, frequency or hematuria  NEUROLOGICAL: No numbness, weakness or tremors.  MUSCULOSKELETAL: Painful right big toe.  SKIN: Swollen, red rt 1st toe. No itching, burning, rashes, lesions or sores.   IMMUNOLOGY/ ALLERGY: Hives, wheezing, swelling lips/ tongue/ throat, face, rash- vasculitis, SOB, HA, dizziness, chest pain, abdominal pain, Nausea, vomiting, diarrhea, arthralgias- effusion  All other review of systems is negative unless indicated above    Vital Signs Last 24 Hrs  T(C): 36.3, Max: 37 (04-29 @ 21:07)  T(F): 97.4, Max: 98.6 (04-29 @ 21:07)  HR: 82 (61 - 82)  BP: 110/70 (94/65 - 110/70)  BP(mean): --  RR: 18 (16 - 18)  SpO2: 96% (94% - 98%)  Weight      PHYSICAL EXAM:    Constitutional: NAD, awake and alert, well-developed  HEENT: No central fascial weakness; apale, anicteric, normal oral hydration, coating ( no thrush, aphthous), halitosis  Neck:  supple,  No JVD/ bruit. No LN or thyroid mass or pain.  Respiratory: Breath sounds left base diminished/ improved, egophony left base, No wheezing, rales or rhonchi.  Cardiovascular: S1 and S2, regular rate and rhythm, no S3-4, (+) JAVED gde I/V, (-) gallops or rubs, no presacral edema.  Gastrointestinal: Bowel Sounds present, soft, nontender, No HS^, HJR, bruit or pulsation, organomegaly or hernia.  Extremities: Swollen erythematous, warm, tender right 1st 1st toe & distal MT.No peripheral edema. No clubbing or cyanosis, DVT (Sky's sx or cord), or cellulitis.  Vascular: 2+ peripheral pulses  Neurological: A/O x 3, no focal deficits  Psych: appropriate affect & behaviour.  Musculoskeletal: no calf tenderness, erythema, calor, effusion, LROM.  Skin: Nl turgor & integrity. No rashes or decubitus    Tele: RSR, /c 1 run of 3 irregular wide complex VF vs AF aberrant conducted (~150 bpm) (yesterday regular NSVT vs aberrant conducted), moderate PVC's unifocal, coupling, bi/tri/quadrigeminy, short burst of A Fib. Similar to yesterdays? .Sinus bradycardia upper 40s while sleeping, of no significance.    I&O's Detail    I & Os for current day (as of 30 Apr 2017 11:42)  =============================================  IN:    Oral Fluid: 360 ml    IV PiggyBack: 75 ml    Total IN: 435 ml  ---------------------------------------------  OUT:    Voided: 1050 ml    Total OUT: 1050 ml  ---------------------------------------------  Total NET: -615 ml      LABS:  CAPILLARY BLOOD GLUCOSE      04-29    141  |  103  |  30<H>  ----------------------------<  89  4.1   |  31  |  1.75<H>    Ca    9.2      29 Apr 2017 07:40      PT/INR - ( 30 Apr 2017 07:00 )   PT: 18.8 sec;   INR: 1.71/1.81/1.74 ratio               RADIOLOGY & ADDITIONAL TESTS:  INTERVAL HPI /OVERNIGHT EVENTS: CC     Prostate is enlarged. Right pleural effusion incidentally   noted. No hydronephrotic changes,    LVEF: Approximately 50%  RVSP: Approximately 39 mmHg  RA Pressure: 15 mmHg  IVC: Dilated without significant respiratory variation There is mild   concentric left ventricular hypertrophy.  Mild to moderate mitral   regurgitation is present mild tricuspid regurgitation

## 2017-04-30 NOTE — PHARMACY COMMUNICATION NOTE - COMMENTS
md wants to treat therapeutic dose he checked INR and patient needs therapeutic one time dose does not want to change to 2.5 mg

## 2017-04-30 NOTE — PROGRESS NOTE ADULT - ASSESSMENT
Problem/Plan - 1:  ·  Problem: SOB (shortness of breath).  Plan: Improving dyspnea (predominant pulmonary etiology / pneumonia); continue antibiotics.     Problem/Plan - 2:  ·  Problem: Paroxysmal a-fib.  Plan: Maintaining sinus rhythm; continue Sotalol.     Problem/Plan - 3:  ·  Problem: Troponin level elevated.  Plan: No chest pain. EKG is without ischemic changes (similar to old EKG other than prolonged QTc); unlikely ACS (due to renal failure, demand ischemia) Problem/Plan - 1:  . Problem: swollen erythematous painful rt big toe, s/p diuresis, r/o gout.    Problem/Plan - 2:  ·  Problem: SOB (shortness of breath).  Plan: Improving dyspnea (predominant pulmonary etiology / pneumonia); continue antibiotics.     Problem/Plan - 3:  ·  Problem: Paroxysmal a-fib.  Plan: Maintaining sinus rhythm; continue Sotalol.     Problem/Plan - 4:  ·  Problem: Troponin level elevated.  Plan: No chest pain. EKG is without ischemic changes (similar to old EKG other than prolonged QTc); unlikely ACS (due to renal failure, demand ischemia)

## 2017-05-01 ENCOUNTER — TRANSCRIPTION ENCOUNTER (OUTPATIENT)
Age: 82
End: 2017-05-01

## 2017-05-01 VITALS
SYSTOLIC BLOOD PRESSURE: 100 MMHG | HEART RATE: 69 BPM | OXYGEN SATURATION: 96 % | RESPIRATION RATE: 18 BRPM | DIASTOLIC BLOOD PRESSURE: 63 MMHG | TEMPERATURE: 98 F

## 2017-05-01 DIAGNOSIS — E87.8 OTHER DISORDERS OF ELECTROLYTE AND FLUID BALANCE, NOT ELSEWHERE CLASSIFIED: ICD-10-CM

## 2017-05-01 DIAGNOSIS — Z87.19 PERSONAL HISTORY OF OTHER DISEASES OF THE DIGESTIVE SYSTEM: ICD-10-CM

## 2017-05-01 PROCEDURE — 80053 COMPREHEN METABOLIC PANEL: CPT

## 2017-05-01 PROCEDURE — 83605 ASSAY OF LACTIC ACID: CPT

## 2017-05-01 PROCEDURE — 97110 THERAPEUTIC EXERCISES: CPT

## 2017-05-01 PROCEDURE — 97530 THERAPEUTIC ACTIVITIES: CPT

## 2017-05-01 PROCEDURE — 76770 US EXAM ABDO BACK WALL COMP: CPT

## 2017-05-01 PROCEDURE — 84550 ASSAY OF BLOOD/URIC ACID: CPT

## 2017-05-01 PROCEDURE — 85610 PROTHROMBIN TIME: CPT

## 2017-05-01 PROCEDURE — 36415 COLL VENOUS BLD VENIPUNCTURE: CPT

## 2017-05-01 PROCEDURE — 93306 TTE W/DOPPLER COMPLETE: CPT

## 2017-05-01 PROCEDURE — 71046 X-RAY EXAM CHEST 2 VIEWS: CPT

## 2017-05-01 PROCEDURE — 83735 ASSAY OF MAGNESIUM: CPT

## 2017-05-01 PROCEDURE — 85730 THROMBOPLASTIN TIME PARTIAL: CPT

## 2017-05-01 PROCEDURE — 99285 EMERGENCY DEPT VISIT HI MDM: CPT | Mod: 25

## 2017-05-01 PROCEDURE — 84100 ASSAY OF PHOSPHORUS: CPT

## 2017-05-01 PROCEDURE — 93005 ELECTROCARDIOGRAM TRACING: CPT

## 2017-05-01 PROCEDURE — 97116 GAIT TRAINING THERAPY: CPT

## 2017-05-01 PROCEDURE — 84145 PROCALCITONIN (PCT): CPT

## 2017-05-01 PROCEDURE — 82310 ASSAY OF CALCIUM: CPT

## 2017-05-01 PROCEDURE — 87040 BLOOD CULTURE FOR BACTERIA: CPT

## 2017-05-01 PROCEDURE — 96372 THER/PROPH/DIAG INJ SC/IM: CPT | Mod: XU

## 2017-05-01 PROCEDURE — 84484 ASSAY OF TROPONIN QUANT: CPT

## 2017-05-01 PROCEDURE — 80048 BASIC METABOLIC PNL TOTAL CA: CPT

## 2017-05-01 PROCEDURE — 82550 ASSAY OF CK (CPK): CPT

## 2017-05-01 PROCEDURE — 83036 HEMOGLOBIN GLYCOSYLATED A1C: CPT

## 2017-05-01 PROCEDURE — 96374 THER/PROPH/DIAG INJ IV PUSH: CPT

## 2017-05-01 PROCEDURE — 71250 CT THORAX DX C-: CPT

## 2017-05-01 PROCEDURE — 86140 C-REACTIVE PROTEIN: CPT

## 2017-05-01 PROCEDURE — 80061 LIPID PANEL: CPT

## 2017-05-01 PROCEDURE — 73630 X-RAY EXAM OF FOOT: CPT

## 2017-05-01 PROCEDURE — 84443 ASSAY THYROID STIM HORMONE: CPT

## 2017-05-01 PROCEDURE — 85027 COMPLETE CBC AUTOMATED: CPT

## 2017-05-01 PROCEDURE — 85652 RBC SED RATE AUTOMATED: CPT

## 2017-05-01 PROCEDURE — 84480 ASSAY TRIIODOTHYRONINE (T3): CPT

## 2017-05-01 PROCEDURE — 97161 PT EVAL LOW COMPLEX 20 MIN: CPT

## 2017-05-01 PROCEDURE — 84436 ASSAY OF TOTAL THYROXINE: CPT

## 2017-05-01 PROCEDURE — 83880 ASSAY OF NATRIURETIC PEPTIDE: CPT

## 2017-05-01 RX ORDER — WARFARIN SODIUM 2.5 MG/1
3.5 TABLET ORAL ONCE
Qty: 0 | Refills: 0 | Status: COMPLETED | OUTPATIENT
Start: 2017-05-01 | End: 2017-05-01

## 2017-05-01 RX ORDER — COLCHICINE 0.6 MG
1 TABLET ORAL
Qty: 30 | Refills: 0 | OUTPATIENT
Start: 2017-05-01 | End: 2017-05-31

## 2017-05-01 RX ADMIN — Medication 1 TABLET(S): at 11:30

## 2017-05-01 RX ADMIN — Medication 1 TABLET(S): at 16:26

## 2017-05-01 RX ADMIN — Medication 75 MICROGRAM(S): at 06:04

## 2017-05-01 RX ADMIN — Medication 325 MILLIGRAM(S): at 16:26

## 2017-05-01 RX ADMIN — Medication 1 GRAM(S): at 11:30

## 2017-05-01 RX ADMIN — Medication 1 TABLET(S): at 08:36

## 2017-05-01 RX ADMIN — WARFARIN SODIUM 3.5 MILLIGRAM(S): 2.5 TABLET ORAL at 17:00

## 2017-05-01 RX ADMIN — Medication 1000 UNIT(S): at 11:30

## 2017-05-01 RX ADMIN — Medication 1 GRAM(S): at 06:04

## 2017-05-01 RX ADMIN — Medication 325 MILLIGRAM(S): at 08:36

## 2017-05-01 RX ADMIN — PANTOPRAZOLE SODIUM 40 MILLIGRAM(S): 20 TABLET, DELAYED RELEASE ORAL at 06:03

## 2017-05-01 RX ADMIN — Medication 1 TABLET(S): at 06:03

## 2017-05-01 NOTE — PROGRESS NOTE ADULT - NSHPATTENDINGPLANDISCUSS_GEN_ALL_CORE
pt and at length. Questions answered to best of my ability.
pt and wife at length. Questions answered to best of my ability.
pt and wife at length. Questions answered to best of my ability.
Pt & wife @ bedside yesterday & today, Cardiologist, pulm, nephro consult, nursing, PT
Pt & nursing. review of cardio/ pulm consult

## 2017-05-01 NOTE — PROGRESS NOTE ADULT - PROBLEM SELECTOR PROBLEM 5
HCAP (healthcare-associated pneumonia)
Demand ischemia of myocardium
Electrolyte abnormality
Subtherapeutic anticoagulation

## 2017-05-01 NOTE — PROGRESS NOTE ADULT - SUBJECTIVE AND OBJECTIVE BOX
INTERVAL HPI/OVERNIGHT EVENTS:CC  Patient is a 88y old  Male who presents with a chief complaint of Shortness of breath (01 May 2017 12:19)     HPI:  88 years old male with history of Atrial Fibrillation, Hypertension, recent GI bleed with anemia of blood loss, valvular heart disease, who was at rehab and developed vomiting. Patient then developed increasing shortness of breath and cough. He also had some fever. Patient was placed on antibiotics at [facility] but he was brought in to ER at family's request for further eval and management.     In ER patient continues to c/o cough and some shortness of breath on minimal activity. He also c/o being weak and tired. He denies any chest pain or pressure. No nausea or vomiting now.     patient is found to have possible pneumonia and possible CHF exacerbation. He is being admitted for same. (25 Apr 2017 17:14)    PAST MEDICAL & SURGICAL HISTORY:  Mitral valve disorder  Kidney stone  Diverticulosis  CKD (chronic kidney disease)  Afib  Hypothyroidism  BPH (benign prostatic hyperplasia)  Chronic peptic ulcer: S/P surgery more than 10 yrs ago  No significant past surgical history    MEDICATIONS  (STANDING):  tamsulosin 0.4milliGRAM(s) Oral at bedtime  sotalol 80milliGRAM(s) Oral every 12 hours  ferrous    sulfate 325milliGRAM(s) Oral two times a day with meals  docusate sodium 100milliGRAM(s) Oral three times a day  sucralfate suspension 1Gram(s) Oral four times a day  pantoprazole    Tablet 40milliGRAM(s) Oral before breakfast  levothyroxine 75MICROGram(s) Oral daily  multivitamin 1Tablet(s) Oral daily  cholecalciferol 1000Unit(s) Oral daily  lactobacillus acidophilus 1Tablet(s) Oral three times a day with meals  amoxicillin  875 milliGRAM(s)/clavulanate 1Tablet(s) Oral two times a day    MEDICATIONS  (PRN):  acetaminophen   Tablet. 650milliGRAM(s) Oral every 6 hours PRN Mild Pain (1 - 3)  senna 2Tablet(s) Oral at bedtime PRN Constipation  ALBUTerol/ipratropium for Nebulization 3milliLiter(s) Nebulizer every 6 hours PRN Shortness of Breath and/or Wheezing  guaiFENesin    Syrup 200milliGRAM(s) Oral every 6 hours PRN Cough  colchicine 0.6milliGRAM(s) Oral every 2 hours PRN toe/ foot pain  ketorolac   Injectable 30milliGRAM(s) IV Push once PRN Severe Pain (7 - 10)      Allergies    No Known Allergies    Intolerances          REVIEW OF SYSTEMS:    CONSTITUTIONAL: No weakness, fatigue, fevers or chills  EYES/ENT: No visual changes;  No vertigo or throat pain   NECK: No pain or stiffness  RESPIRATORY: No cough, wheezing, hemoptysis; No shortness of breath  CARDIOVASCULAR: No excertional, @ rest, postural or orthostatic  chest discomfort, SOB, dizziness, fluttering or palpitations, swelling, myalgias or claudication  GASTROINTESTINAL: No abdominal or epigastric pain. No nausea, vomiting, or hematemesis; No diarrhea or constipation. No melena or hematochezia. Date of last bm., & consistency.  GENITOURINARY: No dysuria, frequency or hematuria  NEUROLOGICAL: No numbness, weakness or tremors.  SKIN: No itching, burning, rashes, lesions or sores.   IMMUNOLOGY/ ALLERGY: Hives, wheezing, swelling lips/ tongue/ throat, face, rash- vasculitis, SOB, HA, dizziness, chest pain, abdominal pain, Nausea, vomiting, diarrhea, arthralgias- effusion  All other review of systems is negative unless indicated above    Vital Signs Last 24 Hrs  T(C): 36.9, Max: 36.9 (04-30 @ 21:18)  T(F): 98.4, Max: 98.5 (05-01 @ 09:10)  HR: 69 (62 - 69)  BP: 100/63 (100/63 - 124/78)  BP(mean): --  RR: 18 (18 - 18)  SpO2: 96% (93% - 99%)  Weight      PHYSICAL EXAM:    Constitutional: NAD, awake and alert, well-developed  HEENT: No central fascial weakness; apale, anicteric, normal oral hydration, coating ( no thrush, aphthous), halithosis.  Neck:  supple,  No JVD/ bruit. No LN or thyroid mass or pain.  Respiratory: Breath sounds are clear bilaterally, No wheezing, rales or rhonchi.  Cardiovascular: S1 and S2, regular rate and rhythm, no S3-4, Murmurs, gallops or rubs,   Gastrointestinal: Bowel Sounds present, soft, nontender, No HS^, HJR, bruit or pulsation, organomegaly or hernia.  Extremities: No peripheral edema. No clubbing or cyanosis, DVT (Sky's sx or cord), or cellulitis.  Vascular: 2+ peripheral pulses  Neurological: A/O x 3, no focal deficits  Psych: appropriate affect & behaviour.  Musculoskeletal: no calf tenderness, erythema, calor, effusion, LROM.  Skin: Nl turgor & integrity. No rashes or decubitus  Tele:    I&O's Detail  I & Os for 24h ending 01 May 2017 07:00  =============================================  IN:    Total IN: 0 ml  ---------------------------------------------  OUT:    Voided: 900 ml    Total OUT: 900 ml  ---------------------------------------------  Total NET: -900 ml    I & Os for current day (as of 01 May 2017 15:14)  =============================================  IN:    Oral Fluid: 470 ml    Total IN: 470 ml  ---------------------------------------------  OUT:    Voided: 200 ml    Total OUT: 200 ml  ---------------------------------------------  Total NET: 270 ml      LABS:  CAPILLARY BLOOD GLUCOSE                          12.8   8.6   )-----------( 147      ( 30 Apr 2017 11:59 )             37.6       Ca    9.5      30 Apr 2017 11:59      PT/INR - ( 01 May 2017 06:43 )   PT: 20.0 sec;   INR: 1.81 ratio                   RADIOLOGY & ADDITIONAL TESTS: INTERVAL HPI/OVERNIGHT EVENTS:CC  Patient is a 88y old  Male who presents with a chief complaint of Shortness of breath (01 May 2017 12:19)     HPI:  88 years old male with history of Atrial Fibrillation, Hypertension, recent GI bleed with anemia of blood loss, valvular heart disease, who was at rehab and developed vomiting. Patient then developed increasing shortness of breath and cough. He also had some fever. Patient was placed on antibiotics at [facility] but he was brought in to ER at family's request for further eval and management.     In ER patient continues to c/o cough and some shortness of breath on minimal activity. He also c/o being weak and tired. He denies any chest pain or pressure. No nausea or vomiting now.     patient is found to have possible pneumonia and possible CHF exacerbation. He is being admitted for same. (25 Apr 2017 17:14)    PAST MEDICAL & SURGICAL HISTORY:  Mitral valve disorder  Kidney stone  Diverticulosis  CKD (chronic kidney disease)  Afib  Hypothyroidism  BPH (benign prostatic hyperplasia)  Chronic peptic ulcer: S/P surgery more than 10 yrs ago  No significant past surgical history    MEDICATIONS  (STANDING):  tamsulosin 0.4milliGRAM(s) Oral at bedtime  sotalol 80milliGRAM(s) Oral every 12 hours  ferrous    sulfate 325milliGRAM(s) Oral two times a day with meals  docusate sodium 100milliGRAM(s) Oral three times a day  sucralfate suspension 1Gram(s) Oral four times a day  pantoprazole    Tablet 40milliGRAM(s) Oral before breakfast  levothyroxine 75MICROGram(s) Oral daily  multivitamin 1Tablet(s) Oral daily  cholecalciferol 1000Unit(s) Oral daily  lactobacillus acidophilus 1Tablet(s) Oral three times a day with meals  amoxicillin  875 milliGRAM(s)/clavulanate 1Tablet(s) Oral two times a day    MEDICATIONS  (PRN):  acetaminophen   Tablet. 650milliGRAM(s) Oral every 6 hours PRN Mild Pain (1 - 3)  senna 2Tablet(s) Oral at bedtime PRN Constipation  ALBUTerol/ipratropium for Nebulization 3milliLiter(s) Nebulizer every 6 hours PRN Shortness of Breath and/or Wheezing  guaiFENesin    Syrup 200milliGRAM(s) Oral every 6 hours PRN Cough  colchicine 0.6milliGRAM(s) Oral every 2 hours PRN toe/ foot pain  ketorolac   Injectable 30milliGRAM(s) IV Push once PRN Severe Pain (7 - 10)      Allergies    No Known Allergies    Intolerances          REVIEW OF SYSTEMS:    CONSTITUTIONAL: No weakness, fatigue, fevers or chills  EYES/ENT: No visual changes;  No vertigo or throat pain   NECK: No pain or stiffness  RESPIRATORY: No cough, wheezing, hemoptysis; No shortness of breath  CARDIOVASCULAR: No exertional @ rest, postural or orthostatic  chest discomfort, SOB, dizziness, fluttering or palpitations, swelling, myalgias or claudication  GASTROINTESTINAL: No abdominal or epigastric pain. No nausea, vomiting, or hematemesis; No diarrhea or constipation. No melena or hematochezia. Date of last bm., & consistency.  GENITOURINARY: No dysuria, frequency or hematuria  NEUROLOGICAL: No numbness, weakness or tremors.  SKIN: No itching, burning, rashes, lesions or sores.   MUSCULOSKELETAL: Less pain right 1st toe,  to any pressure in dorsum  IMMUNOLOGY/ ALLERGY: Hives, wheezing, swelling lips/ tongue/ throat, face, rash- vasculitis, SOB, HA, dizziness, chest pain, abdominal pain, Nausea, vomiting, diarrhea, arthralgias- effusion  All other review of systems is negative unless indicated above    Vital Signs Last 24 Hrs  T(C): 36.9, Max: 36.9 (04-30 @ 21:18)  T(F): 98.4, Max: 98.5 (05-01 @ 09:10)  HR: 69 (62 - 69)  BP: 100/63 (100/63 - 124/78)  BP(mean): --  RR: 18 (18 - 18)  SpO2: 96% (93% - 99%)  Weight      PHYSICAL EXAM:    Constitutional: NAD, awake and alert, well-developed  HEENT: No central fascial weakness; apale, anicteric, normal oral hydration, coating ( no thrush, aphthous), halithosis.  Neck:  supple,  No JVD/ bruit. No LN or thyroid mass or pain.  Respiratory: Breath sounds are clear bilaterally, No wheezing, rales or rhonchi.  Cardiovascular: S1 and S2, regular rate and rhythm, no S3-4, Murmurs, gallops or rubs,   Gastrointestinal: Bowel Sounds present, soft, nontender, No HS^, HJR, bruit or pulsation, organomegaly or hernia.  Extremities:1st proximal phalanx/ distal 1st MT tender, erythematous, calor, effusion, No peripheral edema. No clubbing or cyanosis, DVT (Sky's sx or cord), or cellulitis.  Vascular: 2+ peripheral pulses  Neurological: A/O x 3, no focal deficits  Psych: appropriate affect & behaviour.  Musculoskeletal: no calf tenderness,1st proximal phalanx/ distal 1st MT tender, erythematous, calor, effusion, LROM.  Skin: Nl turgor & integrity. No rashes or decubitus  Tele:    I&O's Detail  I & Os for 24h ending 01 May 2017 07:00  =============================================  IN:    Total IN: 0 ml  ---------------------------------------------  OUT:    Voided: 900 ml    Total OUT: 900 ml  ---------------------------------------------  Total NET: -900 ml    I & Os for current day (as of 01 May 2017 15:14)  =============================================  IN:    Oral Fluid: 470 ml    Total IN: 470 ml  ---------------------------------------------  OUT:    Voided: 200 ml    Total OUT: 200 ml  ---------------------------------------------  Total NET: 270 ml      LABS:  CAPILLARY BLOOD GLUCOSE                          12.8   8.6   )-----------( 147      ( 30 Apr 2017 11:59 )             37.6       Ca    9.5      30 Apr 2017 11:59      PT/INR - ( 01 May 2017 06:43 )   PT: 20.0 sec;   INR: 1.81 ratio                   RADIOLOGY & ADDITIONAL TESTS: INTERVAL HPI /OVERNIGHT EVENTS: CC Acute gout attack (suspect), & subtherapeutic A/C  Patient is a 88y old  Male who presents with a chief complaint of Shortness of breath (01 May 2017 12:19)     HPI:  88 years old male with history of Atrial Fibrillation, Hypertension, recent GI bleed with anemia of blood loss, valvular heart disease, who was at rehab and developed vomiting. Patient then developed increasing shortness of breath and cough. He also had some fever. Patient was placed on antibiotics at [facility] but he was brought in to ER at family's request for further eval and management.     In ER patient continues to c/o cough and some shortness of breath on minimal activity. He also c/o being weak and tired. He denies any chest pain or pressure. No nausea or vomiting now.     patient is found to have possible pneumonia and possible CHF exacerbation. He is being admitted for same. (25 Apr 2017 17:14)    PAST MEDICAL & SURGICAL HISTORY:  Mitral valve disorder  Kidney stone  Diverticulosis  CKD (chronic kidney disease)  Afib  Hypothyroidism  BPH (benign prostatic hyperplasia)  Chronic peptic ulcer: S/P surgery more than 10 yrs ago  No significant past surgical history    MEDICATIONS  (STANDING):  tamsulosin 0.4milliGRAM(s) Oral at bedtime  sotalol 80milliGRAM(s) Oral every 12 hours  ferrous    sulfate 325milliGRAM(s) Oral two times a day with meals  docusate sodium 100milliGRAM(s) Oral three times a day  sucralfate suspension 1Gram(s) Oral four times a day  pantoprazole    Tablet 40milliGRAM(s) Oral before breakfast  levothyroxine 75MICROGram(s) Oral daily  multivitamin 1Tablet(s) Oral daily  cholecalciferol 1000Unit(s) Oral daily  lactobacillus acidophilus 1Tablet(s) Oral three times a day with meals  amoxicillin  875 milligram 1Tablet(s) Oral two times a day    MEDICATIONS  (PRN):  acetaminophen   Tablet. 650milliGRAM(s) Oral every 6 hours PRN Mild Pain (1 - 3)  senna 2Tablet(s) Oral at bedtime PRN Constipation  Albuterol/ ipratropium for Nebulization 3milliLiter(s) Nebulizer every 6 hours PRN Shortness of Breath and/or Wheezing  Guaifenesin    Syrup 200milliGRAM(s) Oral every 6 hours PRN Cough  colchicine 0.6milliGRAM(s) Oral every 2 hours PRN toe/ foot pain  ketorolac   Injectable 30milliGRAM(s) IV Push once PRN Severe Pain (7 - 10)      Allergies    No Known Allergies    Intolerances          REVIEW OF SYSTEMS:    CONSTITUTIONAL: No weakness, fatigue, fevers or chills  EYES/ENT: No visual changes;  No vertigo or throat pain   NECK: No pain or stiffness  RESPIRATORY: No cough, wheezing, hemoptysis; No shortness of breath  CARDIOVASCULAR: No exertional @ rest, postural or orthostatic  chest discomfort, SOB, dizziness, fluttering or palpitations, swelling, myalgias or claudication  GASTROINTESTINAL: No abdominal or epigastric pain. No nausea, vomiting, or hematemesis; No diarrhea or constipation. No melena or hematochezia. Date of last bm., & consistency.  GENITOURINARY: No dysuria, frequency or hematuria  NEUROLOGICAL: No numbness, weakness or tremors.  SKIN: No itching, burning, rashes, lesions or sores.   MUSCULOSKELETAL: Less pain right 1st toe,  to any pressure in dorsum  IMMUNOLOGY/ ALLERGY: Hives, wheezing, swelling lips/ tongue/ throat, face, rash- vasculitis, SOB, HA, dizziness, chest pain, abdominal pain, Nausea, vomiting, diarrhea, arthralgias- effusion  All other review of systems is negative unless indicated above    Vital Signs Last 24 Hrs  T(C): 36.9, Max: 36.9 (04-30 @ 21:18)  T(F): 98.4, Max: 98.5 (05-01 @ 09:10)  HR: 69 (62 - 69)  BP: 100/63 (100/63 - 124/78)  BP(mean): --  RR: 18 (18 - 18)  SpO2: 96% (93% - 99%)  Weight      PHYSICAL EXAM:    Constitutional: NAD, awake and alert, well-developed  HEENT: No central fascial weakness; apale, anicteric, normal oral hydration, coating ( no thrush, aphthous), halithosis.  Neck:  supple,  No JVD/ bruit. No LN or thyroid mass or pain.  Respiratory: Breath sounds are clear bilaterally, No wheezing, rales or rhonchi.  Cardiovascular: S1 and S2, regular rate and rhythm, no S3-4, Murmurs, gallops or rubs,   Gastrointestinal: Bowel Sounds present, soft, nontender, No HS^, HJR, bruit or pulsation, organomegaly or hernia.  Extremities:1st proximal phalanx/ distal 1st MT tender, erythematous, calor, effusion, No peripheral edema. No clubbing or cyanosis, DVT (Sky's sx or cord), or cellulitis.  Vascular: 2+ peripheral pulses  Neurological: A/O x 3, no focal deficits  Psych: appropriate affect & behaviour.  Musculoskeletal: no calf tenderness,1st proximal phalanx/ distal 1st MT tender, erythematous, calor, effusion, LROM.  Skin: Nl turgor & integrity. No rashes or decubitus  Tele:    I&O's Detail  I & Os for 24h ending 01 May 2017 07:00  =============================================  IN:    Total IN: 0 ml  ---------------------------------------------  OUT:    Voided: 900 ml    Total OUT: 900 ml  ---------------------------------------------  Total NET: -900 ml    I & Os for current day (as of 01 May 2017 15:14)  =============================================  IN:    Oral Fluid: 470 ml    Total IN: 470 ml  ---------------------------------------------  OUT:    Voided: 200 ml    Total OUT: 200 ml  ---------------------------------------------  Total NET: 270 ml      LABS:  CAPILLARY BLOOD GLUCOSE                          12.8   8.6   )-----------( 147      ( 30 Apr 2017 11:59 )             37.6       Ca    9.5      30 Apr 2017 11:59      PT/INR - ( 01 May 2017 06:43 )   PT: 20.0 sec;   INR: 1.81 ratio      ESR 51, CRP, Uric Acid & WBC wnl                 RADIOLOGY & ADDITIONAL TESTS:  EXAM:  FOOT COMPLETE RIGHT (MIN 3 VIEW)                                  PROCEDURE DATE:  04/30/2017        INTERPRETATION:  Clinical information: Swelling of the right foot.    3 views, AP, lateral, oblique.    No acute fracture or dislocation. Focal osteopenia in the distal end of   the fifth metatarsal. If suspicion present for the presence of   osteomyelitis in that location, consider follow-up imaging/nuclear   medicine scan.    Vascular calcifications evident. Soft tissue swelling present dorsal   region of the foot superior to the metatarsals, correlate clinically.    IMPRESSION:    See above report. INTERVAL HPI /OVERNIGHT EVENTS: CC Acute gout attack (suspect), & subtherapeutic A/C  Patient is a 88y old  Male who presents with a chief complaint of Shortness of breath (01 May 2017 12:19)     HPI:  88 years old male with history of Atrial Fibrillation, Hypertension, recent GI bleed with anemia of blood loss, valvular heart disease, who was at rehab and developed vomiting. Patient then developed increasing shortness of breath and cough. He also had some fever. Patient was placed on antibiotics at [facility] but he was brought in to ER at family's request for further eval and management.     In ER patient continues to c/o cough and some shortness of breath on minimal activity. He also c/o being weak and tired. He denies any chest pain or pressure. No nausea or vomiting now.     patient is found to have possible pneumonia and possible CHF exacerbation. He is being admitted for same. (25 Apr 2017 17:14)    PAST MEDICAL & SURGICAL HISTORY:  Mitral valve disorder  Kidney stone  Diverticulosis  CKD (chronic kidney disease)  Afib  Hypothyroidism  BPH (benign prostatic hyperplasia)  Chronic peptic ulcer: S/P surgery more than 10 yrs ago  No significant past surgical history    MEDICATIONS  (STANDING):  tamsulosin 0.4milliGRAM(s) Oral at bedtime  sotalol 80milliGRAM(s) Oral every 12 hours  ferrous    sulfate 325milliGRAM(s) Oral two times a day with meals  docusate sodium 100milliGRAM(s) Oral three times a day  sucralfate suspension 1Gram(s) Oral four times a day  pantoprazole    Tablet 40milliGRAM(s) Oral before breakfast  levothyroxine 75MICROGram(s) Oral daily  multivitamin 1Tablet(s) Oral daily  cholecalciferol 1000Unit(s) Oral daily  lactobacillus acidophilus 1Tablet(s) Oral three times a day with meals  amoxicillin  875 milligram 1Tablet(s) Oral two times a day    MEDICATIONS  (PRN):  acetaminophen   Tablet. 650milliGRAM(s) Oral every 6 hours PRN Mild Pain (1 - 3)  senna 2Tablet(s) Oral at bedtime PRN Constipation  Albuterol/ ipratropium for Nebulization 3milliLiter(s) Nebulizer every 6 hours PRN Shortness of Breath and/or Wheezing  Guaifenesin    Syrup 200milliGRAM(s) Oral every 6 hours PRN Cough  colchicine 0.6milliGRAM(s) Oral every 2 hours PRN toe/ foot pain  ketorolac   Injectable 30milliGRAM(s) IV Push once PRN Severe Pain (7 - 10)      Allergies    No Known Allergies    Intolerances          REVIEW OF SYSTEMS:    CONSTITUTIONAL: No weakness, fatigue, fevers or chills  EYES/ENT: No visual changes;  No vertigo or throat pain   NECK: No pain or stiffness  RESPIRATORY: No cough, wheezing, hemoptysis; No shortness of breath  CARDIOVASCULAR: No exertional @ rest, postural or orthostatic  chest discomfort, SOB, dizziness, fluttering or palpitations, swelling, myalgias or claudication  GASTROINTESTINAL: No abdominal or epigastric pain. No nausea, vomiting, or hematemesis; No diarrhea or constipation. No melena or hematochezia. Date of last bm., & consistency.  GENITOURINARY: No dysuria, frequency or hematuria  NEUROLOGICAL: No numbness, weakness or tremors.  SKIN: No itching, burning, rashes, lesions or sores.   MUSCULOSKELETAL: Less pain right 1st toe,  to any pressure in dorsum  IMMUNOLOGY/ ALLERGY: Hives, wheezing, swelling lips/ tongue/ throat, face, rash- vasculitis, SOB, HA, dizziness, chest pain, abdominal pain, Nausea, vomiting, diarrhea, arthralgias- effusion  All other review of systems is negative unless indicated above    Vital Signs Last 24 Hrs  T(C): 36.9, Max: 36.9 (04-30 @ 21:18)  T(F): 98.4, Max: 98.5 (05-01 @ 09:10)  HR: 69 (62 - 69)  BP: 100/63 (100/63 - 124/78)  BP(mean): --  RR: 18 (18 - 18)  SpO2: 96% (93% - 99%)  Weight      PHYSICAL EXAM:    Constitutional: NAD, awake and alert, well-developed  HEENT: No central fascial weakness; apale, anicteric, normal oral hydration, coating ( no thrush, aphthous), halithosis.  Neck:  supple,  No JVD/ bruit. No LN or thyroid mass or pain.  Respiratory: Breath sounds are clear bilaterally, No wheezing, rales or rhonchi.  Cardiovascular: S1 and S2, regular rate and rhythm, no S3-4, Murmurs, gallops or rubs,   Gastrointestinal: Bowel Sounds present, soft, nontender, No HS^, HJR, bruit or pulsation, organomegaly or hernia.  Extremities:1st proximal phalanx/ distal 1st MT tender, erythematous, calor, effusion, No peripheral edema. No clubbing or cyanosis, DVT (Sky's sx or cord), or cellulitis.  Vascular: 2+ peripheral pulses  Neurological: A/O x 3, no focal deficits  Psych: appropriate affect & behaviour.  Musculoskeletal: no calf tenderness,1st proximal phalanx/ distal 1st MT tender, erythematous, calor, effusion, LROM.  Skin: Nl turgor & integrity. No rashes or decubitus, distal 1st MT tender, erythematous, calor, effusion  Tele: No events, some PVC's, occasional arrhythmia, some likely aberrant conducted wide complexes few times, then resume bradycardia high 40's while sleeping.    I&O's Detail  I & Os for 24h ending 01 May 2017 07:00  =============================================  IN:    Total IN: 0 ml  ---------------------------------------------  OUT:    Voided: 900 ml    Total OUT: 900 ml  ---------------------------------------------  Total NET: -900 ml    I & Os for current day (as of 01 May 2017 15:14)  =============================================  IN:    Oral Fluid: 470 ml    Total IN: 470 ml  ---------------------------------------------  OUT:    Voided: 200 ml    Total OUT: 200 ml  ---------------------------------------------  Total NET: 270 ml      LABS:  CAPILLARY BLOOD GLUCOSE                          12.8   8.6   )-----------( 147      ( 30 Apr 2017 11:59 )             37.6       Ca    9.5      30 Apr 2017 11:59      PT/INR - ( 01 May 2017 06:43 )   PT: 20.0 sec;   INR: 1.81 ratio      ESR 51, CRP, Uric Acid & WBC wnl                 RADIOLOGY & ADDITIONAL TESTS:  EXAM:  FOOT COMPLETE RIGHT (MIN 3 VIEW)                                  PROCEDURE DATE:  04/30/2017        INTERPRETATION:  Clinical information: Swelling of the right foot.    3 views, AP, lateral, oblique.    No acute fracture or dislocation. Focal osteopenia in the distal end of   the fifth metatarsal. If suspicion present for the presence of   osteomyelitis in that location, consider follow-up imaging/nuclear   medicine scan.    Vascular calcifications evident. Soft tissue swelling present dorsal   region of the foot superior to the metatarsals, correlate clinically.    IMPRESSION:    See above report.

## 2017-05-01 NOTE — DISCHARGE NOTE ADULT - CARE PROVIDER_API CALL
Private Cardiologist,   Phone: (   )    -  Fax: (   )    -    Uriah Sams), [facility]  [address] [place] [phone]  Phone: [phone]  Fax: [phone]

## 2017-05-01 NOTE — PROGRESS NOTE ADULT - PROBLEM SELECTOR PROBLEM 2
Paroxysmal a-fib
Dyspnea
Dyspnea
Paroxysmal a-fib
Paroxysmal a-fib
Dyspnea
HCAP (healthcare-associated pneumonia)
SOB (shortness of breath)
SOB (shortness of breath)
Subtherapeutic anticoagulation
Acute on chronic congestive heart failure, unspecified congestive heart failure type
HCAP (healthcare-associated pneumonia)
SOB (shortness of breath)
Subtherapeutic anticoagulation

## 2017-05-01 NOTE — PROGRESS NOTE ADULT - PROBLEM SELECTOR PLAN 2
He is in sinus rhythm. Is receiving Sotalol. QTc is prolonged and contributed to by low K and Mg levels. ( can also occur re: to Sotalol) which is improving . Continue Sotalol and replete k to >= 4.0 and Mg to >= 2.0    repeat potassium  levels ,
Maintaining sinus rhythm; continue Sotalol.
Pneumonia / CHF. On abx. Improving on current treatment.
Pneumonia / CHF. On abx. Improving on current treatment.
Improving on current treatment.
multifactorial  assess exertional resp rate and sat  increase activity as tolerated
on ABX  complete 7 day course of ABX regimen for HCAP  overall much better  plan for dc to home  will follow up with PMD in the community Dr. Ricardo Chavez
on IV  abx  will change to Augmentin for 3 more days  overall better  monitor sats, resp rate, and functional capacity  cxr repeat in 3 - 4 weeks
on Zosyn  WBC trending down  vs and clinical response are better  pt overall better  will probably change over to Augmentin today or tomorrow  ID follow up
resolved  pna and chf treatment helped with sx of Dyspnea
Continue /c cardiac/ pulmonary/ infectious tx
con't iv zosyn/vanco  f/u cultures  pulm/ID f/u
stable, ok of diuretic
He is in sinus rhythm. Is receiving Sotalol. QTc is prolonged and contributed to by low K and Mg levels. ( can also occur re: to Sotalol) which is improving . Continue Sotalol and replete k to >= 4.0 and Mg to >= 2.o. Repeat EKG in the am. Continue rhythm monitoring.  Transient pauses  while a sleep ? vagotonia , continue to monitor
Coumadin, ^ above daily dose
^Coumadin, d/c Lovenox.

## 2017-05-01 NOTE — PROGRESS NOTE ADULT - PROBLEM SELECTOR PROBLEM 8
Hypomagnesemia
Iron deficiency anemia, unspecified iron deficiency anemia type
Paroxysmal tachycardia

## 2017-05-01 NOTE — PROGRESS NOTE ADULT - PROBLEM SELECTOR PLAN 4
on Zosyn, WBC trending down, clinically improving  overall better, will need 7 days of total ABX therapy  Cx data reviewed
Continue /c cardiac/ pulmonary
rate controlled, A/C
resolved, post correcting electrolytes & ^ demand.
Resolved, bronchodilator.

## 2017-05-01 NOTE — DISCHARGE NOTE ADULT - PATIENT PORTAL LINK FT
“You can access the Follow[facility] Patient Portal, offered by [facility], by registering with the following website: [email or web address]”

## 2017-05-01 NOTE — PROGRESS NOTE ADULT - PROBLEM SELECTOR PLAN 1
Improving dyspnea (predominant pulmonary etiology / pneumonia); continue antibiotics.
Prerenal azotemia on CKD 3. On lasix.   No labs today. Will follow electrolytes and renal function trend. Avoid nephrotoxic meds as possible.
Prerenal azotemia on CKD 3. On lasix.   No labs today. Will follow electrolytes and renal function trend. Avoid nephrotoxic meds as possible. D/c planning with outpt follow up.
likely to be multifactorial. Appears to be predominantly pulmonary given the history and cxr findings. doubt a component of diastolic heart failure. He has received diuretic with excellent negative fluid balance.  patients Pro BNP level improving Renal function is stable . Monitor mg and BMP ( deficiencies have been supplemented; will repeat later today). Is receiving antibiotics.
HF  chronic heart disease  cvs regimen  BP control  on tele monitoring  I and O  dash diet
HF  cvs regimen  monitor BP  out of bed  increase activity  has hx of cad with 4 stents  cardio to follow up
Prerenal azotemia on CKD 3. On lasix. Will follow electrolytes and renal function trend. Avoid nephrotoxic meds as possible.
echo reviewed  Mod MR  pulm HTN  EF 50  off lasix at present  cont cvs regimen  BP control and monitoring  I and O
heart failure  echo pending  old echo reviewed  proBNP noted  off lasix at present due to rising Cr
heart failure  off lasix for GABO on CKD  monitor cr,   cvs regimen  bp control  increase activity
procalcitonin 27  on dual abx for HCAP  oral hygiene  BLOOD cx pending  am WBC pending  out of bed  monitor sat  clinically does not appear toxic   manage comorbidities, CHF, echo pending, on AC for AF  cvs regimen and LASIX, monitor cr and lytes  optimize nutrition and assess functional capacity
A/C dose adjustment
Colchicine, rest.
Continue IV antb till tomorrow, switch to PO
clinically improved  appears compensated  lasix on hold as per cardio
likely to be multifactorial. Appears to be predominantly pulmonary given the history and cxr findings. doubt a component of diastolic heart failure. He has received diuretic with excellent negative fluid balance. Renal function is worsening. Would suggest holding further diuretic for now and monitoring clinically, radiographically. Monitor mg and BMP ( deficiencies have been supplemented; will repeat later today). Is recieving antibiotics. ID will be seeing.
Colchicine, Toradol prn

## 2017-05-01 NOTE — PROGRESS NOTE ADULT - PROBLEM SELECTOR PROBLEM 6
Chronic kidney disease, stage III (moderate)
Chronic atrial fibrillation
Electrocardiogram finding, abnormal, without diagnosis
Paroxysmal a-fib

## 2017-05-01 NOTE — DISCHARGE NOTE ADULT - HOSPITAL COURSE
88 years old male with history of Atrial Fibrillation, Hypertension, recent GI bleed with anemia of blood loss, valvular heart disease, who was at rehab and developed vomiting. Patient then developed increasing shortness of breath and cough. He also had some fever. Patient was placed on antibiotics at [facility] but he was brought in to ER at family's request for further eval and management.   In ER patient continues to c/o cough and some shortness of breath on minimal activity. He also c/o being weak and tired. He denies any chest pain or pressure. No nausea or vomiting now.   patient is found to have possible pneumonia and possible CHF exacerbation. He is being admitted for same.    PAST MEDICAL & SURGICAL HISTORY:  Mitral valve disorder  Kidney stone  Diverticulosis  CKD (chronic kidney disease)  Afib  Hypothyroidism  BPH (benign prostatic hyperplasia)  Chronic peptic ulcer: S/P surgery more than 10 yrs ago  No significant past surgical history  Treated for possible HCAP /c IV antb, switched to PO Augmentin ptd, CHF /c diuresis, inducing some worsening of CKD syg III, improved by d/c antbx. TTE LVEF 50%, mild DD & multivalvular HD, very minimal PHTN. A/C monitor & adjusted. Acute gouty attack right 1st toe, (likely diuretic induced, tx effectively /c PO Colchicine. D/c home on prior meds + Augmentin + Colchicine. PPI & Iron x h/u UGI bleeding.

## 2017-05-01 NOTE — DISCHARGE NOTE ADULT - SECONDARY DIAGNOSIS.
Acute on chronic congestive heart failure, unspecified congestive heart failure type Chronic kidney disease, stage III (moderate) Acute gout involving toe of right foot, unspecified cause Demand ischemia of myocardium Iron deficiency anemia, unspecified iron deficiency anemia type Subtherapeutic anticoagulation

## 2017-05-01 NOTE — DISCHARGE NOTE ADULT - MEDICATION SUMMARY - MEDICATIONS TO TAKE
I will START or STAY ON the medications listed below when I get home from the hospital:    acetaminophen 325 mg oral tablet  -- 2 tab(s) by mouth every 6 hours, As needed, For Temp greater than 38 C (100.4 F)  -- Indication: For Acute gout involving toe of right foot, unspecified cause    Flomax 0.4 mg oral capsule  -- 1 cap(s) by mouth once a day  -- Indication: For Benign prostatic hyperplasia, presence of lower urinary tract symptoms unspecified, unspecified morphology    sotalol 80 mg oral tablet  -- 1 tab(s) by mouth every 12 hours  -- Indication: For Chronic atrial fibrillation    colchicine 0.6 mg oral tablet  -- 1 tab(s) by mouth once a day, As Needed, toe/ foot pain  -- Indication: For Acute gout involving toe of right foot, unspecified cause    ferrous sulfate 325 mg (65 mg elemental iron) oral delayed release tablet  -- 1 tab(s) by mouth 2 times a day  -- Indication: For Iron deficiency anemia, unspecified iron deficiency anemia type    senna oral tablet  -- 2 tab(s) by mouth once a day (at bedtime), As needed, Constipation  -- Indication: For Constipation    docusate sodium 100 mg oral capsule  -- 1 cap(s) by mouth 3 times a day  -- Indication: For Constipation    sucralfate 1 g/10 mL oral suspension  -- 10 milliliter(s) by mouth 3 times a day for 14 days  -- Indication: For Gastrointestinal hemorrhage, unspecified gastrointestinal hemorrhage type    amoxicillin-clavulanate 875 mg-125 mg oral tablet  -- 1 tab(s) by mouth 2 times a day  -- Indication: For HCAP (healthcare-associated pneumonia)    pantoprazole 40 mg oral delayed release tablet  -- 1 tab(s) by mouth 2 times a day  -- Indication: For Gastrointestinal hemorrhage, unspecified gastrointestinal hemorrhage type    Synthroid 75 mcg (0.075 mg) oral tablet  -- 1 tab(s) by mouth once a day  -- Indication: For Hypothyroidism, unspecified type    multivitamin  -- 1 tab(s) by mouth once a day  -- Indication: For Vitamin supplement    Vitamin D3 1000 intl units oral capsule  -- 1 cap(s) by mouth once a day  -- Indication: For Osteoporosis I will START or STAY ON the medications listed below when I get home from the hospital:    Rolling Walker  -- Indication: For unsteady gait.    acetaminophen 325 mg oral tablet  -- 2 tab(s) by mouth every 6 hours, As needed, For Temp greater than 38 C (100.4 F)  -- Indication: For Acute gout involving toe of right foot, unspecified cause    Flomax 0.4 mg oral capsule  -- 1 cap(s) by mouth once a day  -- Indication: For Benign prostatic hyperplasia, presence of lower urinary tract symptoms unspecified, unspecified morphology    sotalol 80 mg oral tablet  -- 1 tab(s) by mouth every 12 hours  -- Indication: For Chronic atrial fibrillation    warfarin 2.5 mg oral tablet  -- 1 tab(s) by mouth once a day  -- Indication: For Chronic atrial fibrillation    colchicine 0.6 mg oral tablet  -- 1 tab(s) by mouth once a day, As Needed, toe/ foot pain  -- Indication: For Acute gout involving toe of right foot, unspecified cause    ferrous sulfate 325 mg (65 mg elemental iron) oral delayed release tablet  -- 1 tab(s) by mouth 2 times a day  -- Indication: For Iron deficiency anemia, unspecified iron deficiency anemia type    senna oral tablet  -- 2 tab(s) by mouth once a day (at bedtime), As needed, Constipation  -- Indication: For Constipation    docusate sodium 100 mg oral capsule  -- 1 cap(s) by mouth 3 times a day  -- Indication: For Constipation    sucralfate 1 g/10 mL oral suspension  -- 10 milliliter(s) by mouth 3 times a day for 14 days  -- Indication: For Gastrointestinal hemorrhage, unspecified gastrointestinal hemorrhage type    amoxicillin-clavulanate 875 mg-125 mg oral tablet  -- 1 tab(s) by mouth 2 times a day  -- Indication: For HCAP (healthcare-associated pneumonia)    pantoprazole 40 mg oral delayed release tablet  -- 1 tab(s) by mouth 2 times a day  -- Indication: For Gastrointestinal hemorrhage, unspecified gastrointestinal hemorrhage type    Synthroid 75 mcg (0.075 mg) oral tablet  -- 1 tab(s) by mouth once a day  -- Indication: For Hypothyroidism, unspecified type    multivitamin  -- 1 tab(s) by mouth once a day  -- Indication: For Vitamin supplement    Vitamin D3 1000 intl units oral capsule  -- 1 cap(s) by mouth once a day  -- Indication: For Osteoporosis

## 2017-05-01 NOTE — PROGRESS NOTE ADULT - PROBLEM SELECTOR PROBLEM 1
SOB (shortness of breath)
Chronic kidney disease, stage III (moderate)
Chronic kidney disease, stage III (moderate)
SOB (shortness of breath)
SOB (shortness of breath)
Chronic kidney disease, stage III (moderate)
HCAP (healthcare-associated pneumonia)
R/O Acute gout involving toe of right foot, unspecified cause
Valvular heart disease
Acute on chronic congestive heart failure, unspecified congestive heart failure type
HCAP (healthcare-associated pneumonia)
R/O Acute gout involving toe of right foot, unspecified cause
Subtherapeutic anticoagulation

## 2017-05-01 NOTE — DISCHARGE NOTE ADULT - CARE PLAN
Principal Discharge DX:	HCAP (healthcare-associated pneumonia)  Goal:	Complete antibiotic  Instructions for follow-up, activity and diet:	DASH/ TLC  Secondary Diagnosis:	Acute on chronic congestive heart failure, unspecified congestive heart failure type  Secondary Diagnosis:	Chronic kidney disease, stage III (moderate)  Secondary Diagnosis:	Acute gout involving toe of right foot, unspecified cause  Secondary Diagnosis:	Demand ischemia of myocardium  Secondary Diagnosis:	Iron deficiency anemia, unspecified iron deficiency anemia type  Secondary Diagnosis:	Subtherapeutic anticoagulation

## 2017-05-01 NOTE — DISCHARGE NOTE ADULT - MEDICATION SUMMARY - MEDICATIONS TO CHANGE
I will SWITCH the dose or number of times a day I take the medications listed below when I get home from the hospital:  None I will SWITCH the dose or number of times a day I take the medications listed below when I get home from the hospital:    sucralfate 1 g/10 mL oral suspension  -- 10 milliliter(s) by mouth 3 times a day for 14 days

## 2017-05-01 NOTE — PROGRESS NOTE ADULT - SUBJECTIVE AND OBJECTIVE BOX
[facility] patient examined and chart reviewed.   Patient being followed for Multifocal pna    Subjective:  No events.   Afebrile.    ROS:  CONSTITUTIONAL:  Negative fever or chills, feels well, good appetite  EYES:  Negative  blurry vision or double vision  CARDIOVASCULAR:  Negative for chest pain or palpitations  RESPIRATORY:  Negative for cough, wheezing, or SOB   GASTROINTESTINAL:  Negative for nausea, vomiting, diarrhea, constipation, or abdominal pain  GENITOURINARY:  Negative frequency, urgency or dysuria  NEUROLOGIC:  No headache, confusion, dizziness, lightheadedness    No Known Allergies      ANTIBIOTICS/RELEVANT:  lactobacillus acidophilus 1Tablet(s) Oral three times a day with meals  amoxicillin  875 milliGRAM(s)/clavulanate 1Tablet(s) Oral two times a day      acetaminophen   Tablet. 650milliGRAM(s) Oral every 6 hours PRN  tamsulosin 0.4milliGRAM(s) Oral at bedtime  sotalol 80milliGRAM(s) Oral every 12 hours  ferrous    sulfate 325milliGRAM(s) Oral two times a day with meals  docusate sodium 100milliGRAM(s) Oral three times a day  senna 2Tablet(s) Oral at bedtime PRN  sucralfate suspension 1Gram(s) Oral four times a day  pantoprazole    Tablet 40milliGRAM(s) Oral before breakfast  levothyroxine 75MICROGram(s) Oral daily  multivitamin 1Tablet(s) Oral daily  cholecalciferol 1000Unit(s) Oral daily  ALBUTerol/ipratropium for Nebulization 3milliLiter(s) Nebulizer every 6 hours PRN  guaiFENesin    Syrup 200milliGRAM(s) Oral every 6 hours PRN  colchicine 0.6milliGRAM(s) Oral every 2 hours PRN  ketorolac   Injectable 30milliGRAM(s) IV Push once PRN      Objective:  I & Os for 24h ending 05-01 @ 07:00  =============================================  IN: 0 ml / OUT: 900 ml / NET: -900 ml    I & Os for current day (as of 05-01 @ 12:29)  =============================================  IN: 250 ml / OUT: 0 ml / NET: 250 ml    T(C): 36.9, Max: 37.1 (04-30 @ 14:15)  HR: 63 (62 - 73)  BP: 115/71 (95/59 - 124/78)  RR: 18 (18 - 18)  SpO2: 95% (93% - 99%)  Wt(kg): --    PHYSICAL EXAM:  Constitutional: NAD  Eyes:EV, EOMI  Ear/Nose/Throat: no oral lesion, no sinus tenderness on percussion	  Neck:no JVD, no lymphadenopathy, supple  Respiratory: Decreased ramses  Cardiovascular: S1S2 RRR, no murmurs  Gastrointestinal:soft, (+) BS, NTND  Extremities:no e/e/c  CNS: No focal deficit    LABS:                        12.8   8.6   )-----------( 147      ( 30 Apr 2017 11:59 )             37.6       Ca    9.5      30 Apr 2017 11:59      PT/INR - ( 01 May 2017 06:43 )   PT: 20.0 sec;   INR: 1.81 ratio         04-25 @ 21:41  Culture-urine --  Culture results   No growth at 5 days.  method type --  Organism --  Organism Identification --  Specimen source .Blood Blood-Venous  04-25 @ 21:38  Culture-urine --  Culture results   No growth at 5 days.  method type --  Organism --  Organism Identification --  Specimen source .Blood Blood-Venous           04-25 @ 21:41  Culture blood --  Culture results   No growth at 5 days.  Gram stain --  Gram stain blood --  Method type --  Organism --  Organism identification --  Specimen source .Blood Blood-Venous   04-25 @ 21:38  Culture blood --  Culture results   No growth at 5 days.  Gram stain --  Gram stain blood --  Method type --  Organism --  Organism identification --  Specimen source .Blood Blood-Venous    ASSESSMENT:  88 year old male admitted from rehab with multifocal pna  Had N/V few days prior   ?aspiration vs HCAP  + troponin likely demand ischemia  CHF  GABO on CKD improving  Clinically better    PLAN:  Sp Zosyn   Finish course of Augmentin x 5 days  Increase activity  Dc planning to home per primary team

## 2017-05-01 NOTE — PROGRESS NOTE ADULT - PROBLEM SELECTOR PROBLEM 4
HCAP (healthcare-associated pneumonia)
Shortness of breath
Acute on chronic congestive heart failure, unspecified congestive heart failure type
Chronic atrial fibrillation
Demand ischemia of myocardium

## 2017-05-01 NOTE — PROGRESS NOTE ADULT - SUBJECTIVE AND OBJECTIVE BOX
Date/Time Patient Seen:  		  Referring MD:   Data Reviewed	       Patient is a 88y old  Male who presents with a chief complaint of Shortness of breath (25 Apr 2017 17:14)  in bed  seen and examined  vs and meds reviewed      Subjective/HPI       Medication list         MEDICATIONS  (STANDING):  tamsulosin 0.4milliGRAM(s) Oral at bedtime  sotalol 80milliGRAM(s) Oral every 12 hours  ferrous    sulfate 325milliGRAM(s) Oral two times a day with meals  docusate sodium 100milliGRAM(s) Oral three times a day  sucralfate suspension 1Gram(s) Oral four times a day  pantoprazole    Tablet 40milliGRAM(s) Oral before breakfast  levothyroxine 75MICROGram(s) Oral daily  multivitamin 1Tablet(s) Oral daily  cholecalciferol 1000Unit(s) Oral daily  lactobacillus acidophilus 1Tablet(s) Oral three times a day with meals  amoxicillin  875 milliGRAM(s)/clavulanate 1Tablet(s) Oral two times a day    MEDICATIONS  (PRN):  acetaminophen   Tablet. 650milliGRAM(s) Oral every 6 hours PRN Mild Pain (1 - 3)  senna 2Tablet(s) Oral at bedtime PRN Constipation  ALBUTerol/ipratropium for Nebulization 3milliLiter(s) Nebulizer every 6 hours PRN Shortness of Breath and/or Wheezing  guaiFENesin    Syrup 200milliGRAM(s) Oral every 6 hours PRN Cough  colchicine 0.6milliGRAM(s) Oral every 2 hours PRN toe/ foot pain  ketorolac   Injectable 30milliGRAM(s) IV Push once PRN Severe Pain (7 - 10)         Vitals log        ICU Vital Signs Last 24 Hrs  T(C): 36.8, Max: 37.1 (04-30 @ 14:15)  T(F): 98.3, Max: 98.7 (04-30 @ 14:15)  HR: 62 (62 - 82)  BP: 113/69 (95/59 - 124/78)  BP(mean): --  ABP: --  ABP(mean): --  RR: 18 (18 - 18)  SpO2: 93% (93% - 99%)           Input and Output:  I&O's Detail    I & Os for current day (as of 01 May 2017 07:09)  =============================================  IN:    Total IN: 0 ml  ---------------------------------------------  OUT:    Voided: 900 ml    Total OUT: 900 ml  ---------------------------------------------  Total NET: -900 ml      Lab Data                        12.8   8.6   )-----------( 147      ( 30 Apr 2017 11:59 )             37.6     04-29    141  |  103  |  30<H>  ----------------------------<  89  4.1   |  31  |  1.75<H>    Ca    9.5      30 Apr 2017 11:59              Review of Systems	      Objective     Physical Examination    heart - s1s2  lungs - dec BS  abd - soft      Pertinent Lab findings & Imaging      Apoorva:  NO   Adequate UO     I&O's Detail    I & Os for current day (as of 01 May 2017 07:09)  =============================================  IN:    Total IN: 0 ml  ---------------------------------------------  OUT:    Voided: 900 ml    Total OUT: 900 ml  ---------------------------------------------  Total NET: -900 ml           Discussed with:     Cultures:	        Radiology

## 2017-05-01 NOTE — PROGRESS NOTE ADULT - PROVIDER SPECIALTY LIST ADULT
Cardiology
Cardiology
Hospitalist
Infectious Disease
Nephrology
Pulmonology
Hospitalist
Hospitalist
Cardiology

## 2017-05-01 NOTE — PROGRESS NOTE ADULT - SUBJECTIVE AND OBJECTIVE BOX
Patient seen in follow up for GABO / CKD. Stable renal function. No labs today.     PAST MEDICAL HISTORY:  Mitral valve disorder  Kidney stone  Diverticulosis  CKD (chronic kidney disease)  Afib  Hypothyroidism  BPH (benign prostatic hyperplasia)    MEDICATIONS  (STANDING):  tamsulosin 0.4milliGRAM(s) Oral at bedtime  sotalol 80milliGRAM(s) Oral every 12 hours  ferrous    sulfate 325milliGRAM(s) Oral two times a day with meals  docusate sodium 100milliGRAM(s) Oral three times a day  sucralfate suspension 1Gram(s) Oral four times a day  pantoprazole    Tablet 40milliGRAM(s) Oral before breakfast  levothyroxine 75MICROGram(s) Oral daily  multivitamin 1Tablet(s) Oral daily  cholecalciferol 1000Unit(s) Oral daily  lactobacillus acidophilus 1Tablet(s) Oral three times a day with meals  amoxicillin  875 milliGRAM(s)/clavulanate 1Tablet(s) Oral two times a day    MEDICATIONS  (PRN):  acetaminophen   Tablet. 650milliGRAM(s) Oral every 6 hours PRN Mild Pain (1 - 3)  senna 2Tablet(s) Oral at bedtime PRN Constipation  ALBUTerol/ipratropium for Nebulization 3milliLiter(s) Nebulizer every 6 hours PRN Shortness of Breath and/or Wheezing  guaiFENesin    Syrup 200milliGRAM(s) Oral every 6 hours PRN Cough  colchicine 0.6milliGRAM(s) Oral every 2 hours PRN toe/ foot pain  ketorolac   Injectable 30milliGRAM(s) IV Push once PRN Severe Pain (7 - 10)    T(C): 36.9, Max: 37.1 (04-30 @ 14:15)  HR: 69 (61 - 82)  BP: 100/63 (95/59 - 124/78)  RR: 18 (18 - 18)  SpO2: 96% (93% - 99%)  Wt(kg): --  I&O's Detail  I & Os for 24h ending 01 May 2017 07:00  =============================================  IN:    Total IN: 0 ml  ---------------------------------------------  OUT:    Voided: 900 ml    Total OUT: 900 ml  ---------------------------------------------  Total NET: -900 ml    I & Os for current day (as of 01 May 2017 14:03)  =============================================  IN:    Oral Fluid: 250 ml    Total IN: 250 ml  ---------------------------------------------  OUT:    Total OUT: 0 ml  ---------------------------------------------  Total NET: 250 ml      PHYSICAL EXAM:  General: NAD  Respiratory: b/l air entry  Cardiovascular: S1 S2  Gastrointestinal: soft  Extremities:  no edema                          12.8   8.6   )-----------( 147      ( 30 Apr 2017 11:59 )             37.6       Ca    9.5      30 Apr 2017 11:59        Sodium, Serum: 141 (04-29 @ 07:40)    Creatinine, Serum: 1.75 (04-29 @ 07:40)    Potassium, Serum: 4.1 (04-29 @ 07:40)    Hemoglobin: 12.8 (04-30 @ 11:59)

## 2017-05-01 NOTE — PROGRESS NOTE ADULT - PROBLEM SELECTOR PLAN 3
No chest pain. EKG is without ischemic changes ( similar to old EKG other than prolonged QTc). Not consistent with ACS. Is present in the setting of renal insufficiency;  consider demand ischemia.  no significant ST T changes    Would defer use of  antiplatelet agents  in this setting  as he is systemically anticoagulated for hx. of atrial  fibrillation and has recent hx. of GI bleed.
No chest pain. EKG is without ischemic changes (similar to old EKG other than prolonged QTc); unlikely ACS (due to renal failure, demand ischemia).
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
monitor Cr and Lytes  has CKD and now with GABO on CKD
on dual abx for HCAP  id follow up noted  monitor labs, clinical response and functional status  overall prognosis guarded  multiple medical issues, advanced age and frail status
Seen by cardio, 2ary to CKD
pulmonary/ infectious tx
slightly worse today  lasix on hold  serial labs
No chest pain. EKG is without ischemic changes ( similar to old EKG other than prolonged QTc). Not consistent with ACS. Is present in the setting of renal insufficiency;  consider demand ischemia.  no significant ST T changes    Would defer use of  antiplatelet agents  in this setting  as he is systemically anticoagulated for hx. of atrial  fibrillation and has recent hx. of GI bleed.
complete antb
Sotalol

## 2017-05-02 RX ORDER — WARFARIN SODIUM 2.5 MG/1
1 TABLET ORAL
Qty: 0 | Refills: 0 | COMMUNITY
Start: 2017-05-02

## 2017-05-08 ENCOUNTER — APPOINTMENT (OUTPATIENT)
Dept: CARDIOLOGY | Facility: CLINIC | Age: 82
End: 2017-05-08

## 2017-05-08 VITALS
SYSTOLIC BLOOD PRESSURE: 101 MMHG | WEIGHT: 154 LBS | HEART RATE: 75 BPM | DIASTOLIC BLOOD PRESSURE: 62 MMHG | HEIGHT: 71 IN | OXYGEN SATURATION: 100 % | BODY MASS INDEX: 21.56 KG/M2

## 2017-05-08 RX ORDER — VITAMIN E ACETATE 670 MG
CAPSULE ORAL
Refills: 0 | Status: ACTIVE | COMMUNITY

## 2017-05-08 RX ORDER — PANTOPRAZOLE SODIUM 40 MG/1
40 TABLET, DELAYED RELEASE ORAL
Refills: 0 | Status: ACTIVE | COMMUNITY

## 2017-06-06 ENCOUNTER — APPOINTMENT (OUTPATIENT)
Dept: CARDIOLOGY | Facility: CLINIC | Age: 82
End: 2017-06-06

## 2017-06-06 ENCOUNTER — NON-APPOINTMENT (OUTPATIENT)
Age: 82
End: 2017-06-06

## 2017-06-06 VITALS
HEIGHT: 71 IN | DIASTOLIC BLOOD PRESSURE: 70 MMHG | OXYGEN SATURATION: 94 % | HEART RATE: 67 BPM | BODY MASS INDEX: 22.12 KG/M2 | WEIGHT: 158 LBS | SYSTOLIC BLOOD PRESSURE: 95 MMHG

## 2017-08-07 ENCOUNTER — APPOINTMENT (OUTPATIENT)
Dept: CARDIOLOGY | Facility: CLINIC | Age: 82
End: 2017-08-07
Payer: MEDICARE

## 2017-08-07 VITALS
DIASTOLIC BLOOD PRESSURE: 72 MMHG | WEIGHT: 152 LBS | SYSTOLIC BLOOD PRESSURE: 112 MMHG | HEIGHT: 60 IN | BODY MASS INDEX: 29.84 KG/M2 | HEART RATE: 59 BPM

## 2017-08-07 PROCEDURE — 99214 OFFICE O/P EST MOD 30 MIN: CPT

## 2017-11-06 ENCOUNTER — APPOINTMENT (OUTPATIENT)
Dept: CARDIOLOGY | Facility: CLINIC | Age: 82
End: 2017-11-06
Payer: MEDICARE

## 2017-11-06 VITALS
DIASTOLIC BLOOD PRESSURE: 66 MMHG | SYSTOLIC BLOOD PRESSURE: 90 MMHG | HEIGHT: 72 IN | HEART RATE: 66 BPM | BODY MASS INDEX: 20.18 KG/M2 | OXYGEN SATURATION: 98 % | WEIGHT: 149 LBS

## 2017-11-06 PROCEDURE — 99214 OFFICE O/P EST MOD 30 MIN: CPT

## 2018-02-12 ENCOUNTER — APPOINTMENT (OUTPATIENT)
Dept: CARDIOLOGY | Facility: CLINIC | Age: 83
End: 2018-02-12
Payer: MEDICARE

## 2018-02-12 VITALS
SYSTOLIC BLOOD PRESSURE: 114 MMHG | WEIGHT: 154 LBS | HEIGHT: 72 IN | HEART RATE: 68 BPM | OXYGEN SATURATION: 98 % | DIASTOLIC BLOOD PRESSURE: 70 MMHG | BODY MASS INDEX: 20.86 KG/M2

## 2018-02-12 PROCEDURE — 99214 OFFICE O/P EST MOD 30 MIN: CPT

## 2018-06-11 ENCOUNTER — APPOINTMENT (OUTPATIENT)
Dept: CARDIOLOGY | Facility: CLINIC | Age: 83
End: 2018-06-11
Payer: MEDICARE

## 2018-06-11 DIAGNOSIS — K92.2 GASTROINTESTINAL HEMORRHAGE, UNSPECIFIED: ICD-10-CM

## 2018-06-11 DIAGNOSIS — R09.89 OTHER SPECIFIED SYMPTOMS AND SIGNS INVOLVING THE CIRCULATORY AND RESPIRATORY SYSTEMS: ICD-10-CM

## 2018-06-11 PROCEDURE — 99214 OFFICE O/P EST MOD 30 MIN: CPT

## 2018-06-15 ENCOUNTER — APPOINTMENT (OUTPATIENT)
Dept: CARDIOLOGY | Facility: CLINIC | Age: 83
End: 2018-06-15
Payer: MEDICARE

## 2018-06-15 PROCEDURE — 93880 EXTRACRANIAL BILAT STUDY: CPT

## 2018-09-10 ENCOUNTER — NON-APPOINTMENT (OUTPATIENT)
Age: 83
End: 2018-09-10

## 2018-09-10 ENCOUNTER — APPOINTMENT (OUTPATIENT)
Dept: CARDIOLOGY | Facility: CLINIC | Age: 83
End: 2018-09-10
Payer: MEDICARE

## 2018-09-10 VITALS
DIASTOLIC BLOOD PRESSURE: 68 MMHG | OXYGEN SATURATION: 99 % | BODY MASS INDEX: 20.99 KG/M2 | HEART RATE: 62 BPM | SYSTOLIC BLOOD PRESSURE: 111 MMHG | WEIGHT: 155 LBS | HEIGHT: 72 IN

## 2018-09-10 DIAGNOSIS — E03.9 HYPOTHYROIDISM, UNSPECIFIED: ICD-10-CM

## 2018-09-10 PROBLEM — I05.9 RHEUMATIC MITRAL VALVE DISEASE, UNSPECIFIED: Chronic | Status: ACTIVE | Noted: 2017-04-03

## 2018-09-10 PROBLEM — N20.0 CALCULUS OF KIDNEY: Chronic | Status: ACTIVE | Noted: 2017-04-03

## 2018-09-10 PROBLEM — N18.9 CHRONIC KIDNEY DISEASE, UNSPECIFIED: Chronic | Status: ACTIVE | Noted: 2017-04-03

## 2018-09-10 PROBLEM — K57.90 DIVERTICULOSIS OF INTESTINE, PART UNSPECIFIED, WITHOUT PERFORATION OR ABSCESS WITHOUT BLEEDING: Chronic | Status: ACTIVE | Noted: 2017-04-03

## 2018-09-10 PROCEDURE — 93000 ELECTROCARDIOGRAM COMPLETE: CPT

## 2018-09-10 PROCEDURE — 99214 OFFICE O/P EST MOD 30 MIN: CPT

## 2018-10-29 NOTE — PHYSICAL THERAPY INITIAL EVALUATION ADULT - RANGE OF MOTION EXAMINATION, REHAB EVAL
bilateral upper extremity ROM was WFL (within functional limits)/bilateral lower extremity ROM was WFL (within functional limits) normal...

## 2018-12-19 ENCOUNTER — APPOINTMENT (OUTPATIENT)
Dept: CARDIOLOGY | Facility: CLINIC | Age: 83
End: 2018-12-19
Payer: MEDICARE

## 2018-12-19 VITALS
SYSTOLIC BLOOD PRESSURE: 113 MMHG | DIASTOLIC BLOOD PRESSURE: 68 MMHG | HEART RATE: 66 BPM | WEIGHT: 160 LBS | HEIGHT: 72 IN | OXYGEN SATURATION: 98 % | BODY MASS INDEX: 21.67 KG/M2

## 2018-12-19 DIAGNOSIS — R73.9 HYPERGLYCEMIA, UNSPECIFIED: ICD-10-CM

## 2018-12-19 DIAGNOSIS — I48.0 PAROXYSMAL ATRIAL FIBRILLATION: ICD-10-CM

## 2018-12-19 DIAGNOSIS — I87.2 VENOUS INSUFFICIENCY (CHRONIC) (PERIPHERAL): ICD-10-CM

## 2018-12-19 PROCEDURE — 99214 OFFICE O/P EST MOD 30 MIN: CPT

## 2018-12-19 NOTE — PHYSICAL EXAM
[General Appearance - Well Developed] : well developed [Normal Appearance] : normal appearance [Well Groomed] : well groomed [General Appearance - Well Nourished] : well nourished [No Deformities] : no deformities [General Appearance - In No Acute Distress] : no acute distress [Normal Conjunctiva] : the conjunctiva exhibited no abnormalities [Eyelids - No Xanthelasma] : the eyelids demonstrated no xanthelasmas [Normal Oral Mucosa] : normal oral mucosa [No Oral Pallor] : no oral pallor [No Oral Cyanosis] : no oral cyanosis [Normal Jugular Venous A Waves Present] : normal jugular venous A waves present [Normal Jugular Venous V Waves Present] : normal jugular venous V waves present [No Jugular Venous Ross A Waves] : no jugular venous ross A waves [Respiration, Rhythm And Depth] : normal respiratory rhythm and effort [Exaggerated Use Of Accessory Muscles For Inspiration] : no accessory muscle use [Auscultation Breath Sounds / Voice Sounds] : lungs were clear to auscultation bilaterally [Abdomen Soft] : soft [Abdomen Tenderness] : non-tender [Abdomen Mass (___ Cm)] : no abdominal mass palpated [Abnormal Walk] : normal gait [Gait - Sufficient For Exercise Testing] : the gait was sufficient for exercise testing [Nail Clubbing] : no clubbing of the fingernails [Cyanosis, Localized] : no localized cyanosis [Petechial Hemorrhages (___cm)] : no petechial hemorrhages [Skin Color & Pigmentation] : normal skin color and pigmentation [] : no rash [No Venous Stasis] : no venous stasis [Skin Lesions] : no skin lesions [No Skin Ulcers] : no skin ulcer [No Xanthoma] : no  xanthoma was observed [Oriented To Time, Place, And Person] : oriented to person, place, and time [Affect] : the affect was normal [Mood] : the mood was normal [No Anxiety] : not feeling anxious [Bradycardia] : bradycardic [Normal S1] : normal S1 [Normal S2] : normal S2 [No Gallop] : no gallop heard [I] : a grade 1 [2+] : left 2+ [___ +] : [unfilled]U+ pitting edema to L ankle [S3] : no S3 [S4] : no S4 [Right Carotid Bruit] : no bruit heard over the right carotid [Left Carotid Bruit] : no bruit heard over the left carotid [Right Femoral Bruit] : no bruit heard over the right femoral artery [Left Femoral Bruit] : no bruit heard over the left femoral artery [Bruit] : no bruit heard

## 2018-12-19 NOTE — HISTORY OF PRESENT ILLNESS
[FreeTextEntry1] : Markell Whyte presented to the office today for a cardiovascular follow up.\par \par He is 89 years old, with a history of hypothyroidism, diverticulosis and renal insufficiency. He has a history of mild mitral regurgitation. He did not previously have a diagnosis of arrhythmia.\par \par in November 2014, Mr. Whyte presented to [facility] on 2 occasions. He presented to emergency department with pleuritic chest discomfort, and initially was discharged. The next day, his pleuritic chest discomfort returned, and he was evaluated by his primary care physician. He was found to be in atrial fibrillation with a rapid ventricular response, and was referred to the emergency department for further evaluation. He converted spontaneously back to sinus, but had several episodes of recurrent atrial fibrillation with a rapid ventricular response. To better control this, he was started on sotalol, 80 mg twice daily. This adequately controlled his arrhythmia. He was anticoagulated, and discharged. At a later date, he was noted to have recurrent atrial fibrillation on Holter monitoring, and his sotalol dose was therefore increased.\par \par He was admitted to [facility] 4/17 with a GI bleed. He was found to have GI bleeding due to a gastric ulcer which he had about 30 years ago. He was treated with protonix and sucralfate. He was readmitted to [facility] 5/17 with multi-lobar pneumonia. He does have a history of paroxysmal atrial fibrillation. An echocardiogram during the hospitalization showed ejection fraction of 50% with LVH. There was mild to moderate MR and mild TR with PA pressure of 39.. He was discharged with a creatinine 1.75, and hematocrit of 37.6. INR was 1.8. He is also taking iron therapy\par \priti Started to feel stronger and no longer walking with a walker. His blood pressure has been running about 100 to 110 systolic and he reduced his sotalol from 80 mg twice a day 80 mg in the morning and 40 mg in the evening. He is not complaining of any lightheadedness. The last creatinine is 1.9 this would probably is appropriate that you did reduce the dose of sotalol.\par \par Clinically, the patient has had no bleeding and remains on iron therapy. is taking a dietary supplement\par He was losing weight but now his weight has stabilized.. He's had no bleeding episodes. His blood pressure today is low normal but he has no episodes of dizziness or lightheadedness. He has chronic peripheral edema and is wearing support stockings. This has been stable. He does very little walking and does a lot of sitting. He needs to start at least using a stationary bicycle.\par \par The patient's dose of sotalol was reduced to 40 mg twice a day because of hypotension. His blood pressure has been better.\par \par He had a carotid Doppler performed 6/18 that showed mild plaque. Unfortunately he sits most of the day and despite the support stockings he still has 1-2+ bilateral leg edema\par \par

## 2018-12-19 NOTE — DISCUSSION/SUMMARY
[FreeTextEntry1] : Overall the patient is doing well. He is maintaining sinus rhythm. On his medication his blood pressure heart rate well controlled. His peripheral edema is stable with the support stockings. He does have some difficulty with balance he should start walking with a cane.\par \par I would appreciate your sending copy of his most recent bloodwork for my review. If all is well I will see him in 4 months.

## 2018-12-30 ENCOUNTER — INPATIENT (INPATIENT)
Facility: HOSPITAL | Age: 83
LOS: 13 days | DRG: 871 | End: 2019-01-13
Attending: HOSPITALIST | Admitting: HOSPITALIST
Payer: MEDICARE

## 2018-12-30 VITALS
DIASTOLIC BLOOD PRESSURE: 87 MMHG | SYSTOLIC BLOOD PRESSURE: 144 MMHG | TEMPERATURE: 98 F | OXYGEN SATURATION: 80 % | WEIGHT: 160.06 LBS | HEIGHT: 71 IN | RESPIRATION RATE: 25 BRPM | HEART RATE: 82 BPM

## 2018-12-30 DIAGNOSIS — K27.7 CHRONIC PEPTIC ULCER, SITE UNSPECIFIED, WITHOUT HEMORRHAGE OR PERFORATION: Chronic | ICD-10-CM

## 2018-12-30 NOTE — ED ADULT TRIAGE NOTE - PAIN RATING/NUMBER SCALE (0-10): REST
[facility]  [address]  [facility] [address]  Phone: [phone]           Patient Discharge Instructions     Our goal is to set you up for success. This packet includes information on your condition, medications, and your home care. It will help you to care for yourself so you don't get sicker and need to go back to the hospital.     Please ask your nurse if you have any questions.        There are many details to remember when preparing to leave the hospital. Here is what you will need to do:    1. Take your medicine. If you are prescribed medications, review your Medication List in the following pages. You may have new medications to  at the pharmacy and others that you'll need to stop taking. Review the instructions for how and when to take your medications. Talk with your doctor or nurses if you are unsure of what to do.     2. Go to your follow-up appointments. Specific follow-up information is listed in the following pages. Your may be contacted by a transition nurse or clinical provider about future appointments. Be sure we have all of the phone numbers to reach you, if needed. Please contact your provider's office if you are unable to make an appointment.     3. Watch for warning signs. Your doctor or nurse will give you detailed warning signs to watch for and when to call for assistance. These instructions may also include educational information about your condition. If you experience any of warning signs to your health, call your doctor.               [facility]  Unless otherwise directed by your provider, please contact [facility], our nurse care line that is available for 24/7 assistance.     [phone] (toll-free)    Registered nurses in the [facility] provide clinical advisement, health education, appointment booking, and other advisory services.                    ** Verify the list of medication(s) below is accurate and up  to date. Carry this with you in case of emergency. If your medications have changed, please notify your healthcare provider.             Medication List      CHANGE how you take these medications        Additional Info                      atorvastatin 80 MG tablet   Commonly known as:  LIPITOR   Quantity:  90 tablet   Refills:  3   Dose:  80 mg   What changed:  when to take this    Last time this was given:  80 mg on 2/2/2017  8:16 PM   Instructions:  Take 1 tablet (80 mg total) by mouth once daily.     Begin Date    AM    Noon    PM    Bedtime       furosemide 40 MG tablet   Commonly known as:  LASIX   Quantity:  180 tablet   Refills:  3   Dose:  40 mg   What changed:    - how much to take  - when to take this  - additional instructions    Last time this was given:  60 mg on 2/3/2017  9:27 AM   Instructions:  Take 1 tablet (40 mg total) by mouth 2 (two) times daily.     Begin Date    AM    Noon    PM    Bedtime       levothyroxine 100 MCG tablet   Commonly known as:  SYNTHROID   Quantity:  90 tablet   Refills:  1   Dose:  100 mcg   What changed:    - medication strength  - how much to take  - when to take this    Last time this was given:  100 mcg on 2/3/2017  7:47 AM   Instructions:  Take 1 tablet (100 mcg total) by mouth before breakfast.     Begin Date    AM    Noon    PM    Bedtime       polyethylene glycol 17 gram Pwpk   Commonly known as:  GLYCOLAX   Quantity:  30 packet   Refills:  5   Dose:  17 g   What changed:    - when to take this  - reasons to take this    Last time this was given:  17 g on 2/1/2017 11:15 PM   Instructions:  Take 17 g by mouth 2 (two) times daily.     Begin Date    AM    Noon    PM    Bedtime       potassium chloride SA 20 MEQ tablet   Commonly known as:  K-DUR,KLOR-CON   Quantity:  180 tablet   Refills:  3   Dose:  20 mEq   What changed:    - when to take this  - additional instructions    Last time this was given:  20 mEq on 2/3/2017  9:27 AM   Instructions:  Take 1 tablet (20 mEq  total) by mouth 2 (two) times daily.     Begin Date    AM    Noon    PM    Bedtime         CONTINUE taking these medications        Additional Info                      alprazolam 0.25 MG tablet   Commonly known as:  XANAX   Quantity:  14 tablet   Refills:  0   Dose:  0.25 mg    Last time this was given:  0.25 mg on 2/2/2017 11:07 PM   Instructions:  Take 1 tablet (0.25 mg total) by mouth once daily.     Begin Date    AM    Noon    PM    Bedtime       aspirin 81 MG EC tablet   Commonly known as:  ECOTRIN   Refills:  0   Dose:  81 mg    Last time this was given:  81 mg on 2/3/2017  9:27 AM   Instructions:  Take 1 tablet (81 mg total) by mouth once daily.     Begin Date    AM    Noon    PM    Bedtime       buPROPion 150 MG TB24 tablet   Commonly known as:  WELLBUTRIN XL   Quantity:  60 tablet   Refills:  2   Dose:  300 mg    Last time this was given:  300 mg on 2/3/2017  9:30 AM   Instructions:  Take 2 tablets (300 mg total) by mouth once daily.     Begin Date    AM    Noon    PM    Bedtime       clopidogrel 75 mg tablet   Commonly known as:  PLAVIX   Quantity:  30 tablet   Refills:  6   Dose:  75 mg    Last time this was given:  75 mg on 2/3/2017  9:27 AM   Instructions:  Take 1 tablet (75 mg total) by mouth once daily.     Begin Date    AM    Noon    PM    Bedtime       docusate sodium 100 MG capsule   Commonly known as:  COLACE   Quantity:  90 capsule   Refills:  3   Dose:  100 mg    Instructions:  Take 1 capsule (100 mg total) by mouth 3 (three) times daily as needed for Constipation.     Begin Date    AM    Noon    PM    Bedtime       FIORINAL ORAL   Refills:  0    Instructions:  Take by mouth as needed.     Begin Date    AM    Noon    PM    Bedtime       fluoxetine 20 MG capsule   Commonly known as:  PROZAC   Quantity:  60 capsule   Refills:  2   Dose:  40 mg    Last time this was given:  40 mg on 2/3/2017  9:27 AM   Instructions:  Take 2 capsules (40 mg total) by mouth once daily.     Begin Date    AM    Noon     PM    Bedtime       hydrocodone-acetaminophen 10-325mg  mg Tab   Commonly known as:  NORCO   Quantity:  61 tablet   Refills:  0   Dose:  1 tablet    Last time this was given:  1 tablet on 2/2/2017 11:06 PM   Instructions:  Take 1 tablet by mouth every 6 (six) hours as needed.     Begin Date    AM    Noon    PM    Bedtime       hyoscyamine 0.125 mg Tab   Commonly known as:  ANASPAZ,LEVSIN   Quantity:  120 tablet   Refills:  1   Dose:  125 mcg    Instructions:  Take 1 tablet (125 mcg total) by mouth every 6 (six) hours as needed.     Begin Date    AM    Noon    PM    Bedtime       lisinopril 2.5 MG tablet   Commonly known as:  PRINIVIL,ZESTRIL   Quantity:  90 tablet   Refills:  3   Dose:  2.5 mg    Last time this was given:  2.5 mg on 2/3/2017  9:28 AM   Instructions:  Take 1 tablet (2.5 mg total) by mouth once daily.     Begin Date    AM    Noon    PM    Bedtime       ondansetron 8 MG tablet   Commonly known as:  ZOFRAN   Quantity:  60 tablet   Refills:  3   Dose:  8 mg    Instructions:  Take 1 tablet (8 mg total) by mouth every 12 (twelve) hours as needed for Nausea.     Begin Date    AM    Noon    PM    Bedtime       pantoprazole 40 MG tablet   Commonly known as:  PROTONIX   Quantity:  90 tablet   Refills:  3   Dose:  40 mg    Last time this was given:  40 mg on 2/3/2017  9:27 AM   Instructions:  Take 1 tablet (40 mg total) by mouth once daily.     Begin Date    AM    Noon    PM    Bedtime       ranitidine 150 MG capsule   Commonly known as:  ZANTAC   Refills:  0   Dose:  150 mg    Instructions:  Take 150 mg by mouth daily as needed.     Begin Date    AM    Noon    PM    Bedtime       SENNA LAX ORAL   Refills:  0    Instructions:  Take by mouth as needed.     Begin Date    AM    Noon    PM    Bedtime       tolterodine 4 MG 24 hr capsule   Commonly known as:  DETROL LA   Refills:  0      Begin Date    AM    Noon    PM    Bedtime         ASK your doctor about these medications        Additional Info                       lamotrigine 25 MG tablet   Commonly known as:  LAMICTAL   Quantity:  30 tablet   Refills:  6   Dose:  25 mg    Instructions:  Take 1 tablet (25 mg total) by mouth once daily.     Begin Date    AM    Noon    PM    Bedtime            Where to Get Your Medications      These medications were sent to [id number] - [place], [place] - [address] [facility]  [address] [facility] [address]     Phone:  [phone]     levothyroxine 100 MCG tablet    polyethylene glycol 17 gram Pwpk                  Please bring to all follow up appointments:    1. A copy of your discharge instructions.  2. All medicines you are currently taking in their original bottles.  3. Identification and insurance card.    Please arrive 15 minutes ahead of scheduled appointment time.    Please call 24 hours in advance if you must reschedule your appointment and/or time.        Your Scheduled Appointments     Feb 03, 2017  1:45 PM CST   Color Flow Doppler Echo with ECHO, [facility] - Echo/Stress Lab ([facility] )    [address]  [place] [address] [phone]            Feb 07, 2017 10:00 AM CST   Established Patient Visit with MD Thierry Lundberg II [facility] - Internal Medicine ([facility])    [address]  [place] [address] [phone]              Follow-up Information     Follow up with Mesfin Hodges Ii, MD In 2 weeks.    Specialty:  Internal Medicine    Contact information:    [address]  [place] [address] [phone]          Please follow up.    Why:  Follow up with your pain medicine physician.        Follow up with Shireen Mayo MD.    Specialty:  Urology    Why:  they will call you to schedule follow up    Contact information:    [address] [facility] [address] [phone]        Referrals     Future Orders    Ambulatory Referral to Urology         Discharge Instructions     Future Orders    Activity as tolerated   "   Call MD for:  increased confusion or weakness     Call MD for:  persistent dizziness, light-headedness, or visual disturbances     Diet Cardiac         Primary Diagnosis     Your primary diagnosis was:  Infection Or Inflammation Of Bladder      Admission Information     Date & Time Provider Department CSN    2/1/2017  2:35 PM Barrera Coppola MD [facility] [id number]      Care Providers     Provider Role Specialty Primary office phone    Barrera Coppola MD Attending Provider Hospitalist [phone]    Julio Hernandes MD Team Attending  Hospitalist [phone]    Barrera Coppola MD Team Attending  Hospitalist [phone]      Your Vitals Were     BP Pulse Temp Resp Height Weight    125/67 (BP Location: Left arm, Patient Position: Lying, BP Method: Automatic) 65 98.4 °F (36.9 °C) (Oral) 17 5' 4" (1.626 m) 76.1 kg (167 lb 12.3 oz)    Last Period SpO2 BMI          (Within Years) 96% 28.8 kg/m2        Recent Lab Values        2/16/2012 6/23/2012 8/13/2013 5/28/2014 3/28/2015 8/25/2016            9:42 AM  7:00 AM 10:40 AM  5:36 AM 12:45 AM 10:10 AM      A1C 5.5 4.7 5.9 5.8 5.4 5.4      Comment for A1C at 10:10 AM on 8/25/2016:  According to ADA guidelines, hemoglobin A1C <7.0% represents  optimal control in non-pregnant diabetic patients.  Different  metrics may apply to specific populations.   Standards of Medical Care in Diabetes - 2016.  For the purpose of screening for the presence of diabetes:  <5.7%     Consistent with the absence of diabetes  5.7-6.4%  Consistent with increasing risk for diabetes   (prediabetes)  >or=6.5%  Consistent with diabetes  Currently no consensus exists for use of hemoglobin A1C  for diagnosis of diabetes for children.        Pending Labs     Order Current Status    Calcitonin In process    Blood culture #1 **CANNOT BE ORDERED STAT** Preliminary result    Blood culture #2 **CANNOT BE ORDERED STAT** Preliminary result    Urine culture Preliminary result    Urine culture " **CANNOT BE ORDERED STAT** Preliminary result      Allergies as of 2/3/2017        Reactions    Imitrex [Sumatriptan Succinate] Shortness Of Breath    Penicillins Shortness Of Breath    Other reaction(s): Jittery    Percocet [Oxycodone-acetaminophen] Anaphylaxis    Topamax [Topiramate] Shortness Of Breath    Vancomycin Shortness Of Breath    Rash    (D)-limonene Flavor     Other reaction(s): difficult intubation  Other reaction(s): Jittery  Other reaction(s): Difficulty breathing  Other reaction(s): Difficulty breathing  Other reaction(s): Jittery  Other reaction(s): Difficulty breathing    Bactrim [Sulfamethoxazole-trimethoprim] Nausea And Vomiting    Other reaction(s): Resp Depression  Other reaction(s): Anaphylaxis    Butorphanol Tartrate     Darvocet A500 [Propoxyphene N-acetaminophen]     Other reaction(s): Jittery  Other reaction(s): Difficulty breathing    Fentanyl     Other reaction(s): Vomiting  Other reaction(s): Nausea  Other reaction(s): Itching  swelling    Phenytoin Sodium Extended     pATIENT DENIES EVER HAVING THIS MEDICATION    White Petrolatum-zinc     Zinc Oxide-white Petrolatum     Other reaction(s): Difficulty breathing    Latex, Natural Rubber Itching, Rash      Advance Directives     An advance directive is a document which, in the event you are no longer able to make decisions for yourself, tells your healthcare team what kind of treatment you do or do not want to receive, or who you would like to make those decisions for you.  If you do not currently have an advance directive, [facility] encourages you to create one.  For more information call:  [phone]), [phone] ([phone]),  or log on to [email or web address].        Language Assistance Services     ATTENTION: Language assistance services are available, free of charge. Please call [phone].      ATENCIÓN: Si habla español, tiene a morgan disposición servicios gratuitos de asistencia lingüística. Llame al  [phone].     VERONICA Ý: N?u b?n nói Ti?ng Vi?t, có các d?ch v? h? tr? ngôn ng? mi?n phí dành cho b?n. G?i s? [phone].         [facility] complies with applicable Federal civil rights laws and does not discriminate on the basis of race, color, national origin, age, disability, or sex.         6

## 2018-12-30 NOTE — ED PROVIDER NOTE - PROGRESS NOTE DETAILS
[facility] PA in room, Resp therapist started bipap, pt vomited red emesis into bipap, sat 70s on RA, put back on NRB, 80s on NRB vomited again - blood - going to [facility] shortly
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
## 2018-12-30 NOTE — ED PROVIDER NOTE - MEDICAL DECISION MAKING DETAILS
89 y.o. M choked on pill tonight, vomited large amount and now difficulty breathing, hypoxia, wheeze -iv, labs, cxr, O2, neb, reassess

## 2018-12-31 DIAGNOSIS — E03.9 HYPOTHYROIDISM, UNSPECIFIED: ICD-10-CM

## 2018-12-31 DIAGNOSIS — R79.1 ABNORMAL COAGULATION PROFILE: ICD-10-CM

## 2018-12-31 DIAGNOSIS — J69.0 PNEUMONITIS DUE TO INHALATION OF FOOD AND VOMIT: ICD-10-CM

## 2018-12-31 DIAGNOSIS — N40.0 BENIGN PROSTATIC HYPERPLASIA WITHOUT LOWER URINARY TRACT SYMPTOMS: ICD-10-CM

## 2018-12-31 DIAGNOSIS — I48.91 UNSPECIFIED ATRIAL FIBRILLATION: ICD-10-CM

## 2018-12-31 DIAGNOSIS — J96.01 ACUTE RESPIRATORY FAILURE WITH HYPOXIA: ICD-10-CM

## 2018-12-31 DIAGNOSIS — Z29.9 ENCOUNTER FOR PROPHYLACTIC MEASURES, UNSPECIFIED: ICD-10-CM

## 2018-12-31 DIAGNOSIS — N17.9 ACUTE KIDNEY FAILURE, UNSPECIFIED: ICD-10-CM

## 2018-12-31 DIAGNOSIS — K92.0 HEMATEMESIS: ICD-10-CM

## 2018-12-31 DIAGNOSIS — R79.89 OTHER SPECIFIED ABNORMAL FINDINGS OF BLOOD CHEMISTRY: ICD-10-CM

## 2018-12-31 LAB
ALBUMIN SERPL ELPH-MCNC: 3.1 G/DL — LOW (ref 3.3–5)
ALP SERPL-CCNC: 108 U/L — SIGNIFICANT CHANGE UP (ref 30–120)
ALT FLD-CCNC: 24 U/L DA — SIGNIFICANT CHANGE UP (ref 10–60)
ANION GAP SERPL CALC-SCNC: 11 MMOL/L — SIGNIFICANT CHANGE UP (ref 5–17)
ANION GAP SERPL CALC-SCNC: 11 MMOL/L — SIGNIFICANT CHANGE UP (ref 5–17)
APTT BLD: 43.6 SEC — HIGH (ref 28.5–37)
AST SERPL-CCNC: 27 U/L — SIGNIFICANT CHANGE UP (ref 10–40)
BASE EXCESS BLDA CALC-SCNC: -4.2 MMOL/L — LOW (ref -2–2)
BASE EXCESS BLDA CALC-SCNC: -4.7 MMOL/L — LOW (ref -2–2)
BASE EXCESS BLDA CALC-SCNC: -6.1 MMOL/L — LOW (ref -2–2)
BASOPHILS # BLD AUTO: 0.02 K/UL — SIGNIFICANT CHANGE UP (ref 0–0.2)
BASOPHILS NFR BLD AUTO: 0.3 % — SIGNIFICANT CHANGE UP (ref 0–2)
BILIRUB SERPL-MCNC: 0.4 MG/DL — SIGNIFICANT CHANGE UP (ref 0.2–1.2)
BLOOD GAS SOURCE: SIGNIFICANT CHANGE UP
BUN SERPL-MCNC: 39 MG/DL — HIGH (ref 7–23)
BUN SERPL-MCNC: 41 MG/DL — HIGH (ref 7–23)
CALCIUM SERPL-MCNC: 8.4 MG/DL — SIGNIFICANT CHANGE UP (ref 8.4–10.5)
CALCIUM SERPL-MCNC: 8.5 MG/DL — SIGNIFICANT CHANGE UP (ref 8.4–10.5)
CHLORIDE SERPL-SCNC: 107 MMOL/L — SIGNIFICANT CHANGE UP (ref 96–108)
CHLORIDE SERPL-SCNC: 110 MMOL/L — HIGH (ref 96–108)
CK MB BLD-MCNC: 4.4 % — HIGH (ref 0–3.5)
CK MB CFR SERPL CALC: 4.1 NG/ML — HIGH (ref 0–3.6)
CK SERPL-CCNC: 93 U/L — SIGNIFICANT CHANGE UP (ref 39–308)
CO2 SERPL-SCNC: 23 MMOL/L — SIGNIFICANT CHANGE UP (ref 22–31)
CO2 SERPL-SCNC: 23 MMOL/L — SIGNIFICANT CHANGE UP (ref 22–31)
CREAT SERPL-MCNC: 1.92 MG/DL — HIGH (ref 0.5–1.3)
CREAT SERPL-MCNC: 1.94 MG/DL — HIGH (ref 0.5–1.3)
EOSINOPHIL # BLD AUTO: 0.07 K/UL — SIGNIFICANT CHANGE UP (ref 0–0.5)
EOSINOPHIL NFR BLD AUTO: 0.9 % — SIGNIFICANT CHANGE UP (ref 0–6)
GLUCOSE SERPL-MCNC: 109 MG/DL — HIGH (ref 70–99)
GLUCOSE SERPL-MCNC: 118 MG/DL — HIGH (ref 70–99)
HCO3 BLDA-SCNC: 19 MMOL/L — LOW (ref 21–29)
HCO3 BLDA-SCNC: 20 MMOL/L — LOW (ref 21–29)
HCO3 BLDA-SCNC: 21 MMOL/L — SIGNIFICANT CHANGE UP (ref 21–29)
HCT VFR BLD CALC: 33.6 % — LOW (ref 39–50)
HCT VFR BLD CALC: 34.9 % — LOW (ref 39–50)
HCT VFR BLD CALC: 40 % — SIGNIFICANT CHANGE UP (ref 39–50)
HCT VFR BLD CALC: 42.2 % — SIGNIFICANT CHANGE UP (ref 39–50)
HGB BLD-MCNC: 10.9 G/DL — LOW (ref 13–17)
HGB BLD-MCNC: 11.2 G/DL — LOW (ref 13–17)
HGB BLD-MCNC: 13.1 G/DL — SIGNIFICANT CHANGE UP (ref 13–17)
HGB BLD-MCNC: 13.3 G/DL — SIGNIFICANT CHANGE UP (ref 13–17)
HOROWITZ INDEX BLDA+IHG-RTO: 50 — SIGNIFICANT CHANGE UP
HOROWITZ INDEX BLDA+IHG-RTO: 50 — SIGNIFICANT CHANGE UP
HOROWITZ INDEX BLDA+IHG-RTO: 65 — SIGNIFICANT CHANGE UP
IMM GRANULOCYTES NFR BLD AUTO: 0.4 % — SIGNIFICANT CHANGE UP (ref 0–1.5)
INR BLD: 3.79 RATIO — HIGH (ref 0.88–1.16)
INR BLD: 4.5 RATIO — HIGH (ref 0.88–1.16)
LACTATE SERPL-SCNC: 1.8 MMOL/L — SIGNIFICANT CHANGE UP (ref 0.7–2)
LACTATE SERPL-SCNC: 2.3 MMOL/L — HIGH (ref 0.7–2)
LACTATE SERPL-SCNC: 2.4 MMOL/L — HIGH (ref 0.7–2)
LYMPHOCYTES # BLD AUTO: 1.27 K/UL — SIGNIFICANT CHANGE UP (ref 1–3.3)
LYMPHOCYTES # BLD AUTO: 17 % — SIGNIFICANT CHANGE UP (ref 13–44)
MCHC RBC-ENTMCNC: 31.2 PG — SIGNIFICANT CHANGE UP (ref 27–34)
MCHC RBC-ENTMCNC: 31.4 PG — SIGNIFICANT CHANGE UP (ref 27–34)
MCHC RBC-ENTMCNC: 31.5 GM/DL — LOW (ref 32–36)
MCHC RBC-ENTMCNC: 31.8 PG — SIGNIFICANT CHANGE UP (ref 27–34)
MCHC RBC-ENTMCNC: 32.1 GM/DL — SIGNIFICANT CHANGE UP (ref 32–36)
MCHC RBC-ENTMCNC: 32.2 PG — SIGNIFICANT CHANGE UP (ref 27–34)
MCHC RBC-ENTMCNC: 32.4 GM/DL — SIGNIFICANT CHANGE UP (ref 32–36)
MCHC RBC-ENTMCNC: 32.8 GM/DL — SIGNIFICANT CHANGE UP (ref 32–36)
MCV RBC AUTO: 97.2 FL — SIGNIFICANT CHANGE UP (ref 80–100)
MCV RBC AUTO: 98 FL — SIGNIFICANT CHANGE UP (ref 80–100)
MCV RBC AUTO: 98.3 FL — SIGNIFICANT CHANGE UP (ref 80–100)
MCV RBC AUTO: 99.5 FL — SIGNIFICANT CHANGE UP (ref 80–100)
MONOCYTES # BLD AUTO: 0.16 K/UL — SIGNIFICANT CHANGE UP (ref 0–0.9)
MONOCYTES NFR BLD AUTO: 2.1 % — SIGNIFICANT CHANGE UP (ref 2–14)
NEUTROPHILS # BLD AUTO: 5.9 K/UL — SIGNIFICANT CHANGE UP (ref 1.8–7.4)
NEUTROPHILS NFR BLD AUTO: 79.3 % — HIGH (ref 43–77)
NRBC # BLD: 0 /100 WBCS — SIGNIFICANT CHANGE UP (ref 0–0)
NT-PROBNP SERPL-SCNC: HIGH PG/ML (ref 0–450)
OB PNL STL: POSITIVE
PCO2 BLDA: 34 MMHG — SIGNIFICANT CHANGE UP (ref 32–46)
PCO2 BLDA: 35 MMHG — SIGNIFICANT CHANGE UP (ref 32–46)
PCO2 BLDA: 35 MMHG — SIGNIFICANT CHANGE UP (ref 32–46)
PH BLD: 7.35 — SIGNIFICANT CHANGE UP (ref 7.35–7.45)
PH BLD: 7.37 — SIGNIFICANT CHANGE UP (ref 7.35–7.45)
PH BLD: 7.38 — SIGNIFICANT CHANGE UP (ref 7.35–7.45)
PLATELET # BLD AUTO: 131 K/UL — LOW (ref 150–400)
PLATELET # BLD AUTO: 137 K/UL — LOW (ref 150–400)
PLATELET # BLD AUTO: 159 K/UL — SIGNIFICANT CHANGE UP (ref 150–400)
PLATELET # BLD AUTO: 175 K/UL — SIGNIFICANT CHANGE UP (ref 150–400)
PO2 BLDA: 43 MMHG — CRITICAL LOW (ref 74–108)
PO2 BLDA: 47 MMHG — CRITICAL LOW (ref 74–108)
PO2 BLDA: 86 MMHG — SIGNIFICANT CHANGE UP (ref 74–108)
POTASSIUM SERPL-MCNC: 4.6 MMOL/L — SIGNIFICANT CHANGE UP (ref 3.5–5.3)
POTASSIUM SERPL-MCNC: 4.7 MMOL/L — SIGNIFICANT CHANGE UP (ref 3.5–5.3)
POTASSIUM SERPL-SCNC: 4.6 MMOL/L — SIGNIFICANT CHANGE UP (ref 3.5–5.3)
POTASSIUM SERPL-SCNC: 4.7 MMOL/L — SIGNIFICANT CHANGE UP (ref 3.5–5.3)
PROT SERPL-MCNC: 6.9 G/DL — SIGNIFICANT CHANGE UP (ref 6–8.3)
PROTHROM AB SERPL-ACNC: 43.1 SEC — HIGH (ref 10–12.9)
PROTHROM AB SERPL-ACNC: 51.4 SEC — HIGH (ref 10–12.9)
RBC # BLD: 3.43 M/UL — LOW (ref 4.2–5.8)
RBC # BLD: 3.59 M/UL — LOW (ref 4.2–5.8)
RBC # BLD: 4.07 M/UL — LOW (ref 4.2–5.8)
RBC # BLD: 4.24 M/UL — SIGNIFICANT CHANGE UP (ref 4.2–5.8)
RBC # FLD: 13.1 % — SIGNIFICANT CHANGE UP (ref 10.3–14.5)
RBC # FLD: 13.2 % — SIGNIFICANT CHANGE UP (ref 10.3–14.5)
SAO2 % BLDA: 78 % — LOW (ref 92–96)
SAO2 % BLDA: 82 % — LOW (ref 92–96)
SAO2 % BLDA: 94 % — SIGNIFICANT CHANGE UP (ref 92–96)
SODIUM SERPL-SCNC: 141 MMOL/L — SIGNIFICANT CHANGE UP (ref 135–145)
SODIUM SERPL-SCNC: 144 MMOL/L — SIGNIFICANT CHANGE UP (ref 135–145)
TROPONIN I SERPL-MCNC: 0.08 NG/ML — HIGH (ref 0.02–0.06)
TROPONIN I SERPL-MCNC: 0.29 NG/ML — HIGH (ref 0.02–0.06)
WBC # BLD: 10.32 K/UL — SIGNIFICANT CHANGE UP (ref 3.8–10.5)
WBC # BLD: 3.17 K/UL — LOW (ref 3.8–10.5)
WBC # BLD: 6.04 K/UL — SIGNIFICANT CHANGE UP (ref 3.8–10.5)
WBC # BLD: 7.54 K/UL — SIGNIFICANT CHANGE UP (ref 3.8–10.5)
WBC # FLD AUTO: 10.32 K/UL — SIGNIFICANT CHANGE UP (ref 3.8–10.5)
WBC # FLD AUTO: 3.17 K/UL — LOW (ref 3.8–10.5)
WBC # FLD AUTO: 6.04 K/UL — SIGNIFICANT CHANGE UP (ref 3.8–10.5)
WBC # FLD AUTO: 7.54 K/UL — SIGNIFICANT CHANGE UP (ref 3.8–10.5)

## 2018-12-31 PROCEDURE — 71250 CT THORAX DX C-: CPT | Mod: 26

## 2018-12-31 PROCEDURE — 71045 X-RAY EXAM CHEST 1 VIEW: CPT | Mod: 26

## 2018-12-31 PROCEDURE — 99285 EMERGENCY DEPT VISIT HI MDM: CPT

## 2018-12-31 PROCEDURE — 12345: CPT | Mod: NC

## 2018-12-31 PROCEDURE — 74176 CT ABD & PELVIS W/O CONTRAST: CPT | Mod: 26

## 2018-12-31 PROCEDURE — 93010 ELECTROCARDIOGRAM REPORT: CPT

## 2018-12-31 PROCEDURE — 99223 1ST HOSP IP/OBS HIGH 75: CPT | Mod: AI

## 2018-12-31 RX ORDER — SODIUM CHLORIDE 9 MG/ML
1000 INJECTION INTRAMUSCULAR; INTRAVENOUS; SUBCUTANEOUS ONCE
Qty: 0 | Refills: 0 | Status: COMPLETED | OUTPATIENT
Start: 2018-12-31 | End: 2018-12-31

## 2018-12-31 RX ORDER — PHYTONADIONE (VIT K1) 5 MG
5 TABLET ORAL ONCE
Qty: 0 | Refills: 0 | Status: COMPLETED | OUTPATIENT
Start: 2018-12-31 | End: 2018-12-31

## 2018-12-31 RX ORDER — AMPICILLIN SODIUM AND SULBACTAM SODIUM 250; 125 MG/ML; MG/ML
3 INJECTION, POWDER, FOR SUSPENSION INTRAMUSCULAR; INTRAVENOUS ONCE
Qty: 0 | Refills: 0 | Status: DISCONTINUED | OUTPATIENT
Start: 2018-12-31 | End: 2018-12-31

## 2018-12-31 RX ORDER — PIPERACILLIN AND TAZOBACTAM 4; .5 G/20ML; G/20ML
3.38 INJECTION, POWDER, LYOPHILIZED, FOR SOLUTION INTRAVENOUS EVERY 8 HOURS
Qty: 0 | Refills: 0 | Status: COMPLETED | OUTPATIENT
Start: 2018-12-31 | End: 2019-01-10

## 2018-12-31 RX ORDER — SODIUM CHLORIDE 9 MG/ML
1000 INJECTION, SOLUTION INTRAVENOUS ONCE
Qty: 0 | Refills: 0 | Status: COMPLETED | OUTPATIENT
Start: 2018-12-31 | End: 2018-12-31

## 2018-12-31 RX ORDER — PANTOPRAZOLE SODIUM 20 MG/1
8 TABLET, DELAYED RELEASE ORAL
Qty: 80 | Refills: 0 | Status: DISCONTINUED | OUTPATIENT
Start: 2018-12-31 | End: 2019-01-02

## 2018-12-31 RX ORDER — TAMSULOSIN HYDROCHLORIDE 0.4 MG/1
0.4 CAPSULE ORAL AT BEDTIME
Qty: 0 | Refills: 0 | Status: DISCONTINUED | OUTPATIENT
Start: 2018-12-31 | End: 2019-01-13

## 2018-12-31 RX ORDER — PANTOPRAZOLE SODIUM 20 MG/1
80 TABLET, DELAYED RELEASE ORAL ONCE
Qty: 0 | Refills: 0 | Status: COMPLETED | OUTPATIENT
Start: 2018-12-31 | End: 2018-12-31

## 2018-12-31 RX ORDER — LEVOTHYROXINE SODIUM 125 MCG
37.5 TABLET ORAL AT BEDTIME
Qty: 0 | Refills: 0 | Status: DISCONTINUED | OUTPATIENT
Start: 2018-12-31 | End: 2019-01-11

## 2018-12-31 RX ORDER — SODIUM CHLORIDE 9 MG/ML
1000 INJECTION, SOLUTION INTRAVENOUS
Qty: 0 | Refills: 0 | Status: DISCONTINUED | OUTPATIENT
Start: 2018-12-31 | End: 2019-01-01

## 2018-12-31 RX ORDER — CHOLECALCIFEROL (VITAMIN D3) 125 MCG
1 CAPSULE ORAL
Qty: 0 | Refills: 0 | COMMUNITY

## 2018-12-31 RX ORDER — PANTOPRAZOLE SODIUM 20 MG/1
40 TABLET, DELAYED RELEASE ORAL
Qty: 0 | Refills: 0 | Status: DISCONTINUED | OUTPATIENT
Start: 2018-12-31 | End: 2018-12-31

## 2018-12-31 RX ORDER — PIPERACILLIN AND TAZOBACTAM 4; .5 G/20ML; G/20ML
3.38 INJECTION, POWDER, LYOPHILIZED, FOR SOLUTION INTRAVENOUS EVERY 8 HOURS
Qty: 0 | Refills: 0 | Status: DISCONTINUED | OUTPATIENT
Start: 2018-12-31 | End: 2018-12-31

## 2018-12-31 RX ORDER — LEVOTHYROXINE SODIUM 125 MCG
75 TABLET ORAL DAILY
Qty: 0 | Refills: 0 | Status: DISCONTINUED | OUTPATIENT
Start: 2018-12-31 | End: 2018-12-31

## 2018-12-31 RX ORDER — ALBUTEROL 90 UG/1
2.5 AEROSOL, METERED ORAL ONCE
Qty: 0 | Refills: 0 | Status: COMPLETED | OUTPATIENT
Start: 2018-12-31 | End: 2018-12-31

## 2018-12-31 RX ORDER — CHLORHEXIDINE GLUCONATE 213 G/1000ML
1 SOLUTION TOPICAL DAILY
Qty: 0 | Refills: 0 | Status: DISCONTINUED | OUTPATIENT
Start: 2018-12-31 | End: 2019-01-07

## 2018-12-31 RX ORDER — ONDANSETRON 8 MG/1
8 TABLET, FILM COATED ORAL ONCE
Qty: 0 | Refills: 0 | Status: COMPLETED | OUTPATIENT
Start: 2018-12-31 | End: 2018-12-31

## 2018-12-31 RX ORDER — SODIUM CHLORIDE 9 MG/ML
1000 INJECTION INTRAMUSCULAR; INTRAVENOUS; SUBCUTANEOUS ONCE
Qty: 0 | Refills: 0 | Status: COMPLETED | OUTPATIENT
Start: 2018-12-31 | End: 2019-01-01

## 2018-12-31 RX ORDER — FERROUS SULFATE 325(65) MG
325 TABLET ORAL DAILY
Qty: 0 | Refills: 0 | Status: DISCONTINUED | OUTPATIENT
Start: 2018-12-31 | End: 2019-01-13

## 2018-12-31 RX ORDER — NOREPINEPHRINE BITARTRATE/D5W 8 MG/250ML
0.05 PLASTIC BAG, INJECTION (ML) INTRAVENOUS
Qty: 8 | Refills: 0 | Status: DISCONTINUED | OUTPATIENT
Start: 2018-12-31 | End: 2018-12-31

## 2018-12-31 RX ORDER — SOTALOL HCL 120 MG
80 TABLET ORAL EVERY 12 HOURS
Qty: 0 | Refills: 0 | Status: DISCONTINUED | OUTPATIENT
Start: 2018-12-31 | End: 2019-01-03

## 2018-12-31 RX ORDER — INFLUENZA VIRUS VACCINE 15; 15; 15; 15 UG/.5ML; UG/.5ML; UG/.5ML; UG/.5ML
0.5 SUSPENSION INTRAMUSCULAR ONCE
Qty: 0 | Refills: 0 | Status: COMPLETED | OUTPATIENT
Start: 2018-12-31 | End: 2018-12-31

## 2018-12-31 RX ADMIN — SODIUM CHLORIDE 1000 MILLILITER(S): 9 INJECTION, SOLUTION INTRAVENOUS at 04:30

## 2018-12-31 RX ADMIN — SODIUM CHLORIDE 1000 MILLILITER(S): 9 INJECTION, SOLUTION INTRAVENOUS at 05:28

## 2018-12-31 RX ADMIN — CHLORHEXIDINE GLUCONATE 1 APPLICATION(S): 213 SOLUTION TOPICAL at 11:50

## 2018-12-31 RX ADMIN — SODIUM CHLORIDE 100 MILLILITER(S): 9 INJECTION, SOLUTION INTRAVENOUS at 17:39

## 2018-12-31 RX ADMIN — ONDANSETRON 8 MILLIGRAM(S): 8 TABLET, FILM COATED ORAL at 02:04

## 2018-12-31 RX ADMIN — SODIUM CHLORIDE 500 MILLILITER(S): 9 INJECTION INTRAMUSCULAR; INTRAVENOUS; SUBCUTANEOUS at 01:00

## 2018-12-31 RX ADMIN — Medication 37.5 MICROGRAM(S): at 22:00

## 2018-12-31 RX ADMIN — ALBUTEROL 2.5 MILLIGRAM(S): 90 AEROSOL, METERED ORAL at 00:12

## 2018-12-31 RX ADMIN — PANTOPRAZOLE SODIUM 10 MG/HR: 20 TABLET, DELAYED RELEASE ORAL at 22:09

## 2018-12-31 RX ADMIN — Medication 101 MILLIGRAM(S): at 16:16

## 2018-12-31 RX ADMIN — PIPERACILLIN AND TAZOBACTAM 25 GRAM(S): 4; .5 INJECTION, POWDER, LYOPHILIZED, FOR SOLUTION INTRAVENOUS at 10:48

## 2018-12-31 RX ADMIN — Medication 101 MILLIGRAM(S): at 02:40

## 2018-12-31 RX ADMIN — Medication 325 MILLIGRAM(S): at 12:59

## 2018-12-31 RX ADMIN — SODIUM CHLORIDE 100 MILLILITER(S): 9 INJECTION, SOLUTION INTRAVENOUS at 08:00

## 2018-12-31 RX ADMIN — PANTOPRAZOLE SODIUM 10 MG/HR: 20 TABLET, DELAYED RELEASE ORAL at 12:59

## 2018-12-31 RX ADMIN — Medication 1 TABLET(S): at 12:59

## 2018-12-31 RX ADMIN — PANTOPRAZOLE SODIUM 10 MG/HR: 20 TABLET, DELAYED RELEASE ORAL at 02:40

## 2018-12-31 RX ADMIN — PANTOPRAZOLE SODIUM 80 MILLIGRAM(S): 20 TABLET, DELAYED RELEASE ORAL at 02:03

## 2018-12-31 RX ADMIN — TAMSULOSIN HYDROCHLORIDE 0.4 MILLIGRAM(S): 0.4 CAPSULE ORAL at 21:56

## 2018-12-31 RX ADMIN — PIPERACILLIN AND TAZOBACTAM 25 GRAM(S): 4; .5 INJECTION, POWDER, LYOPHILIZED, FOR SOLUTION INTRAVENOUS at 01:31

## 2018-12-31 RX ADMIN — PIPERACILLIN AND TAZOBACTAM 25 GRAM(S): 4; .5 INJECTION, POWDER, LYOPHILIZED, FOR SOLUTION INTRAVENOUS at 17:34

## 2018-12-31 NOTE — H&P ADULT - PROBLEM SELECTOR PLAN 5
IMPROVE VTE Individual Risk Assessment          RISK                                                          Points  [  ] Previous VTE                                                 3  [  ] Thrombophilia                                              2  [  ] Lower limb paralysis                                    2        (unable to hold up >15 seconds)    [  ] Current Cancer                                             2         (within 6 months)  [  ] Immobilization > 24 hrs                              1  [  ] ICU/CCU stay > 24 hours                            1  [X] Age > 60                                                        1    IMPROVE VTE Score 1    - will be on lovenox for DVT ppx

## 2018-12-31 NOTE — CONSULT NOTE ADULT - PROBLEM SELECTOR RECOMMENDATION 8
lactate 2.4 posible secondary to sepsis vs chronic kidney   will fluid resusitate with 30cc/kg for sepsis ( broad spectrum antibiotics started)   repeat lactate in 3 hrs
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
## 2018-12-31 NOTE — H&P ADULT - ASSESSMENT
89M with HTN, hypothyroidism, BPH, afib, CKD, who presents with SOB.  Patient was in his usual state of health with no recent illness when he tried taking two of his pills this evening.  SOB 2/2 aspiration pneumonitis.

## 2018-12-31 NOTE — H&P ADULT - PROBLEM SELECTOR PLAN 1
- admit to [facility] (given resp distress and desats -> may need to be intubated.  Spouse and patient both request to be FULL CODE still)  - repeat ABG (low pO2 in first ABG)  - cover with zosyn  - pulm/cc consult  - f/u cultures

## 2018-12-31 NOTE — CONSULT NOTE ADULT - SUBJECTIVE AND OBJECTIVE BOX
History of Present Illness: The patient is a 89 year old male with a history of hypothyroidism, BPH, PUD, atrial fibrillation who was admitted with shortness of breath. The patient is unable to provide additional history to me. As per notes, he choked on pills. Afterwards, he vomited through his mouth and nose and subsequently became short of breath. There was chest tightness. He was vomiting in hospital and there was gross blood in it. His breathing eventually worsened in hospital and he was intubated.    Past Medical/Surgical History:  hypothyroidism, BPH, PUD, atrial fibrillation    Medications:  MEDICATIONS  (STANDING):  chlorhexidine 2% Cloths 1 Application(s) Topical daily  ferrous    sulfate 325 milliGRAM(s) Oral daily  influenza   Vaccine 0.5 milliLiter(s) IntraMuscular once  lactated ringers. 1000 milliLiter(s) (100 mL/Hr) IV Continuous <Continuous>  levothyroxine Injectable 37.5 MICROGram(s) IV Push at bedtime  multivitamin 1 Tablet(s) Oral daily  norepinephrine Infusion 0.05 MICROgram(s)/kG/Min (6.806 mL/Hr) IV Continuous <Continuous>  pantoprazole Infusion 8 mG/Hr (10 mL/Hr) IV Continuous <Continuous>  piperacillin/tazobactam IVPB. 3.375 Gram(s) IV Intermittent every 8 hours  sotalol 80 milliGRAM(s) Oral every 12 hours  tamsulosin 0.4 milliGRAM(s) Oral at bedtime    MEDICATIONS  (PRN):  sodium chloride 0.9% Bolus 1000 milliLiter(s) IV Bolus once PRN if MAP <60      Family History: Non-contributory family history of premature cardiovascular atherosclerotic disease    Social History: No tobacco, alcohol or drug use    Review of Systems:  General: No fevers, chills, weight loss or gain  Skin: No rashes, color changes  Cardiovascular: No chest pain, orthopnea  Respiratory: No shortness of breath, cough  Gastrointestinal: No nausea, abdominal pain  Genitourinary: No incontinence, pain with urination  Musculoskeletal: No pain, swelling, decreased range of motion  Neurological: No headache, weakness  Psychiatric: No depression, anxiety  Endocrine: No weight loss or gain, increased thirst  All other systems are comprehensively negative.    Physical Exam:  Vitals:        Vital Signs Last 24 Hrs  T(C): 36.9 (31 Dec 2018 12:59), Max: 37.2 (31 Dec 2018 04:01)  T(F): 98.5 (31 Dec 2018 12:59), Max: 98.9 (31 Dec 2018 04:01)  HR: 70 (31 Dec 2018 14:00) (70 - 149)  BP: 100/57 (31 Dec 2018 14:00) (72/44 - 167/96)  BP(mean): 70 (31 Dec 2018 14:00) (54 - 117)  RR: 24 (31 Dec 2018 14:00) (16 - 33)  SpO2: 97% (31 Dec 2018 14:00) (80% - 100%)  General: Sedated  HEENT: Intubated  Neck: No JVD, no carotid bruit  Lungs: CTAB  CV: RRR, nl S1/S2, no M/R/G  Abdomen: S/NT/ND, +BS  Extremities: No LE edema, no cyanosis  Neuro: non-focal  Skin: No rash    Labs:                        11.2   6.04  )-----------( 137      ( 31 Dec 2018 10:39 )             34.9     12-31    144  |  110<H>  |  41<H>  ----------------------------<  109<H>  4.6   |  23  |  1.92<H>    Ca    8.4      31 Dec 2018 05:25    TPro  6.9  /  Alb  3.1<L>  /  TBili  0.4  /  DBili  x   /  AST  27  /  ALT  24  /  AlkPhos  108  12-31    CARDIAC MARKERS ( 31 Dec 2018 00:53 )  .080 ng/mL / x     / 93 U/L / x     / 4.1 ng/mL      PT/INR - ( 31 Dec 2018 00:56 )   PT: 51.4 sec;   INR: 4.50 ratio         PTT - ( 31 Dec 2018 00:56 )  PTT:43.6 sec    ECG: Not available History of Present Illness: The patient is a 89 year old male with a history of hypothyroidism, BPH, PUD, atrial fibrillation who was admitted with shortness of breath. Yesterday the patient choked on pills. Afterwards, he vomited through his mouth and nose and subsequently became short of breath. There was chest tightness. He was vomiting in hospital and there was gross blood in it. His breathing eventually worsened in hospital and he was placed on BIPAP.    Past Medical/Surgical History:  hypothyroidism, BPH, PUD, atrial fibrillation    Medications:  MEDICATIONS  (STANDING):  chlorhexidine 2% Cloths 1 Application(s) Topical daily  ferrous    sulfate 325 milliGRAM(s) Oral daily  influenza   Vaccine 0.5 milliLiter(s) IntraMuscular once  lactated ringers. 1000 milliLiter(s) (100 mL/Hr) IV Continuous <Continuous>  levothyroxine Injectable 37.5 MICROGram(s) IV Push at bedtime  multivitamin 1 Tablet(s) Oral daily  norepinephrine Infusion 0.05 MICROgram(s)/kG/Min (6.806 mL/Hr) IV Continuous <Continuous>  pantoprazole Infusion 8 mG/Hr (10 mL/Hr) IV Continuous <Continuous>  piperacillin/tazobactam IVPB. 3.375 Gram(s) IV Intermittent every 8 hours  sotalol 80 milliGRAM(s) Oral every 12 hours  tamsulosin 0.4 milliGRAM(s) Oral at bedtime    MEDICATIONS  (PRN):  sodium chloride 0.9% Bolus 1000 milliLiter(s) IV Bolus once PRN if MAP <60      Family History: Non-contributory family history of premature cardiovascular atherosclerotic disease    Social History: No tobacco, alcohol or drug use    Review of Systems:  General: No fevers, chills, weight loss or gain  Skin: No rashes, color changes  Cardiovascular: No chest pain, orthopnea  Respiratory: No shortness of breath, cough  Gastrointestinal: No nausea, abdominal pain  Genitourinary: No incontinence, pain with urination  Musculoskeletal: No pain, swelling, decreased range of motion  Neurological: No headache, weakness  Psychiatric: No depression, anxiety  Endocrine: No weight loss or gain, increased thirst  All other systems are comprehensively negative.    Physical Exam:  Vitals:        Vital Signs Last 24 Hrs  T(C): 36.9 (31 Dec 2018 12:59), Max: 37.2 (31 Dec 2018 04:01)  T(F): 98.5 (31 Dec 2018 12:59), Max: 98.9 (31 Dec 2018 04:01)  HR: 70 (31 Dec 2018 14:00) (70 - 149)  BP: 100/57 (31 Dec 2018 14:00) (72/44 - 167/96)  BP(mean): 70 (31 Dec 2018 14:00) (54 - 117)  RR: 24 (31 Dec 2018 14:00) (16 - 33)  SpO2: 97% (31 Dec 2018 14:00) (80% - 100%)  General: NAD  HEENT: MMM  Neck: No JVD, no carotid bruit  Lungs: CTAB  CV: RRR, nl S1/S2, no M/R/G  Abdomen: S/NT/ND, +BS  Extremities: No LE edema, no cyanosis  Neuro: non-focal  Skin: No rash    Labs:                        11.2   6.04  )-----------( 137      ( 31 Dec 2018 10:39 )             34.9     12-31    144  |  110<H>  |  41<H>  ----------------------------<  109<H>  4.6   |  23  |  1.92<H>    Ca    8.4      31 Dec 2018 05:25    TPro  6.9  /  Alb  3.1<L>  /  TBili  0.4  /  DBili  x   /  AST  27  /  ALT  24  /  AlkPhos  108  12-31    CARDIAC MARKERS ( 31 Dec 2018 00:53 )  .080 ng/mL / x     / 93 U/L / x     / 4.1 ng/mL      PT/INR - ( 31 Dec 2018 00:56 )   PT: 51.4 sec;   INR: 4.50 ratio         PTT - ( 31 Dec 2018 00:56 )  PTT:43.6 sec    ECG: Not available History of Present Illness: The patient is a 89 year old male with a history of hypothyroidism, BPH, PUD, atrial fibrillation who was admitted with shortness of breath. Yesterday the patient choked on pills. Afterwards, he vomited through his mouth and nose and subsequently became short of breath. There was chest tightness. He was vomiting in hospital and there was gross blood in it. His breathing eventually worsened in hospital and he was placed on BIPAP.    Past Medical/Surgical History:  hypothyroidism, BPH, PUD, atrial fibrillation    Medications:  MEDICATIONS  (STANDING):  chlorhexidine 2% Cloths 1 Application(s) Topical daily  ferrous    sulfate 325 milliGRAM(s) Oral daily  influenza   Vaccine 0.5 milliLiter(s) IntraMuscular once  lactated ringers. 1000 milliLiter(s) (100 mL/Hr) IV Continuous <Continuous>  levothyroxine Injectable 37.5 MICROGram(s) IV Push at bedtime  multivitamin 1 Tablet(s) Oral daily  norepinephrine Infusion 0.05 MICROgram(s)/kG/Min (6.806 mL/Hr) IV Continuous <Continuous>  pantoprazole Infusion 8 mG/Hr (10 mL/Hr) IV Continuous <Continuous>  piperacillin/tazobactam IVPB. 3.375 Gram(s) IV Intermittent every 8 hours  sotalol 80 milliGRAM(s) Oral every 12 hours  tamsulosin 0.4 milliGRAM(s) Oral at bedtime    MEDICATIONS  (PRN):  sodium chloride 0.9% Bolus 1000 milliLiter(s) IV Bolus once PRN if MAP <60      Family History: Non-contributory family history of premature cardiovascular atherosclerotic disease    Social History: No tobacco, alcohol or drug use    Review of Systems:  General: No fevers, chills, weight loss or gain  Skin: No rashes, color changes  Cardiovascular: No chest pain, orthopnea  Respiratory: No shortness of breath, cough  Gastrointestinal: No nausea, abdominal pain  Genitourinary: No incontinence, pain with urination  Musculoskeletal: No pain, swelling, decreased range of motion  Neurological: No headache, weakness  Psychiatric: No depression, anxiety  Endocrine: No weight loss or gain, increased thirst  All other systems are comprehensively negative.    Physical Exam:  Vitals:        Vital Signs Last 24 Hrs  T(C): 36.9 (31 Dec 2018 12:59), Max: 37.2 (31 Dec 2018 04:01)  T(F): 98.5 (31 Dec 2018 12:59), Max: 98.9 (31 Dec 2018 04:01)  HR: 70 (31 Dec 2018 14:00) (70 - 149)  BP: 100/57 (31 Dec 2018 14:00) (72/44 - 167/96)  BP(mean): 70 (31 Dec 2018 14:00) (54 - 117)  RR: 24 (31 Dec 2018 14:00) (16 - 33)  SpO2: 97% (31 Dec 2018 14:00) (80% - 100%)  General: NAD  HEENT: MMM  Neck: No JVD, no carotid bruit  Lungs: CTAB  CV: RRR, nl S1/S2, no M/R/G  Abdomen: S/NT/ND, +BS  Extremities: No LE edema, no cyanosis  Neuro: non-focal  Skin: No rash    Labs:                        11.2   6.04  )-----------( 137      ( 31 Dec 2018 10:39 )             34.9     12-31    144  |  110<H>  |  41<H>  ----------------------------<  109<H>  4.6   |  23  |  1.92<H>    Ca    8.4      31 Dec 2018 05:25    TPro  6.9  /  Alb  3.1<L>  /  TBili  0.4  /  DBili  x   /  AST  27  /  ALT  24  /  AlkPhos  108  12-31    CARDIAC MARKERS ( 31 Dec 2018 00:53 )  .080 ng/mL / x     / 93 U/L / x     / 4.1 ng/mL      PT/INR - ( 31 Dec 2018 00:56 )   PT: 51.4 sec;   INR: 4.50 ratio         PTT - ( 31 Dec 2018 00:56 )  PTT:43.6 sec    ECG: Not available    Telemetry: sinus rhythm, episode of atrial fibrillation

## 2018-12-31 NOTE — CONSULT NOTE ADULT - ATTENDING COMMENTS
33 minutes of critical care time spent with the patient and coordinating care with nursing, hospitalist, and consultants. Patient is critically ill requiring ICU care due to respiratory failure, GI bleed, hypotension. The patient is high risk for deterioration and death.
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
## 2018-12-31 NOTE — H&P ADULT - NSHPLABSRESULTS_GEN_ALL_CORE
LABS:                        13.3   7.54  )-----------( 175      ( 31 Dec 2018 00:56 )             42.2     141    |  107    |  39<H>  ----------------------------<  118<H>    31 Dec 2018 00:53  4.7     |  23     |  1.94<H>        Ca 8.5           31 Dec 2018 00:53        TPro  6.9    /  Alb  3.1<L>  /  TBili  0.4    /  DBili  x      /  AST  27     /  ALT  24     /  AlkPhos  108    31 Dec 2018 00:53    PT/INR - ( 31 Dec 2018 00:56 )   PT: 51.4<H>;   INR: 4.50<H>         PTT - ( 31 Dec 2018 00:56 )  PTT:43.6<H>    Troponin trend:  .080  12-31 @ 00:53    EKG:  Radiology:  CXR - prelim read by me - diffuse bilateral infiltrates

## 2018-12-31 NOTE — ED ADULT NURSE NOTE - NSIMPLEMENTINTERV_GEN_ALL_ED
Implemented All Fall with Harm Risk Interventions:  [place] to call system. Call bell, personal items and telephone within reach. Instruct patient to call for assistance. Room bathroom lighting operational. Non-slip footwear when patient is off stretcher. Physically safe environment: no spills, clutter or unnecessary equipment. Stretcher in lowest position, wheels locked, appropriate side rails in place. Provide visual cue, wrist band, yellow gown, etc. Monitor gait and stability. Monitor for mental status changes and reorient to person, place, and time. Review medications for side effects contributing to fall risk. Reinforce activity limits and safety measures with patient and family. Provide visual clues: red socks.
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
## 2018-12-31 NOTE — CONSULT NOTE ADULT - PROBLEM SELECTOR RECOMMENDATION 7
INR supra theraputic at 4.5 in the setting of upper GI bleeding  will give vitamin K  and FFP  repeat coags

## 2018-12-31 NOTE — PROGRESS NOTE ADULT - ASSESSMENT
89M with HTN, hypothyroidism, BPH, afib, CKD, who presents with SOB.  Patient was in his usual state of health with no recent illness when he tried taking two of his pills this evening.  SOB 2/2 aspiration pneumonitis.      -Aspiration pneumonitis.    cw spcu.   (given resp distress and desats -> may need to be intubated.  Spouse and patient both request to be FULL CODE still)  - repeat ABG (low pO2 in first ABG)  - cover with zosyn  - pulm/cc consult  - f/u cultures.     -GI bleed: hx of billroth II w/ gastrojej ulcer in 2017  npo. ivfs  serial cbc  iv protonix  gi consulted.   AC and anti platelets on hold.   repeat inr, elevated. vit k 5mg iv ordered stat.     -Benign prostatic hyperplasia, unspecified whether lower urinary tract symptoms present.  Plan: - cont with BPH meds.     -Hypothyroidism, unspecified type.  Plan: - cont with synthroid.     -Atrial fibrillation, unspecified type.  Plan: - tachycardic though likely from resp distress  - cont sotalol for now.   AC on hold.   cardio consulted. may need amiodarone drip for afib.     -Preventive measure.  Plan: IMPROVE VTE Individual Risk Assessment          RISK                                                          Points  [  ] Previous VTE                                                 3  [  ] Thrombophilia                                              2  [  ] Lower limb paralysis                                    2        (unable to hold up >15 seconds)    [  ] Current Cancer                                             2         (within 6 months)  [  ] Immobilization > 24 hrs                              1  [  ] ICU/CCU stay > 24 hours                            1  [X] Age > 60                                                        1    IMPROVE VTE Score 1    -SCD's DVT ppx.

## 2018-12-31 NOTE — CONSULT NOTE ADULT - PROBLEM SELECTOR RECOMMENDATION 2
Initial broad spectrum coverage for MDR orgamsims including staph aureus and gram negatives.    Follow up sputum cultre the narrow specrtum based on culture  sensitivities

## 2018-12-31 NOTE — ED ADULT NURSE NOTE - OBJECTIVE STATEMENT
as per pt's wife, pt had an choking episode at home then had difficulty breathing. she also reports his blood pressure was elevated

## 2018-12-31 NOTE — CONSULT NOTE ADULT - SUBJECTIVE AND OBJECTIVE BOX
Chief Complaint:  Patient is a 89y old  Male who presents with a chief complaint of SOB (31 Dec 2018 06:44)    Mitral valve disorder  Kidney stone  Diverticulosis  CKD (chronic kidney disease)  Afib  Hypothyroidism  BPH (benign prostatic hyperplasia)  Chronic peptic ulcer  No significant past surgical history     HPI:  89M with hypothyroidism, BPH, afib, CKD, who presents with SOB.  Patient was in his usual state of health with no recent illness when he tried taking two of his pills this evening.  Started to choke on his pills and his wife tried getting the pills out by hitting his back.  Patient then started to vomit through his mouth and nose and since then he has had trouble breathing.    Associated with chest pain and chest tightness.  Patient was brought here where he was found to be desat'ing despite O2 NC.   CXR showed diffuse infiltrates.  Patient placed on BiPAP where he started to vomit.    Patient placed on high flow O2.  Currently the patient still with difficulty breathing and complaining of his chest is hurting when he breathes.  Started on Zosyn empirically. (31 Dec 2018 01:25)  reported to have had blood in emesis. pmh includes Billroth II w/ GI bleed in 2017 had EGD w/ gastrojej ulcer.    now w/ ngt with contents that appear as coffee grounds.   no melena. pt is awake and alert, comfortable  hgb stable.       No Known Allergies      chlorhexidine 2% Cloths 1 Application(s) Topical daily  ferrous    sulfate 325 milliGRAM(s) Oral daily  influenza   Vaccine 0.5 milliLiter(s) IntraMuscular once  lactated ringers. 1000 milliLiter(s) IV Continuous <Continuous>  levothyroxine Injectable 37.5 MICROGram(s) IV Push at bedtime  multivitamin 1 Tablet(s) Oral daily  norepinephrine Infusion 0.05 MICROgram(s)/kG/Min IV Continuous <Continuous>  pantoprazole Infusion 8 mG/Hr IV Continuous <Continuous>  piperacillin/tazobactam IVPB. 3.375 Gram(s) IV Intermittent every 8 hours  sotalol 80 milliGRAM(s) Oral every 12 hours  tamsulosin 0.4 milliGRAM(s) Oral at bedtime        FAMILY HISTORY:  No pertinent family history in first degree relatives        Review of Systems:    General:  No wt loss, fevers, chills, night sweats,fatigue,   Eyes:  Good vision, no reported pain  ENT:  No sore throat, pain, runny nose, dysphagia  CV:  No pain, palpitatioins, hypo/hypertension  Resp: +dyspnea, cough, tachypnea, wheezing  GI:  No pain, No nausea, No vomiting, No diarrhea, No constipatiion, No weight loss, No fever, No pruritis, No rectal bleeding, No tarry stools, No dysphagia,  :  No pain, bleeding, incontinence, nocturia  Muscle:  No pain, weakness  Breast:  No pain, abscess, mass, discharge  Neuro:  No weakness, tingling, memory problems  Psych:  No fatigue, insomnia, mood problems, depression  Endocrine:  No polyuria, polydypsia, cold/heat intolerance  Heme:  No petechiae, ecchymosis, easy bruisability  Skin:  No rash, tattoos, scars, sheela    Relevant Social History:   no toxic habits    Physical Exam:    Vital Signs:  Vital Signs Last 24 Hrs  T(C): 36.8 (31 Dec 2018 08:37), Max: 37.2 (31 Dec 2018 04:01)  T(F): 98.3 (31 Dec 2018 08:37), Max: 98.9 (31 Dec 2018 04:01)  HR: 79 (31 Dec 2018 08:00) (79 - 149)  BP: 96/50 (31 Dec 2018 08:00) (72/44 - 167/96)  BP(mean): 64 (31 Dec 2018 08:00) (54 - 117)  RR: 19 (31 Dec 2018 08:00) (16 - 33)  SpO2: 99% (31 Dec 2018 08:00) (80% - 100%)  Daily Height in cm: 180.34 (30 Dec 2018 23:40)    Daily Weight in k (31 Dec 2018 06:01)    General:  Appears stated age, well-groomed, well-nourished, no distress  HEENT:  NC/AT,  conjunctivae clear and pink, no thyromegaly, nodules, adenopathy, no JVD  Chest:  Full & symmetric excursion, no increased effort, breath sounds clear  Cardiovascular:  Regular rhythm, S1, S2, no murmur/rub/S3/S4, no abdominal bruit, no edema  Abdomen:  Soft, non-tender, non-distended, normoactive bowel sounds,  no masses ,no hepatosplenomeagaly, no signs of chronic liver disease  Extremities:  no cyanosis,clubbing or edema  Skin:  No rash/erythema/ecchymoses/petechiae/wounds/abscess/warm/dry  Neuro/Psych:  Alert, oriented, no asterixis, no tremor, no encephalopathy    Laboratory:                            13.1   3.17  )-----------( 159      ( 31 Dec 2018 05:25 )             40.0         144  |  110<H>  |  41<H>  ----------------------------<  109<H>  4.6   |  23  |  1.92<H>    Ca    8.4      31 Dec 2018 05:25    TPro  6.9  /  Alb  3.1<L>  /  TBili  0.4  /  DBili  x   /  AST  27  /  ALT  24  /  AlkPhos  108      LIVER FUNCTIONS - ( 31 Dec 2018 00:53 )  Alb: 3.1 g/dL / Pro: 6.9 g/dL / ALK PHOS: 108 U/L / ALT: 24 U/L DA / AST: 27 U/L / GGT: x           PT/INR - ( 31 Dec 2018 00:56 )   PT: 51.4 sec;   INR: 4.50 ratio         PTT - ( 31 Dec 2018 00:56 )  PTT:43.6 sec      Imaging:      Assessment:      Plan:

## 2018-12-31 NOTE — H&P ADULT - NSHPREVIEWOFSYSTEMS_GEN_ALL_CORE
REVIEW OF SYSTEMS:  CONSTITUTIONAL:    no fever or weight loss or fatigue  EYES:    no eye pain or visual disturbances or discharge  ENMT:     no difficulty hearing or tinnitus or vertigo, no sinus or throat pain  NECK:    no pain or stiffness  RESPIRATORY:    no cough or wheezing or chills or hemoptysis, no shortness of breath  CARDIOVASCULAR:    +chest pain, no palpitations or dizziness or leg swelling  GASTROINTESTINAL:    +vomiting, no hematemesis, no diarrhea or constipation. no melena or hematochezia.  GENITOURINARY:    no dysuria or frequency or hematuria or incontinence  NEUROLOGICAL:    no headaches or memory loss or loss of strength or numbness or tremors  SKIN:    no itching or burning or rashes or lesions   LYMPH NODES:    no enlarged glands  ENDOCRINE:    no heat or cold intolerance, no hair loss, no polydipsia or polyuria  MUSCULOSKELETAL:    no joint pain or swelling, no muscle or back or extremity pain  PSYCHIATRIC:    no depression or anxiety or mood swings or difficulty sleeping  HEME/LYMPH:    no easy bruising or bleeding gums  ALLERGY AND IMMUNOLOGIC:    no hives or eczema

## 2018-12-31 NOTE — CONSULT NOTE ADULT - PROBLEM SELECTOR RECOMMENDATION 9
eval asp pna, resp distress, CKD, poss GI bleed, hx of AF,   INR reversed - discussed with ICU PA  I and O  serial Hgb  on emp ABX - Zosyn  CXR noted - official report pending, will check CT chest and abd non contrast  ABGs serially reviewed  GI eval pending  on IVF - NPO - serial labs, serial PE, assist with ADL as needed  attempt to wean off High Flow NC - keep sat > 88 pct  monitor vs and HD and Sat in ICU  will follow  prognosis guarded  old TTE and old CT chest and abd reviewed from 2017
continue High Flow Nasal with supplemental oxygen   work of breathing improved   repeat ABG

## 2018-12-31 NOTE — H&P ADULT - NSHPPHYSICALEXAM_GEN_ALL_CORE
PHYSICAL EXAM:  Vital Signs Last 24 Hrs  T(C): 36.6 (30 Dec 2018 23:40), Max: 36.6 (30 Dec 2018 23:40)  T(F): 97.9 (30 Dec 2018 23:40), Max: 97.9 (30 Dec 2018 23:40)  HR: 86 (31 Dec 2018 00:17) (82 - 87)  BP: 144/87 (30 Dec 2018 23:40) (144/87 - 144/87)  BP(mean): --  RR: 22 (30 Dec 2018 23:41) (22 - 25)  SpO2: 92% (31 Dec 2018 00:17) (80% - 95%)    GENERAL:     elderly male in respiratory distress  HEAD:     atraumatic, normocephalic  EYES:     EOMI, conjunctiva and sclera clear  ENMT:     no tonsillar erythema or exudates or enlargement, no oral lesions, moist mucous membranes, good dentition  NECK:     supple, no JVD  RESPIRATORY:     diffuse crackles throughout  CARDIOVASCULAR:     irregular irregular   GASTROINTESTINAL:     soft, nontender, nondistended, no hepatosplenomegaly palpated, bowel sounds present  EXTREMITIES:     no clubbing or cyanosis or edema  MUSCULOSKELETAL:     no joint pain or swelling or deformities  NERVOUS SYSTEM:     motor strength intact with 5/5 B/L upper and lower extremities, no gross sensory deficits  SKIN:     no rashes or lesions  PSYCH:     appropriate, alert and orientated x3, good concentration

## 2018-12-31 NOTE — H&P ADULT - NSHPSOCIALHISTORY_GEN_ALL_CORE
+ former smoker (quit about many years ago, approx 40 years ago)  - denies any etoh or illicit drug use  - lives with spouse

## 2018-12-31 NOTE — CONSULT NOTE ADULT - PROBLEM SELECTOR RECOMMENDATION 5
[address]  Department of Interventional Radiology  [facility]  [phone] Office  [phone] Fax  Implanted Port Discharge Instructions      General Instructions:   A port is like an implanted IV. They are usually ordered for patients who will be getting chemotherapy, but can also be used as an IV for long term antibiotics, large amounts of fluids, and/or blood products. Your blood can be drawn from your port for labs also. Those patients who do not have good veins find the ports convenient as they can get the IV they need with one stick. The port can be used long term, and the care is easy. The device is under the skin, and once the skin heals, care is minimal. All that is required is the nurse who accesses the port will need to flush it with heparinized saline after each use. Ports are usually placed in the chest wall, usually on the right side. But they can be place in the arms and in the abdomen. Home Care Instructions: If your port is in your arm, do not allow blood pressure or other IVs to be place in that arm. Do not allow bra straps or any clothing to rub the skin over the port. Do not bathe or swim until the skin has healed and if the port is accessed. Once it is healed, and when the port is not accessed, it is okay to bathe and swim. Restrict yourself to light activity for the first 5 days after getting the port put in, after that, resume normal activity slowly. You may resume your normal diet and medications. Follow-Up Instructions: Please see your oncologist, or whatever physician ordered the port as he/she has requested of you. Call If: You should call your Physician and/or the Radiology Nurse if you notice redness, pus, swelling, or pain from the area of your incision. Call if you should develop a fever. The nurses who access your port will know to call your doctor if the port does not seem to be working properly. You need to tell the nurses who use the port if you should have any pain or swelling at the site during an infusion. Interventional Radiology General Nurse Discharge    After general anesthesia or intravenous sedation, for 24 hours or while taking prescription Narcotics:  · Limit your activities  · Do not drive and operate hazardous machinery  · Do not make important personal or business decisions  · Do  not drink alcoholic beverages  · If you have not urinated within 8 hours after discharge, please contact your surgeon on call. * Please give a list of your current medications to your Primary Care Provider. * Please update this list whenever your medications are discontinued, doses are     changed, or new medications (including over-the-counter products) are added. * Please carry medication information at all times in case of emergency situations. These are general instructions for a healthy lifestyle:    No smoking/ No tobacco products/ Avoid exposure to second hand smoke  Surgeon General's Warning:  Quitting smoking now greatly reduces serious risk to your health. Obesity, smoking, and sedentary lifestyle greatly increases your risk for illness  A healthy diet, regular physical exercise & weight monitoring are important for maintaining a healthy lifestyle    You may be retaining fluid if you have a history of heart failure or if you experience any of the following symptoms:  Weight gain of 3 pounds or more overnight or 5 pounds in a week, increased swelling in our hands or feet or shortness of breath while lying flat in bed. Please call your doctor as soon as you notice any of these symptoms; do not wait until your next office visit.     Recognize signs and symptoms of STROKE:  F-face looks uneven    A-arms unable to move or move unevenly    S-speech slurred or non-existent    T-time-call 911 as soon as signs and symptoms begin-DO NOT go       Back to bed or wait to see if you get better-TIME IS BRAIN. To Reach Us: If you have any questions about your procedure, please call the Interventional Radiology department at [phone]. After business hours (5pm) and weekends, call the answering service at [phone] and ask for the Radiologist on call to be paged. Si tiene Preguntas acerca del procedimiento, por favor llame al departamento de Radiología Intervencional al [phone]. Después de horas de oficina (5 pm) y los fines de [place], llamar al [facility] de llamadas al [phone] y pregunte por el Radiologo de [facility].           Patient Signature:  Date: 8/2/2017  Discharging Nurse: Mauri Bear RN holding lovenox/  heparin SQ for DVT due to GI bleeding   will apply SD'S  continue protonix for GI  contiue aspiration precautions by keeping head of bed at 30 degrees

## 2018-12-31 NOTE — PROGRESS NOTE ADULT - SUBJECTIVE AND OBJECTIVE BOX
Patient is a 89y old  Male who presents with a chief complaint of SOB (31 Dec 2018 09:59)    HPI:  89M with hypothyroidism, BPH, afib, CKD, who presents with SOB.  Patient was in his usual state of health with no recent illness when he tried taking two of his pills this evening.  Started to choke on his pills and his wife tried getting the pills out by hitting his back.  Patient then started to vomit through his mouth and nose and since then he has had trouble breathing.  Associated with chest pain and chest tightness.  Patient was brought here where he was found to be desat'ing despite O2 NC.   CXR showed diffuse infiltrates.  Patient placed on BiPAP where he started to vomit.  Patient placed on high flow O2.  Currently the patient still with difficulty breathing and complaining of his chest is hurting when he breathes.  Started on Zosyn empirically. (31 Dec 2018 01:25)    Today:  Pt denies any vomiting at present. Denies any pain. On non rebreather.   No fevers, no abd pain. SOB improved.     PAST MEDICAL & SURGICAL HISTORY:  Mitral valve disorder  Kidney stone  Diverticulosis  CKD (chronic kidney disease)  Afib  Hypothyroidism  BPH (benign prostatic hyperplasia)  Chronic peptic ulcer: S/P surgery more than 10 yrs ago    MEDICATIONS  (STANDING):  chlorhexidine 2% Cloths 1 Application(s) Topical daily  ferrous    sulfate 325 milliGRAM(s) Oral daily  influenza   Vaccine 0.5 milliLiter(s) IntraMuscular once  lactated ringers. 1000 milliLiter(s) (100 mL/Hr) IV Continuous <Continuous>  levothyroxine Injectable 37.5 MICROGram(s) IV Push at bedtime  multivitamin 1 Tablet(s) Oral daily  norepinephrine Infusion 0.05 MICROgram(s)/kG/Min (6.806 mL/Hr) IV Continuous <Continuous>  pantoprazole Infusion 8 mG/Hr (10 mL/Hr) IV Continuous <Continuous>  piperacillin/tazobactam IVPB. 3.375 Gram(s) IV Intermittent every 8 hours  sotalol 80 milliGRAM(s) Oral every 12 hours  tamsulosin 0.4 milliGRAM(s) Oral at bedtime    MEDICATIONS  (PRN):  sodium chloride 0.9% Bolus 1000 milliLiter(s) IV Bolus once PRN if MAP <60    EXAM:  Vital Signs Last 24 Hrs  T(C): 36.9 (31 Dec 2018 12:59), Max: 37.2 (31 Dec 2018 04:01)  T(F): 98.5 (31 Dec 2018 12:59), Max: 98.9 (31 Dec 2018 04:01)  HR: 70 (31 Dec 2018 14:00) (70 - 149)  BP: 100/57 (31 Dec 2018 14:00) (72/44 - 167/96)  BP(mean): 70 (31 Dec 2018 14:00) (54 - 117)  RR: 24 (31 Dec 2018 14:00) (16 - 33)  SpO2: 97% (31 Dec 2018 14:00) (80% - 100%)    12-30 @ 07:01  -  12-31 @ 07:00  --------------------------------------------------------  IN: 2461 mL / OUT: 1 mL / NET: 2460 mL    12-31 @ 07:01  -  12-31 @ 15:06  --------------------------------------------------------  IN: 760 mL / OUT: 0 mL / NET: 760 mL    PHYSICAL EXAM:  Constitutional: awake in bed, nad on non rebreather  ENMT: patent nares  Neck: supple  Respiratory: bilaterally clear to auscultation, no wheezing, no rhonchi, no crackles, no decreased air entry  Cardiovascular: s1s2, rrr, no murmurs.   Gastrointestinal: soft, non tender, +bowel sounds, no rebound, no guarding.   Extremities: no edema.   Neurological: alert    Skin: intact no rash, warm to touch.   Musculoskeletal: moves all 4 extremities  Psychiatric: no homicidal, suicidal ideation. appropriate affect.     LABS:  PT/INR - ( 31 Dec 2018 14:40 )   PT: 43.1 sec;   INR: 3.79 ratio     PTT - ( 31 Dec 2018 00:56 )  PTT:43.6 sec  LIVER FUNCTIONS - ( 31 Dec 2018 00:53 )  Alb: 3.1 g/dL / Pro: 6.9 g/dL / ALK PHOS: 108 U/L / ALT: 24 U/L DA / AST: 27 U/L / GGT: x                           11.2   6.04  )-----------( 137      ( 31 Dec 2018 10:39 )             34.9     12-31    144  |  110<H>  |  41<H>  ----------------------------<  109<H>  4.6   |  23  |  1.92<H>    Ca    8.4      31 Dec 2018 05:25    TPro  6.9  /  Alb  3.1<L>  /  TBili  0.4  /  DBili  x   /  AST  27  /  ALT  24  /  AlkPhos  108  12-31    CARDIAC MARKERS ( 31 Dec 2018 00:53 )  .080 ng/mL / x     / 93 U/L / x     / 4.1 ng/mL    Chart reviewed.   Labs reviewed.  Imaging reviewed.   Plan discussed with consultants.

## 2018-12-31 NOTE — CONSULT NOTE ADULT - ASSESSMENT
89 m w/ above hx a/w/ respiratory distress which began after vomiting.   reported to have blood in emesis   hx of billroth II w/ gastrojej ulcer in 2017  presently without active/overt gi bleeding  agree w/ IV protonix drip at 8mg/hr   repeat CBC this evening  holding all antiplatelet medications and AC   CT a/p today , results to follow   repeat INR , vitamin K if remains supratherapeutic.   NPO for time being. can give meds via ngt
The patient is a 89 year old male with a history of hypothyroidism, BPH, PUD, atrial fibrillation, chronic diastolic heart failure who was admitted with respiratory failure, GI bleed, hypotension.    Plan:  - Echo from 2017 with grossly low normal LV systolic function, no significant valve issues  - Currently in sinus rhythm although there was a brief episode of atrial fibrillation earlier  - Given BPs borderline, if goes back into atrial fibrillation, would start on amiodarone drip  - Continue sotalol for now (if able to take via PEG tube)  - Maintaining BPs off of pressors  - Continue IV fluids although cautious use given history of diastolic heart failure  - Troponin elevated at 0.08 in the setting of respiratory failure and GI bleed. Continue to trend.  - Check BNP  - Check serial hemoglobin  - Received vitamin K. Repeat INR. If still elevated, give additional dose of vitamin K.  - Remain off of anticoagulation. Given multiple episodes of GI bleed with supratherapeutic INR, warfarin may not be the best option. Eventually can consider apixaban or no anticoagulation.  - GI and crit care follow-up
89 year old s/p choking event at home resolved with partial Heimlich by wife but now in acute hypoxic respiratory failure secondary to aspiration pneumonitis from vomiting, upper GI bleeding with supratheraputic INR, GABO elevated troponin likely demand ischemia from hypoxia.     Critical Care time: 45 mins assessing presenting problems of acute illness that poses high probability of life threatening deterioration or end organ damage/dysfunction.  Medical decision making inculding Initiating plan of care, reviewing data, reviewing radiology,direct patient bedside evaluation and interpretation of vital signs, any necessary ventilator management , discusion with multidisciplinary team, discussing goals of care with patient/family, all non inclusive of procedures

## 2018-12-31 NOTE — CHART NOTE - NSCHARTNOTEFT_GEN_A_CORE
Patient vomited again and looked like hematemesis.  Given that the patient has elevated INR, hx of ulcers that required intervention and 2 years ago re-opened, concerned for UGIB.  Will start protonix drip, transfuse FFP, give vitamin K.  Will need serial CBCs and GI consult as well.

## 2018-12-31 NOTE — CONSULT NOTE ADULT - PROBLEM SELECTOR RECOMMENDATION 4
acute on chronic   Hold nephrotoxic meds  Continue aggressive hydration  Trend urine output  Follow up BUN/Creatinine  follow up electrolytes

## 2019-01-01 DIAGNOSIS — A41.9 SEPSIS, UNSPECIFIED ORGANISM: ICD-10-CM

## 2019-01-01 DIAGNOSIS — E03.9 HYPOTHYROIDISM, UNSPECIFIED: ICD-10-CM

## 2019-01-01 DIAGNOSIS — N18.9 CHRONIC KIDNEY DISEASE, UNSPECIFIED: ICD-10-CM

## 2019-01-01 DIAGNOSIS — R09.02 HYPOXEMIA: ICD-10-CM

## 2019-01-01 DIAGNOSIS — R06.89 OTHER ABNORMALITIES OF BREATHING: ICD-10-CM

## 2019-01-01 LAB
ANION GAP SERPL CALC-SCNC: 11 MMOL/L — SIGNIFICANT CHANGE UP (ref 5–17)
BUN SERPL-MCNC: 35 MG/DL — HIGH (ref 7–23)
CALCIUM SERPL-MCNC: 8 MG/DL — LOW (ref 8.4–10.5)
CHLORIDE SERPL-SCNC: 109 MMOL/L — HIGH (ref 96–108)
CO2 SERPL-SCNC: 22 MMOL/L — SIGNIFICANT CHANGE UP (ref 22–31)
CREAT SERPL-MCNC: 1.84 MG/DL — HIGH (ref 0.5–1.3)
GLUCOSE SERPL-MCNC: 116 MG/DL — HIGH (ref 70–99)
HCT VFR BLD CALC: 32.9 % — LOW (ref 39–50)
HGB BLD-MCNC: 10.6 G/DL — LOW (ref 13–17)
INR BLD: 1.74 RATIO — HIGH (ref 0.88–1.16)
MCHC RBC-ENTMCNC: 31.5 PG — SIGNIFICANT CHANGE UP (ref 27–34)
MCHC RBC-ENTMCNC: 32.2 GM/DL — SIGNIFICANT CHANGE UP (ref 32–36)
MCV RBC AUTO: 97.9 FL — SIGNIFICANT CHANGE UP (ref 80–100)
NRBC # BLD: 0 /100 WBCS — SIGNIFICANT CHANGE UP (ref 0–0)
PLATELET # BLD AUTO: 115 K/UL — LOW (ref 150–400)
POTASSIUM SERPL-MCNC: 4.1 MMOL/L — SIGNIFICANT CHANGE UP (ref 3.5–5.3)
POTASSIUM SERPL-SCNC: 4.1 MMOL/L — SIGNIFICANT CHANGE UP (ref 3.5–5.3)
PROTHROM AB SERPL-ACNC: 19.3 SEC — HIGH (ref 10–12.9)
RBC # BLD: 3.36 M/UL — LOW (ref 4.2–5.8)
RBC # FLD: 13.5 % — SIGNIFICANT CHANGE UP (ref 10.3–14.5)
SODIUM SERPL-SCNC: 142 MMOL/L — SIGNIFICANT CHANGE UP (ref 135–145)
T3 SERPL-MCNC: 44 NG/DL — LOW (ref 80–200)
T4 AB SER-ACNC: 5.5 UG/DL — SIGNIFICANT CHANGE UP (ref 4.6–12)
TROPONIN I SERPL-MCNC: 0.17 NG/ML — HIGH (ref 0.02–0.06)
TROPONIN I SERPL-MCNC: 0.24 NG/ML — HIGH (ref 0.02–0.06)
TSH SERPL-MCNC: 4.78 UIU/ML — HIGH (ref 0.27–4.2)
WBC # BLD: 11.87 K/UL — HIGH (ref 3.8–10.5)
WBC # FLD AUTO: 11.87 K/UL — HIGH (ref 3.8–10.5)

## 2019-01-01 PROCEDURE — 99233 SBSQ HOSP IP/OBS HIGH 50: CPT | Mod: GC

## 2019-01-01 PROCEDURE — 93010 ELECTROCARDIOGRAM REPORT: CPT

## 2019-01-01 RX ORDER — AMIODARONE HYDROCHLORIDE 400 MG/1
0.5 TABLET ORAL
Qty: 900 | Refills: 0 | Status: DISCONTINUED | OUTPATIENT
Start: 2019-01-01 | End: 2019-01-02

## 2019-01-01 RX ORDER — AMIODARONE HYDROCHLORIDE 400 MG/1
1 TABLET ORAL
Qty: 900 | Refills: 0 | Status: DISCONTINUED | OUTPATIENT
Start: 2019-01-01 | End: 2019-01-02

## 2019-01-01 RX ORDER — AMIODARONE HYDROCHLORIDE 400 MG/1
150 TABLET ORAL ONCE
Qty: 0 | Refills: 0 | Status: COMPLETED | OUTPATIENT
Start: 2019-01-01 | End: 2019-01-01

## 2019-01-01 RX ORDER — METOPROLOL TARTRATE 50 MG
5 TABLET ORAL ONCE
Qty: 0 | Refills: 0 | Status: COMPLETED | OUTPATIENT
Start: 2019-01-01 | End: 2019-01-01

## 2019-01-01 RX ORDER — ALPRAZOLAM 0.25 MG
0.25 TABLET ORAL ONCE
Qty: 0 | Refills: 0 | Status: DISCONTINUED | OUTPATIENT
Start: 2019-01-01 | End: 2019-01-01

## 2019-01-01 RX ADMIN — Medication 1 TABLET(S): at 11:47

## 2019-01-01 RX ADMIN — Medication 0.25 MILLIGRAM(S): at 21:29

## 2019-01-01 RX ADMIN — AMIODARONE HYDROCHLORIDE 33.33 MG/MIN: 400 TABLET ORAL at 05:03

## 2019-01-01 RX ADMIN — Medication 37.5 MICROGRAM(S): at 22:16

## 2019-01-01 RX ADMIN — PIPERACILLIN AND TAZOBACTAM 25 GRAM(S): 4; .5 INJECTION, POWDER, LYOPHILIZED, FOR SOLUTION INTRAVENOUS at 02:30

## 2019-01-01 RX ADMIN — CHLORHEXIDINE GLUCONATE 1 APPLICATION(S): 213 SOLUTION TOPICAL at 11:59

## 2019-01-01 RX ADMIN — AMIODARONE HYDROCHLORIDE 618 MILLIGRAM(S): 400 TABLET ORAL at 04:45

## 2019-01-01 RX ADMIN — SODIUM CHLORIDE 100 MILLILITER(S): 9 INJECTION, SOLUTION INTRAVENOUS at 05:13

## 2019-01-01 RX ADMIN — Medication 325 MILLIGRAM(S): at 11:47

## 2019-01-01 RX ADMIN — TAMSULOSIN HYDROCHLORIDE 0.4 MILLIGRAM(S): 0.4 CAPSULE ORAL at 21:29

## 2019-01-01 RX ADMIN — PANTOPRAZOLE SODIUM 10 MG/HR: 20 TABLET, DELAYED RELEASE ORAL at 17:27

## 2019-01-01 RX ADMIN — Medication 80 MILLIGRAM(S): at 17:27

## 2019-01-01 RX ADMIN — Medication 5 MILLIGRAM(S): at 18:02

## 2019-01-01 RX ADMIN — PIPERACILLIN AND TAZOBACTAM 25 GRAM(S): 4; .5 INJECTION, POWDER, LYOPHILIZED, FOR SOLUTION INTRAVENOUS at 11:48

## 2019-01-01 RX ADMIN — PIPERACILLIN AND TAZOBACTAM 25 GRAM(S): 4; .5 INJECTION, POWDER, LYOPHILIZED, FOR SOLUTION INTRAVENOUS at 17:27

## 2019-01-01 RX ADMIN — SODIUM CHLORIDE 2000 MILLILITER(S): 9 INJECTION INTRAMUSCULAR; INTRAVENOUS; SUBCUTANEOUS at 00:00

## 2019-01-01 NOTE — PROGRESS NOTE ADULT - ASSESSMENT
The patient is a 89 year old male with a history of hypothyroidism, BPH, PUD, atrial fibrillation, chronic diastolic heart failure who was admitted with respiratory failure, GI bleed, hypotension.    Plan:  - Echo from 2017 with grossly low normal LV systolic function, no significant valve issues  - Continue amiodarone drip. When able to take PO or PEG tube, will restart sotalolol.  - Turn off fluids as patient is becoming more edematous  - BNP elevated at [address]  - Troponin peaked at 0.292 in the setting of type 2 NSTEMI (demand ischemia) from respiratory failure and GI bleed  - Check serial hemoglobin  - INR improved with vitamin K  - Remain off of anticoagulation. Given multiple episodes of GI bleed with supratherapeutic INR, warfarin may not be the best option. Eventually can consider apixaban or no anticoagulation.  - GI and crit care follow-up

## 2019-01-01 NOTE — PROGRESS NOTE ADULT - ASSESSMENT
89M with HTN, hypothyroidism, BPH, afib, CKD, who presents with SOB.  Patient was in his usual state of health with no recent illness when he tried taking two of his pills this evening.  SOB 2/2 aspiration pneumonitis.      -Aspiration pneumonitis.    - Continue with SPCU level of care.    - cover with zosyn  - pulmonary  evaluation appreciated.   - f/u cultures.     -GI bleed: hx of billroth II w/ gastrojej ulcer in 2017- now resolved.   npo. ivfs  serial cbc  Continue with iv protonix  gi consult appreciated.    AC and anti platelets on hold.     - Hypothyroidism- stable , continue with Levothyroxine.     -Benign prostatic hyperplasia, unspecified whether lower urinary tract symptoms present.  Plan: - cont with BPH meds.     -Hypothyroidism, unspecified type.  Plan: - cont with synthroid.     -Atrial fibrillation, unspecified type.   - continue with Amiodarone and  sotalol for now.   cardio consult appreciated .   Hold AC for now.     -Preventive measure.  Plan: IMPROVE VTE Individual Risk Assessment          RISK                                                          Points  [  ] Previous VTE                                                 3  [  ] Thrombophilia                                              2  [  ] Lower limb paralysis                                    2        (unable to hold up >15 seconds)    [  ] Current Cancer                                             2         (within 6 months)  [  ] Immobilization > 24 hrs                              1  [  ] ICU/CCU stay > 24 hours                            1  [X] Age > 60                                                        1    IMPROVE VTE Score 1    -SCD's DVT ppx.

## 2019-01-01 NOTE — PROGRESS NOTE ADULT - SUBJECTIVE AND OBJECTIVE BOX
Patient is a 89y old  Male who presents with a chief complaint of SOB (01 Jan 2019 06:55)      INTERVAL HPI/OVERNIGHT EVENTS: no acute overnight events.     MEDICATIONS  (STANDING):  amiodarone Infusion 1 mG/Min (33.333 mL/Hr) IV Continuous <Continuous>  amiodarone Infusion 0.5 mG/Min (16.667 mL/Hr) IV Continuous <Continuous>  chlorhexidine 2% Cloths 1 Application(s) Topical daily  ferrous    sulfate 325 milliGRAM(s) Oral daily  influenza   Vaccine 0.5 milliLiter(s) IntraMuscular once  levothyroxine Injectable 37.5 MICROGram(s) IV Push at bedtime  multivitamin 1 Tablet(s) Oral daily  pantoprazole Infusion 8 mG/Hr (10 mL/Hr) IV Continuous <Continuous>  piperacillin/tazobactam IVPB. 3.375 Gram(s) IV Intermittent every 8 hours  sotalol 80 milliGRAM(s) Oral every 12 hours  tamsulosin 0.4 milliGRAM(s) Oral at bedtime    MEDICATIONS  (PRN):      Allergies    No Known Allergies    Intolerances        REVIEW OF SYSTEMS:    ROS Unable to obtain due to - [ ] Dementia        Vital Signs Last 24 Hrs  T(C): 36.8 (01 Jan 2019 12:42), Max: 37.1 (01 Jan 2019 04:06)  T(F): 98.2 (01 Jan 2019 12:42), Max: 98.8 (01 Jan 2019 04:06)  HR: 101 (01 Jan 2019 13:00) (71 - 145)  BP: 148/84 (01 Jan 2019 13:00) (84/53 - 148/84)  BP(mean): 102 (01 Jan 2019 13:00) (61 - 102)  RR: 29 (01 Jan 2019 13:00) (19 - 31)  SpO2: 97% (01 Jan 2019 13:00) (89% - 100%)    PHYSICAL EXAM:  GENERAL: NAD  HEENT:  EOMI, mmm  CHEST/LUNG:  CTA b/l, no rales, wheezes, or rhonchi  HEART:  RRR, S1, S2, []murmur  ABDOMEN:  BS+, soft, NT, ND  EXTREMITIES: no edema or calf tenderness  NERVOUS SYSTEM: Awake.     DIET:     Cultures: Culture Results:   No growth to date. (12-31 @ 12:33)  Culture Results:   No growth to date. (12-31 @ 12:33)      LABS:                        10.6   11.87 )-----------( 115      ( 01 Jan 2019 06:12 )             32.9     CBC Full  -  ( 01 Jan 2019 06:12 )  WBC Count : 11.87 K/uL  Hemoglobin : 10.6 g/dL  Hematocrit : 32.9 %  Platelet Count - Automated : 115 K/uL  Mean Cell Volume : 97.9 fl  Mean Cell Hemoglobin : 31.5 pg  Mean Cell Hemoglobin Concentration : 32.2 gm/dL  Auto Neutrophil # : x  Auto Lymphocyte # : x  Auto Monocyte # : x  Auto Eosinophil # : x  Auto Basophil # : x  Auto Neutrophil % : x  Auto Lymphocyte % : x  Auto Monocyte % : x  Auto Eosinophil % : x  Auto Basophil % : x    01 Jan 2019 06:12    142    |  109    |  35     ----------------------------<  116    4.1     |  22     |  1.84     Ca    8.0        01 Jan 2019 06:12      PT/INR - ( 01 Jan 2019 06:12 )   PT: 19.3 sec;   INR: 1.74 ratio         PTT - ( 31 Dec 2018 00:56 )  PTT:43.6 sec    CAPILLARY BLOOD GLUCOSE              RADIOLOGY & ADDITIONAL TESTS:    Personally reviewed.     Consultant(s) Notes Reviewed:  [x] YES  [ ] NO    Care Discussed with [ ] Consultants  [x] Patient  [ ] Family  [x]      [ ] Other; RN  DVT ppx  Advanced directive

## 2019-01-01 NOTE — PROGRESS NOTE ADULT - SUBJECTIVE AND OBJECTIVE BOX
Chief Complaint: Shortness of breath, hematemesis    Interval Events: Went into rapid AF. Started on amiodarone drip.    Review of Systems:  General: No fevers, chills, weight loss or gain  Skin: No rashes, color changes  Cardiovascular: No chest pain, orthopnea  Respiratory: No shortness of breath, cough  Gastrointestinal: No nausea, abdominal pain  Genitourinary: No incontinence, pain with urination  Musculoskeletal: No pain, swelling, decreased range of motion  Neurological: No headache, weakness  Psychiatric: No depression, anxiety  Endocrine: No weight loss or gain, increased thirst  All other systems are comprehensively negative.    Physical Exam:  Vitals:        Vital Signs Last 24 Hrs  T(C): 36.7 (01 Jan 2019 08:31), Max: 37.1 (01 Jan 2019 04:06)  T(F): 98 (01 Jan 2019 08:31), Max: 98.8 (01 Jan 2019 04:06)  HR: 88 (01 Jan 2019 08:00) (70 - 145)  BP: 111/72 (01 Jan 2019 08:00) (84/53 - 124/79)  BP(mean): 86 (01 Jan 2019 08:00) (61 - 93)  RR: 25 (01 Jan 2019 08:00) (19 - 30)  SpO2: 100% (01 Jan 2019 08:00) (89% - 100%)  General: NAD  HEENT: MMM  Neck: No JVD, no carotid bruit  Lungs: CTAB  CV: Irregular, nl S1/S2, no M/R/G  Abdomen: S/NT/ND, +BS  Extremities: 2+ LE edema, no cyanosis  Neuro: AAOx3, non-focal  Skin: No rash    Labs:                        10.6   11.87 )-----------( 115      ( 01 Jan 2019 06:12 )             32.9     01-01    142  |  109<H>  |  35<H>  ----------------------------<  116<H>  4.1   |  22  |  1.84<H>    Ca    8.0<L>      01 Jan 2019 06:12    TPro  6.9  /  Alb  3.1<L>  /  TBili  0.4  /  DBili  x   /  AST  27  /  ALT  24  /  AlkPhos  108  12-31    CARDIAC MARKERS ( 01 Jan 2019 06:12 )  .244 ng/mL / x     / x     / x     / x      CARDIAC MARKERS ( 31 Dec 2018 16:35 )  .292 ng/mL / x     / x     / x     / x      CARDIAC MARKERS ( 31 Dec 2018 00:53 )  .080 ng/mL / x     / 93 U/L / x     / 4.1 ng/mL      PT/INR - ( 01 Jan 2019 06:12 )   PT: 19.3 sec;   INR: 1.74 ratio         PTT - ( 31 Dec 2018 00:56 )  PTT:43.6 sec    Telemetry: Sinus rhythm -> AF

## 2019-01-01 NOTE — PROVIDER CONTACT NOTE (EICU) - RECOMMENDATIONS
Recommendations as noted. Discussed with primary team or primary team covering physician/acp, who will review and implement final decision for patient care.

## 2019-01-01 NOTE — PROGRESS NOTE ADULT - SUBJECTIVE AND OBJECTIVE BOX
Date/Time Patient Seen:  		  Referring MD:   Data Reviewed	       Patient is a 89y old  Male who presents with a chief complaint of SOB (01 Jan 2019 00:17)      Subjective/HPI     PAST MEDICAL & SURGICAL HISTORY:  Mitral valve disorder  Kidney stone  Diverticulosis  CKD (chronic kidney disease)  Afib  Hypothyroidism  BPH (benign prostatic hyperplasia)  Chronic peptic ulcer: S/P surgery more than 10 yrs ago  No significant past surgical history        Medication list         MEDICATIONS  (STANDING):  amiodarone Infusion 1 mG/Min (33.333 mL/Hr) IV Continuous <Continuous>  amiodarone Infusion 0.5 mG/Min (16.667 mL/Hr) IV Continuous <Continuous>  chlorhexidine 2% Cloths 1 Application(s) Topical daily  ferrous    sulfate 325 milliGRAM(s) Oral daily  influenza   Vaccine 0.5 milliLiter(s) IntraMuscular once  lactated ringers. 1000 milliLiter(s) (100 mL/Hr) IV Continuous <Continuous>  levothyroxine Injectable 37.5 MICROGram(s) IV Push at bedtime  multivitamin 1 Tablet(s) Oral daily  pantoprazole Infusion 8 mG/Hr (10 mL/Hr) IV Continuous <Continuous>  piperacillin/tazobactam IVPB. 3.375 Gram(s) IV Intermittent every 8 hours  sotalol 80 milliGRAM(s) Oral every 12 hours  tamsulosin 0.4 milliGRAM(s) Oral at bedtime    MEDICATIONS  (PRN):         Vitals log        ICU Vital Signs Last 24 Hrs  T(C): 37.1 (01 Jan 2019 04:06), Max: 37.1 (01 Jan 2019 04:06)  T(F): 98.8 (01 Jan 2019 04:06), Max: 98.8 (01 Jan 2019 04:06)  HR: 90 (01 Jan 2019 06:00) (70 - 145)  BP: 97/62 (01 Jan 2019 06:00) (72/44 - 124/79)  BP(mean): 74 (01 Jan 2019 06:00) (54 - 93)  ABP: --  ABP(mean): --  RR: 24 (01 Jan 2019 06:00) (17 - 30)  SpO2: 96% (01 Jan 2019 06:00) (89% - 100%)           Input and Output:  I&O's Detail    30 Dec 2018 07:01  -  31 Dec 2018 07:00  --------------------------------------------------------  IN:    Lactated Ringers IV Bolus: 2000 mL    lactated ringers.: 100 mL    pantoprazole Infusion: 50 mL    Plasma: 311 mL  Total IN: 2461 mL    OUT:    Voided: 1 mL  Total OUT: 1 mL    Total NET: 2460 mL      31 Dec 2018 07:01  -  01 Jan 2019 06:55  --------------------------------------------------------  IN:    amiodarone Infusion: 100 mL    amiodarone Infusion: 99.9 mL    IV PiggyBack: 300 mL    lactated ringers.: 2400 mL    pantoprazole Infusion: 240 mL  Total IN: 3139.9 mL    OUT:    Nasoenteral Tube: 160 mL    Voided: 200 mL  Total OUT: 360 mL    Total NET: 2779.9 mL          Lab Data                        10.6   11.87 )-----------( 115      ( 01 Jan 2019 06:12 )             32.9     01-01    142  |  109<H>  |  35<H>  ----------------------------<  116<H>  4.1   |  22  |  1.84<H>    Ca    8.0<L>      01 Jan 2019 06:12    TPro  6.9  /  Alb  3.1<L>  /  TBili  0.4  /  DBili  x   /  AST  27  /  ALT  24  /  AlkPhos  108  12-31    ABG - ( 31 Dec 2018 05:26 )  pH, Arterial: x     pH, Blood: 7.38  /  pCO2: 35    /  pO2: 86    / HCO3: 21    / Base Excess: -4.2  /  SaO2: 94                CARDIAC MARKERS ( 31 Dec 2018 16:35 )  .292 ng/mL / x     / x     / x     / x      CARDIAC MARKERS ( 31 Dec 2018 00:53 )  .080 ng/mL / x     / 93 U/L / x     / 4.1 ng/mL        Review of Systems	      Objective     Physical Examination    heart s1s2  lung dec BS  abd dec BS  NG tube in place  on o2 support      Pertinent Lab findings & Imaging      Apoorva:  NO   Adequate UO     I&O's Detail    30 Dec 2018 07:01  -  31 Dec 2018 07:00  --------------------------------------------------------  IN:    Lactated Ringers IV Bolus: 2000 mL    lactated ringers.: 100 mL    pantoprazole Infusion: 50 mL    Plasma: 311 mL  Total IN: 2461 mL    OUT:    Voided: 1 mL  Total OUT: 1 mL    Total NET: 2460 mL      31 Dec 2018 07:01  -  01 Jan 2019 06:55  --------------------------------------------------------  IN:    amiodarone Infusion: 100 mL    amiodarone Infusion: 99.9 mL    IV PiggyBack: 300 mL    lactated ringers.: 2400 mL    pantoprazole Infusion: 240 mL  Total IN: 3139.9 mL    OUT:    Nasoenteral Tube: 160 mL    Voided: 200 mL  Total OUT: 360 mL    Total NET: 2779.9 mL               Discussed with:     Cultures:	        Radiology

## 2019-01-01 NOTE — PROGRESS NOTE ADULT - ASSESSMENT
89M with PMHx MVP, nephrolithiasis, CKD, cAF on coumadin, BPH, PUD sp bilroth, diverticulosis who presented to ED on 12/31 c/o SOB with acute respiratory insufficiency, hypoxemia, aspiration pnemonitis, GIB, hematemesis, CKD, hypothyroidism, elevated INR, severe sepsis

## 2019-01-01 NOTE — CHART NOTE - NSCHARTNOTEFT_GEN_A_CORE
MICU PA  pt with redevelopment of AF with RVR, rates into 140s, BP is stable in 120s systolic, pt remains without distress. IV amiodorone started as per Cardiology consult recs.  TSH ok. Last K 4.7. Repeat BMP and trop pending this am.  Will get 12 leak EKG, pt without  complaints of CP, neck pain, jaw pain, arm pain, or SOB at this time.

## 2019-01-01 NOTE — PROGRESS NOTE ADULT - ATTENDING COMMENTS
35 minutes of critical care time spent with the patient and coordinating care with nursing, hospitalist, and consultants. Patient is critically ill requiring ICU care due to GI bleed, NSTEMI, rapid AF requiring IV infusion. The patient is high risk for deterioration and death.

## 2019-01-01 NOTE — PROGRESS NOTE ADULT - PROBLEM SELECTOR PLAN 9
WBC 3.1, lactate >2, tachypenia with bilateral infiltrates on CXR  received 30cc/kg fluid challenge  WBC has normalized, afebrile, lactate resolved following resuscitation  cont broad spectrum abx  follow up cultures  follow up with ID

## 2019-01-01 NOTE — PROGRESS NOTE ADULT - SUBJECTIVE AND OBJECTIVE BOX
Patient is a 89y old  Male who presents with a chief complaint of SOB (31 Dec 2018 15:06)      BRIEF HOSPITAL COURSE: 89M with PMHx MVP, nephrolithiasis, CKD, cAF on coumadin, BPH, PUD sp bilroth, diverticulosis who presented to ED on 12/31 c/o SOB.  Events began at home after choking on his nightime meds with subsequent vomiting (described as bloody) and aspiration.  Upon presentation was hypoxic requiring escalating O2 requirements. He was further placed on NIPPV due to hypoxia and increased WOB.  While on NIPPV pt experienced another vomiting episode which was also described as containing some blood.  Bleeding was noted in the face of a supratherapeutic INR at 4.5.     Events last 24 hours: No further hematemesis, NGT remains in place but without bloody drainage, remains on protonix drip, hemodynamically stable, weaned to NC with improvement in his WOB and overall O2 requirements.    PAST MEDICAL & SURGICAL HISTORY:  Mitral valve disorder  Kidney stone  Diverticulosis  CKD (chronic kidney disease)  Afib  Hypothyroidism  BPH (benign prostatic hyperplasia)  Chronic peptic ulcer: S/P surgery more than 10 yrs ago      Review of Systems:  CONSTITUTIONAL: No fever, chills, or fatigue  NECK: No pain or stiffness  RESPIRATORY: No cough, wheezing, chills or hemoptysis; mild shortness of breath  CARDIOVASCULAR: No chest pain, palpitations, dizziness, or leg swelling  GASTROINTESTINAL: No abdominal or epigastric pain. No nausea, + vomiting/hematemesis prior, no further episodes today; No diarrhea or constipation. No melena or hematochezia.  GENITOURINARY: No dysuria, frequency, hematuria, or incontinence  NEUROLOGICAL: No headaches, memory loss, loss of strength, numbness, or tremors        Medications:  piperacillin/tazobactam IVPB. 3.375 Gram(s) IV Intermittent every 8 hours    sotalol 80 milliGRAM(s) Oral every 12 hours  tamsulosin 0.4 milliGRAM(s) Oral at bedtime            pantoprazole Infusion 8 mG/Hr IV Continuous <Continuous>      levothyroxine Injectable 37.5 MICROGram(s) IV Push at bedtime    ferrous    sulfate 325 milliGRAM(s) Oral daily  lactated ringers. 1000 milliLiter(s) IV Continuous <Continuous>  multivitamin 1 Tablet(s) Oral daily  sodium chloride 0.9% Bolus 1000 milliLiter(s) IV Bolus once PRN    influenza   Vaccine 0.5 milliLiter(s) IntraMuscular once    chlorhexidine 2% Cloths 1 Application(s) Topical daily            ICU Vital Signs Last 24 Hrs  T(C): 36.4 (31 Dec 2018 20:06), Max: 37.2 (31 Dec 2018 04:01)  T(F): 97.5 (31 Dec 2018 20:06), Max: 98.9 (31 Dec 2018 04:01)  HR: 71 (31 Dec 2018 22:00) (70 - 149)  BP: 93/55 (31 Dec 2018 22:00) (72/44 - 167/96)  BP(mean): 67 (31 Dec 2018 22:00) (54 - 117)  ABP: --  ABP(mean): --  RR: 21 (31 Dec 2018 22:00) (16 - 33)  SpO2: 94% (31 Dec 2018 22:00) (87% - 100%)      ABG - ( 31 Dec 2018 05:26 )  pH, Arterial: x     pH, Blood: 7.38  /  pCO2: 35    /  pO2: 86    / HCO3: 21    / Base Excess: -4.2  /  SaO2: 94                        LABS:                        10.9   10.32 )-----------( 131      ( 31 Dec 2018 18:42 )             33.6     12-31    144  |  110<H>  |  41<H>  ----------------------------<  109<H>  4.6   |  23  |  1.92<H>    Ca    8.4      31 Dec 2018 05:25    TPro  6.9  /  Alb  3.1<L>  /  TBili  0.4  /  DBili  x   /  AST  27  /  ALT  24  /  AlkPhos  108  12-31      CARDIAC MARKERS ( 31 Dec 2018 16:35 )  .292 ng/mL / x     / x     / x     / x      CARDIAC MARKERS ( 31 Dec 2018 00:53 )  .080 ng/mL / x     / 93 U/L / x     / 4.1 ng/mL      PT/INR - ( 31 Dec 2018 14:40 )   PT: 43.1 sec;   INR: 3.79 ratio         PTT - ( 31 Dec 2018 00:56 )  PTT:43.6 sec    Physical Examination:    General: No acute distress.  Alert, oriented, interactive, nonfocal    HEENT: Pupils equal, reactive to light.  Symmetric, sclera anicteric    PULM: Clear to auscultation anteriorly bilaterally, bases are diminished bilaterally with some insp rales, no significant sputum production    CVS: Regular rate and rhythm, S1/S2, NSR    ABD: Soft, nondistended, nontender, normoactive bowel sounds, no rebound or guarding    EXT: 1+ pitting LE edema, nontender    SKIN: Warm and well perfused, no rashes noted.    RADIOLOGY:   EXAM:  CT CHEST                                  PROCEDURE DATE:  12/31/2018          INTERPRETATION:  Clinical information: Respiratory distress, sepsis    Comparison CT chest study dated 4/25/2017. Comparison CT abdomen-pelvis   study dated 11/14/2014.    Axial images obtained, coronal and sagittal images computer reformatted.   Noncontrast exam. Neither intravenous or oral contrast material was   administered which limits the study.        CHEST: An NG tube is present. No thoracic aortic aneurysm or pericardial   effusion. Global cardiomegaly present. Coronary artery calcifications   present. Central airway intact. Thyroid gland not enlarged.    Minimal bibasilar effusions right greater than left. Pleural thickening   with pleural parenchymal scarring at the apices unchanged since the prior   exam. Calcifications evident within the right apical scarring.    Multifocal bilateral airspace disease present. Airspace disease present   in the left upper lobe, left lower lobe, right middle lobe, and right   lower lobe.    No acute osseous abnormalities visible in the thoracic skeleton.    Small anterior mediastinal lymph nodes present could be reactive. Hilar   regions obscured by the extensive airspace disease.        ABDOMEN-PELVIS: Postcholecystectomy clips present. Hepatic parenchyma   homogeneous. The spleen is not enlarged. No adrenal lesions evident. No   hydronephrotic changes identified. Traces bilateral perinephric stranding   noted. Unenhanced pancreas shows no lesions. No aortic aneurysm. No   retroperitoneal lymphadenopathy. No ascites. No biliary ductal   dilatation. Past history of gastric surgery type unspecified.    No bowel obstruction. Edematous appearance of the mesentery could be   related to volume overload. Diverticulosis present. Urinary bladder shows   a thickened wall. A calcification is visible posterior aspect of the   urinary bladder slightly to the right of midline, could represent a stone   in a diverticulum or localized to the wall of the urinary bladder.   Calcification measures 4 mm. The prostate is markedly enlarged. No free   pelvic fluid evident. Inguinal regions intact. Multilevel disc   degenerative disease lower lumbar region.      IMPRESSION: See above reports.    TIME SPENT: 40mins of time spent evaluating/treating pt, reviewing charts/labs/films, discussing care plan with bedside team.

## 2019-01-02 DIAGNOSIS — A41.9 SEPSIS, UNSPECIFIED ORGANISM: ICD-10-CM

## 2019-01-02 DIAGNOSIS — J18.1 LOBAR PNEUMONIA, UNSPECIFIED ORGANISM: ICD-10-CM

## 2019-01-02 LAB
ANION GAP SERPL CALC-SCNC: 12 MMOL/L — SIGNIFICANT CHANGE UP (ref 5–17)
ANION GAP SERPL CALC-SCNC: 12 MMOL/L — SIGNIFICANT CHANGE UP (ref 5–17)
BASE EXCESS BLDA CALC-SCNC: -2.1 MMOL/L — LOW (ref -2–2)
BASE EXCESS BLDA CALC-SCNC: -4.5 MMOL/L — LOW (ref -2–2)
BLOOD GAS SOURCE: SIGNIFICANT CHANGE UP
BLOOD GAS SOURCE: SIGNIFICANT CHANGE UP
BUN SERPL-MCNC: 40 MG/DL — HIGH (ref 7–23)
BUN SERPL-MCNC: 48 MG/DL — HIGH (ref 7–23)
CALCIUM SERPL-MCNC: 8.8 MG/DL — SIGNIFICANT CHANGE UP (ref 8.4–10.5)
CALCIUM SERPL-MCNC: 9.1 MG/DL — SIGNIFICANT CHANGE UP (ref 8.4–10.5)
CHLORIDE SERPL-SCNC: 104 MMOL/L — SIGNIFICANT CHANGE UP (ref 96–108)
CHLORIDE SERPL-SCNC: 106 MMOL/L — SIGNIFICANT CHANGE UP (ref 96–108)
CO2 SERPL-SCNC: 23 MMOL/L — SIGNIFICANT CHANGE UP (ref 22–31)
CO2 SERPL-SCNC: 24 MMOL/L — SIGNIFICANT CHANGE UP (ref 22–31)
CREAT SERPL-MCNC: 2.12 MG/DL — HIGH (ref 0.5–1.3)
CREAT SERPL-MCNC: 2.24 MG/DL — HIGH (ref 0.5–1.3)
GLUCOSE SERPL-MCNC: 151 MG/DL — HIGH (ref 70–99)
GLUCOSE SERPL-MCNC: 168 MG/DL — HIGH (ref 70–99)
HCO3 BLDA-SCNC: 21 MMOL/L — SIGNIFICANT CHANGE UP (ref 21–29)
HCO3 BLDA-SCNC: 23 MMOL/L — SIGNIFICANT CHANGE UP (ref 21–29)
HCT VFR BLD CALC: 37.1 % — LOW (ref 39–50)
HCT VFR BLD CALC: 39.5 % — SIGNIFICANT CHANGE UP (ref 39–50)
HGB BLD-MCNC: 12.1 G/DL — LOW (ref 13–17)
HGB BLD-MCNC: 12.6 G/DL — LOW (ref 13–17)
HOROWITZ INDEX BLDA+IHG-RTO: 36 — SIGNIFICANT CHANGE UP
HOROWITZ INDEX BLDA+IHG-RTO: 50 — SIGNIFICANT CHANGE UP
LACTATE SERPL-SCNC: 2.5 MMOL/L — HIGH (ref 0.7–2)
MCHC RBC-ENTMCNC: 31.3 PG — SIGNIFICANT CHANGE UP (ref 27–34)
MCHC RBC-ENTMCNC: 31.3 PG — SIGNIFICANT CHANGE UP (ref 27–34)
MCHC RBC-ENTMCNC: 31.9 GM/DL — LOW (ref 32–36)
MCHC RBC-ENTMCNC: 32.6 GM/DL — SIGNIFICANT CHANGE UP (ref 32–36)
MCV RBC AUTO: 96.1 FL — SIGNIFICANT CHANGE UP (ref 80–100)
MCV RBC AUTO: 98 FL — SIGNIFICANT CHANGE UP (ref 80–100)
NRBC # BLD: 0 /100 WBCS — SIGNIFICANT CHANGE UP (ref 0–0)
NRBC # BLD: 0 /100 WBCS — SIGNIFICANT CHANGE UP (ref 0–0)
PCO2 BLDA: 37 MMHG — SIGNIFICANT CHANGE UP (ref 32–46)
PCO2 BLDA: 39 MMHG — SIGNIFICANT CHANGE UP (ref 32–46)
PH BLD: 7.35 — SIGNIFICANT CHANGE UP (ref 7.35–7.45)
PH BLD: 7.38 — SIGNIFICANT CHANGE UP (ref 7.35–7.45)
PLATELET # BLD AUTO: 108 K/UL — LOW (ref 150–400)
PLATELET # BLD AUTO: 135 K/UL — LOW (ref 150–400)
PO2 BLDA: 121 MMHG — HIGH (ref 74–108)
PO2 BLDA: 63 MMHG — LOW (ref 74–108)
POTASSIUM SERPL-MCNC: 4.2 MMOL/L — SIGNIFICANT CHANGE UP (ref 3.5–5.3)
POTASSIUM SERPL-MCNC: 4.3 MMOL/L — SIGNIFICANT CHANGE UP (ref 3.5–5.3)
POTASSIUM SERPL-SCNC: 4.2 MMOL/L — SIGNIFICANT CHANGE UP (ref 3.5–5.3)
POTASSIUM SERPL-SCNC: 4.3 MMOL/L — SIGNIFICANT CHANGE UP (ref 3.5–5.3)
RBC # BLD: 3.86 M/UL — LOW (ref 4.2–5.8)
RBC # BLD: 4.03 M/UL — LOW (ref 4.2–5.8)
RBC # FLD: 13.3 % — SIGNIFICANT CHANGE UP (ref 10.3–14.5)
RBC # FLD: 13.3 % — SIGNIFICANT CHANGE UP (ref 10.3–14.5)
SAO2 % BLDA: 90 % — LOW (ref 92–96)
SAO2 % BLDA: 96 % — SIGNIFICANT CHANGE UP (ref 92–96)
SODIUM SERPL-SCNC: 139 MMOL/L — SIGNIFICANT CHANGE UP (ref 135–145)
SODIUM SERPL-SCNC: 142 MMOL/L — SIGNIFICANT CHANGE UP (ref 135–145)
WBC # BLD: 17.22 K/UL — HIGH (ref 3.8–10.5)
WBC # BLD: 19.15 K/UL — HIGH (ref 3.8–10.5)
WBC # FLD AUTO: 17.22 K/UL — HIGH (ref 3.8–10.5)
WBC # FLD AUTO: 19.15 K/UL — HIGH (ref 3.8–10.5)

## 2019-01-02 PROCEDURE — 99233 SBSQ HOSP IP/OBS HIGH 50: CPT

## 2019-01-02 PROCEDURE — 71045 X-RAY EXAM CHEST 1 VIEW: CPT | Mod: 26,77

## 2019-01-02 PROCEDURE — 71045 X-RAY EXAM CHEST 1 VIEW: CPT | Mod: 26

## 2019-01-02 RX ORDER — PANTOPRAZOLE SODIUM 20 MG/1
40 TABLET, DELAYED RELEASE ORAL
Qty: 0 | Refills: 0 | Status: DISCONTINUED | OUTPATIENT
Start: 2019-01-02 | End: 2019-01-13

## 2019-01-02 RX ORDER — NOREPINEPHRINE BITARTRATE/D5W 8 MG/250ML
0.05 PLASTIC BAG, INJECTION (ML) INTRAVENOUS
Qty: 8 | Refills: 0 | Status: DISCONTINUED | OUTPATIENT
Start: 2019-01-02 | End: 2019-01-03

## 2019-01-02 RX ORDER — FUROSEMIDE 40 MG
20 TABLET ORAL ONCE
Qty: 0 | Refills: 0 | Status: COMPLETED | OUTPATIENT
Start: 2019-01-02 | End: 2019-01-02

## 2019-01-02 RX ORDER — ALBUTEROL 90 UG/1
2.5 AEROSOL, METERED ORAL EVERY 8 HOURS
Qty: 0 | Refills: 0 | Status: DISCONTINUED | OUTPATIENT
Start: 2019-01-02 | End: 2019-01-13

## 2019-01-02 RX ORDER — VANCOMYCIN HCL 1 G
1000 VIAL (EA) INTRAVENOUS ONCE
Qty: 0 | Refills: 0 | Status: COMPLETED | OUTPATIENT
Start: 2019-01-02 | End: 2019-01-02

## 2019-01-02 RX ORDER — PROPOFOL 10 MG/ML
10 INJECTION, EMULSION INTRAVENOUS
Qty: 1000 | Refills: 0 | Status: DISCONTINUED | OUTPATIENT
Start: 2019-01-02 | End: 2019-01-03

## 2019-01-02 RX ORDER — METOPROLOL TARTRATE 50 MG
25 TABLET ORAL
Qty: 0 | Refills: 0 | Status: DISCONTINUED | OUTPATIENT
Start: 2019-01-02 | End: 2019-01-03

## 2019-01-02 RX ORDER — IPRATROPIUM/ALBUTEROL SULFATE 18-103MCG
3 AEROSOL WITH ADAPTER (GRAM) INHALATION ONCE
Qty: 0 | Refills: 0 | Status: COMPLETED | OUTPATIENT
Start: 2019-01-02 | End: 2019-01-02

## 2019-01-02 RX ORDER — ALPRAZOLAM 0.25 MG
0.25 TABLET ORAL ONCE
Qty: 0 | Refills: 0 | Status: DISCONTINUED | OUTPATIENT
Start: 2019-01-02 | End: 2019-01-02

## 2019-01-02 RX ORDER — AZITHROMYCIN 500 MG/1
500 TABLET, FILM COATED ORAL ONCE
Qty: 0 | Refills: 0 | Status: COMPLETED | OUTPATIENT
Start: 2019-01-02 | End: 2019-01-02

## 2019-01-02 RX ORDER — AZITHROMYCIN 500 MG/1
TABLET, FILM COATED ORAL
Qty: 0 | Refills: 0 | Status: DISCONTINUED | OUTPATIENT
Start: 2019-01-02 | End: 2019-01-04

## 2019-01-02 RX ORDER — AZITHROMYCIN 500 MG/1
500 TABLET, FILM COATED ORAL EVERY 24 HOURS
Qty: 0 | Refills: 0 | Status: DISCONTINUED | OUTPATIENT
Start: 2019-01-03 | End: 2019-01-04

## 2019-01-02 RX ORDER — DEXMEDETOMIDINE HYDROCHLORIDE IN 0.9% SODIUM CHLORIDE 4 UG/ML
0.5 INJECTION INTRAVENOUS
Qty: 200 | Refills: 0 | Status: DISCONTINUED | OUTPATIENT
Start: 2019-01-02 | End: 2019-01-03

## 2019-01-02 RX ORDER — CHLORHEXIDINE GLUCONATE 213 G/1000ML
15 SOLUTION TOPICAL
Qty: 0 | Refills: 0 | Status: DISCONTINUED | OUTPATIENT
Start: 2019-01-02 | End: 2019-01-03

## 2019-01-02 RX ADMIN — Medication 325 MILLIGRAM(S): at 11:51

## 2019-01-02 RX ADMIN — AZITHROMYCIN 255 MILLIGRAM(S): 500 TABLET, FILM COATED ORAL at 16:31

## 2019-01-02 RX ADMIN — PANTOPRAZOLE SODIUM 40 MILLIGRAM(S): 20 TABLET, DELAYED RELEASE ORAL at 17:26

## 2019-01-02 RX ADMIN — Medication 37.5 MICROGRAM(S): at 22:22

## 2019-01-02 RX ADMIN — CHLORHEXIDINE GLUCONATE 1 APPLICATION(S): 213 SOLUTION TOPICAL at 11:57

## 2019-01-02 RX ADMIN — PIPERACILLIN AND TAZOBACTAM 25 GRAM(S): 4; .5 INJECTION, POWDER, LYOPHILIZED, FOR SOLUTION INTRAVENOUS at 01:26

## 2019-01-02 RX ADMIN — Medication 0.25 MILLIGRAM(S): at 15:40

## 2019-01-02 RX ADMIN — Medication 250 MILLIGRAM(S): at 17:25

## 2019-01-02 RX ADMIN — Medication 80 MILLIGRAM(S): at 06:29

## 2019-01-02 RX ADMIN — Medication 1 TABLET(S): at 11:51

## 2019-01-02 RX ADMIN — Medication 3 MILLILITER(S): at 07:10

## 2019-01-02 RX ADMIN — PIPERACILLIN AND TAZOBACTAM 25 GRAM(S): 4; .5 INJECTION, POWDER, LYOPHILIZED, FOR SOLUTION INTRAVENOUS at 09:19

## 2019-01-02 RX ADMIN — Medication 20 MILLIGRAM(S): at 07:35

## 2019-01-02 RX ADMIN — PANTOPRAZOLE SODIUM 10 MG/HR: 20 TABLET, DELAYED RELEASE ORAL at 04:57

## 2019-01-02 RX ADMIN — Medication 20 MILLIGRAM(S): at 17:26

## 2019-01-02 RX ADMIN — PIPERACILLIN AND TAZOBACTAM 25 GRAM(S): 4; .5 INJECTION, POWDER, LYOPHILIZED, FOR SOLUTION INTRAVENOUS at 17:26

## 2019-01-02 RX ADMIN — Medication 25 MILLIGRAM(S): at 11:51

## 2019-01-02 RX ADMIN — DEXMEDETOMIDINE HYDROCHLORIDE IN 0.9% SODIUM CHLORIDE 9.07 MICROGRAM(S)/KG/HR: 4 INJECTION INTRAVENOUS at 17:19

## 2019-01-02 NOTE — PROVIDER CONTACT NOTE (EICU) - SITUATION
E-alert by bedside for elevated HR
ICU rounds done with bedside team. Recommendations discussed with team.
patient with worsening resp. status and AMS, less responsive, patient to be intubated by bedside provider for airway protection

## 2019-01-02 NOTE — PROCEDURE NOTE - ADDITIONAL PROCEDURE DETAILS
This central line was placed due to pt developing shock secondary to sepsis from aspiration PNA for purpose of starting IV pressors to maintain systoloic boood pressure/ ( map > 65)
NG tube placed in setting of upper GI bleeding
indication: airway protection and management in critically ill patient with deteriorating respiratory and mental status.  On visualization of cords excessive secretions noted in airway and suctioned. Once ET tube placed copious, nonbloody secretions noted in ET tube. Patients spo2 remained stable throughout intubation. Levophed started prior to sedatives since patients MAP was 67 with systolics in the 70s and had been trending down throughout the day. BP remained stable throughout intubation.  procedural time independent of documented critical care time

## 2019-01-02 NOTE — PROGRESS NOTE ADULT - ASSESSMENT
89 y/o male with pmhx of MVP, CKD, afib on coumadin, PUD s/p bilroth procedure, nephrolithiasis, hypothyroidism, BPH, diverticulosis who presented to ED on 12/31 with shortness of breath after having a choking episode at home on his medication. Patient vomited and aspirated gastric contents. Patient admitted with aspiration pneumonitis/pneumonia causing severe sepsis with lactemia, elevated INR, ? GIB secondary to hematemesis, and GABO on CKD. He was placed on bipap where he had an additional episode of hematemesis. INR was reversed and has since stabilized. Hospital course further complicated by afib with RVR requiring amio infusion.     Patient required intubation at the start of this shift for airway protection. I was called to patients bedside after being told by hospitalist that patients bp had been downtrending throughout day and lactate re-elevated to 2.5. ABG showed hypoxia with pao2 of 63 otherwise normal. Upon entering room patient had a MAP of 66 with systolic of 78. He was unarousable to verbal or painful stimuli. His breath sounds were noted to be severely diminished throughout lung fields, almost absent on left side therefore decision was made to intubate (refer to procedure note for details). eICU present for intubation. NG/OG tube attempted to be placed, but could not pass and patient started to develop some epistaxis therefore held off for now will reattempt in am.     Family was notified and are now at bedside. I had extensive discussion with them regarding patients condition and they understand patient's clinical status.

## 2019-01-02 NOTE — OCCUPATIONAL THERAPY INITIAL EVALUATION ADULT - GENERAL OBSERVATIONS, REHAB EVAL
Pt found in bed IV, + SCDs , supplemental 02, Pt found in bed IV, + SCDs , supplemental 02 4L, central line, zflex

## 2019-01-02 NOTE — PROCEDURE NOTE - NSPROCDETAILS_GEN_ALL_CORE
nasogastric
patient pre-oxygenated, tube inserted, placement confirmed/connected to ventilator
sterile technique, catheter placed/sterile dressing applied/ultrasound guidance/lumen(s) aspirated and flushed/guidewire recovered

## 2019-01-02 NOTE — PROGRESS NOTE ADULT - SUBJECTIVE AND OBJECTIVE BOX
Chief Complaint:        INTERVAL HPI/OVERNIGHT EVENTS:    no significant events overnight reported      MEDICATIONS  (STANDING):  ALBUTerol/ipratropium for Nebulization. 3 milliLiter(s) Nebulizer once  amiodarone Infusion 1 mG/Min (33.333 mL/Hr) IV Continuous <Continuous>  amiodarone Infusion 0.5 mG/Min (16.667 mL/Hr) IV Continuous <Continuous>  chlorhexidine 2% Cloths 1 Application(s) Topical daily  ferrous    sulfate 325 milliGRAM(s) Oral daily  influenza   Vaccine 0.5 milliLiter(s) IntraMuscular once  levothyroxine Injectable 37.5 MICROGram(s) IV Push at bedtime  multivitamin 1 Tablet(s) Oral daily  pantoprazole Infusion 8 mG/Hr (10 mL/Hr) IV Continuous <Continuous>  piperacillin/tazobactam IVPB. 3.375 Gram(s) IV Intermittent every 8 hours  sotalol 80 milliGRAM(s) Oral every 12 hours  tamsulosin 0.4 milliGRAM(s) Oral at bedtime    MEDICATIONS  (PRN):            Vital Signs Last 24 Hrs  T(C): 36.3 (02 Jan 2019 08:06), Max: 37.4 (01 Jan 2019 19:58)  T(F): 97.3 (02 Jan 2019 08:06), Max: 99.4 (01 Jan 2019 19:58)  HR: 99 (02 Jan 2019 08:00) (81 - 139)  BP: 158/97 (02 Jan 2019 08:00) (110/67 - 174/117)  BP(mean): 115 (02 Jan 2019 08:00) (82 - 132)  RR: 27 (02 Jan 2019 08:00) (21 - 32)  SpO2: 99% (02 Jan 2019 08:00) (91% - 100%)    Physical exam:  abd soft, non tender  cv s1s2  chest air entry  ext no edema          LABS:                        10.6   11.87 )-----------( 115      ( 01 Jan 2019 06:12 )             32.9     01-02    139  |  104  |  40<H>  ----------------------------<  151<H>  4.2   |  23  |  2.12<H>    Ca    9.1      02 Jan 2019 06:53      PT/INR - ( 01 Jan 2019 06:12 )   PT: 19.3 sec;   INR: 1.74 ratio               RADIOLOGY & ADDITIONAL TESTS:

## 2019-01-02 NOTE — PROGRESS NOTE ADULT - ASSESSMENT
The patient is a 89 year old male with a history of hypothyroidism, BPH, PUD, atrial fibrillation, chronic diastolic heart failure who was admitted with respiratory failure, GI bleed, hypotension.    Plan:  - Echo from 2017 with grossly low normal LV systolic function, no significant valve issues  - Continue sotalol 80 mg bid  - Add metoprolol tartrate 25 mg bid  - Received furosemide 20 mg IV this morning. Will give an additional 20 mg dose in evening  - BNP elevated at [address]  - Troponin peaked at 0.292 in the setting of type 2 NSTEMI (demand ischemia) from respiratory failure and GI bleed  - Remain off of anticoagulation. Given multiple episodes of GI bleed with supratherapeutic INR, warfarin may not be the best option. Eventually can consider apixaban or no anticoagulation.  - GI follow-up

## 2019-01-02 NOTE — PROCEDURE NOTE - NSPOSTCAREGUIDE_GEN_A_CORE
Care for catheter as per unit/ICU protocols
Keep the cast/splint/dressing clean and dry
Verbal/written post procedure instructions were given to patient/caregiver

## 2019-01-02 NOTE — PROGRESS NOTE ADULT - SUBJECTIVE AND OBJECTIVE BOX
INTERVAL HPI/OVERNIGHT EVENTS:   Patient seen and examined.  c/o shortness of breath, intermittent dry cough.  No fevers, chills, sweats, dizziness, HA, changes in vision, cp, palpitations,  diarrhea, abd pain, dysuria, focal weakness, or calf pain.     MEDICATIONS  (STANDING):  chlorhexidine 2% Cloths 1 Application(s) Topical daily  ferrous    sulfate 325 milliGRAM(s) Oral daily  furosemide   Injectable 20 milliGRAM(s) IV Push once  influenza   Vaccine 0.5 milliLiter(s) IntraMuscular once  levothyroxine Injectable 37.5 MICROGram(s) IV Push at bedtime  metoprolol tartrate 25 milliGRAM(s) Oral two times a day  multivitamin 1 Tablet(s) Oral daily  pantoprazole Infusion 8 mG/Hr (10 mL/Hr) IV Continuous <Continuous>  piperacillin/tazobactam IVPB. 3.375 Gram(s) IV Intermittent every 8 hours  sotalol 80 milliGRAM(s) Oral every 12 hours  tamsulosin 0.4 milliGRAM(s) Oral at bedtime    MEDICATIONS  (PRN):      REVIEW OF SYSTEMS:  See HPI,  all others negative    PHYSICAL EXAM:  Vital Signs Last 24 Hrs  T(C): 36.3 (02 Jan 2019 08:06), Max: 37.4 (01 Jan 2019 19:58)  T(F): 97.3 (02 Jan 2019 08:06), Max: 99.4 (01 Jan 2019 19:58)  HR: 99 (02 Jan 2019 08:00) (93 - 139)  BP: 158/97 (02 Jan 2019 08:00) (125/88 - 174/117)  BP(mean): 115 (02 Jan 2019 08:00) (98 - 132)  RR: 27 (02 Jan 2019 08:00) (23 - 32)  SpO2: 99% (02 Jan 2019 08:00) (91% - 100%)    GENERAL: NAD, well-groomed, well-developed, awake, alert, oriented x 3, fluent and coherent speech  EYES:  conjunctiva and sclera clear  ENMT: No tonsillar erythema, exudates, or enlargement; Moist mucous membranes,   No lesions  NECK: Supple, No JVD, No Cervical LAD, No thyromegaly, No thyroid nodules felt  NERVOUS SYSTEM:  Good concentration; Moving all 4 extremities; No gross sensory deficits, No facial droop  CHEST WALL: No masses  CHEST/LUNG: b/l rhonchi  HEART: Regular rate and rhythm; No murmurs, rubs, or gallops  ABDOMEN: Soft, Nontender, Nondistended, Bowel sounds present, No palpable masses or organomegaly, No bruits  EXTREMITIES:  2+ Peripheral Pulses, No clubbing, cyanosis, + bipedal edema  LYMPH: No lymphadenopathy    LABS:                        12.6   19.15 )-----------( 135      ( 02 Jan 2019 06:53 )             39.5     02 Jan 2019 06:53    139    |  104    |  40     ----------------------------<  151    4.2     |  23     |  2.12     Ca    9.1        02 Jan 2019 06:53      PT/INR - ( 01 Jan 2019 06:12 )   PT: 19.3 sec;   INR: 1.74 ratio                RADIOLOGY & ADDITIONAL TESTS:

## 2019-01-02 NOTE — OCCUPATIONAL THERAPY INITIAL EVALUATION ADULT - PRECAUTIONS/LIMITATIONS, REHAB EVAL
aspiration precautions/fall precautions/NPO with ice chips/sips water until SLP IE. NPO with ice chips/sips water until SLP IE./aspiration precautions/fall precautions/oxygen therapy device and L/min

## 2019-01-02 NOTE — CHART NOTE - NSCHARTNOTEFT_GEN_A_CORE
Upon Nutritional Assessment by the Registered Dietitian your patient was determined to meet criteria / has evidence of the following diagnosis/diagnoses:          [ ]  Mild Protein Calorie Malnutrition        [ ]  Moderate Protein Calorie Malnutrition        [ x] Severe Protein Calorie Malnutrition        [ ] Unspecified Protein Calorie Malnutrition        [ ] Underweight / BMI <19        [ ] Morbid Obesity / BMI > 40      Findings as based on:  •  Comprehensive nutrition assessment and consultation  •  Calorie counts (nutrient intake analysis)  •  Food acceptance and intake status from observations by staff  •  Follow up  •  Patient education  •  Intervention secondary to interdisciplinary rounds  •   concerns      Treatment:    The following diet has been recommended: diet per SLP      PROVIDER Section:     By signing this assessment you are acknowledging and agree with the diagnosis/diagnoses assigned by the Registered Dietitian    Comments:      Pt assessed as per [facility] length of stay policy: 89M with hypothyroidism, BPH, afib, CKD, who presents with SOB.   Pt started to choke on his pills and  then started to vomit through his mouth and nose and since then he has had trouble breathing. Patient placed on BiPAP where he started to vomit.  Pt found to have aspiration pna and per pulmonologist, pt c RALPH, pulmonary edema.  Patient placed on high flow O2.  Pt , based on nutrition focused  physical exam found to have temporal/occipital moderate wasting and protruding clavicle. Pt c muscle loss to chest and shoulders. Pt legs and ankles +2 and +3 respectively. Pt states usual weight 160#( also reported in RD assessment from April 2017). Current weight 166# (gain likely secondary to edema.)  Pt has been NPO x 3 days. Given moderate/severe edema to extremities and based on NFPE findings, pt meets criteria for severe malnutrition in the context of acute illness.  SLP eval pending. Will make recommendations based on results.

## 2019-01-02 NOTE — CONSULT NOTE ADULT - CONSULT REASON
vomiting
Acute hypoxic respiratory failure sec severe aspiration pneumonia
Respiratory failure, atrial fibrillation
eval GI bleed  Asp pna eval  sepsis eval  AF
difficulty breathing post chocking/ vomiting incident

## 2019-01-02 NOTE — PROCEDURE NOTE - NSINDICATIONS_GEN_A_CORE
airway protection/critical patient/mental status change/breath sounds severely limited while on bipap
volume resuscitation/critical illness/emergency venous access
GI bleed

## 2019-01-02 NOTE — SWALLOW BEDSIDE ASSESSMENT ADULT - SWALLOW EVAL: RECOMMENDED FEEDING/EATING TECHNIQUES
small sips/bites/as upright as pioissible/allow for swallow between intakes/crush medication (when feasible)

## 2019-01-02 NOTE — PROVIDER CONTACT NOTE (CRITICAL VALUE NOTIFICATION) - ACTION/TREATMENT ORDERED:
treatment in progress
Continue to monitor. Repeat troponin in AM.
Awaiting call back from Dr. Stephanie Brumfield made aware, will repeat lactate
Received additional 2L LR IV bolus, repeat lactate 6hrs

## 2019-01-02 NOTE — OCCUPATIONAL THERAPY INITIAL EVALUATION ADULT - TRANSFER SAFETY CONCERNS NOTED: SIT/STAND, REHAB EVAL
decreased weight-shifting ability/losing balance decreased weight-shifting ability/+ edema Edgar feet (nursing aware)/losing balance

## 2019-01-02 NOTE — OCCUPATIONAL THERAPY INITIAL EVALUATION ADULT - IMPAIRED TRANSFERS: SIT/STAND, REHAB EVAL
decreased strength/Pt with difficulty standing, occ left LE buckling, max vc's to use RW, dec upright posture with pt leaning to right (unable/unsafe to take steps). Stephani, RN present & aware. Pt returned to bed. Pt with difficulty standing, occ LE buckling, max vc's to use RW, dec upright posture with pt leaning to right (unable/unsafe to take steps). Stephani, RN present & aware. Pt returned to bed./decreased strength

## 2019-01-02 NOTE — SWALLOW BEDSIDE ASSESSMENT ADULT - SLP GENERAL OBSERVATIONS
Pt appeared able to tolerated small amounts of puree foods, fatigue noted with increased difficulty due to

## 2019-01-02 NOTE — PROVIDER CONTACT NOTE (EICU) - ASSESSMENT
pt appears lethargic via camera
pt c/o increasing SOB and hr sustaining above 110, on amio at maintenance dose, just received sotolol PO
- review with patient preferences. while Goals of Care note state "full code" prior to the need for CPR, discussions of intubation, NIV, etc should be had  - no chest xray required in AM unless clinical status change

## 2019-01-02 NOTE — PROVIDER CONTACT NOTE (EICU) - RECOMMENDATIONS
Stat ABG r/o hypercapnia, BIPAP ordered. stat labs ordered, pt with multilobar pna ,RVP and legionella  WBC inc to 19k from 10k, add zithro, cont zosyn. Stat ABG r/o hypercapnia, BIPAP ordered. stat labs ordered, pt with multilobar pna ,RVP and legionella  WBC inc to 19k from 10k, add zithro, cont zosyn.  P-bnp 14K FEW DAYS AGO, 5L+ since admission, diuresed 220 cc with lasix this am.

## 2019-01-02 NOTE — CONSULT NOTE ADULT - SUBJECTIVE AND OBJECTIVE BOX
HPI:  89M with hypothyroidism, BPH, afib, CKD, who presents with SOB with acute hypoxic respiratory  failure after choking on his pills and his wife tried getting the pills out by hitting his back, he   vomited.  Developed chest pain and chest tightness. In ED CXR showed diffuse infiltrates. Patient   placed on BiPAP and he vomited blood while on BIPAP.  is on Coumadin for Afib. No fever but   respiratory status worsening. WBC rising. Also with GABO on CKD. Pt is unable to give history.     Infectious Disease consult was called to evaluate pt and for antibiotic management.    Past Medical & Surgical Hx:  PAST MEDICAL & SURGICAL HISTORY:  Mitral valve disorder  Kidney stone  Diverticulosis  CKD (chronic kidney disease)  Afib  Hypothyroidism  BPH (benign prostatic hyperplasia)  Chronic peptic ulcer: S/P surgery more than 10 yrs ago      Social History--  EtOH: denies   Tobacco: denies  Drug Use: denies   Lives at home      FAMILY HISTORY:  No pertinent family history in first degree relatives      Allergies  No Known Allergies    Home Medications:  Flomax 0.4 mg oral capsule: 1 cap(s) orally once a day (31 Dec 2018 00:37)  multivitamin: 1 tab(s) orally once a day (31 Dec 2018 00:37)  sotalol 80 mg oral tablet: 0.5 tab(s) orally every 12 hours (31 Dec 2018 01:19)  Synthroid 75 mcg (0.075 mg) oral tablet: 1 tab(s) orally once a day (31 Dec 2018 00:37)    Current Inpatient Medications :    ANTIBIOTICS:   azithromycin  IVPB      piperacillin/tazobactam IVPB. 3.375 Gram(s) IV Intermittent every 8 hours      OTHER RELEVANT MEDICATIONS :  ALBUTerol    0.083% 2.5 milliGRAM(s) Nebulizer every 8 hours  chlorhexidine 2% Cloths 1 Application(s) Topical daily  dexmedetomidine Infusion 0.5 MICROgram(s)/kG/Hr IV Continuous <Continuous>  ferrous    sulfate 325 milliGRAM(s) Oral daily  influenza   Vaccine 0.5 milliLiter(s) IntraMuscular once  levothyroxine Injectable 37.5 MICROGram(s) IV Push at bedtime  metoprolol tartrate 25 milliGRAM(s) Oral two times a day  multivitamin 1 Tablet(s) Oral daily  pantoprazole  Injectable 40 milliGRAM(s) IV Push two times a day  sotalol 80 milliGRAM(s) Oral every 12 hours  tamsulosin 0.4 milliGRAM(s) Oral at bedtime    ROS:  Unable to obtain due to : Sedated BIPAP      Physical Exam:  Vital Signs Last 24 Hrs  T(C): 36.6 (02 Jan 2019 16:01), Max: 37.4 (01 Jan 2019 19:58)  T(F): 97.8 (02 Jan 2019 16:01), Max: 99.4 (01 Jan 2019 19:58)  HR: 81 (02 Jan 2019 18:05) (81 - 139)  BP: 122/82 (02 Jan 2019 18:05) (108/68 - 174/117)  BP(mean): 95 (02 Jan 2019 18:05) (80 - 134)  RR: 27 (02 Jan 2019 18:05) (23 - 35)  SpO2: 97% (02 Jan 2019 18:05) (91% - 100%)      General: Lethargic BIPAP  Eyes: sclera anicteric, pupils equal and reactive to light  ENMT: buccal mucosa moist, pharynx not injected  Neck: supple, trachea midline  Lungs: +rhonchi  Cardiovascular: regular rate and rhythm, S1 S2  Abdomen: soft, nontender, no organomegaly present, bowel sounds normal  Neurological: Sedated, Cranial Nerves II-XII grossly intact  Skin:no increased ecchymosis/petechiae/purpura  Lymph Nodes: no palpable cervical/supraclavicular lymph nodes enlargements  Extremities: no cyanosis/clubbing/edema    Labs:                         12.1   17.22 )-----------( 108      ( 02 Jan 2019 17:21 )             37.1   01-02    142  |  106  |  48<H>  ----------------------------<  168<H>  4.3   |  24  |  2.24<H>    Ca    8.8      02 Jan 2019 17:21      RECENT CULTURES:  Culture - Blood (collected 31 Dec 2018 12:33)  Source: .Blood Blood-Peripheral  Preliminary Report (01 Jan 2019 13:01):    No growth to date.    Culture - Blood (collected 31 Dec 2018 12:33)  Source: .Blood Blood-Peripheral  Preliminary Report (01 Jan 2019 13:01):    No growth to date.    RADIOLOGY & ADDITIONAL STUDIES:    EXAM:  CT CHEST                            PROCEDURE DATE:  12/31/2018          INTERPRETATION:  Clinical information: Respiratory distress, sepsis    Comparison CT chest study dated 4/25/2017. Comparison CT abdomen-pelvis   study dated 11/14/2014.    Axial images obtained, coronal and sagittal images computer reformatted.   Noncontrast exam. Neither intravenous or oral contrast material was   administered which limits the study.        CHEST: An NG tube is present. No thoracic aortic aneurysm or pericardial   effusion. Global cardiomegaly present. Coronary artery calcifications   present. Central airway intact. Thyroid gland not enlarged.    Minimal bibasilar effusions right greater than left. Pleural thickening   with pleural parenchymal scarring at the apices unchanged since the prior   exam. Calcifications evident within the right apical scarring.    Multifocal bilateral airspace disease present. Airspace disease present   in the left upper lobe, left lower lobe, right middle lobe, and right   lower lobe.    No acute osseous abnormalities visible in the thoracic skeleton.    Small anterior mediastinal lymph nodes present could be reactive. Hilar   regions obscured by the extensive airspace disease.        ABDOMEN-PELVIS: Postcholecystectomy clips present. Hepatic parenchyma   homogeneous. The spleen is not enlarged. No adrenal lesions evident. No   hydronephrotic changes identified. Traces bilateral perinephric stranding   noted. Unenhanced pancreas shows no lesions. No aortic aneurysm. No   retroperitoneal lymphadenopathy. No ascites. No biliary ductal   dilatation. Past history of gastric surgery type unspecified.    No bowel obstruction. Edematous appearance of the mesentery could be   related to volume overload. Diverticulosis present. Urinary bladder shows   a thickened wall. A calcification is visible posterior aspect of the   urinary bladder slightly to the right of midline, could represent a stone   in a diverticulum or localized to the wall of the urinary bladder.   Calcification measures 4 mm. The prostate is markedly enlarged. No free   pelvic fluid evident. Inguinal regions intact. Multilevel disc   degenerative disease lower lumbar region.      Assessment :   89M with hypothyroidism, BPH, afib, CKD, who presents with SOB with acute hypoxic respiratory  failure complicated by vomiting with hemetemesis likely severe aspiration pneumonia with   pneumonitis  GABO on CKD  + troponin  Likely also component of pulm edema    Plan :   Cont Zosyn Zithromax  Got Vancomycin  1 gram x 1 dose  Fu cultures  Trend temps and wbc  Fu legionella and strep pna antigen  BIPAP per pulm critical care  If further decompensate will likely need intubation  Critically ill      Continue with present regiment.  Approptiate use of antibiotics and adverse effects reviewed.      I have discussed the above plan of care with patient/family in detail. They expressed understanding of the treatment plan . Risks, benefits and alternatives discussed in detail. I have asked if they have any questions or concerns and appropriately addressed them to the best of my ability .      Critical care > 55 minutes spent in direct patient care reviewing  the notes, lab data/ imaging , discussion with multidisciplinary team. All questions were addressed and answered to the best of my capacity .    Thank you for allowing me to participate in the care of your patient .      Anibal Saldaña MD  Infectious Disease  [phone] HPI:  89M with hypothyroidism, BPH, afib, CKD, who presents with SOB with acute hypoxic respiratory  failure after choking on his pills and his wife tried getting the pills out by hitting his back, he   vomited.  Developed chest pain and chest tightness. In ED CXR showed diffuse infiltrates. Patient   placed on BiPAP and he vomited blood while on BIPAP.  is on Coumadin for Afib. No fever but   respiratory status worsening. WBC rising. Also with GABO on CKD. Pt is unable to give history.     Infectious Disease consult was called to evaluate pt and for antibiotic management.    Past Medical & Surgical Hx:  PAST MEDICAL & SURGICAL HISTORY:  Mitral valve disorder  Kidney stone  Diverticulosis  CKD (chronic kidney disease)  Afib  Hypothyroidism  BPH (benign prostatic hyperplasia)  Chronic peptic ulcer: S/P surgery more than 10 yrs ago      Social History--  EtOH: denies   Tobacco: denies  Drug Use: denies   Lives at home      FAMILY HISTORY:  No pertinent family history in first degree relatives      Allergies  No Known Allergies    Home Medications:  Flomax 0.4 mg oral capsule: 1 cap(s) orally once a day (31 Dec 2018 00:37)  multivitamin: 1 tab(s) orally once a day (31 Dec 2018 00:37)  sotalol 80 mg oral tablet: 0.5 tab(s) orally every 12 hours (31 Dec 2018 01:19)  Synthroid 75 mcg (0.075 mg) oral tablet: 1 tab(s) orally once a day (31 Dec 2018 00:37)    Current Inpatient Medications :    ANTIBIOTICS:   azithromycin  IVPB      piperacillin/tazobactam IVPB. 3.375 Gram(s) IV Intermittent every 8 hours      OTHER RELEVANT MEDICATIONS :  ALBUTerol    0.083% 2.5 milliGRAM(s) Nebulizer every 8 hours  chlorhexidine 2% Cloths 1 Application(s) Topical daily  dexmedetomidine Infusion 0.5 MICROgram(s)/kG/Hr IV Continuous <Continuous>  ferrous    sulfate 325 milliGRAM(s) Oral daily  influenza   Vaccine 0.5 milliLiter(s) IntraMuscular once  levothyroxine Injectable 37.5 MICROGram(s) IV Push at bedtime  metoprolol tartrate 25 milliGRAM(s) Oral two times a day  multivitamin 1 Tablet(s) Oral daily  pantoprazole  Injectable 40 milliGRAM(s) IV Push two times a day  sotalol 80 milliGRAM(s) Oral every 12 hours  tamsulosin 0.4 milliGRAM(s) Oral at bedtime    ROS:  Unable to obtain due to : Sedated BIPAP      Physical Exam:  Vital Signs Last 24 Hrs  T(C): 36.6 (02 Jan 2019 16:01), Max: 37.4 (01 Jan 2019 19:58)  T(F): 97.8 (02 Jan 2019 16:01), Max: 99.4 (01 Jan 2019 19:58)  HR: 81 (02 Jan 2019 18:05) (81 - 139)  BP: 122/82 (02 Jan 2019 18:05) (108/68 - 174/117)  BP(mean): 95 (02 Jan 2019 18:05) (80 - 134)  RR: 27 (02 Jan 2019 18:05) (23 - 35)  SpO2: 97% (02 Jan 2019 18:05) (91% - 100%)      General: Lethargic BIPAP  Eyes: sclera anicteric, pupils equal and reactive to light  ENMT: buccal mucosa moist, pharynx not injected  Neck: supple, trachea midline  Lungs: +rhonchi  Cardiovascular: regular rate and rhythm, S1 S2  Abdomen: soft, nontender, no organomegaly present, bowel sounds normal  Neurological: Sedated, Cranial Nerves II-XII grossly intact  Skin:no increased ecchymosis/petechiae/purpura  Lymph Nodes: no palpable cervical/supraclavicular lymph nodes enlargements  Extremities: no cyanosis/clubbing/edema    Labs:                         12.1   17.22 )-----------( 108      ( 02 Jan 2019 17:21 )             37.1   01-02    142  |  106  |  48<H>  ----------------------------<  168<H>  4.3   |  24  |  2.24<H>    Ca    8.8      02 Jan 2019 17:21      RECENT CULTURES:  Culture - Blood (collected 31 Dec 2018 12:33)  Source: .Blood Blood-Peripheral  Preliminary Report (01 Jan 2019 13:01):    No growth to date.    Culture - Blood (collected 31 Dec 2018 12:33)  Source: .Blood Blood-Peripheral  Preliminary Report (01 Jan 2019 13:01):    No growth to date.    RADIOLOGY & ADDITIONAL STUDIES:    EXAM:  CT CHEST                            PROCEDURE DATE:  12/31/2018          INTERPRETATION:  Clinical information: Respiratory distress, sepsis    Comparison CT chest study dated 4/25/2017. Comparison CT abdomen-pelvis   study dated 11/14/2014.    Axial images obtained, coronal and sagittal images computer reformatted.   Noncontrast exam. Neither intravenous or oral contrast material was   administered which limits the study.        CHEST: An NG tube is present. No thoracic aortic aneurysm or pericardial   effusion. Global cardiomegaly present. Coronary artery calcifications   present. Central airway intact. Thyroid gland not enlarged.    Minimal bibasilar effusions right greater than left. Pleural thickening   with pleural parenchymal scarring at the apices unchanged since the prior   exam. Calcifications evident within the right apical scarring.    Multifocal bilateral airspace disease present. Airspace disease present   in the left upper lobe, left lower lobe, right middle lobe, and right   lower lobe.    No acute osseous abnormalities visible in the thoracic skeleton.    Small anterior mediastinal lymph nodes present could be reactive. Hilar   regions obscured by the extensive airspace disease.        ABDOMEN-PELVIS: Postcholecystectomy clips present. Hepatic parenchyma   homogeneous. The spleen is not enlarged. No adrenal lesions evident. No   hydronephrotic changes identified. Traces bilateral perinephric stranding   noted. Unenhanced pancreas shows no lesions. No aortic aneurysm. No   retroperitoneal lymphadenopathy. No ascites. No biliary ductal   dilatation. Past history of gastric surgery type unspecified.    No bowel obstruction. Edematous appearance of the mesentery could be   related to volume overload. Diverticulosis present. Urinary bladder shows   a thickened wall. A calcification is visible posterior aspect of the   urinary bladder slightly to the right of midline, could represent a stone   in a diverticulum or localized to the wall of the urinary bladder.   Calcification measures 4 mm. The prostate is markedly enlarged. No free   pelvic fluid evident. Inguinal regions intact. Multilevel disc   degenerative disease lower lumbar region.      Assessment :   89M with hypothyroidism, BPH, afib, CKD, who presents with SOB with acute hypoxic respiratory  failure complicated by vomiting with hemetemesis likely severe aspiration pneumonia with   pneumonitis  Likely also component of pulm edema  + troponin  Coagulopathy  GABO on CKD    Plan :   Cont Zosyn Zithromax  Got Vancomycin  1 gram x 1 dose  Fu cultures  Trend temps and wbc  Fu legionella and strep pna antigen  BIPAP per pulm critical care  If further decompensate will likely need intubation  Critically ill      Continue with present regiment.  Approptiate use of antibiotics and adverse effects reviewed.      I have discussed the above plan of care with patient/family in detail. They expressed understanding of the treatment plan . Risks, benefits and alternatives discussed in detail. I have asked if they have any questions or concerns and appropriately addressed them to the best of my ability .      Critical care > 55 minutes spent in direct patient care reviewing  the notes, lab data/ imaging , discussion with multidisciplinary team. All questions were addressed and answered to the best of my capacity .    Thank you for allowing me to participate in the care of your patient .      Anibal Saldaña MD  Infectious Disease  [phone]

## 2019-01-02 NOTE — SWALLOW BEDSIDE ASSESSMENT ADULT - PHARYNGEAL PHASE
Delayed pharyngeal swallow/Throat clear post oral intake/Wet vocal quality post oral intake/Cough post oral intake Delayed pharyngeal swallow/Decreased laryngeal elevation/mildly decreased laryngeal elevation

## 2019-01-02 NOTE — PROCEDURE NOTE - NSINFORMCONSENT_GEN_A_CORE
This was an emergent procedure.
Benefits, risks, and possible complications of procedure explained to patient/caregiver who verbalized understanding and gave verbal consent.
This was an emergent procedure.

## 2019-01-02 NOTE — PROGRESS NOTE ADULT - SUBJECTIVE AND OBJECTIVE BOX
Date/Time Patient Seen:  		  Referring MD:   Data Reviewed	       Patient is a 89y old  Male who presents with a chief complaint of SOB (01 Jan 2019 14:39)      Subjective/HPI     PAST MEDICAL & SURGICAL HISTORY:  Mitral valve disorder  Kidney stone  Diverticulosis  CKD (chronic kidney disease)  Afib  Hypothyroidism  BPH (benign prostatic hyperplasia)  Chronic peptic ulcer: S/P surgery more than 10 yrs ago  No significant past surgical history        Medication list         MEDICATIONS  (STANDING):  amiodarone Infusion 1 mG/Min (33.333 mL/Hr) IV Continuous <Continuous>  amiodarone Infusion 0.5 mG/Min (16.667 mL/Hr) IV Continuous <Continuous>  chlorhexidine 2% Cloths 1 Application(s) Topical daily  ferrous    sulfate 325 milliGRAM(s) Oral daily  influenza   Vaccine 0.5 milliLiter(s) IntraMuscular once  levothyroxine Injectable 37.5 MICROGram(s) IV Push at bedtime  multivitamin 1 Tablet(s) Oral daily  pantoprazole Infusion 8 mG/Hr (10 mL/Hr) IV Continuous <Continuous>  piperacillin/tazobactam IVPB. 3.375 Gram(s) IV Intermittent every 8 hours  sotalol 80 milliGRAM(s) Oral every 12 hours  tamsulosin 0.4 milliGRAM(s) Oral at bedtime    MEDICATIONS  (PRN):         Vitals log        ICU Vital Signs Last 24 Hrs  T(C): 36.5 (02 Jan 2019 00:13), Max: 37.4 (01 Jan 2019 19:58)  T(F): 97.7 (02 Jan 2019 00:13), Max: 99.4 (01 Jan 2019 19:58)  HR: 101 (02 Jan 2019 06:05) (81 - 139)  BP: 145/93 (02 Jan 2019 06:05) (104/70 - 174/117)  BP(mean): 107 (02 Jan 2019 06:05) (82 - 132)  ABP: --  ABP(mean): --  RR: 29 (02 Jan 2019 06:05) (19 - 32)  SpO2: 100% (02 Jan 2019 06:05) (91% - 100%)           Input and Output:  I&O's Detail    31 Dec 2018 07:01  -  01 Jan 2019 07:00  --------------------------------------------------------  IN:    amiodarone Infusion: 100 mL    amiodarone Infusion: 99.9 mL    IV PiggyBack: 300 mL    lactated ringers.: 2400 mL    pantoprazole Infusion: 240 mL  Total IN: 3139.9 mL    OUT:    Nasoenteral Tube: 160 mL    Voided: 200 mL  Total OUT: 360 mL    Total NET: 2779.9 mL      01 Jan 2019 07:01  -  02 Jan 2019 06:53  --------------------------------------------------------  IN:    amiodarone Infusion: 362.2 mL    IV PiggyBack: 100 mL    pantoprazole Infusion: 230 mL  Total IN: 692.2 mL    OUT:    Voided: 400 mL  Total OUT: 400 mL    Total NET: 292.2 mL          Lab Data                        10.6   11.87 )-----------( 115      ( 01 Jan 2019 06:12 )             32.9     01-01    142  |  109<H>  |  35<H>  ----------------------------<  116<H>  4.1   |  22  |  1.84<H>    Ca    8.0<L>      01 Jan 2019 06:12        CARDIAC MARKERS ( 01 Jan 2019 16:18 )  .168 ng/mL / x     / x     / x     / x      CARDIAC MARKERS ( 01 Jan 2019 06:12 )  .244 ng/mL / x     / x     / x     / x      CARDIAC MARKERS ( 31 Dec 2018 16:35 )  .292 ng/mL / x     / x     / x     / x            Review of Systems	      Objective     Physical Examination    extr edema  heart s1s2  lung dec BS  pulm congestion      Pertinent Lab findings & Imaging      Apoorva:  NO   Adequate UO     I&O's Detail    31 Dec 2018 07:01  -  01 Jan 2019 07:00  --------------------------------------------------------  IN:    amiodarone Infusion: 100 mL    amiodarone Infusion: 99.9 mL    IV PiggyBack: 300 mL    lactated ringers.: 2400 mL    pantoprazole Infusion: 240 mL  Total IN: 3139.9 mL    OUT:    Nasoenteral Tube: 160 mL    Voided: 200 mL  Total OUT: 360 mL    Total NET: 2779.9 mL      01 Jan 2019 07:01  -  02 Jan 2019 06:53  --------------------------------------------------------  IN:    amiodarone Infusion: 362.2 mL    IV PiggyBack: 100 mL    pantoprazole Infusion: 230 mL  Total IN: 692.2 mL    OUT:    Voided: 400 mL  Total OUT: 400 mL    Total NET: 292.2 mL               Discussed with:     Cultures:	        Radiology

## 2019-01-02 NOTE — PROGRESS NOTE ADULT - ASSESSMENT
89 m w/ above hx a/w/ respiratory distress which began after vomiting.   reported to have blood in emesis   hx of billroth II w/ gastrojej ulcer in 2017  presently without active/overt gi bleeding  agree w/ IV protonix drip at 8mg/hr--can change to 40mg BID    have swallow eval done at bedside, advance po diet as tolerated  NGT removed.   CBC daily  holding all antiplatelet medications and AC  NO plan for endoscopy

## 2019-01-02 NOTE — CHART NOTE - NSCHARTNOTEFT_GEN_A_CORE
day events reviewed  eICU input noted  ABG reviewed  will add Precedex  will add one dose of Vanco now  ID eval pending today  will add NEBS to help clear secretions and mucociliary clearance  chest PT as needed  cont I and O monitoring  CXR this am reviewed  will check CBC and BMP and Lactic Acid stat  prognosis remains guarded -   will follow and monitor closely  discussed with PMD / Hospitalist team

## 2019-01-02 NOTE — PROGRESS NOTE ADULT - SUBJECTIVE AND OBJECTIVE BOX
Chief Complaint: Shortness of breath, hematemesis    Interval Events: No events overnight. Remains short of breath.    Review of Systems:  General: No fevers, chills, weight loss or gain  Skin: No rashes, color changes  Cardiovascular: No chest pain, orthopnea  Respiratory: (+) shortness of breath, cough  Gastrointestinal: No nausea, abdominal pain  Genitourinary: No incontinence, pain with urination  Musculoskeletal: No pain, swelling, decreased range of motion  Neurological: No headache, weakness  Psychiatric: No depression, anxiety  Endocrine: No weight loss or gain, increased thirst  All other systems are comprehensively negative.    Physical Exam:  Vitals:        Vital Signs Last 24 Hrs  T(C): 36.7 (01 Jan 2019 08:31), Max: 37.1 (01 Jan 2019 04:06)  T(F): 98 (01 Jan 2019 08:31), Max: 98.8 (01 Jan 2019 04:06)  HR: 88 (01 Jan 2019 08:00) (70 - 145)  BP: 111/72 (01 Jan 2019 08:00) (84/53 - 124/79)  BP(mean): 86 (01 Jan 2019 08:00) (61 - 93)  RR: 25 (01 Jan 2019 08:00) (19 - 30)  SpO2: 100% (01 Jan 2019 08:00) (89% - 100%)  General: NAD  HEENT: MMM  Neck: No JVD, no carotid bruit  Lungs: CTAB  CV: Irregular, nl S1/S2, no M/R/G  Abdomen: S/NT/ND, +BS  Extremities: 2+ LE edema, no cyanosis  Neuro: AAOx3, non-focal  Skin: No rash    Labs:    01-02    139  |  104  |  40<H>  ----------------------------<  151<H>  4.2   |  23  |  2.12<H>    Ca    9.1      02 Jan 2019 06:53                          12.6   19.15 )-----------( 135      ( 02 Jan 2019 06:53 )             39.5       Telemetry: AF

## 2019-01-02 NOTE — PROCEDURE NOTE - NSTRACHPOSTINTU_RESP_A_CORE
Breath sounds equal/Positive end tidal Co2 noted/Breath sounds bilateral/Chest excursion noted/Chest X-Ray

## 2019-01-02 NOTE — DIETITIAN INITIAL EVALUATION ADULT. - OTHER INFO
Pt assessed as per [facility] length of stay policy: 89M with hypothyroidism, BPH, afib, CKD, who presents with SOB.   Pt started to choke on his pills and  then started to vomit through his mouth and nose and since then he has had trouble breathing. Patient placed on BiPAP where he started to vomit.  Pt found to have aspiration pna and per pulmonologist, pt c RALPH, pulmonary edema.  Patient placed on high flow O2.  Pt , based on nutrition focused  physical exam found to have temporal/occipital moderate wasting and protruding clavicle. Pt c muscle loss to chest and shoulders. Pt legs and ankles +2 and +3 respectively. Pt states usual weight 160#( also reported in RD assessment from April 2017). Current weight 166# (gain likely secondary to edema.)  Pt has been NPO x 3 days. Given moderate/severe edema to extremities and based on NFPE findings, pt meets criteria for severe malnutrition in the context of acute illness.  SLP eval pending. Will make recommendations based on results.

## 2019-01-02 NOTE — PROGRESS NOTE ADULT - SUBJECTIVE AND OBJECTIVE BOX
Patient is a 89y old  Male who presents with a chief complaint of SOB (02 Jan 2019 18:31)      BRIEF HOSPITAL COURSE: 89 y/o male with pmhx of MVP, CKD, afib on coumadin, PUD s/p bilroth procedure, nephrolithiasis, hypothyroidism, BPH, diverticulosis who presented to ED on 12/31 with shortness of breath after having a choking episode at home on his medication. Patient vomited and aspirated gastric contents. Patient admitted with aspiration pneumonitis/pneumonia causing severe sepsis with lactemia, elevated INR, ? GIB secondary to hematemesis, and GABO on CKD. He was palced on bipap where he had an additional episode of hematemesis. INR was reversed and has since stabilized. Hospital course further complicated by afib with RVR requiring amio infusion.     Events last 24 hours: lactate elevated from 1.8 to 2.5. hypoxic on ABG during afternoon, placed on bipap. Patient required intubation at start of shift for airway protection due to altered mental status and critical illness. received lasix 20 mg x 2 throughout day today for fluid overload    PAST MEDICAL & SURGICAL HISTORY:  Mitral valve disorder  Kidney stone  Diverticulosis  CKD (chronic kidney disease)  Afib  Hypothyroidism  BPH (benign prostatic hyperplasia)  Chronic peptic ulcer: S/P surgery more than 10 yrs ago      Review of Systems:  unable to obtain due to altered mental status.      Medications:  azithromycin  IVPB      piperacillin/tazobactam IVPB. 3.375 Gram(s) IV Intermittent every 8 hours    metoprolol tartrate 25 milliGRAM(s) Oral two times a day  norepinephrine Infusion 0.05 MICROgram(s)/kG/Min IV Continuous <Continuous>  sotalol 80 milliGRAM(s) Oral every 12 hours  tamsulosin 0.4 milliGRAM(s) Oral at bedtime    ALBUTerol    0.083% 2.5 milliGRAM(s) Nebulizer every 8 hours    dexmedetomidine Infusion 0.5 MICROgram(s)/kG/Hr IV Continuous <Continuous>  propofol Infusion 10 MICROgram(s)/kG/Min IV Continuous <Continuous>        pantoprazole  Injectable 40 milliGRAM(s) IV Push two times a day      levothyroxine Injectable 37.5 MICROGram(s) IV Push at bedtime    ferrous    sulfate 325 milliGRAM(s) Oral daily  multivitamin 1 Tablet(s) Oral daily    influenza   Vaccine 0.5 milliLiter(s) IntraMuscular once    chlorhexidine 0.12% Liquid 15 milliLiter(s) Oral Mucosa two times a day  chlorhexidine 2% Cloths 1 Application(s) Topical daily            ICU Vital Signs Last 24 Hrs  T(C): 36.3 (02 Jan 2019 20:00), Max: 36.6 (02 Jan 2019 16:01)  T(F): 97.4 (02 Jan 2019 20:00), Max: 97.8 (02 Jan 2019 16:01)  HR: 87 (02 Jan 2019 22:00) (74 - 113)  BP: 101/73 (02 Jan 2019 22:00) (93/77 - 158/102)  BP(mean): 81 (02 Jan 2019 22:00) (80 - 134)  ABP: --  ABP(mean): --  RR: 25 (02 Jan 2019 22:00) (20 - 35)  SpO2: 98% (02 Jan 2019 22:00) (91% - 100%)      ABG - ( 02 Jan 2019 16:20 )  pH, Arterial: x     pH, Blood: 7.35  /  pCO2: 37    /  pO2: 63    / HCO3: 21    / Base Excess: -4.5  /  SaO2: 90                  I&O's Detail    01 Jan 2019 07:01  -  02 Jan 2019 07:00  --------------------------------------------------------  IN:    amiodarone Infusion: 362.2 mL    pantoprazole Infusion: 230 mL    Solution: 100 mL  Total IN: 692.2 mL    OUT:    Voided: 400 mL  Total OUT: 400 mL    Total NET: 292.2 mL      02 Jan 2019 07:01  -  02 Jan 2019 22:22  --------------------------------------------------------  IN:    dexmedetomidine Infusion: 18.2 mL    norepinephrine Infusion: 20.3 mL    Oral Fluid: 50 mL    pantoprazole Infusion: 30 mL    propofol Infusion: 4.3 mL    Solution: 250 mL    Solution: 250 mL    Solution: 200 mL  Total IN: 822.8 mL    OUT:    Nasoenteral Tube: 20 mL    Voided: 220 mL  Total OUT: 240 mL    Total NET: 582.8 mL            LABS:                        12.1   17.22 )-----------( 108      ( 02 Jan 2019 17:21 )             37.1     01-02    142  |  106  |  48<H>  ----------------------------<  168<H>  4.3   |  24  |  2.24<H>    Ca    8.8      02 Jan 2019 17:21        CARDIAC MARKERS ( 01 Jan 2019 16:18 )  .168 ng/mL / x     / x     / x     / x      CARDIAC MARKERS ( 01 Jan 2019 06:12 )  .244 ng/mL / x     / x     / x     / x          CAPILLARY BLOOD GLUCOSE        PT/INR - ( 01 Jan 2019 06:12 )   PT: 19.3 sec;   INR: 1.74 ratio             CULTURES:  Culture Results:   No growth to date. (12-31 @ 12:33)  Culture Results:   No growth to date. (12-31 @ 12:33)      Physical Examination: post intubation    General: No acute distress. now sedated on propofol     HEENT: Pupils equal, reactive to light.  Symmetric.    PULM: diminished bilaterally left worse than right but improved after intubation.     NECK: Supple, no lymphadenopathy, trachea midline    CVS: Regular rate and rhythm, no murmurs, rubs, or gallops    ABD: Soft, nondistended, nontender, normoactive bowel sounds, no masses    EXT: bilateral pedal edema 1+, nontender    SKIN: Warm and well perfused, no rashes noted.    NEURO: makes purposeful movements for the tube. does not follow commands or open eyes.     DEVICES: ET tube. [facility] CV    RADIOLOGY:     EXAM:  CT CHEST                                  PROCEDURE DATE:  12/31/2018          INTERPRETATION:  Clinical information: Respiratory distress, sepsis    Comparison CT chest study dated 4/25/2017. Comparison CT abdomen-pelvis   study dated 11/14/2014.    Axial images obtained, coronal and sagittal images computer reformatted.   Noncontrast exam. Neither intravenous or oral contrast material was   administered which limits the study.        CHEST: An NG tube is present. No thoracic aortic aneurysm or pericardial   effusion. Global cardiomegaly present. Coronary artery calcifications   present. Central airway intact. Thyroid gland not enlarged.    Minimal bibasilar effusions right greater than left. Pleural thickening   with pleural parenchymal scarring at the apices unchanged since the prior   exam. Calcifications evident within the right apical scarring.    Multifocal bilateral airspace disease present. Airspace disease present   in the left upper lobe, left lower lobe, right middle lobe, and right   lower lobe.    No acute osseous abnormalities visible in the thoracic skeleton.    Small anterior mediastinal lymph nodes present could be reactive. Hilar   regions obscured by the extensive airspace disease.        ABDOMEN-PELVIS: Postcholecystectomy clips present. Hepatic parenchyma   homogeneous. The spleen is not enlarged. No adrenal lesions evident. No   hydronephrotic changes identified. Traces bilateral perinephric stranding   noted. Unenhanced pancreas shows no lesions. No aortic aneurysm. No   retroperitoneal lymphadenopathy. No ascites. No biliary ductal   dilatation. Past history of gastric surgery type unspecified.    No bowel obstruction. Edematous appearance of the mesentery could be   related to volume overload. Diverticulosis present. Urinary bladder shows   a thickened wall. A calcification is visible posterior aspect of the   urinary bladder slightly to the right of midline, could represent a stone   in a diverticulum or localized to the wall of the urinary bladder.   Calcification measures 4 mm. The prostate is markedly enlarged. No free   pelvic fluid evident. Inguinal regions intact. Multilevel disc   degenerative disease lower lumbar region.      IMPRESSION: See above reports.                    TITO BROWN M.D.,ATTENDING RADIOLOGIST  This document has been electronically signed. Dec 31 2018 11:00AM      EXAM:  XR CHEST PORTABLE URGENT 1V                                  PROCEDURE DATE:  01/02/2019          INTERPRETATION:  Clinical information: Pulmonary edema    Portable exam, 7:15 AM    Comparison study dated 12/31/2018.    Cardiac monitor leadsoverlie the chest. NG tube present, tip below   bottom of image, left upper quadrant. Central line present, left side of   the neck with tip localized to SVC.    Extensive bilateral airspace disease, unchanged since the prior exam.   Findings may indicate pulmonary edema, cannot exclude underlying   pneumonic processes. Loss of definition right and left hemidiaphragm,   effusions present. Heart size could not be evaluated, loss of definition   of cardiac borders. Aortic knob contains calcifications. No acute osseous   abnormalities.    IMPRESSION: No significant change since prior exam.                  TITO BROWN M.D.,ATTENDING RADIOLOGIST  This document has been electronically signed. Jan 2 2019  8:35AM      UNOFFICIAL NEW CXR: ET tube in satisfactory position above kim. No significant change in infiltrates but atelectasis seems improved.    CRITICAL CARE TIME SPENT: 40 minutes of critical care time spent providing medical care for patient's acute illness/conditions that impairs at least one vital organ system and/or poses a high risk of imminent or life threatening deterioration in the patient's condition. It includes time spent evaluating and treating the patient's acute illness as well as time spent reviewing labs, radiology, discussing goals of care with patient and/or patient's family, and discussing the case with a multidisciplinary team in an effort to prevent further life threatening deterioration or end organ damage. This time is independent of any procedures performed.

## 2019-01-02 NOTE — SWALLOW BEDSIDE ASSESSMENT ADULT - COMMENTS
Pt reported he is afraid of solid foods at this time because he "choked on meat".   Required nasal suctioning prior to evaluation Pt appeared to tolorate few bites however, appeared to fatigue very easily and began to have difficulty with breath / swallow coordination. Therefore, he is not able to consume meals at this time.

## 2019-01-02 NOTE — PHYSICAL THERAPY INITIAL EVALUATION ADULT - ACTIVE RANGE OF MOTION EXAMINATION, REHAB EVAL
Bilat feet decreased due to edema/bilateral upper extremity Active ROM was WFL (within functional limits)/deficits as listed below

## 2019-01-02 NOTE — PROVIDER CONTACT NOTE (EICU) - ACTION/TREATMENT ORDERED:
-successful intubation by bedside team. Color change noted on colormettrics.  sat maintained throughout, levo started prior to intubation, BP maintained  -have placed post intubation cxr order and placed vent settings for bedside team  -will have bedsdie team follow up xray  Care discussed with bedside provider and eICU attending. If any additional assistance in care/management of patient required by bedside team, provider/RN/family member advised to push "e-alert" button in patient's room, and details will be discussed at that time.

## 2019-01-02 NOTE — PROGRESS NOTE ADULT - ASSESSMENT
89M with HTN, hypothyroidism, BPH, afib, CKD, who presents with SOB.  Patient was in his usual state of health with no recent illness when he tried taking two of his pills this evening.  SOB 2/2 aspiration pneumonitis.      Aspiration pneumonitis related to vomiting/hematemesis  - Continue with SPCU level of care.    - cover with zosyn  - pulmonary  f/u appreciated.   - f/u cultures.     GI bleed: hx of billroth II w/ gastrojej ulcer in 2017- now resolved.   npo   daily CBC  Change protonix drip to IV BID  gi f/u appreciated.    AC and anti platelets on hold.     Coagulopathy  - s/p IV Vit K  - improved  - no Vit K antagonist on home med list - will verify     Atrial fibrillation, unspecified type.   - continue with sotalol   cardio f/u appreciated .   - Eliquis vs. no AC    CKD  - dose meds renally  - baseline unclear  - monitor    Hypothyroidism- stable , continue with Levothyroxine.     Benign prostatic hyperplasia, unspecified whether lower urinary tract symptoms present.  Plan: - cont with BPH meds.     Hypothyroidism, unspecified type.  Plan: - cont with synthroid.     VTE PPx - SCDs

## 2019-01-02 NOTE — OCCUPATIONAL THERAPY INITIAL EVALUATION ADULT - PERTINENT HX OF CURRENT PROBLEM, REHAB EVAL
Aspiration pneumonitis; Hypoxia/respiratory distress which began after vomiting. Started to choke on his pills and his wife tried getting the pills out by hitting his back.  Patient then started to vomit through his mouth and nose and since then he has had trouble breathing.  Associated with chest pain and chest tightness.  Patient was brought here where he was found to be desat'ing despite O2 NC. CXR showed diffuse infiltrates. Aspiration pneumonitis; Hypoxia/respiratory distress which began after vomiting. Started to choke on his pills and his wife tried getting the pills out by hitting his back.  Patient then started to vomit through his mouth and nose and since then he has had trouble breathing.  Associated with chest pain and chest tightness, destaurating with NC o2. CXR showed diffuse infiltrates. GI bleed

## 2019-01-03 LAB
ANION GAP SERPL CALC-SCNC: 10 MMOL/L — SIGNIFICANT CHANGE UP (ref 5–17)
BASE EXCESS BLDV CALC-SCNC: -0.3 MMOL/L — SIGNIFICANT CHANGE UP (ref -2–2)
BUN SERPL-MCNC: 51 MG/DL — HIGH (ref 7–23)
CALCIUM SERPL-MCNC: 8.7 MG/DL — SIGNIFICANT CHANGE UP (ref 8.4–10.5)
CHLORIDE SERPL-SCNC: 105 MMOL/L — SIGNIFICANT CHANGE UP (ref 96–108)
CO2 SERPL-SCNC: 26 MMOL/L — SIGNIFICANT CHANGE UP (ref 22–31)
CREAT SERPL-MCNC: 2.28 MG/DL — HIGH (ref 0.5–1.3)
GAS PNL BLDV: SIGNIFICANT CHANGE UP
GLUCOSE SERPL-MCNC: 128 MG/DL — HIGH (ref 70–99)
GRAM STN FLD: SIGNIFICANT CHANGE UP
HCO3 BLDV-SCNC: 24 MMOL/L — SIGNIFICANT CHANGE UP (ref 21–29)
HCT VFR BLD CALC: 38 % — LOW (ref 39–50)
HGB BLD-MCNC: 12.3 G/DL — LOW (ref 13–17)
HOROWITZ INDEX BLDV+IHG-RTO: 21 — SIGNIFICANT CHANGE UP
INR BLD: 1.68 RATIO — HIGH (ref 0.88–1.16)
LACTATE SERPL-SCNC: 1.7 MMOL/L — SIGNIFICANT CHANGE UP (ref 0.7–2)
MCHC RBC-ENTMCNC: 31 PG — SIGNIFICANT CHANGE UP (ref 27–34)
MCHC RBC-ENTMCNC: 32.4 GM/DL — SIGNIFICANT CHANGE UP (ref 32–36)
MCV RBC AUTO: 95.7 FL — SIGNIFICANT CHANGE UP (ref 80–100)
NRBC # BLD: 0 /100 WBCS — SIGNIFICANT CHANGE UP (ref 0–0)
NT-PROBNP SERPL-SCNC: HIGH PG/ML (ref 0–450)
PCO2 BLDV: 41 MMHG — SIGNIFICANT CHANGE UP (ref 35–50)
PH BLDV: 7.39 — SIGNIFICANT CHANGE UP (ref 7.35–7.45)
PLATELET # BLD AUTO: 113 K/UL — LOW (ref 150–400)
PO2 BLDV: 81 MMHG — HIGH (ref 25–45)
POTASSIUM SERPL-MCNC: 3.7 MMOL/L — SIGNIFICANT CHANGE UP (ref 3.5–5.3)
POTASSIUM SERPL-SCNC: 3.7 MMOL/L — SIGNIFICANT CHANGE UP (ref 3.5–5.3)
PROTHROM AB SERPL-ACNC: 18.6 SEC — HIGH (ref 10–12.9)
RBC # BLD: 3.97 M/UL — LOW (ref 4.2–5.8)
RBC # FLD: 13.2 % — SIGNIFICANT CHANGE UP (ref 10.3–14.5)
SAO2 % BLDV: 94 % — HIGH (ref 67–88)
SODIUM SERPL-SCNC: 141 MMOL/L — SIGNIFICANT CHANGE UP (ref 135–145)
SPECIMEN SOURCE: SIGNIFICANT CHANGE UP
WBC # BLD: 22.04 K/UL — HIGH (ref 3.8–10.5)
WBC # FLD AUTO: 22.04 K/UL — HIGH (ref 3.8–10.5)

## 2019-01-03 PROCEDURE — 99233 SBSQ HOSP IP/OBS HIGH 50: CPT

## 2019-01-03 RX ORDER — HEPARIN SODIUM 5000 [USP'U]/ML
5000 INJECTION INTRAVENOUS; SUBCUTANEOUS EVERY 12 HOURS
Qty: 0 | Refills: 0 | Status: DISCONTINUED | OUTPATIENT
Start: 2019-01-03 | End: 2019-01-08

## 2019-01-03 RX ORDER — SODIUM CHLORIDE 9 MG/ML
1000 INJECTION, SOLUTION INTRAVENOUS
Qty: 0 | Refills: 0 | Status: DISCONTINUED | OUTPATIENT
Start: 2019-01-03 | End: 2019-01-04

## 2019-01-03 RX ORDER — METOPROLOL TARTRATE 50 MG
5 TABLET ORAL EVERY 4 HOURS
Qty: 0 | Refills: 0 | Status: DISCONTINUED | OUTPATIENT
Start: 2019-01-03 | End: 2019-01-11

## 2019-01-03 RX ADMIN — PANTOPRAZOLE SODIUM 40 MILLIGRAM(S): 20 TABLET, DELAYED RELEASE ORAL at 17:20

## 2019-01-03 RX ADMIN — Medication 5 MILLIGRAM(S): at 23:00

## 2019-01-03 RX ADMIN — SODIUM CHLORIDE 75 MILLILITER(S): 9 INJECTION, SOLUTION INTRAVENOUS at 18:29

## 2019-01-03 RX ADMIN — CHLORHEXIDINE GLUCONATE 1 APPLICATION(S): 213 SOLUTION TOPICAL at 11:47

## 2019-01-03 RX ADMIN — ALBUTEROL 2.5 MILLIGRAM(S): 90 AEROSOL, METERED ORAL at 23:49

## 2019-01-03 RX ADMIN — PIPERACILLIN AND TAZOBACTAM 25 GRAM(S): 4; .5 INJECTION, POWDER, LYOPHILIZED, FOR SOLUTION INTRAVENOUS at 17:20

## 2019-01-03 RX ADMIN — PIPERACILLIN AND TAZOBACTAM 25 GRAM(S): 4; .5 INJECTION, POWDER, LYOPHILIZED, FOR SOLUTION INTRAVENOUS at 02:03

## 2019-01-03 RX ADMIN — ALBUTEROL 2.5 MILLIGRAM(S): 90 AEROSOL, METERED ORAL at 07:19

## 2019-01-03 RX ADMIN — HEPARIN SODIUM 5000 UNIT(S): 5000 INJECTION INTRAVENOUS; SUBCUTANEOUS at 17:20

## 2019-01-03 RX ADMIN — Medication 37.5 MICROGRAM(S): at 22:06

## 2019-01-03 RX ADMIN — CHLORHEXIDINE GLUCONATE 15 MILLILITER(S): 213 SOLUTION TOPICAL at 05:11

## 2019-01-03 RX ADMIN — PIPERACILLIN AND TAZOBACTAM 25 GRAM(S): 4; .5 INJECTION, POWDER, LYOPHILIZED, FOR SOLUTION INTRAVENOUS at 11:46

## 2019-01-03 RX ADMIN — AZITHROMYCIN 255 MILLIGRAM(S): 500 TABLET, FILM COATED ORAL at 17:19

## 2019-01-03 RX ADMIN — PANTOPRAZOLE SODIUM 40 MILLIGRAM(S): 20 TABLET, DELAYED RELEASE ORAL at 05:09

## 2019-01-03 RX ADMIN — ALBUTEROL 2.5 MILLIGRAM(S): 90 AEROSOL, METERED ORAL at 13:12

## 2019-01-03 NOTE — PROGRESS NOTE ADULT - SUBJECTIVE AND OBJECTIVE BOX
Date/Time Patient Seen:  		  Referring MD:   Data Reviewed	       Patient is a 89y old  Male who presents with a chief complaint of SOB (02 Jan 2019 22:22)      Subjective/HPI     PAST MEDICAL & SURGICAL HISTORY:  Mitral valve disorder  Kidney stone  Diverticulosis  CKD (chronic kidney disease)  Afib  Hypothyroidism  BPH (benign prostatic hyperplasia)  Chronic peptic ulcer: S/P surgery more than 10 yrs ago  No significant past surgical history        Medication list         MEDICATIONS  (STANDING):  ALBUTerol    0.083% 2.5 milliGRAM(s) Nebulizer every 8 hours  azithromycin  IVPB      azithromycin  IVPB 500 milliGRAM(s) IV Intermittent every 24 hours  chlorhexidine 0.12% Liquid 15 milliLiter(s) Oral Mucosa two times a day  chlorhexidine 2% Cloths 1 Application(s) Topical daily  dexmedetomidine Infusion 0.5 MICROgram(s)/kG/Hr (9.075 mL/Hr) IV Continuous <Continuous>  ferrous    sulfate 325 milliGRAM(s) Oral daily  influenza   Vaccine 0.5 milliLiter(s) IntraMuscular once  levothyroxine Injectable 37.5 MICROGram(s) IV Push at bedtime  metoprolol tartrate 25 milliGRAM(s) Oral two times a day  multivitamin 1 Tablet(s) Oral daily  norepinephrine Infusion 0.05 MICROgram(s)/kG/Min (6.806 mL/Hr) IV Continuous <Continuous>  pantoprazole  Injectable 40 milliGRAM(s) IV Push two times a day  piperacillin/tazobactam IVPB. 3.375 Gram(s) IV Intermittent every 8 hours  sotalol 80 milliGRAM(s) Oral every 12 hours  tamsulosin 0.4 milliGRAM(s) Oral at bedtime    MEDICATIONS  (PRN):         Vitals log        ICU Vital Signs Last 24 Hrs  T(C): 36.3 (03 Jan 2019 00:15), Max: 36.6 (02 Jan 2019 16:01)  T(F): 97.4 (03 Jan 2019 00:15), Max: 97.8 (02 Jan 2019 16:01)  HR: 86 (03 Jan 2019 06:05) (66 - 108)  BP: 108/76 (03 Jan 2019 06:05) (93/68 - 158/102)  BP(mean): 85 (03 Jan 2019 06:05) (71 - 134)  ABP: --  ABP(mean): --  RR: 24 (03 Jan 2019 06:05) (17 - 35)  SpO2: 100% (03 Jan 2019 06:05) (92% - 100%)       Mode: AC/ CMV (Assist Control/ Continuous Mandatory Ventilation)  RR (machine): 16  TV (machine): 450  FiO2: 50  PEEP: 5  ITime: 1  PIP: 24      Input and Output:  I&O's Detail    01 Jan 2019 07:01  -  02 Jan 2019 07:00  --------------------------------------------------------  IN:    amiodarone Infusion: 362.2 mL    pantoprazole Infusion: 230 mL    Solution: 100 mL  Total IN: 692.2 mL    OUT:    Voided: 400 mL  Total OUT: 400 mL    Total NET: 292.2 mL      02 Jan 2019 07:01  -  03 Jan 2019 06:49  --------------------------------------------------------  IN:    dexmedetomidine Infusion: 18.2 mL    norepinephrine Infusion: 122.3 mL    Oral Fluid: 50 mL    pantoprazole Infusion: 30 mL    propofol Infusion: 24.9 mL    Solution: 250 mL    Solution: 250 mL    Solution: 200 mL  Total IN: 945.4 mL    OUT:    Nasoenteral Tube: 20 mL    Voided: 220 mL  Total OUT: 240 mL    Total NET: 705.4 mL          Lab Data                        12.1   17.22 )-----------( 108      ( 02 Jan 2019 17:21 )             37.1     01-02    142  |  106  |  48<H>  ----------------------------<  168<H>  4.3   |  24  |  2.24<H>    Ca    8.8      02 Jan 2019 17:21      ABG - ( 02 Jan 2019 22:59 )  pH, Arterial: x     pH, Blood: 7.38  /  pCO2: 39    /  pO2: 121   / HCO3: 23    / Base Excess: -2.1  /  SaO2: 96                CARDIAC MARKERS ( 01 Jan 2019 16:18 )  .168 ng/mL / x     / x     / x     / x            Review of Systems	      Objective     Physical Examination  extubated this am  heart s1s2  lung dec BS  abd soft  on pressors        Pertinent Lab findings & Imaging      Apoorva:  ODIN   Adequate UO     I&O's Detail    01 Jan 2019 07:01  -  02 Jan 2019 07:00  --------------------------------------------------------  IN:    amiodarone Infusion: 362.2 mL    pantoprazole Infusion: 230 mL    Solution: 100 mL  Total IN: 692.2 mL    OUT:    Voided: 400 mL  Total OUT: 400 mL    Total NET: 292.2 mL      02 Jan 2019 07:01  -  03 Jan 2019 06:49  --------------------------------------------------------  IN:    dexmedetomidine Infusion: 18.2 mL    norepinephrine Infusion: 122.3 mL    Oral Fluid: 50 mL    pantoprazole Infusion: 30 mL    propofol Infusion: 24.9 mL    Solution: 250 mL    Solution: 250 mL    Solution: 200 mL  Total IN: 945.4 mL    OUT:    Nasoenteral Tube: 20 mL    Voided: 220 mL  Total OUT: 240 mL    Total NET: 705.4 mL               Discussed with:     Cultures:	        Radiology

## 2019-01-03 NOTE — PROGRESS NOTE ADULT - ASSESSMENT
89M with HTN, hypothyroidism, BPH, afib, CKD, who presents with SOB.  Patient was in his usual state of health with no recent illness when he tried taking two of his pills this evening.  SOB 2/2 aspiration pneumonitis.      Aspiration pneumonitis related to vomiting/hematemesis, Acute Hypoxemic Resp Failure   - Continue with SPCU level of care.    - somewhat better this morning  - IV zosyn, azithro  - Pulm/ID  f/u appreciated  - f/u cultures    GI bleed: hx of billroth II w/ gastrojej ulcer in 2017- now resolved.   NPO - swallow re-eval in 1-2 days if continues to improve  Daily CBC  Protonix IV BID  gi f/u appreciated.    AC and anti platelets on hold.     Coagulopathy  - s/p IV Vit K and FFP  - improved    Atrial fibrillation, unspecified type.   - continue with sotalol   - cardio f/u appreciated   - Will need to consider Eliquis vs. no AC    CKD  - dose meds renally  - baseline unclear  - monitor    Hypothyroidism- stable , continue with Levothyroxine.     Benign prostatic hyperplasia, unspecified whether lower urinary tract symptoms present.  Plan: - cont with BPH meds.     Hypothyroidism, unspecified type.  Plan: - cont with synthroid.     VTE PPx - SCDs 89M with HTN, hypothyroidism, BPH, afib, CKD, who presents with SOB.  Patient was in his usual state of health with no recent illness when he tried taking two of his pills this evening.  SOB 2/2 aspiration pneumonitis.      Aspiration pneumonitis related to vomiting/hematemesis, Acute Hypoxemic Resp Failure   - Continue with SPCU level of care.    - somewhat better this morning  - IV zosyn, azithro  - Pulm/ID  f/u appreciated  - f/u cultures    GI bleed: hx of billroth II w/ gastrojej ulcer in 2017- now resolved.   NPO - swallow re-eval in 1-2 days if continues to improve  Daily CBC  Protonix IV BID  gi f/u appreciated.    AC and anti platelets on hold.     Coagulopathy  - s/p IV Vit K and FFP  - improved    Atrial fibrillation, unspecified type.   - IV metoprolol until can take PO  - cardio f/u appreciated   - Will need to consider Eliquis vs. no AC    CKD  - monitor rising Cr  - dose meds renally  - baseline unclear    Hypothyroidism- stable , continue with Levothyroxine.     Benign prostatic hyperplasia, unspecified whether lower urinary tract symptoms present.  Plan: - cont with BPH meds.     Hypothyroidism, unspecified type.  Plan: - cont with synthroid.     VTE PPx - SCDs

## 2019-01-03 NOTE — PROGRESS NOTE ADULT - ASSESSMENT
89 y/o M with a h/o MVP, CKD, afib on coumadin, PUD s/p bilroth procedure, nephrolithiasis, hypothyroidism, BPH, diverticulosis, with acute hypoxemic respiratory failure, shock, severe sepsis, aspiration pneumonia, GABO on CKD, UGIB, hypoglycemia, a-fib with RVR.

## 2019-01-03 NOTE — PROGRESS NOTE ADULT - ASSESSMENT
The patient is a 89 year old male with a history of hypothyroidism, BPH, PUD, atrial fibrillation, chronic diastolic heart failure who was admitted with respiratory failure, GI bleed, hypotension.    Plan:  - Echo from 2017 with grossly low normal LV systolic function, no significant valve issues  - Remains volume overloaded. Will dose furosemide prn.  - Troponin peaked at 0.292 in the setting of type 2 NSTEMI (demand ischemia) from respiratory failure and GI bleed  - Remain off of anticoagulation. Given multiple episodes of GI bleed with supratherapeutic INR, warfarin may not be the best option. Eventually can consider apixaban or no anticoagulation.  - Unable to take PO medications right now. Discontinue PO metoprolol and sotalol.  - Start metoprolol 5 mg IV q4h prn HR>120  - Self-extubated. Continue HFNC or BIPAP. High risk for reintubation.  - Weaned off of norepinephrine

## 2019-01-03 NOTE — PROGRESS NOTE ADULT - SUBJECTIVE AND OBJECTIVE BOX
Chief Complaint: Shortness of breath, hematemesis    Interval Events: Overnight went into respiratory distress requiring intubation. Became hypotensive requiring pressors. Self-extubated this morning.    Review of Systems:  General: No fevers, chills, weight loss or gain  Skin: No rashes, color changes  Cardiovascular: No chest pain, orthopnea  Respiratory: (+) shortness of breath, cough  Gastrointestinal: No nausea, abdominal pain  Genitourinary: No incontinence, pain with urination  Musculoskeletal: No pain, swelling, decreased range of motion  Neurological: No headache, weakness  Psychiatric: No depression, anxiety  Endocrine: No weight loss or gain, increased thirst  All other systems are comprehensively negative.    Physical Exam:  Vitals:        Vital Signs Last 24 Hrs  T(C): 36.7 (01 Jan 2019 08:31), Max: 37.1 (01 Jan 2019 04:06)  T(F): 98 (01 Jan 2019 08:31), Max: 98.8 (01 Jan 2019 04:06)  HR: 88 (01 Jan 2019 08:00) (70 - 145)  BP: 111/72 (01 Jan 2019 08:00) (84/53 - 124/79)  BP(mean): 86 (01 Jan 2019 08:00) (61 - 93)  RR: 25 (01 Jan 2019 08:00) (19 - 30)  SpO2: 100% (01 Jan 2019 08:00) (89% - 100%)  General: Respiratory distress  HEENT: MMM  Neck: No JVD, no carotid bruit  Lungs: Rales and wheezing  CV: Irregular, nl S1/S2, no M/R/G  Abdomen: S/NT/ND, +BS  Extremities: 2+ LE edema, no cyanosis  Neuro: AAOx3, non-focal  Skin: No rash    Labs:    01-02    139  |  104  |  40<H>  ----------------------------<  151<H>  4.2   |  23  |  2.12<H>    Ca    9.1      02 Jan 2019 06:53                          12.6   19.15 )-----------( 135      ( 02 Jan 2019 06:53 )             39.5       Telemetry: AF

## 2019-01-03 NOTE — PROGRESS NOTE ADULT - SUBJECTIVE AND OBJECTIVE BOX
HARSHAL COLORADO is a 89yMale , patient examined and chart reviewed.     INTERVAL HPI/ OVERNIGHT EVENTS:  Events noted. Intubated overnight but then self extubated.  On high flow 02.   Afebrile. Awake.  Family at bedside.    Past Medical History--  PAST MEDICAL & SURGICAL HISTORY:  Mitral valve disorder  Kidney stone  Diverticulosis  CKD (chronic kidney disease)  Afib  Hypothyroidism  BPH (benign prostatic hyperplasia)  Chronic peptic ulcer: S/P surgery more than 10 yrs ago      For details regarding the patient's social history, family history, and other miscellaneous elements, please refer the initial infectious diseases consultation and/or the admitting history and physical examination for this admission.    ROS:  CONSTITUTIONAL:  Negative fever or chills  EYES:  Negative  blurry vision or double vision  CARDIOVASCULAR:  Negative for chest pain or palpitations  RESPIRATORY:  Negative for cough, wheezing, + SOB   GASTROINTESTINAL:  Negative for nausea, vomiting, diarrhea, constipation, or abdominal pain  GENITOURINARY:  Negative frequency, urgency , dysuria or hematuria   NEUROLOGIC:  No headache, confusion, dizziness, lightheadedness  All other systems were reviewed and are negative     Current inpatient medications :    ANTIBIOTICS/RELEVANT:  azithromycin  IVPB      azithromycin  IVPB 500 milliGRAM(s) IV Intermittent every 24 hours  piperacillin/tazobactam IVPB. 3.375 Gram(s) IV Intermittent every 8 hours      ALBUTerol    0.083% 2.5 milliGRAM(s) Nebulizer every 8 hours  chlorhexidine 0.12% Liquid 15 milliLiter(s) Oral Mucosa two times a day  chlorhexidine 2% Cloths 1 Application(s) Topical daily  dexmedetomidine Infusion 0.5 MICROgram(s)/kG/Hr IV Continuous <Continuous>  ferrous    sulfate 325 milliGRAM(s) Oral daily  heparin  Injectable 5000 Unit(s) SubCutaneous every 12 hours  levothyroxine Injectable 37.5 MICROGram(s) IV Push at bedtime  metoprolol tartrate Injectable 5 milliGRAM(s) IV Push every 4 hours PRN  multivitamin 1 Tablet(s) Oral daily  norepinephrine Infusion 0.05 MICROgram(s)/kG/Min IV Continuous <Continuous>  pantoprazole  Injectable 40 milliGRAM(s) IV Push two times a day  tamsulosin 0.4 milliGRAM(s) Oral at bedtime      Objective:    01-02 @ 07:01  -  01-03 @ 07:00  --------------------------------------------------------  IN: 952.2 mL / OUT: 240 mL / NET: 712.2 mL    01-03 @ 07:01  -  01-03 @ 15:05  --------------------------------------------------------  IN: 110 mL / OUT: 0 mL / NET: 110 mL      T(C): 36.6 (01-03-19 @ 08:23), Max: 36.6 (01-02-19 @ 16:01)  HR: 88 (01-03-19 @ 13:16) (66 - 107)  BP: 114/85 (01-03-19 @ 13:00) (93/68 - 148/100)  RR: 22 (01-03-19 @ 13:00) (17 - 31)  SpO2: 98% (01-03-19 @ 13:16) (92% - 100%)  Wt(kg): --  Mode: AC/ CMV (Assist Control/ Continuous Mandatory Ventilation), RR (machine): 16, TV (machine): 450, FiO2: 50, PEEP: 5, ITime: 1, PIP: 24    Physical Exam:  GEN: NAD, pleasant  HEENT: normocephalic and atraumatic. EOMI. NITHYA. Moist mucosa. Clear Posterior pharynx.  NECK: Supple. No carotid bruits.  No lymphadenopathy or thyromegaly.  LUNGS: Rhonchi to auscultation.  HEART: Regular rate and rhythm without murmur.  ABDOMEN: Soft, nontender, and nondistended.  Positive bowel sounds.  No hepatosplenomegaly was noted.  EXTREMITIES: + edema.  NEUROLOGIC: A & O x3, No focal neurological deficits   SKIN: No ulceration or induration present.      LABS:                        12.3   22.04 )-----------( 113      ( 03 Jan 2019 05:46 )             38.0       01-03    141  |  105  |  51<H>  ----------------------------<  128<H>  3.7   |  26  |  2.28<H>    Ca    8.7      03 Jan 2019 05:46    PT/INR - ( 03 Jan 2019 05:46 )   PT: 18.6 sec;   INR: 1.68 ratio      ABG - ( 02 Jan 2019 22:59 )  pH, Arterial: x     pH, Blood: 7.38  /  pCO2: 39    /  pO2: 121   / HCO3: 23    / Base Excess: -2.1  /  SaO2: 96        MICROBIOLOGY:  PENDING    RADIOLOGY & ADDITIONAL STUDIES:    EXAM:  CT CHEST                            PROCEDURE DATE:  12/31/2018          INTERPRETATION:  Clinical information: Respiratory distress, sepsis    Comparison CT chest study dated 4/25/2017. Comparison CT abdomen-pelvis   study dated 11/14/2014.    Axial images obtained, coronal and sagittal images computer reformatted.   Noncontrast exam. Neither intravenous or oral contrast material was   administered which limits the study.        CHEST: An NG tube is present. No thoracic aortic aneurysm or pericardial   effusion. Global cardiomegaly present. Coronary artery calcifications   present. Central airway intact. Thyroid gland not enlarged.    Minimal bibasilar effusions right greater than left. Pleural thickening   with pleural parenchymal scarring at the apices unchanged since the prior   exam. Calcifications evident within the right apical scarring.    Multifocal bilateral airspace disease present. Airspace disease present   in the left upper lobe, left lower lobe, right middle lobe, and right   lower lobe.    No acute osseous abnormalities visible in the thoracic skeleton.    Small anterior mediastinal lymph nodes present could be reactive. Hilar   regions obscured by the extensive airspace disease.        ABDOMEN-PELVIS: Postcholecystectomy clips present. Hepatic parenchyma   homogeneous. The spleen is not enlarged. No adrenal lesions evident. No   hydronephrotic changes identified. Traces bilateral perinephric stranding   noted. Unenhanced pancreas shows no lesions. No aortic aneurysm. No   retroperitoneal lymphadenopathy. No ascites. No biliary ductal   dilatation. Past history of gastric surgery type unspecified.    No bowel obstruction. Edematous appearance of the mesentery could be   related to volume overload. Diverticulosis present. Urinary bladder shows   a thickened wall. A calcification is visible posterior aspect of the   urinary bladder slightly to the right of midline, could represent a stone   in a diverticulum or localized to the wall of the urinary bladder.   Calcification measures 4 mm. The prostate is markedly enlarged. No free   pelvic fluid evident. Inguinal regions intact. Multilevel disc   degenerative disease lower lumbar region.      Assessment :  89M with hypothyroidism, BPH, afib, CKD, who presents with SOB with acute hypoxic respiratory  failure complicated by vomiting with hematemesis likely severe aspiration pneumonia with   pneumonitis  Likely also component of pulm edema  + troponin  Coagulopathy  GABO on CKD  Leukocytosis multifactorial    Plan :   Cont Zosyn Zithromax  Fu cultures  Trend temps and wbc  Fu legionella and strep pna antigen  On high flow  Critically ill      Continue with present regiment.  Appropriate use of antibiotics and adverse effects reviewed.    I have discussed the above plan of care with patient and family in detail. They expressed understanding of the the treatment plan . Risks, benefits and alternatives discussed in detail. I have asked if they have any questions or concerns and appropriately addressed them to the best of my ability .      Critical care time greater then 45 minutes reviewing notes, labs data/ imaging , discussion with multidisciplinary team.    Thank you for allowing me to participate in care of your patient .        Anibal Saldaña MD  Infectious Disease  [phone]

## 2019-01-03 NOTE — CHART NOTE - NSCHARTNOTEFT_GEN_A_CORE
patient self-extubated at 640. able to speak. breath sounds bilateral. spo2 100% on 3 L NC. will trial without mechanical ventilation as he appears much more alert and is talking/following commands and appears to be protecting his airway. would avoid future use of bipap due to increased aspiration risk and would use HFNC if hypoxia develops instead. Dr. Goetz made aware of self-extubation.

## 2019-01-03 NOTE — PROGRESS NOTE ADULT - SUBJECTIVE AND OBJECTIVE BOX
INTERVAL HPI/OVERNIGHT EVENTS:   Patient seen and examined.  Overnight events noted, was intubated, self-extubated, appears stable currently.  Reports that his breathing is easier, has dry cough.  No fevers, chills, sweats, dizziness, HA, changes in vision, cp, palpitations,  diarrhea, abd pain, dysuria, focal weakness, or calf pain.      REVIEW OF SYSTEMS:  See HPI,  all others negative    PHYSICAL EXAM:  Vital Signs Last 24 Hrs  T(C): 36.6 (03 Jan 2019 08:23), Max: 36.6 (02 Jan 2019 16:01)  T(F): 97.8 (03 Jan 2019 08:23), Max: 97.8 (02 Jan 2019 16:01)  HR: 98 (03 Jan 2019 09:30) (66 - 101)  BP: 126/92 (03 Jan 2019 09:30) (93/68 - 158/102)  BP(mean): 102 (03 Jan 2019 09:30) (71 - 120)  RR: 31 (03 Jan 2019 09:30) (17 - 35)  SpO2: 95% (03 Jan 2019 09:30) (92% - 100%)    GENERAL: NAD, well-groomed, well-developed, awake, alert, oriented x 3, fluent and coherent speech  EYES:  conjunctiva and sclera clear  ENMT: No tonsillar erythema, exudates   NECK: Supple, No JVD   NERVOUS SYSTEM:  Good concentration; Moving all 4 extremities against gravity;  No facial droop  CHEST WALL: No masses  CHEST/LUNG: b/l rhonchi  HEART: Regular rate and rhythm; No murmurs, rubs, or gallops  ABDOMEN: Soft, Nontender, Nondistended, Bowel sounds present, No palpable masses or organomegaly, No bruits  EXTREMITIES:  2+ Peripheral Pulses, No clubbing, cyanosis, + bipedal edema  LYMPH: No lymphadenopathy    LABS:                                   12.3   22.04 )-----------( 113      ( 03 Jan 2019 05:46 )             38.0     01-03    141  |  105  |  51<H>  ----------------------------<  128<H>  3.7   |  26  |  2.28<H>    Ca    8.7      03 Jan 2019 05:46

## 2019-01-03 NOTE — PROGRESS NOTE ADULT - SUBJECTIVE AND OBJECTIVE BOX
Patient is a 89y old  Male who presents with a chief complaint of SOB (03 Jan 2019 15:05)      BRIEF HOSPITAL COURSE: 89 y/o M with a h/o MVP, CKD, afib on coumadin, PUD s/p bilroth procedure, nephrolithiasis, hypothyroidism, BPH, diverticulosis, admitted on 12/31 with respiratory distress after an aspiration event. As per report, patient had choked on outpatient medications and then vomited. Hospital course complicated by the development of aspiration pneumonia, severe sepsis, UGIB, and GABO on CKD, and a-fib with RVR. Patient had been placed on NIPPV for worsening respiratory failure, but failed this treatment modality and was subsequently intubated. Transient shock state requiring vasopressor therapy.    Events last 24 hours: Patient self-extubated earlier this morning without need for reintubation. Currently on HFNC with moderate FiO2 requirement. Has been weaned off of IV vasopressor therapy. No longer requiring sedative infusion. No further bleeding appreciated. Concern for developing hypoglycemia given prolonged NPO status.      PAST MEDICAL & SURGICAL HISTORY:  Mitral valve disorder  Kidney stone  Diverticulosis  CKD (chronic kidney disease)  Afib  Hypothyroidism  BPH (benign prostatic hyperplasia)  Chronic peptic ulcer: S/P surgery more than 10 yrs ago      Review of Systems:  CONSTITUTIONAL: No fever, chills, or fatigue  EYES: No eye pain, visual disturbances, or discharge  ENMT:  No difficulty hearing, tinnitus, vertigo; No sinus or throat pain  NECK: No pain or stiffness  RESPIRATORY: No cough, wheezing, chills or hemoptysis; No shortness of breath  CARDIOVASCULAR: No chest pain, palpitations, dizziness, or leg swelling  GASTROINTESTINAL: No abdominal or epigastric pain. No nausea, vomiting, or hematemesis; No diarrhea or constipation. No melena or hematochezia.  GENITOURINARY: No dysuria, frequency, hematuria, or incontinence  NEUROLOGICAL: No headaches, memory loss, loss of strength, numbness, or tremors  SKIN: No itching, burning, rashes, or lesions   MUSCULOSKELETAL: No joint pain or swelling; No muscle, back, or extremity pain  PSYCHIATRIC: No depression, anxiety, mood swings, or difficulty sleeping      Medications:  azithromycin  IVPB      azithromycin  IVPB 500 milliGRAM(s) IV Intermittent every 24 hours  piperacillin/tazobactam IVPB. 3.375 Gram(s) IV Intermittent every 8 hours  metoprolol tartrate Injectable 5 milliGRAM(s) IV Push every 4 hours PRN  tamsulosin 0.4 milliGRAM(s) Oral at bedtime  ALBUTerol    0.083% 2.5 milliGRAM(s) Nebulizer every 8 hours  heparin  Injectable 5000 Unit(s) SubCutaneous every 12 hours  pantoprazole  Injectable 40 milliGRAM(s) IV Push two times a day  levothyroxine Injectable 37.5 MICROGram(s) IV Push at bedtime  dextrose 5%. 1000 milliLiter(s) IV Continuous <Continuous>  ferrous    sulfate 325 milliGRAM(s) Oral daily  multivitamin 1 Tablet(s) Oral daily  influenza   Vaccine 0.5 milliLiter(s) IntraMuscular once  chlorhexidine 2% Cloths 1 Application(s) Topical daily        Mode: AC/ CMV (Assist Control/ Continuous Mandatory Ventilation)  RR (machine): 16  TV (machine): 450  FiO2: 50  PEEP: 5  ITime: 1  PIP: 24      ICU Vital Signs Last 24 Hrs  T(C): 36.4 (04 Jan 2019 00:06), Max: 36.8 (03 Jan 2019 12:00)  T(F): 97.6 (04 Jan 2019 00:06), Max: 98.2 (03 Jan 2019 12:00)  HR: 95 (04 Jan 2019 01:00) (66 - 132)  BP: 125/82 (04 Jan 2019 01:00) (97/60 - 139/86)  BP(mean): 93 (04 Jan 2019 01:00) (71 - 102)  ABP: --  ABP(mean): --  RR: 24 (04 Jan 2019 01:00) (16 - 31)  SpO2: 100% (04 Jan 2019 01:00) (95% - 100%)      ABG - ( 02 Jan 2019 22:59 )  pH, Arterial: x     pH, Blood: 7.38  /  pCO2: 39    /  pO2: 121   / HCO3: 23    / Base Excess: -2.1  /  SaO2: 96                  I&O's Detail    02 Jan 2019 07:01  -  03 Jan 2019 07:00  --------------------------------------------------------  IN:    dexmedetomidine Infusion: 18.2 mL    norepinephrine Infusion: 129.1 mL    Oral Fluid: 50 mL    pantoprazole Infusion: 30 mL    propofol Infusion: 24.9 mL    Solution: 250 mL    Solution: 250 mL    Solution: 200 mL  Total IN: 952.2 mL    OUT:    Nasoenteral Tube: 20 mL    Voided: 220 mL  Total OUT: 240 mL    Total NET: 712.2 mL      03 Jan 2019 07:01  -  04 Jan 2019 02:44  --------------------------------------------------------  IN:    dextrose 5%.: 600 mL    norepinephrine Infusion: 10 mL    Solution: 200 mL  Total IN: 810 mL    OUT:  Total OUT: 0 mL    Total NET: 810 mL            LABS:                        12.3   22.04 )-----------( 113      ( 03 Jan 2019 05:46 )             38.0     01-03    141  |  105  |  51<H>  ----------------------------<  128<H>  3.7   |  26  |  2.28<H>    Ca    8.7      03 Jan 2019 05:46            CAPILLARY BLOOD GLUCOSE      POCT Blood Glucose.: 109 mg/dL (03 Jan 2019 23:29)    PT/INR - ( 03 Jan 2019 05:46 )   PT: 18.6 sec;   INR: 1.68 ratio             CULTURES:  Culture Results:   No growth to date. (12-31-18 @ 12:33)  Culture Results:   No growth to date. (12-31-18 @ 12:33)        Physical Examination:    General: No acute distress.  Alert, oriented, interactive, nonfocal, fatigued, on HFNC    HEENT: Pupils equal, reactive to light.  Symmetric.    PULM: Coarse breath sounds bilaterally, no significant sputum production    CVS: Regular rate and irregularly irregular rhythm, no murmurs, rubs, or gallops    ABD: Soft, nondistended, nontender, normoactive bowel sounds, no masses    EXT: b/l LE edema, nontender    SKIN: Warm and well perfused, no rashes noted.    NEURO: A&Ox3, strength 5/5 all extremities, cranial nerves grossly intact, no focal deficits      RADIOLOGY:     < from: Xray Chest 1 View- PORTABLE-Urgent (01.02.19 @ 20:14) >  Findings: There has been interval endotracheal tube intubation with the   endotracheal tube in proper position. The patient is status post left IJ   central line placement with its tip at the SVC.    Pulmonary edema and/or vascular congestion appear unchanged. Bibasilar   opacities are noted compatible with atelectasis and/or pneumonia and/or   effusion. These appear unchanged. The heart size is normal. There are   mild multilevel degenerative changes of the thoracic spine.     IMPRESSION: Interval intubation.    Unchanged pulmonary edema and bibasilar opacities compatible with   atelectasis and/or pneumonia and/or effusion.

## 2019-01-03 NOTE — PROGRESS NOTE ADULT - ATTENDING COMMENTS
32 minutes of critical care time spent with the patient and coordinating care with nursing, hospitalist, and consultants. Patient is critically ill requiring ICU care due to respiratory failure, septic shock, rapid atrial fibrillation, heart failure. The patient is high risk for deterioration and death.

## 2019-01-04 DIAGNOSIS — I48.91 UNSPECIFIED ATRIAL FIBRILLATION: ICD-10-CM

## 2019-01-04 DIAGNOSIS — E16.2 HYPOGLYCEMIA, UNSPECIFIED: ICD-10-CM

## 2019-01-04 DIAGNOSIS — K92.2 GASTROINTESTINAL HEMORRHAGE, UNSPECIFIED: ICD-10-CM

## 2019-01-04 DIAGNOSIS — R57.9 SHOCK, UNSPECIFIED: ICD-10-CM

## 2019-01-04 LAB
ANION GAP SERPL CALC-SCNC: 9 MMOL/L — SIGNIFICANT CHANGE UP (ref 5–17)
BASE EXCESS BLDV CALC-SCNC: 2.5 MMOL/L — HIGH (ref -2–2)
BUN SERPL-MCNC: 44 MG/DL — HIGH (ref 7–23)
CALCIUM SERPL-MCNC: 8.5 MG/DL — SIGNIFICANT CHANGE UP (ref 8.4–10.5)
CHLORIDE SERPL-SCNC: 104 MMOL/L — SIGNIFICANT CHANGE UP (ref 96–108)
CO2 SERPL-SCNC: 28 MMOL/L — SIGNIFICANT CHANGE UP (ref 22–31)
CREAT SERPL-MCNC: 1.91 MG/DL — HIGH (ref 0.5–1.3)
GAS PNL BLDV: SIGNIFICANT CHANGE UP
GLUCOSE SERPL-MCNC: 111 MG/DL — HIGH (ref 70–99)
HCO3 BLDV-SCNC: 26 MMOL/L — SIGNIFICANT CHANGE UP (ref 21–29)
HCT VFR BLD CALC: 35.2 % — LOW (ref 39–50)
HGB BLD-MCNC: 11.5 G/DL — LOW (ref 13–17)
HOROWITZ INDEX BLDV+IHG-RTO: 21 — SIGNIFICANT CHANGE UP
LACTATE SERPL-SCNC: 1 MMOL/L — SIGNIFICANT CHANGE UP (ref 0.7–2)
MAGNESIUM SERPL-MCNC: 1.6 MG/DL — SIGNIFICANT CHANGE UP (ref 1.6–2.6)
MCHC RBC-ENTMCNC: 31.3 PG — SIGNIFICANT CHANGE UP (ref 27–34)
MCHC RBC-ENTMCNC: 32.7 GM/DL — SIGNIFICANT CHANGE UP (ref 32–36)
MCV RBC AUTO: 95.9 FL — SIGNIFICANT CHANGE UP (ref 80–100)
NRBC # BLD: 0 /100 WBCS — SIGNIFICANT CHANGE UP (ref 0–0)
PCO2 BLDV: 49 MMHG — SIGNIFICANT CHANGE UP (ref 35–50)
PH BLDV: 7.37 — SIGNIFICANT CHANGE UP (ref 7.35–7.45)
PLATELET # BLD AUTO: 102 K/UL — LOW (ref 150–400)
PO2 BLDV: 46 — HIGH (ref 25–45)
POTASSIUM SERPL-MCNC: 3.3 MMOL/L — LOW (ref 3.5–5.3)
POTASSIUM SERPL-SCNC: 3.3 MMOL/L — LOW (ref 3.5–5.3)
RBC # BLD: 3.67 M/UL — LOW (ref 4.2–5.8)
RBC # FLD: 13.1 % — SIGNIFICANT CHANGE UP (ref 10.3–14.5)
SAO2 % BLDV: 78 % — SIGNIFICANT CHANGE UP (ref 67–88)
SODIUM SERPL-SCNC: 141 MMOL/L — SIGNIFICANT CHANGE UP (ref 135–145)
WBC # BLD: 15.17 K/UL — HIGH (ref 3.8–10.5)
WBC # FLD AUTO: 15.17 K/UL — HIGH (ref 3.8–10.5)

## 2019-01-04 PROCEDURE — 99233 SBSQ HOSP IP/OBS HIGH 50: CPT

## 2019-01-04 RX ORDER — DEXTROSE 10 % IN WATER 10 %
1000 INTRAVENOUS SOLUTION INTRAVENOUS
Qty: 0 | Refills: 0 | Status: DISCONTINUED | OUTPATIENT
Start: 2019-01-04 | End: 2019-01-07

## 2019-01-04 RX ORDER — POTASSIUM CHLORIDE 20 MEQ
10 PACKET (EA) ORAL
Qty: 0 | Refills: 0 | Status: DISCONTINUED | OUTPATIENT
Start: 2019-01-04 | End: 2019-01-04

## 2019-01-04 RX ORDER — POTASSIUM CHLORIDE 20 MEQ
10 PACKET (EA) ORAL
Qty: 0 | Refills: 0 | Status: COMPLETED | OUTPATIENT
Start: 2019-01-04 | End: 2019-01-04

## 2019-01-04 RX ORDER — FUROSEMIDE 40 MG
20 TABLET ORAL ONCE
Qty: 0 | Refills: 0 | Status: COMPLETED | OUTPATIENT
Start: 2019-01-04 | End: 2019-01-04

## 2019-01-04 RX ADMIN — PANTOPRAZOLE SODIUM 40 MILLIGRAM(S): 20 TABLET, DELAYED RELEASE ORAL at 05:34

## 2019-01-04 RX ADMIN — PIPERACILLIN AND TAZOBACTAM 25 GRAM(S): 4; .5 INJECTION, POWDER, LYOPHILIZED, FOR SOLUTION INTRAVENOUS at 01:17

## 2019-01-04 RX ADMIN — Medication 100 MILLIEQUIVALENT(S): at 12:01

## 2019-01-04 RX ADMIN — PIPERACILLIN AND TAZOBACTAM 25 GRAM(S): 4; .5 INJECTION, POWDER, LYOPHILIZED, FOR SOLUTION INTRAVENOUS at 10:15

## 2019-01-04 RX ADMIN — Medication 100 MILLIEQUIVALENT(S): at 10:17

## 2019-01-04 RX ADMIN — Medication 40 MILLILITER(S): at 10:54

## 2019-01-04 RX ADMIN — HEPARIN SODIUM 5000 UNIT(S): 5000 INJECTION INTRAVENOUS; SUBCUTANEOUS at 17:26

## 2019-01-04 RX ADMIN — Medication 20 MILLIGRAM(S): at 10:54

## 2019-01-04 RX ADMIN — ALBUTEROL 2.5 MILLIGRAM(S): 90 AEROSOL, METERED ORAL at 16:01

## 2019-01-04 RX ADMIN — PIPERACILLIN AND TAZOBACTAM 25 GRAM(S): 4; .5 INJECTION, POWDER, LYOPHILIZED, FOR SOLUTION INTRAVENOUS at 17:26

## 2019-01-04 RX ADMIN — Medication 37.5 MICROGRAM(S): at 21:51

## 2019-01-04 RX ADMIN — CHLORHEXIDINE GLUCONATE 1 APPLICATION(S): 213 SOLUTION TOPICAL at 13:57

## 2019-01-04 RX ADMIN — ALBUTEROL 2.5 MILLIGRAM(S): 90 AEROSOL, METERED ORAL at 07:13

## 2019-01-04 RX ADMIN — SODIUM CHLORIDE 75 MILLILITER(S): 9 INJECTION, SOLUTION INTRAVENOUS at 05:36

## 2019-01-04 RX ADMIN — PANTOPRAZOLE SODIUM 40 MILLIGRAM(S): 20 TABLET, DELAYED RELEASE ORAL at 17:26

## 2019-01-04 RX ADMIN — HEPARIN SODIUM 5000 UNIT(S): 5000 INJECTION INTRAVENOUS; SUBCUTANEOUS at 05:33

## 2019-01-04 NOTE — PROGRESS NOTE ADULT - SUBJECTIVE AND OBJECTIVE BOX
Chief Complaint:        INTERVAL HPI/OVERNIGHT EVENTS:  no significant events overnight reported   pt awake and alert.   n obleeding. high flow 02      MEDICATIONS  (STANDING):  ALBUTerol    0.083% 2.5 milliGRAM(s) Nebulizer every 8 hours  azithromycin  IVPB      azithromycin  IVPB 500 milliGRAM(s) IV Intermittent every 24 hours  chlorhexidine 2% Cloths 1 Application(s) Topical daily  dextrose 5%. 1000 milliLiter(s) (75 mL/Hr) IV Continuous <Continuous>  ferrous    sulfate 325 milliGRAM(s) Oral daily  heparin  Injectable 5000 Unit(s) SubCutaneous every 12 hours  influenza   Vaccine 0.5 milliLiter(s) IntraMuscular once  levothyroxine Injectable 37.5 MICROGram(s) IV Push at bedtime  multivitamin 1 Tablet(s) Oral daily  pantoprazole  Injectable 40 milliGRAM(s) IV Push two times a day  piperacillin/tazobactam IVPB. 3.375 Gram(s) IV Intermittent every 8 hours  potassium chloride  10 mEq/50 mL IVPB 10 milliEquivalent(s) IV Intermittent every 2 hours  tamsulosin 0.4 milliGRAM(s) Oral at bedtime    MEDICATIONS  (PRN):  metoprolol tartrate Injectable 5 milliGRAM(s) IV Push every 4 hours PRN HR>120            Vital Signs Last 24 Hrs  T(C): 36.9 (04 Jan 2019 08:35), Max: 36.9 (04 Jan 2019 08:35)  T(F): 98.4 (04 Jan 2019 08:35), Max: 98.4 (04 Jan 2019 08:35)  HR: 101 (04 Jan 2019 08:00) (88 - 132)  BP: 117/68 (04 Jan 2019 08:00) (94/65 - 139/86)  BP(mean): 82 (04 Jan 2019 08:00) (74 - 102)  RR: 20 (04 Jan 2019 08:00) (16 - 31)  SpO2: 99% (04 Jan 2019 08:00) (95% - 100%)    Physical exam:    abd soft, non tender  cv s1s2  chest rhonchi  ext mild edema le        LABS:                        11.5   15.17 )-----------( 102      ( 04 Jan 2019 06:20 )             35.2     01-04    141  |  104  |  44<H>  ----------------------------<  111<H>  3.3<L>   |  28  |  1.91<H>    Ca    8.5      04 Jan 2019 06:20      PT/INR - ( 03 Jan 2019 05:46 )   PT: 18.6 sec;   INR: 1.68 ratio               RADIOLOGY & ADDITIONAL TESTS:

## 2019-01-04 NOTE — PROGRESS NOTE ADULT - SUBJECTIVE AND OBJECTIVE BOX
HARSHAL COLORADO is a 89yMale , patient examined and chart reviewed.     INTERVAL HPI/ OVERNIGHT EVENTS:  On high flow 02. Off pressors.  Afebrile. Awake.  Weak. Failed swallow study.    Past Medical History--  PAST MEDICAL & SURGICAL HISTORY:  Mitral valve disorder  Kidney stone  Diverticulosis  CKD (chronic kidney disease)  Afib  Hypothyroidism  BPH (benign prostatic hyperplasia)  Chronic peptic ulcer: S/P surgery more than 10 yrs ago      For details regarding the patient's social history, family history, and other miscellaneous elements, please refer the initial infectious diseases consultation and/or the admitting history and physical examination for this admission.    ROS:  CONSTITUTIONAL:  Negative fever or chills  EYES:  Negative  blurry vision or double vision  CARDIOVASCULAR:  Negative for chest pain or palpitations  RESPIRATORY:  Negative for cough, wheezing, + SOB   GASTROINTESTINAL:  Negative for nausea, vomiting, diarrhea, constipation, or abdominal pain  GENITOURINARY:  Negative frequency, urgency , dysuria or hematuria   NEUROLOGIC:  No headache, confusion, dizziness, lightheadedness  All other systems were reviewed and are negative     Current inpatient medications :    ANTIBIOTICS/RELEVANT:  azithromycin  IVPB      azithromycin  IVPB 500 milliGRAM(s) IV Intermittent every 24 hours  piperacillin/tazobactam IVPB. 3.375 Gram(s) IV Intermittent every 8 hours      MEDICATIONS  (STANDING):  ALBUTerol    0.083% 2.5 milliGRAM(s) Nebulizer every 8 hours  azithromycin  IVPB      azithromycin  IVPB 500 milliGRAM(s) IV Intermittent every 24 hours  chlorhexidine 2% Cloths 1 Application(s) Topical daily  dextrose 10%. 1000 milliLiter(s) (40 mL/Hr) IV Continuous <Continuous>  ferrous    sulfate 325 milliGRAM(s) Oral daily  heparin  Injectable 5000 Unit(s) SubCutaneous every 12 hours  influenza   Vaccine 0.5 milliLiter(s) IntraMuscular once  levothyroxine Injectable 37.5 MICROGram(s) IV Push at bedtime  multivitamin 1 Tablet(s) Oral daily  pantoprazole  Injectable 40 milliGRAM(s) IV Push two times a day  piperacillin/tazobactam IVPB. 3.375 Gram(s) IV Intermittent every 8 hours  tamsulosin 0.4 milliGRAM(s) Oral at bedtime    MEDICATIONS  (PRN):  metoprolol tartrate Injectable 5 milliGRAM(s) IV Push every 4 hours PRN HR>120        Objective:  I&O's Summary    03 Jan 2019 07:01  -  04 Jan 2019 07:00  --------------------------------------------------------  IN: 1185 mL / OUT: 0 mL / NET: 1185 mL    04 Jan 2019 07:01  -  04 Jan 2019 13:04  --------------------------------------------------------  IN: 420 mL / OUT: 0 mL / NET: 420 mL    ICU Vital Signs Last 24 Hrs  T(C): 37.1 (04 Jan 2019 12:38), Max: 37.1 (04 Jan 2019 12:38)  T(F): 98.8 (04 Jan 2019 12:38), Max: 98.8 (04 Jan 2019 12:38)  HR: 106 (04 Jan 2019 12:00) (88 - 132)  BP: 128/78 (04 Jan 2019 12:00) (94/65 - 141/76)  BP(mean): 94 (04 Jan 2019 12:00) (74 - 95)  RR: 22 (04 Jan 2019 12:00) (16 - 24)  SpO2: 98% (04 Jan 2019 12:00) (97% - 100%)      Physical Exam:  GEN: NAD, pleasant  HEENT: normocephalic and atraumatic. EOMI. NITHYA. Moist mucosa. Clear Posterior pharynx.  NECK: Supple. No carotid bruits.  No lymphadenopathy or thyromegaly.  LUNGS: Rhonchi to auscultation.  HEART: Regular rate and rhythm without murmur.  ABDOMEN: Soft, nontender, and nondistended.  Positive bowel sounds.  No hepatosplenomegaly was noted.  EXTREMITIES: + edema.  NEUROLOGIC: A & O x3, No focal neurological deficits   SKIN: No ulceration or induration present.      LABS:                                   11.5   15.17 )-----------( 102      ( 04 Jan 2019 06:20 )             35.2   01-04    141  |  104  |  44<H>  ----------------------------<  111<H>  3.3<L>   |  28  |  1.91<H>    Ca    8.5      04 Jan 2019 06:20      MICROBIOLOGY:    Culture - Sputum (collected 03 Jan 2019 10:25)  Source: .Sputum Sputum - trap  Gram Stain (03 Jan 2019 15:36):    Numerous polymorphonuclear leukocytes seen per low power field    Few Squamous epithelial cells seen per low power field    Numerous Yeast like cells seen per oil power field  Preliminary Report (04 Jan 2019 09:19):    Normal Respiratory Abbie present    Culture - Blood (12.31.18 @ 12:33)    Specimen Source: .Blood Blood-Peripheral    Culture Results:   No growth to date.      RADIOLOGY & ADDITIONAL STUDIES:    EXAM:  CT CHEST                            PROCEDURE DATE:  12/31/2018          INTERPRETATION:  Clinical information: Respiratory distress, sepsis    Comparison CT chest study dated 4/25/2017. Comparison CT abdomen-pelvis   study dated 11/14/2014.    Axial images obtained, coronal and sagittal images computer reformatted.   Noncontrast exam. Neither intravenous or oral contrast material was   administered which limits the study.        CHEST: An NG tube is present. No thoracic aortic aneurysm or pericardial   effusion. Global cardiomegaly present. Coronary artery calcifications   present. Central airway intact. Thyroid gland not enlarged.    Minimal bibasilar effusions right greater than left. Pleural thickening   with pleural parenchymal scarring at the apices unchanged since the prior   exam. Calcifications evident within the right apical scarring.    Multifocal bilateral airspace disease present. Airspace disease present   in the left upper lobe, left lower lobe, right middle lobe, and right   lower lobe.    No acute osseous abnormalities visible in the thoracic skeleton.    Small anterior mediastinal lymph nodes present could be reactive. Hilar   regions obscured by the extensive airspace disease.        ABDOMEN-PELVIS: Postcholecystectomy clips present. Hepatic parenchyma   homogeneous. The spleen is not enlarged. No adrenal lesions evident. No   hydronephrotic changes identified. Traces bilateral perinephric stranding   noted. Unenhanced pancreas shows no lesions. No aortic aneurysm. No   retroperitoneal lymphadenopathy. No ascites. No biliary ductal   dilatation. Past history of gastric surgery type unspecified.    No bowel obstruction. Edematous appearance of the mesentery could be   related to volume overload. Diverticulosis present. Urinary bladder shows   a thickened wall. A calcification is visible posterior aspect of the   urinary bladder slightly to the right of midline, could represent a stone   in a diverticulum or localized to the wall of the urinary bladder.   Calcification measures 4 mm. The prostate is markedly enlarged. No free   pelvic fluid evident. Inguinal regions intact. Multilevel disc   degenerative disease lower lumbar region.      Assessment :  89M with hypothyroidism, BPH, afib, CKD, who presents with SOB with acute hypoxic respiratory  failure complicated by vomiting with hematemesis likely severe aspiration pneumonia with   pneumonitis  Likely also component of pulm edema  + troponin  Coagulopathy  GABO on CKD  Leukocytosis multifactorial better today    Plan :   Cont Zosyn   Dc Zithromax  Fu cultures  Trend temps and wbc  On high flow       Continue with present regiment.  Appropriate use of antibiotics and adverse effects reviewed.    I have discussed the above plan of care with patient and family in detail. They expressed understanding of the the treatment plan . Risks, benefits and alternatives discussed in detail. I have asked if they have any questions or concerns and appropriately addressed them to the best of my ability .      Critical care time greater then 45 minutes reviewing notes, labs data/ imaging , discussion with multidisciplinary team.    Dr Lester Snell to cover me over weekend.    Thank you for allowing me to participate in care of your patient .      Anibal Saldaña MD  Infectious Disease  [phone]

## 2019-01-04 NOTE — PROGRESS NOTE ADULT - PROBLEM SELECTOR PLAN 8
GABO on CKD. Continue gentle IVF hydration. Hold diuresis. Monitor lytes and UOP. No indication for hemodialysis at this time. Trend BUN/Cr- improving. Avoid nephrotoxic agents.
Resolved. Continue IV rate control until patient is able to restart PO regimen.
Cr stable, baseline appears to be between 1.5-1.9  close monitoring of output, pt is noted to be incontinent by nursing  avoid MAP<65  avoid nephrotoxins  renal dose adjustments PRN  no hydro on CTA/P

## 2019-01-04 NOTE — PROGRESS NOTE ADULT - ASSESSMENT
89 m w/ above hx a/w/ respiratory distress which began after vomiting.   reported to have blood in emesis at presentation   hx of billroth II w/ gastrojej ulcer in 2017  presently without active/overt gi bleeding  IV protonix  to 40mg BID   CBC daily  holding all antiplatelet medications and AC  NO plan for endoscopy  swallow eval 1/2 noted- presently npo. 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

## 2019-01-04 NOTE — PROGRESS NOTE ADULT - ATTENDING COMMENTS
31 minutes of critical care time spent with the patient and coordinating care with nursing, hospitalist, and consultants. Patient is critically ill requiring ICU care due to respiratory failure, septic shock, rapid atrial fibrillation, heart failure. The patient is high risk for deterioration and death.

## 2019-01-04 NOTE — PROGRESS NOTE ADULT - PROBLEM SELECTOR PLAN 5
BG downtrending now that patient has been NPO. Level dipped to 83 and D5W infusion was started. Will begin q 6 hour accu-checks to ensure we are maintaining euglycemia. If patient remains unable to tolerate PO intake we may have to place NGT and initiate tube feeds.
No further bleeding noted. No plans for EGD, as per GI. Continue IV protonix BID for now. Okay to advance diet as tolerated. Holding a/c- is he still a good candidate to continue this going forward? Continue to trend H&H and transfuse as necessary for HgB < 7 or for symptomatic bleeding.
as above
downtrending, Vit K x 2 given  repeat INR in am  no active bleeding currently, no acute indicatio for further reversal   holding AC for now

## 2019-01-04 NOTE — PROGRESS NOTE ADULT - SUBJECTIVE AND OBJECTIVE BOX
Chief Complaint: Shortness of breath, hematemesis    Interval Events: Remains on HFNC. Low FS overnight, started on D5W.    Review of Systems:  General: No fevers, chills, weight loss or gain  Skin: No rashes, color changes  Cardiovascular: No chest pain, orthopnea  Respiratory: (+) shortness of breath, cough  Gastrointestinal: No nausea, abdominal pain  Genitourinary: No incontinence, pain with urination  Musculoskeletal: No pain, swelling, decreased range of motion  Neurological: No headache, weakness  Psychiatric: No depression, anxiety  Endocrine: No weight loss or gain, increased thirst  All other systems are comprehensively negative.    Physical Exam:  Vitals:        Vital Signs Last 24 Hrs  T(C): 36.7 (01 Jan 2019 08:31), Max: 37.1 (01 Jan 2019 04:06)  T(F): 98 (01 Jan 2019 08:31), Max: 98.8 (01 Jan 2019 04:06)  HR: 88 (01 Jan 2019 08:00) (70 - 145)  BP: 111/72 (01 Jan 2019 08:00) (84/53 - 124/79)  BP(mean): 86 (01 Jan 2019 08:00) (61 - 93)  RR: 25 (01 Jan 2019 08:00) (19 - 30)  SpO2: 100% (01 Jan 2019 08:00) (89% - 100%)  General: Respiratory distress  HEENT: MMM  Neck: No JVD, no carotid bruit  Lungs: Rales, decreased at bases  CV: Irregular, nl S1/S2, no M/R/G  Abdomen: S/NT/ND, +BS  Extremities: 2+ LE edema, no cyanosis  Neuro: AAOx3, non-focal  Skin: No rash    Labs:    01-02    139  |  104  |  40<H>  ----------------------------<  151<H>  4.2   |  23  |  2.12<H>    Ca    9.1      02 Jan 2019 06:53                          12.6   19.15 )-----------( 135      ( 02 Jan 2019 06:53 )             39.5       Telemetry: AF

## 2019-01-04 NOTE — PROGRESS NOTE ADULT - PROBLEM SELECTOR PLAN 6
-no episodes of hematemesis recently. f/u on pt/inr in am.
No further bleeding noted. No plans for EGD, as per GI. Continue IV protonix BID for now. Okay to advance diet as tolerated. Holding a/c- is he still a good candidate to continue this going forward? Continue to trend H&H and transfuse as necessary for HgB < 7 or for symptomatic bleeding.
Now on D10 infusion. Continue to monitor BG q 6 hours- currently euglycemic, change to D5+LR if level continues to uptrend. If patient remains unable to tolerate PO intake we may have to place NGT and initiate tube feeds. Will have video esophagram, as per report.
source unclear  NPO/IVF  protonix drip, consider transition to BID given no further active bleeding with stable HCTs  repeat CBC in am  no significant findings on CT A/P  further workup as per GI  if rebleeds overnight will engage GI for urgent EGD needs

## 2019-01-04 NOTE — PROGRESS NOTE ADULT - PROBLEM SELECTOR PLAN 7
GABO on CKD. Continue gentle IVF hydration. Hold diuresis. Monitor lytes and UOP. No indication for hemodialysis at this time. Trend BUN/Cr. Avoid nephrotoxic agents.
Resolved. Continue IV rate control until patient is able to restart PO regimen.
as above
TSH pending, follow up   cont synthroid  adjustments based on results

## 2019-01-04 NOTE — PROGRESS NOTE ADULT - SUBJECTIVE AND OBJECTIVE BOX
INTERVAL HPI/OVERNIGHT EVENTS:   Patient seen and examined.  Reports that his breathing is easier.  No fevers, chills, sweats, dizziness, HA, changes in vision, cp, palpitations,  diarrhea, abd pain, dysuria, focal weakness, or calf pain.      MEDICATIONS  (STANDING):  ALBUTerol    0.083% 2.5 milliGRAM(s) Nebulizer every 8 hours  azithromycin  IVPB      azithromycin  IVPB 500 milliGRAM(s) IV Intermittent every 24 hours  chlorhexidine 2% Cloths 1 Application(s) Topical daily  dextrose 10%. 1000 milliLiter(s) (40 mL/Hr) IV Continuous <Continuous>  ferrous    sulfate 325 milliGRAM(s) Oral daily  heparin  Injectable 5000 Unit(s) SubCutaneous every 12 hours  influenza   Vaccine 0.5 milliLiter(s) IntraMuscular once  levothyroxine Injectable 37.5 MICROGram(s) IV Push at bedtime  multivitamin 1 Tablet(s) Oral daily  pantoprazole  Injectable 40 milliGRAM(s) IV Push two times a day  piperacillin/tazobactam IVPB. 3.375 Gram(s) IV Intermittent every 8 hours  tamsulosin 0.4 milliGRAM(s) Oral at bedtime    MEDICATIONS  (PRN):  metoprolol tartrate Injectable 5 milliGRAM(s) IV Push every 4 hours PRN HR>120    REVIEW OF SYSTEMS:  See HPI,  all others negative    PHYSICAL EXAM:  Vital Signs Last 24 Hrs  T(C): 36.9 (04 Jan 2019 08:35), Max: 36.9 (04 Jan 2019 08:35)  T(F): 98.4 (04 Jan 2019 08:35), Max: 98.4 (04 Jan 2019 08:35)  HR: 106 (04 Jan 2019 12:00) (88 - 132)  BP: 128/78 (04 Jan 2019 12:00) (94/65 - 141/76)  BP(mean): 94 (04 Jan 2019 12:00) (74 - 95)  RR: 22 (04 Jan 2019 12:00) (16 - 24)  SpO2: 98% (04 Jan 2019 12:00) (97% - 100%)    GENERAL: NAD, well-groomed, well-developed, awake, alert, oriented x 3, fluent and coherent speech, appears weak  EYES:  conjunctiva and sclera clear  ENMT: No tonsillar erythema, exudates   NECK: Supple, No JVD   NERVOUS SYSTEM:  Good concentration; Moving all 4 extremities against gravity;  No facial droop  CHEST/LUNG: b/l rhonchi  HEART: Regular rate and rhythm; No murmurs, rubs, or gallops  ABDOMEN: Soft, Nontender, Nondistended, Bowel sounds present, No palpable masses or organomegaly, No bruits  EXTREMITIES:  2+ Peripheral Pulses, No clubbing, cyanosis, + bipedal edema  LYMPH: No lymphadenopathy    LABS:                                   11.5   15.17 )-----------( 102      ( 04 Jan 2019 06:20 )             35.2     01-04    141  |  104  |  44<H>  ----------------------------<  111<H>  3.3<L>   |  28  |  1.91<H>    Ca    8.5      04 Jan 2019 06:20

## 2019-01-04 NOTE — PROGRESS NOTE ADULT - PROBLEM SELECTOR PROBLEM 6
Gastrointestinal hemorrhage with hematemesis
Hypoglycemia
UGIB (upper gastrointestinal bleed)
Gastrointestinal hemorrhage with hematemesis

## 2019-01-04 NOTE — PROGRESS NOTE ADULT - ASSESSMENT
89M with HTN, hypothyroidism, BPH, afib, CKD, who presents with SOB.  Patient was in his usual state of health with no recent illness when he tried taking two of his pills this evening.  SOB 2/2 aspiration pneumonitis.      Aspiration pneumonitis related to vomiting/hematemesis, Acute Hypoxemic Resp Failure   - Continue with SPCU level of care.    - slowly improving  - IV zosyn, azithro  - Pulm/ID  f/u appreciated  - f/u cultures    GI bleed: hx of billroth II w/ gastrojej ulcer in 2017- now resolved.   NPO - swallow re-eval on 1/4 - recommended ice chips ONLY  Daily CBC  Protonix IV BID  gi f/u appreciated.    AC and anti platelets on hold.     Coagulopathy upon admission  - s/p IV Vit K and FFP  - improved  - was on Warfarin at home for afib    Atrial fibrillation, unspecified type.   - IV metoprolol until can take PO  - cardio f/u appreciated   - Will need to consider Eliquis vs. no AC    CKD  - monitor rising Cr  - dose meds renally  - baseline unclear    Hypothyroidism- stable , continue with Levothyroxine.     Benign prostatic hyperplasia, unspecified whether lower urinary tract symptoms present.  Plan: - cont with BPH meds.     Hypothyroidism, unspecified type.  Plan: - cont with synthroid.     VTE PPx - Heparin SC 89M with HTN, hypothyroidism, BPH, afib, CKD, who presents with SOB.  Patient was in his usual state of health with no recent illness when he tried taking two of his pills this evening.  SOB 2/2 aspiration pneumonitis.      Aspiration pneumonitis related to vomiting/hematemesis, Acute Hypoxemic Resp Failure   - Continue with SPCU level of care.    - slowly improving  - IV zosyn, azithro  - Pulm/ID  f/u appreciated  - f/u cultures  - NPO - swallow re-eval on 1/4 - recommended ice chips ONLY  - patient had no swallow problems prior to emesis event, swallow re-eval on Monday  - not a candidate for PEG given subtotal gastrectomy, would need jejunostomy if it came down to that    GI bleed: hx of billroth II w/ gastrojej ulcer in 2017- now resolved.   Daily CBC  Protonix IV BID  gi f/u appreciated.    AC and anti platelets on hold.     Hypoglycemia  - D10 to give less volume given LE edema    Coagulopathy upon admission  - s/p IV Vit K and FFP  - improved  - was on Warfarin at home for afib    Atrial fibrillation, unspecified type.   - IV metoprolol until can take PO  - cardio f/u appreciated   - Will need to consider Eliquis vs. no AC    CKD  - monitor rising Cr  - dose meds renally  - baseline unclear    Hypothyroidism- stable , continue with Levothyroxine.     Benign prostatic hyperplasia, unspecified whether lower urinary tract symptoms present.  Plan: - cont with BPH meds.     Hypothyroidism, unspecified type.  Plan: - cont with synthroid.     VTE PPx - Heparin SC

## 2019-01-04 NOTE — PROGRESS NOTE ADULT - ASSESSMENT
The patient is a 89 year old male with a history of hypothyroidism, BPH, PUD, atrial fibrillation, chronic diastolic heart failure who was admitted with respiratory failure, GI bleed, hypotension.    Plan:  - Echo from 2017 with grossly low normal LV systolic function, no significant valve issues  - Change fluids to D10W  - When taking PO or NGT feeds, stop IV fluids  - Give furosemide 20 mg IV now  - Troponin peaked at 0.292 in the setting of type 2 NSTEMI (demand ischemia) from respiratory failure and GI bleed  - Remain off of anticoagulation. Given multiple episodes of GI bleed with supratherapeutic INR, warfarin may not be the best option. Eventually can consider apixaban or no anticoagulation.  - Continue metoprolol 5 mg IV q4h prn HR>120  - Continue HFNC  - Weaned off of norepinephrine

## 2019-01-04 NOTE — PROGRESS NOTE ADULT - SUBJECTIVE AND OBJECTIVE BOX
Patient is a 89y old  Male who presents with a chief complaint of SOB (04 Jan 2019 13:02)      BRIEF HOSPITAL COURSE: 89 y/o M with a h/o MVP, CKD, afib on coumadin, PUD s/p bilroth procedure, nephrolithiasis, hypothyroidism, BPH, diverticulosis, admitted on 12/31 with respiratory distress after an aspiration event. As per report, patient had choked on outpatient medications and then vomited. Hospital course complicated by the development of aspiration pneumonia, severe sepsis, UGIB, and GABO on CKD, and a-fib with RVR. Patient had been placed on NIPPV for worsening respiratory failure, but failed this treatment modality and was subsequently intubated. Transient shock state requiring vasopressor therapy. Patient self-extubated on 1/3 without need for reintubation.    Events last 24 hours: Continues to require HFNC with moderate FiO2 requirement. Copious secretions requiring frequent suctioning. When patient inadvertently removes his supplemental O2 he quickly becomes profoundly hypoxemic. Remains off IV vasopressor therapy. No further bleeding appreciated.        PAST MEDICAL & SURGICAL HISTORY:  Mitral valve disorder  Kidney stone  Diverticulosis  CKD (chronic kidney disease)  Afib  Hypothyroidism  BPH (benign prostatic hyperplasia)  Chronic peptic ulcer: S/P surgery more than 10 yrs ago      Review of Systems:  CONSTITUTIONAL: No fever, chills, or fatigue  EYES: No eye pain, visual disturbances, or discharge  ENMT:  No difficulty hearing, tinnitus, vertigo; No sinus or throat pain  NECK: No pain or stiffness  RESPIRATORY: No cough, wheezing, chills or hemoptysis; No shortness of breath  CARDIOVASCULAR: No chest pain, palpitations, dizziness, or leg swelling  GASTROINTESTINAL: No abdominal or epigastric pain. No nausea, vomiting, or hematemesis; No diarrhea or constipation. No melena or hematochezia.  GENITOURINARY: No dysuria, frequency, hematuria, or incontinence  NEUROLOGICAL: No headaches, memory loss, loss of strength, numbness, or tremors  SKIN: No itching, burning, rashes, or lesions   MUSCULOSKELETAL: No joint pain or swelling; No muscle, back, or extremity pain  PSYCHIATRIC: No depression, anxiety, mood swings, or difficulty sleeping      Medications:  piperacillin/tazobactam IVPB. 3.375 Gram(s) IV Intermittent every 8 hours  metoprolol tartrate Injectable 5 milliGRAM(s) IV Push every 4 hours PRN  tamsulosin 0.4 milliGRAM(s) Oral at bedtime  ALBUTerol    0.083% 2.5 milliGRAM(s) Nebulizer every 8 hours  heparin  Injectable 5000 Unit(s) SubCutaneous every 12 hours  pantoprazole  Injectable 40 milliGRAM(s) IV Push two times a day  levothyroxine Injectable 37.5 MICROGram(s) IV Push at bedtime  dextrose 10%. 1000 milliLiter(s) IV Continuous <Continuous>  ferrous    sulfate 325 milliGRAM(s) Oral daily  multivitamin 1 Tablet(s) Oral daily  influenza   Vaccine 0.5 milliLiter(s) IntraMuscular once  chlorhexidine 2% Cloths 1 Application(s) Topical daily        ICU Vital Signs Last 24 Hrs  T(C): 36.7 (05 Jan 2019 04:05), Max: 37.1 (04 Jan 2019 12:38)  T(F): 98.1 (05 Jan 2019 04:05), Max: 98.8 (04 Jan 2019 12:38)  HR: 98 (05 Jan 2019 04:00) (92 - 146)  BP: 142/85 (05 Jan 2019 04:00) (106/76 - 143/76)  BP(mean): 101 (05 Jan 2019 04:00) (81 - 106)  ABP: --  ABP(mean): --  RR: 29 (05 Jan 2019 04:00) (16 - 29)  SpO2: 93% (05 Jan 2019 04:00) (93% - 100%)          I&O's Detail    03 Jan 2019 07:01  -  04 Jan 2019 07:00  --------------------------------------------------------  IN:    dextrose 5%.: 975 mL    norepinephrine Infusion: 10 mL    Solution: 200 mL  Total IN: 1185 mL    OUT:  Total OUT: 0 mL    Total NET: 1185 mL      04 Jan 2019 07:01  -  05 Jan 2019 06:08  --------------------------------------------------------  IN:    dextrose 10%.: 440 mL    Solution: 200 mL    Solution: 200 mL  Total IN: 840 mL    OUT:    Voided: 100 mL  Total OUT: 100 mL    Total NET: 740 mL            LABS:                        11.5   15.17 )-----------( 102      ( 04 Jan 2019 06:20 )             35.2     01-04    141  |  104  |  44<H>  ----------------------------<  111<H>  3.3<L>   |  28  |  1.91<H>    Ca    8.5      04 Jan 2019 06:20  Mg     1.6     01-04            CAPILLARY BLOOD GLUCOSE      POCT Blood Glucose.: 147 mg/dL (05 Jan 2019 05:53)        CULTURES:  Culture Results:   Normal Respiratory Abbie present (01-03-19 @ 10:25)  Culture Results:   No growth to date. (12-31-18 @ 12:33)  Culture Results:   No growth to date. (12-31-18 @ 12:33)        Physical Examination:    General: No acute distress.  Alert, oriented, interactive, nonfocal, fatigued, on HFNC    HEENT: Pupils equal, reactive to light.  Symmetric.    PULM: Coarse breath sounds bilaterally, referred breath sounds from upper airway, (+) significant sputum production    CVS: tachycardic with regular rhythm, some ectopic beats, no murmurs, rubs, or gallops    ABD: Soft, nondistended, nontender, normoactive bowel sounds, no masses    EXT: b/l LE edema, nontender    SKIN: Warm and well perfused, no rashes noted.    NEURO: A&Ox3, strength 5/5 all extremities, cranial nerves grossly intact, no focal deficits      RADIOLOGY:     < from: Xray Chest 1 View- PORTABLE-Urgent (01.02.19 @ 20:14) >  Findings: There has been interval endotracheal tube intubation with the   endotracheal tube in proper position. The patient is status post left IJ   central line placement with its tip at the SVC.    Pulmonary edema and/or vascular congestion appear unchanged. Bibasilar   opacities are noted compatible with atelectasis and/or pneumonia and/or   effusion. These appear unchanged. The heart size is normal. There are   mild multilevel degenerative changes of the thoracic spine.     IMPRESSION: Interval intubation.    Unchanged pulmonary edema and bibasilar opacities compatible with   atelectasis and/or pneumonia and/or effusion.

## 2019-01-04 NOTE — PROGRESS NOTE ADULT - SUBJECTIVE AND OBJECTIVE BOX
Date/Time Patient Seen:  		  Referring MD:   Data Reviewed	       Patient is a 89y old  Male who presents with a chief complaint of SOB (03 Jan 2019 22:31)      Subjective/HPI     PAST MEDICAL & SURGICAL HISTORY:  Mitral valve disorder  Kidney stone  Diverticulosis  CKD (chronic kidney disease)  Afib  Hypothyroidism  BPH (benign prostatic hyperplasia)  Chronic peptic ulcer: S/P surgery more than 10 yrs ago  No significant past surgical history        Medication list         MEDICATIONS  (STANDING):  ALBUTerol    0.083% 2.5 milliGRAM(s) Nebulizer every 8 hours  azithromycin  IVPB      azithromycin  IVPB 500 milliGRAM(s) IV Intermittent every 24 hours  chlorhexidine 2% Cloths 1 Application(s) Topical daily  dextrose 5%. 1000 milliLiter(s) (75 mL/Hr) IV Continuous <Continuous>  ferrous    sulfate 325 milliGRAM(s) Oral daily  heparin  Injectable 5000 Unit(s) SubCutaneous every 12 hours  influenza   Vaccine 0.5 milliLiter(s) IntraMuscular once  levothyroxine Injectable 37.5 MICROGram(s) IV Push at bedtime  multivitamin 1 Tablet(s) Oral daily  pantoprazole  Injectable 40 milliGRAM(s) IV Push two times a day  piperacillin/tazobactam IVPB. 3.375 Gram(s) IV Intermittent every 8 hours  tamsulosin 0.4 milliGRAM(s) Oral at bedtime    MEDICATIONS  (PRN):  metoprolol tartrate Injectable 5 milliGRAM(s) IV Push every 4 hours PRN HR>120         Vitals log        ICU Vital Signs Last 24 Hrs  T(C): 36.6 (04 Jan 2019 04:06), Max: 36.8 (03 Jan 2019 12:00)  T(F): 97.8 (04 Jan 2019 04:06), Max: 98.2 (03 Jan 2019 12:00)  HR: 112 (04 Jan 2019 07:19) (86 - 132)  BP: 94/65 (04 Jan 2019 06:00) (94/65 - 139/86)  BP(mean): 74 (04 Jan 2019 06:00) (74 - 102)  ABP: --  ABP(mean): --  RR: 17 (04 Jan 2019 06:00) (16 - 31)  SpO2: 100% (04 Jan 2019 07:19) (95% - 100%)           Input and Output:  I&O's Detail    03 Jan 2019 07:01  -  04 Jan 2019 07:00  --------------------------------------------------------  IN:    dextrose 5%.: 975 mL    norepinephrine Infusion: 10 mL    Solution: 200 mL  Total IN: 1185 mL    OUT:  Total OUT: 0 mL    Total NET: 1185 mL          Lab Data                        11.5   15.17 )-----------( 102      ( 04 Jan 2019 06:20 )             35.2     01-04    141  |  104  |  44<H>  ----------------------------<  111<H>  3.3<L>   |  28  |  1.91<H>    Ca    8.5      04 Jan 2019 06:20      ABG - ( 02 Jan 2019 22:59 )  pH, Arterial: x     pH, Blood: 7.38  /  pCO2: 39    /  pO2: 121   / HCO3: 23    / Base Excess: -2.1  /  SaO2: 96                      Review of Systems	      Objective     Physical Examination  heart s1s2  lung dec BS  abd soft  on high flow NC        Pertinent Lab findings & Imaging      Apoorva:  NO   Adequate UO     I&O's Detail    03 Jan 2019 07:01  -  04 Jan 2019 07:00  --------------------------------------------------------  IN:    dextrose 5%.: 975 mL    norepinephrine Infusion: 10 mL    Solution: 200 mL  Total IN: 1185 mL    OUT:  Total OUT: 0 mL    Total NET: 1185 mL               Discussed with:     Cultures:	        Radiology

## 2019-01-05 LAB
ANION GAP SERPL CALC-SCNC: 10 MMOL/L — SIGNIFICANT CHANGE UP (ref 5–17)
BUN SERPL-MCNC: 35 MG/DL — HIGH (ref 7–23)
CALCIUM SERPL-MCNC: 8.8 MG/DL — SIGNIFICANT CHANGE UP (ref 8.4–10.5)
CHLORIDE SERPL-SCNC: 102 MMOL/L — SIGNIFICANT CHANGE UP (ref 96–108)
CO2 SERPL-SCNC: 28 MMOL/L — SIGNIFICANT CHANGE UP (ref 22–31)
CREAT SERPL-MCNC: 1.78 MG/DL — HIGH (ref 0.5–1.3)
CULTURE RESULTS: SIGNIFICANT CHANGE UP
GLUCOSE SERPL-MCNC: 127 MG/DL — HIGH (ref 70–99)
HCT VFR BLD CALC: 36.8 % — LOW (ref 39–50)
HGB BLD-MCNC: 12.1 G/DL — LOW (ref 13–17)
LEGIONELLA AG UR QL: NEGATIVE — SIGNIFICANT CHANGE UP
MCHC RBC-ENTMCNC: 31 PG — SIGNIFICANT CHANGE UP (ref 27–34)
MCHC RBC-ENTMCNC: 32.9 GM/DL — SIGNIFICANT CHANGE UP (ref 32–36)
MCV RBC AUTO: 94.4 FL — SIGNIFICANT CHANGE UP (ref 80–100)
NRBC # BLD: 0 /100 WBCS — SIGNIFICANT CHANGE UP (ref 0–0)
PLATELET # BLD AUTO: 104 K/UL — LOW (ref 150–400)
POTASSIUM SERPL-MCNC: 3 MMOL/L — LOW (ref 3.5–5.3)
POTASSIUM SERPL-SCNC: 3 MMOL/L — LOW (ref 3.5–5.3)
RBC # BLD: 3.9 M/UL — LOW (ref 4.2–5.8)
RBC # FLD: 13 % — SIGNIFICANT CHANGE UP (ref 10.3–14.5)
SODIUM SERPL-SCNC: 140 MMOL/L — SIGNIFICANT CHANGE UP (ref 135–145)
SPECIMEN SOURCE: SIGNIFICANT CHANGE UP
WBC # BLD: 10.82 K/UL — HIGH (ref 3.8–10.5)
WBC # FLD AUTO: 10.82 K/UL — HIGH (ref 3.8–10.5)

## 2019-01-05 PROCEDURE — 99233 SBSQ HOSP IP/OBS HIGH 50: CPT

## 2019-01-05 RX ORDER — POTASSIUM CHLORIDE 20 MEQ
10 PACKET (EA) ORAL
Qty: 0 | Refills: 0 | Status: COMPLETED | OUTPATIENT
Start: 2019-01-05 | End: 2019-01-05

## 2019-01-05 RX ADMIN — ALBUTEROL 2.5 MILLIGRAM(S): 90 AEROSOL, METERED ORAL at 15:23

## 2019-01-05 RX ADMIN — ALBUTEROL 2.5 MILLIGRAM(S): 90 AEROSOL, METERED ORAL at 22:41

## 2019-01-05 RX ADMIN — Medication 40 MILLILITER(S): at 12:03

## 2019-01-05 RX ADMIN — Medication 37.5 MICROGRAM(S): at 22:33

## 2019-01-05 RX ADMIN — Medication 100 MILLIEQUIVALENT(S): at 13:48

## 2019-01-05 RX ADMIN — PIPERACILLIN AND TAZOBACTAM 25 GRAM(S): 4; .5 INJECTION, POWDER, LYOPHILIZED, FOR SOLUTION INTRAVENOUS at 09:30

## 2019-01-05 RX ADMIN — Medication 100 MILLIEQUIVALENT(S): at 13:00

## 2019-01-05 RX ADMIN — PIPERACILLIN AND TAZOBACTAM 25 GRAM(S): 4; .5 INJECTION, POWDER, LYOPHILIZED, FOR SOLUTION INTRAVENOUS at 01:58

## 2019-01-05 RX ADMIN — HEPARIN SODIUM 5000 UNIT(S): 5000 INJECTION INTRAVENOUS; SUBCUTANEOUS at 06:13

## 2019-01-05 RX ADMIN — ALBUTEROL 2.5 MILLIGRAM(S): 90 AEROSOL, METERED ORAL at 07:48

## 2019-01-05 RX ADMIN — Medication 100 MILLIEQUIVALENT(S): at 14:56

## 2019-01-05 RX ADMIN — HEPARIN SODIUM 5000 UNIT(S): 5000 INJECTION INTRAVENOUS; SUBCUTANEOUS at 17:20

## 2019-01-05 RX ADMIN — PANTOPRAZOLE SODIUM 40 MILLIGRAM(S): 20 TABLET, DELAYED RELEASE ORAL at 06:13

## 2019-01-05 RX ADMIN — PIPERACILLIN AND TAZOBACTAM 25 GRAM(S): 4; .5 INJECTION, POWDER, LYOPHILIZED, FOR SOLUTION INTRAVENOUS at 17:20

## 2019-01-05 RX ADMIN — Medication 5 MILLIGRAM(S): at 02:35

## 2019-01-05 RX ADMIN — PANTOPRAZOLE SODIUM 40 MILLIGRAM(S): 20 TABLET, DELAYED RELEASE ORAL at 17:19

## 2019-01-05 NOTE — PROGRESS NOTE ADULT - ASSESSMENT
89M with PMhx as above, admitted with acute hypoxic respiratory failure, septic shock, aspiration pna, acute pulmonary edema/CHF, afib with rvr, yolanda on ckd, UGIB, hypoglycemia requiring d10 gtt, coagulopathy, hypokalemia/hypomagnesemia    -Avoid sedatives/benzo's  -BP improved off pressor support. Monitor HD's.  -Afirb rate controlled. Continue BB  -Cardiology recommendations appreciated  -Off AC due to coagulopathy and GIB. Monitor  -Continue HFNC. Titrate to O2 saturation > 92%. High risk for respiratory decompensation. Low threshold for reintubation  -NPO/ PPI  -May require NGT and TF's for nutrition  -Trend H/H and transfuse prn for Hgb > 7  -Trend PT/INR, PTT  -Monitor for further bleeding  -Continue D10 gtt  -Monitor FS and transition to D5W LR once serum glucose improved  -Monitor UOP/Lytes  -Trend renal function  -Replete Mg+/K+  -Hold AC given bleeding/coagulapathy  -SCD for dVT ppx

## 2019-01-05 NOTE — PROGRESS NOTE ADULT - SUBJECTIVE AND OBJECTIVE BOX
Date/Time Patient Seen:  		  Referring MD:   Data Reviewed	       Patient is a 89y old  Male who presents with a chief complaint of SOB (04 Jan 2019 23:51)      Subjective/HPI     PAST MEDICAL & SURGICAL HISTORY:  Mitral valve disorder  Kidney stone  Diverticulosis  CKD (chronic kidney disease)  Afib  Hypothyroidism  BPH (benign prostatic hyperplasia)  Chronic peptic ulcer: S/P surgery more than 10 yrs ago  No significant past surgical history        Medication list         MEDICATIONS  (STANDING):  ALBUTerol    0.083% 2.5 milliGRAM(s) Nebulizer every 8 hours  chlorhexidine 2% Cloths 1 Application(s) Topical daily  dextrose 10%. 1000 milliLiter(s) (40 mL/Hr) IV Continuous <Continuous>  ferrous    sulfate 325 milliGRAM(s) Oral daily  heparin  Injectable 5000 Unit(s) SubCutaneous every 12 hours  influenza   Vaccine 0.5 milliLiter(s) IntraMuscular once  levothyroxine Injectable 37.5 MICROGram(s) IV Push at bedtime  multivitamin 1 Tablet(s) Oral daily  pantoprazole  Injectable 40 milliGRAM(s) IV Push two times a day  piperacillin/tazobactam IVPB. 3.375 Gram(s) IV Intermittent every 8 hours  tamsulosin 0.4 milliGRAM(s) Oral at bedtime    MEDICATIONS  (PRN):  metoprolol tartrate Injectable 5 milliGRAM(s) IV Push every 4 hours PRN HR>120         Vitals log        ICU Vital Signs Last 24 Hrs  T(C): 36.7 (05 Jan 2019 04:05), Max: 37.1 (04 Jan 2019 12:38)  T(F): 98.1 (05 Jan 2019 04:05), Max: 98.8 (04 Jan 2019 12:38)  HR: 98 (05 Jan 2019 04:00) (92 - 146)  BP: 142/85 (05 Jan 2019 04:00) (106/76 - 143/76)  BP(mean): 101 (05 Jan 2019 04:00) (81 - 106)  ABP: --  ABP(mean): --  RR: 29 (05 Jan 2019 04:00) (16 - 29)  SpO2: 93% (05 Jan 2019 04:00) (93% - 100%)           Input and Output:  I&O's Detail    03 Jan 2019 07:01  -  04 Jan 2019 07:00  --------------------------------------------------------  IN:    dextrose 5%.: 975 mL    norepinephrine Infusion: 10 mL    Solution: 200 mL  Total IN: 1185 mL    OUT:  Total OUT: 0 mL    Total NET: 1185 mL      04 Jan 2019 07:01  -  05 Jan 2019 06:32  --------------------------------------------------------  IN:    dextrose 10%.: 440 mL    Solution: 200 mL    Solution: 200 mL  Total IN: 840 mL    OUT:    Voided: 100 mL  Total OUT: 100 mL    Total NET: 740 mL          Lab Data                        11.5   15.17 )-----------( 102      ( 04 Jan 2019 06:20 )             35.2     01-04    141  |  104  |  44<H>  ----------------------------<  111<H>  3.3<L>   |  28  |  1.91<H>    Ca    8.5      04 Jan 2019 06:20  Mg     1.6     01-04              Review of Systems	      Objective     Physical Examination  heart s1s2  lung dec BS  abd soft  confused  delirious  on HFNC        Pertinent Lab findings & Imaging      Apoorva:  NO   Adequate UO     I&O's Detail    03 Jan 2019 07:01  -  04 Jan 2019 07:00  --------------------------------------------------------  IN:    dextrose 5%.: 975 mL    norepinephrine Infusion: 10 mL    Solution: 200 mL  Total IN: 1185 mL    OUT:  Total OUT: 0 mL    Total NET: 1185 mL      04 Jan 2019 07:01  -  05 Jan 2019 06:32  --------------------------------------------------------  IN:    dextrose 10%.: 440 mL    Solution: 200 mL    Solution: 200 mL  Total IN: 840 mL    OUT:    Voided: 100 mL  Total OUT: 100 mL    Total NET: 740 mL               Discussed with:     Cultures:	        Radiology

## 2019-01-05 NOTE — CHART NOTE - NSCHARTNOTEFT_GEN_A_CORE
Assessment: 89yo M pt presents with severe sepsis and aspiration pneumonia after choking on his pills and vomiting. ABT course in progress. Pt was self-extubated without need for reintubation. Found to have clinical characteristics of severe protein calorie malnutrition per dietitian initial evaluation 1/2/19. Pt has been NPO x6 days, SLP re-evaluated pt yesterday and recommended NPO with ice chips and sips of water. 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 Called Dr. Becerra, notified of prolonged NPO status and recommendation for insertion of NG or NJT for means of artifical nutrition. Will continue to monitor. F/u PRN.     Factors impacting intake: [ ] none [ ] nausea  [ ] vomiting [ ] diarrhea [ ] constipation  [ ]chewing problems [XX] swallowing issues  [ ] other:     Diet Presciption: Diet, NPO:   With Ice Chips/Sips of Water (01-04-19 @ 12:29)    Intake: n/a    Current Weight: Weight (kg): 72.6 (12-30 @ 23:40)  % Weight Change 1/5/19 72.2kg. Stable since admission. Noted with LE edema which appears to have improved per pts daughter.     Pertinent Medications: MEDICATIONS  (STANDING):  ALBUTerol    0.083% 2.5 milliGRAM(s) Nebulizer every 8 hours  chlorhexidine 2% Cloths 1 Application(s) Topical daily  dextrose 10%. 1000 milliLiter(s) (40 mL/Hr) IV Continuous <Continuous>  ferrous    sulfate 325 milliGRAM(s) Oral daily  heparin  Injectable 5000 Unit(s) SubCutaneous every 12 hours  influenza   Vaccine 0.5 milliLiter(s) IntraMuscular once  levothyroxine Injectable 37.5 MICROGram(s) IV Push at bedtime  multivitamin 1 Tablet(s) Oral daily  pantoprazole  Injectable 40 milliGRAM(s) IV Push two times a day  piperacillin/tazobactam IVPB. 3.375 Gram(s) IV Intermittent every 8 hours  tamsulosin 0.4 milliGRAM(s) Oral at bedtime    MEDICATIONS  (PRN):  metoprolol tartrate Injectable 5 milliGRAM(s) IV Push every 4 hours PRN HR>120    Pertinent Labs: 01-05 Na140 mmol/L Glu 127 mg/dL<H> K+ 3.0 mmol/L<L> Cr  1.78 mg/dL<H> BUN 35 mg/dL<H> 12-31 Alb 3.1 g/dL<L>     CAPILLARY BLOOD GLUCOSE      POCT Blood Glucose.: 123 mg/dL (05 Jan 2019 11:20)  POCT Blood Glucose.: 147 mg/dL (05 Jan 2019 05:53)  POCT Blood Glucose.: 115 mg/dL (04 Jan 2019 23:12)  POCT Blood Glucose.: 103 mg/dL (04 Jan 2019 17:56)    Skin: no pressure injuries noted    Estimated Needs:   [X] no change since previous assessment  [ ] recalculated:     Previous Nutrition Diagnosis:   [ ] Inadequate Energy Intake [ ]Inadequate Oral Intake [ ] Excessive Energy Intake   [ ] Underweight [ ] Increased Nutrient Needs [ ] Overweight/Obesity   [ ] Altered GI Function [ ] Unintended Weight Loss [ ] Food & Nutrition Related Knowledge Deficit [X] Malnutrition     Nutrition Diagnosis is [ ] ongoing  [ ] resolved [ ] not applicable     New Nutrition Diagnosis: [ ] not applicable     inadequate oral intake r/t swallowing difficulty aeb NPO status x6 days, failed S/S    Interventions:   Recommend  [ ] Change Diet To:  [ ] Nutrition Supplement  [X] Nutrition Support  - place NGT or NJT <24 hours.   [ ] Other:     Monitoring and Evaluation:   [ ] PO intake [ x ] Tolerance to diet prescription [ x ] weights [ x ] labs[ x ] follow up per protocol  [ ] other:  initiate TF <24 hours, provision of nutrition >1800kcal, >100g pro

## 2019-01-05 NOTE — PROGRESS NOTE ADULT - SUBJECTIVE AND OBJECTIVE BOX
Chief Complaint: Shortness of breath, hematemesis    Interval Events: Remains on HFNC and short of breath    Review of Systems:  General: No fevers, chills, weight loss or gain  Skin: No rashes, color changes  Cardiovascular: No chest pain, orthopnea  Respiratory: (+) shortness of breath, cough  Gastrointestinal: No nausea, abdominal pain  Genitourinary: No incontinence, pain with urination  Musculoskeletal: No pain, swelling, decreased range of motion  Neurological: No headache, weakness  Psychiatric: No depression, anxiety  Endocrine: No weight loss or gain, increased thirst  All other systems are comprehensively negative.    Physical Exam:  Vital Signs Last 24 Hrs  T(C): 36.7 (05 Jan 2019 08:15), Max: 37.1 (04 Jan 2019 12:38)  T(F): 98.1 (05 Jan 2019 08:15), Max: 98.8 (04 Jan 2019 12:38)  HR: 96 (05 Jan 2019 10:00) (90 - 146)  BP: 127/94 (05 Jan 2019 10:00) (106/76 - 143/76)  BP(mean): 103 (05 Jan 2019 10:00) (86 - 111)  RR: 19 (05 Jan 2019 10:00) (17 - 29)  SpO2: 96% (05 Jan 2019 10:00) (91% - 99%)  General: Respiratory distress  HEENT: MMM  Neck: No JVD, no carotid bruit  Lungs: Rales, decreased at bases  CV: Irregular, nl S1/S2, no M/R/G  Abdomen: S/NT/ND, +BS  Extremities: 2+ LE edema, no cyanosis  Neuro: AAOx3, non-focal  Skin: No rash    Labs:    01-05    140  |  102  |  35<H>  ----------------------------<  127<H>  3.0<L>   |  28  |  1.78<H>    Ca    8.8      05 Jan 2019 07:05  Mg     1.6     01-04                          12.1   10.82 )-----------( 104      ( 05 Jan 2019 07:08 )             36.8     Telemetry: AF

## 2019-01-05 NOTE — PROGRESS NOTE ADULT - SUBJECTIVE AND OBJECTIVE BOX
CC.  SOB  HPI.  Patient reports SOB, and cough.  Poor historian  unable to obtain ROS              Vital Signs Last 24 Hrs  T(C): 36.7 (01-05-19 @ 08:15), Max: 37.1 (01-04-19 @ 12:38)  T(F): 98.1 (01-05-19 @ 08:15), Max: 98.8 (01-04-19 @ 12:38)  HR: 96 (01-05-19 @ 10:00) (90 - 146)  BP: 127/94 (01-05-19 @ 10:00) (106/76 - 143/76)  BP(mean): 103 (01-05-19 @ 10:00) (86 - 111)  RR: 19 (01-05-19 @ 10:00) (17 - 29)  SpO2: 96% (01-05-19 @ 10:00) (91% - 99%)        PHYSICAL EXAM-  GENERAL: Chronic ill appearing male  HEAD:  Atraumatic, Normocephalic  EYES: EOMI, PERRLA, conjunctiva and sclera clear  NECK: Supple, No JVD, Normal thyroid  NERVOUS SYSTEM:  Alert but lethargic at time.  generalized weakness  CHEST/LUNG: Diffuse rhonchi sound with decrease air entry.  No retractions or accessory muscle usage  Heart regular rate and rhythm; No murmurs, rubs, or gallops  ABDOMEN: Soft, Nontender, Nondistended; Bowel sounds present  EXTREMITIES:  No clubbing, cyanosis, or edema  SKIN: No rashes or lesions                                  12.1   10.82 )-----------( 104      ( 05 Jan 2019 07:08 )             36.8     01-05    140  |  102  |  35<H>  ----------------------------<  127<H>  3.0<L>   |  28  |  1.78<H>    Ca    8.8      05 Jan 2019 07:05  Mg     1.6     01-04                      MEDICATIONS  (STANDING):  ALBUTerol    0.083% 2.5 milliGRAM(s) Nebulizer every 8 hours  chlorhexidine 2% Cloths 1 Application(s) Topical daily  dextrose 10%. 1000 milliLiter(s) (40 mL/Hr) IV Continuous <Continuous>  ferrous    sulfate 325 milliGRAM(s) Oral daily  heparin  Injectable 5000 Unit(s) SubCutaneous every 12 hours  influenza   Vaccine 0.5 milliLiter(s) IntraMuscular once  levothyroxine Injectable 37.5 MICROGram(s) IV Push at bedtime  multivitamin 1 Tablet(s) Oral daily  pantoprazole  Injectable 40 milliGRAM(s) IV Push two times a day  piperacillin/tazobactam IVPB. 3.375 Gram(s) IV Intermittent every 8 hours  tamsulosin 0.4 milliGRAM(s) Oral at bedtime    MEDICATIONS  (PRN):  metoprolol tartrate Injectable 5 milliGRAM(s) IV Push every 4 hours PRN HR>120        Imaging Personally Reviewed:     [x ] YES  [ ] NO    Consultant(s) Notes Reviewed:  [x ] YES  [ ] NO    Care Discussed with Consultants/Other Providers [x ] YES  [ ] No medical contraindication for discharge

## 2019-01-05 NOTE — PROGRESS NOTE ADULT - ASSESSMENT
The patient is a 89 year old male with a history of hypothyroidism, BPH, PUD, atrial fibrillation, chronic diastolic heart failure who was admitted with respiratory failure, GI bleed, hypotension.    Plan:  - Echo from 2017 with grossly low normal LV systolic function, no significant valve issues  - Continue low rate D10W for now as patient is NPO without feeds  - When taking PO or NGT feeds, stop IV fluids  - Troponin peaked at 0.292 in the setting of type 2 NSTEMI (demand ischemia) from respiratory failure and GI bleed  - Remain off of anticoagulation. Given multiple episodes of GI bleed with supratherapeutic INR, warfarin may not be the best option. Eventually can consider apixaban or no anticoagulation.  - Continue metoprolol 5 mg IV q4h prn HR>120  - Continue HFNC  - Weaned off of norepinephrine  - S&S re-eval?

## 2019-01-05 NOTE — PROGRESS NOTE ADULT - SUBJECTIVE AND OBJECTIVE BOX
ID Progress note covering Dr. Saldaña    Name: HARSHAL COLORADO  Age: 89y  Gender: Male  MRN: [id number]    Interval History-- Events noted, remains on HiFlow oxygen , off pressors resting comfortably . Still with cough, reamins NPO as he failed swallowing test  Notes reviewed    Past Medical History--  Mitral valve disorder  Kidney stone  Diverticulosis  CKD (chronic kidney disease)  Afib  Hypothyroidism  BPH (benign prostatic hyperplasia)  Chronic peptic ulcer  No significant past surgical history      For details regarding the patient's social history, family history, and other miscellaneous elements, please refer the initial infectious diseases consultation and/or the admitting history and physical examination for this admission.    Allergies--  Allergies    No Known Allergies    Intolerances        Medications--  Antibiotics:  piperacillin/tazobactam IVPB. 3.375 Gram(s) IV Intermittent every 8 hours    Immunologic:  influenza   Vaccine 0.5 milliLiter(s) IntraMuscular once    Other:  ALBUTerol    0.083%  chlorhexidine 2% Cloths  dextrose 10%.  ferrous    sulfate  heparin  Injectable  levothyroxine Injectable  metoprolol tartrate Injectable PRN  multivitamin  pantoprazole  Injectable  tamsulosin      Review of Systems--  Review of systems unable to be obtained secondary to clinical condition.    Physical Examination--    Vital Signs: T(F): 98.1 (01-05-19 @ 04:05), Max: 98.8 (01-04-19 @ 12:38)  HR: 90 (01-05-19 @ 07:48)  BP: 132/98 (01-05-19 @ 06:00)  RR: 29 (01-05-19 @ 06:00)  SpO2: 94% (01-05-19 @ 07:48)  Wt(kg): --  General: Nontoxic-appearing Male in no acute distress.  HEENT: AT/NC. PERRL. EOMI. Anicteric. Conjunctiva pink and moist. Oropharynx clear. Dentition fair.  Neck: Not rigid. No sense of mass.  Nodes: None palpable.  Lungs: Coarse rales  bilaterally without rales, wheezing or rhonchi  Heart: Regular rate and rhythm. No Murmur. No rub. No gallop. No palpable thrill.  Abdomen: Bowel sounds present and normoactive. Soft. Nondistended. Nontender. No sense of mass. No organomegaly.  Back: No spinal tenderness. No costovertebral angle tenderness.   Extremities: No cyanosis or clubbing. No edema.   Skin: Warm. Dry. Good turgor. No rash. No vasculitic stigmata.  Psychiatric: Appropriate affect and mood for situation.         Laboratory Studies--  CBC                        12.1   10.82 )-----------( 104      ( 05 Jan 2019 07:08 )             36.8       Chemistries  01-05    140  |  102  |  35<H>  ----------------------------<  127<H>  3.0<L>   |  28  |  1.78<H>    Ca    8.8      05 Jan 2019 07:05  Mg     1.6     01-04        Culture Data    Culture - Sputum (collected 03 Jan 2019 10:25)  Source: .Sputum Sputum - trap  Gram Stain (03 Jan 2019 15:36):    Numerous polymorphonuclear leukocytes seen per low power field    Few Squamous epithelial cells seen per low power field    Numerous Yeast like cells seen per oil power field  Preliminary Report (04 Jan 2019 09:19):    Normal Respiratory Abbie present    Culture - Blood (collected 31 Dec 2018 12:33)  Source: .Blood Blood-Peripheral  Preliminary Report (01 Jan 2019 13:01):    No growth to date.    Culture - Blood (collected 31 Dec 2018 12:33)  Source: .Blood Blood-Peripheral  Preliminary Report (01 Jan 2019 13:01):    No growth to date.        Radiology:  Xray Chest 1 View- PORTABLE-Urgent (01.02.19 @ 20:14) >  IMPRESSION: Interval intubation.    Unchanged pulmonary edema and bibasilar opacities compatible with   atelectasis and/or pneumonia and/or effusion.     CT Chest No Cont (12.31.18 @ 10:28) >  CHEST: An NG tube is present. No thoracic aortic aneurysm or pericardial   effusion. Global cardiomegaly present. Coronary artery calcifications   present. Central airway intact. Thyroid gland not enlarged.    Minimal bibasilar effusions right greater than left. Pleural thickening   with pleural parenchymal scarring at the apices unchanged since the prior   exam. Calcifications evident within the right apical scarring.    Multifocal bilateral airspace disease present. Airspace disease present   in the left upper lobe, left lower lobe, right middle lobe, and right   lower lobe.    No acute osseous abnormalities visible in the thoracic skeleton.    Small anterior mediastinal lymph nodes present could be reactive. Hilar   regions obscured by the extensive airspace disease.      Assessment--  89M with hypothyroidism, BPH, afib, CKD, who presents with SOB with acute hypoxic respiratory  failure complicated by vomiting with hematemesis likely severe aspiration pneumonia with   pneumonitis  Likely also component of pulm edema  + troponin  Coagulopathy  GABO on CKD  Leukocytosis multifactorial better today    Plan :   Cont Zosyn   repeat CXR in am  consider NGT feeds or comfort care if family refuses PEG or NGT feeding   Fu cultures  Trend temps and wbc  On high flow       Continue with present regiment.  Appropriate use of antibiotics and adverse effects reviewed.      I have discussed the above plan of care with patient's family in detail. They expressed understanding of the treatment plan . Risks, benefits and alternatives discussed in detail. I have asked if they have any questions or concerns and appropriately addressed them to the best of my ability .      > 35 minutes spent in direct patient care reviewing  the notes, lab data/ imaging , discussion with multidisciplinary team. All questions were addressed and answered to the best of my capacity .    Thank you for allowing me to participate in the care of your patient .        Thelma Snell MD  [phone]

## 2019-01-05 NOTE — PROGRESS NOTE ADULT - SUBJECTIVE AND OBJECTIVE BOX
89M with PMHx of MVP, CKD, afib on coumadin, PUD s/p bilroth procedure, nephrolithiasis, hypothyroidism, BPH, diverticulosis, admitted on 12/31 with respiratory distress after an aspiration event. As per report, patient had choked on outpatient medications and then vomited. Hospital course complicated by the development of aspiration pneumonia, severe sepsis, UGIB, and GABO on CKD, and a-fib with RVR. Patient had been placed on NIPPV for worsening respiratory failure, but failed this treatment modality and was subsequently intubated. Transient shock state requiring vasopressor therapy. Patient self-extubated on 1/3 without need for reintubation.      24 hour events: Pt seen and examined at bedside. Chart/labs reviewed. Pt remains on HFNC. Failed S/S evaluation. Remains on D10 gtt    PAST MEDICAL & SURGICAL HISTORY:  Mitral valve disorder  Kidney stone  Diverticulosis  CKD (chronic kidney disease)  Afib  Hypothyroidism  BPH (benign prostatic hyperplasia)  Chronic peptic ulcer: S/P surgery more than 10 yrs ago      Review of Systems:  Constitutional: No fever, chills, fatigue  Neuro: No headache, numbness, weakness  Resp: No cough, wheezing, +shortness of breath  CVS: No chest pain, palpitations, leg swelling  GI: No abdominal pain, nausea, vomiting, diarrhea   : No dysuria, frequency, incontinence  Skin: No itching, burning, rashes, or lesions   Msk: No joint pain or swelling  Psych: No depression, anxiety, mood swings    T(F): 97.5 (01-05-19 @ 20:10), Max: 98.3 (01-05-19 @ 12:56)  HR: 87 (01-05-19 @ 22:43) (87 - 146)  BP: 140/93 (01-05-19 @ 22:00) (106/92 - 148/93)  RR: 22 (01-05-19 @ 22:00) (19 - 29)  SpO2: 96% (01-05-19 @ 22:43) (91% - 98%)  Wt(kg): --        CAPILLARY BLOOD GLUCOSE      POCT Blood Glucose.: 116 mg/dL (05 Jan 2019 23:29)      I&O's Summary    04 Jan 2019 07:01  -  05 Jan 2019 07:00  --------------------------------------------------------  IN: 1340 mL / OUT: 100 mL / NET: 1240 mL    05 Jan 2019 07:01  -  06 Jan 2019 01:12  --------------------------------------------------------  IN: 1140 mL / OUT: 0 mL / NET: 1140 mL        Physical Exam:     Gen: respiratory distress  Neuro: Awake, follows commands  HEENT: NC/AT  Resp: Rhonchi  CVS: nl S1/S2, RRR  Abd: soft, nt, nd, +bs  Ext: + edema, +pulses  Skin: well perfused, warm      Meds:    piperacillin/tazobactam IVPB. IV Intermittent  metoprolol tartrate Injectable IV Push PRN  tamsulosin Oral  levothyroxine Injectable IV Push  ALBUTerol    0.083% Nebulizer  heparin  Injectable SubCutaneous  pantoprazole  Injectable IV Push  dextrose 10%. IV Continuous  ferrous    sulfate Oral  multivitamin Oral  influenza   Vaccine IntraMuscular  chlorhexidine 2% Cloths Topical                              12.1   10.82 )-----------( 104      ( 05 Jan 2019 07:08 )             36.8       01-05    140  |  102  |  35<H>  ----------------------------<  127<H>  3.0<L>   |  28  |  1.78<H>    Ca    8.8      05 Jan 2019 07:05  Mg     1.6     01-04        .Sputum Sputum - trap   Normal Respiratory Abbie present   Numerous polymorphonuclear leukocytes seen per low power field  Few Squamous epithelial cells seen per low power field  Numerous Yeast like cells seen per oil power field 01-03 @ 10:25          CXR:    INTERPRETATION: Clinical information: Intubation.     Technique: Frontal view of the chest.     Comparison: Prior chest x-ray examination from 1/2/2019.     Findings: There has been interval endotracheal tube intubation with the   endotracheal tube in proper position. The patient is status post left IJ   central line placement with its tip at the SVC.     Pulmonary edema and/or vascular congestion appear unchanged. Bibasilar   opacities are noted compatible with atelectasis and/or pneumonia and/or   effusion. These appear unchanged. The heart size is normal. There are mild   multilevel degenerative changes of the thoracic spine.     IMPRESSION: Interval intubation.     Unchanged pulmonary edema and bibasilar opacities compatible with   atelectasis and/or pneumonia and/or effusion.             CENTRAL LINE: yes    BROOKS: yes    A-LINE: no    GLOBAL ISSUE/BEST PRACTICE:  Analgesia: no  Sedation: no  HOB elevation: yes  Stress ulcer prophylaxis: yes  VTE prophylaxis: yes  Glycemic control: no  Nutrition: npo      CODE STATUS: Full  GOC discussion: Y  Critical care time spent (mins): 41  (Reviewing data, imaging, discussing with multidisciplinary team, non inclusive of procedures, discussing goals of care with patient/family)

## 2019-01-06 LAB
ANION GAP SERPL CALC-SCNC: 5 MMOL/L — SIGNIFICANT CHANGE UP (ref 5–17)
ANION GAP SERPL CALC-SCNC: 8 MMOL/L — SIGNIFICANT CHANGE UP (ref 5–17)
BASE EXCESS BLDV CALC-SCNC: 4.7 MMOL/L — HIGH (ref -2–2)
BASOPHILS # BLD AUTO: 0.02 K/UL — SIGNIFICANT CHANGE UP (ref 0–0.2)
BASOPHILS NFR BLD AUTO: 0.3 % — SIGNIFICANT CHANGE UP (ref 0–2)
BLOOD GAS COMMENTS, VENOUS: SIGNIFICANT CHANGE UP
BUN SERPL-MCNC: 25 MG/DL — HIGH (ref 7–23)
BUN SERPL-MCNC: 27 MG/DL — HIGH (ref 7–23)
CALCIUM SERPL-MCNC: 8.5 MG/DL — SIGNIFICANT CHANGE UP (ref 8.4–10.5)
CALCIUM SERPL-MCNC: 8.7 MG/DL — SIGNIFICANT CHANGE UP (ref 8.4–10.5)
CHLORIDE SERPL-SCNC: 101 MMOL/L — SIGNIFICANT CHANGE UP (ref 96–108)
CHLORIDE SERPL-SCNC: 99 MMOL/L — SIGNIFICANT CHANGE UP (ref 96–108)
CO2 SERPL-SCNC: 29 MMOL/L — SIGNIFICANT CHANGE UP (ref 22–31)
CO2 SERPL-SCNC: 34 MMOL/L — HIGH (ref 22–31)
CREAT SERPL-MCNC: 1.56 MG/DL — HIGH (ref 0.5–1.3)
CREAT SERPL-MCNC: 1.67 MG/DL — HIGH (ref 0.5–1.3)
CRP SERPL-MCNC: 13.6 MG/DL — HIGH (ref 0–0.4)
EOSINOPHIL # BLD AUTO: 0.09 K/UL — SIGNIFICANT CHANGE UP (ref 0–0.5)
EOSINOPHIL NFR BLD AUTO: 1.2 % — SIGNIFICANT CHANGE UP (ref 0–6)
GAS PNL BLDV: SIGNIFICANT CHANGE UP
GLUCOSE SERPL-MCNC: 122 MG/DL — HIGH (ref 70–99)
GLUCOSE SERPL-MCNC: 124 MG/DL — HIGH (ref 70–99)
HCO3 BLDV-SCNC: 28 MMOL/L — SIGNIFICANT CHANGE UP (ref 21–29)
HCT VFR BLD CALC: 35.6 % — LOW (ref 39–50)
HGB BLD-MCNC: 11.6 G/DL — LOW (ref 13–17)
HOROWITZ INDEX BLDV+IHG-RTO: 21 — SIGNIFICANT CHANGE UP
IMM GRANULOCYTES NFR BLD AUTO: 0.8 % — SIGNIFICANT CHANGE UP (ref 0–1.5)
LYMPHOCYTES # BLD AUTO: 0.63 K/UL — LOW (ref 1–3.3)
LYMPHOCYTES # BLD AUTO: 8.6 % — LOW (ref 13–44)
MAGNESIUM SERPL-MCNC: 1.6 MG/DL — SIGNIFICANT CHANGE UP (ref 1.6–2.6)
MCHC RBC-ENTMCNC: 30.9 PG — SIGNIFICANT CHANGE UP (ref 27–34)
MCHC RBC-ENTMCNC: 32.6 GM/DL — SIGNIFICANT CHANGE UP (ref 32–36)
MCV RBC AUTO: 94.9 FL — SIGNIFICANT CHANGE UP (ref 80–100)
MONOCYTES # BLD AUTO: 0.75 K/UL — SIGNIFICANT CHANGE UP (ref 0–0.9)
MONOCYTES NFR BLD AUTO: 10.3 % — SIGNIFICANT CHANGE UP (ref 2–14)
NEUTROPHILS # BLD AUTO: 5.75 K/UL — SIGNIFICANT CHANGE UP (ref 1.8–7.4)
NEUTROPHILS NFR BLD AUTO: 78.8 % — HIGH (ref 43–77)
NRBC # BLD: 0 /100 WBCS — SIGNIFICANT CHANGE UP (ref 0–0)
PCO2 BLDV: 43 MMHG — SIGNIFICANT CHANGE UP (ref 35–50)
PH BLDV: 7.44 — SIGNIFICANT CHANGE UP (ref 7.35–7.45)
PHOSPHATE SERPL-MCNC: 2.3 MG/DL — LOW (ref 2.5–4.5)
PLATELET # BLD AUTO: 108 K/UL — LOW (ref 150–400)
PO2 BLDV: 58 MMHG — HIGH (ref 25–45)
POTASSIUM SERPL-MCNC: 3 MMOL/L — LOW (ref 3.5–5.3)
POTASSIUM SERPL-MCNC: 3.2 MMOL/L — LOW (ref 3.5–5.3)
POTASSIUM SERPL-SCNC: 3 MMOL/L — LOW (ref 3.5–5.3)
POTASSIUM SERPL-SCNC: 3.2 MMOL/L — LOW (ref 3.5–5.3)
RBC # BLD: 3.75 M/UL — LOW (ref 4.2–5.8)
RBC # FLD: 13 % — SIGNIFICANT CHANGE UP (ref 10.3–14.5)
SAO2 % BLDV: 89 % — HIGH (ref 67–88)
SODIUM SERPL-SCNC: 138 MMOL/L — SIGNIFICANT CHANGE UP (ref 135–145)
SODIUM SERPL-SCNC: 138 MMOL/L — SIGNIFICANT CHANGE UP (ref 135–145)
WBC # BLD: 7.3 K/UL — SIGNIFICANT CHANGE UP (ref 3.8–10.5)
WBC # FLD AUTO: 7.3 K/UL — SIGNIFICANT CHANGE UP (ref 3.8–10.5)

## 2019-01-06 PROCEDURE — 99233 SBSQ HOSP IP/OBS HIGH 50: CPT

## 2019-01-06 PROCEDURE — 71045 X-RAY EXAM CHEST 1 VIEW: CPT | Mod: 26

## 2019-01-06 RX ORDER — POTASSIUM CHLORIDE 20 MEQ
10 PACKET (EA) ORAL
Qty: 0 | Refills: 0 | Status: COMPLETED | OUTPATIENT
Start: 2019-01-06 | End: 2019-01-06

## 2019-01-06 RX ORDER — FUROSEMIDE 40 MG
20 TABLET ORAL ONCE
Qty: 0 | Refills: 0 | Status: COMPLETED | OUTPATIENT
Start: 2019-01-06 | End: 2019-01-06

## 2019-01-06 RX ORDER — MAGNESIUM SULFATE 500 MG/ML
2 VIAL (ML) INJECTION ONCE
Qty: 0 | Refills: 0 | Status: COMPLETED | OUTPATIENT
Start: 2019-01-06 | End: 2019-01-06

## 2019-01-06 RX ADMIN — Medication 100 MILLIEQUIVALENT(S): at 09:54

## 2019-01-06 RX ADMIN — Medication 37.5 MICROGRAM(S): at 21:33

## 2019-01-06 RX ADMIN — Medication 100 MILLIEQUIVALENT(S): at 20:54

## 2019-01-06 RX ADMIN — Medication 100 MILLIEQUIVALENT(S): at 08:45

## 2019-01-06 RX ADMIN — PIPERACILLIN AND TAZOBACTAM 25 GRAM(S): 4; .5 INJECTION, POWDER, LYOPHILIZED, FOR SOLUTION INTRAVENOUS at 01:44

## 2019-01-06 RX ADMIN — HEPARIN SODIUM 5000 UNIT(S): 5000 INJECTION INTRAVENOUS; SUBCUTANEOUS at 17:13

## 2019-01-06 RX ADMIN — Medication 50 GRAM(S): at 08:47

## 2019-01-06 RX ADMIN — ALBUTEROL 2.5 MILLIGRAM(S): 90 AEROSOL, METERED ORAL at 07:39

## 2019-01-06 RX ADMIN — Medication 100 MILLIEQUIVALENT(S): at 10:40

## 2019-01-06 RX ADMIN — Medication 100 MILLIEQUIVALENT(S): at 21:57

## 2019-01-06 RX ADMIN — ALBUTEROL 2.5 MILLIGRAM(S): 90 AEROSOL, METERED ORAL at 15:35

## 2019-01-06 RX ADMIN — Medication 40 MILLILITER(S): at 09:54

## 2019-01-06 RX ADMIN — PANTOPRAZOLE SODIUM 40 MILLIGRAM(S): 20 TABLET, DELAYED RELEASE ORAL at 17:13

## 2019-01-06 RX ADMIN — PIPERACILLIN AND TAZOBACTAM 25 GRAM(S): 4; .5 INJECTION, POWDER, LYOPHILIZED, FOR SOLUTION INTRAVENOUS at 17:14

## 2019-01-06 RX ADMIN — PANTOPRAZOLE SODIUM 40 MILLIGRAM(S): 20 TABLET, DELAYED RELEASE ORAL at 05:31

## 2019-01-06 RX ADMIN — Medication 100 MILLIEQUIVALENT(S): at 22:55

## 2019-01-06 RX ADMIN — HEPARIN SODIUM 5000 UNIT(S): 5000 INJECTION INTRAVENOUS; SUBCUTANEOUS at 05:31

## 2019-01-06 RX ADMIN — Medication 20 MILLIGRAM(S): at 06:59

## 2019-01-06 RX ADMIN — PIPERACILLIN AND TAZOBACTAM 25 GRAM(S): 4; .5 INJECTION, POWDER, LYOPHILIZED, FOR SOLUTION INTRAVENOUS at 09:04

## 2019-01-06 NOTE — PROGRESS NOTE ADULT - SUBJECTIVE AND OBJECTIVE BOX
CC.  SOB  HPI. pt is compalining of being hungry pending [id number] eval, he is high risk for aspiration so npo till then              Vital Signs Last 24 Hrs  vss      PHYSICAL EXAM-  GENERAL: Chronic ill appearing male  HEAD:  Atraumatic, Normocephalic  EYES: EOMI, PERRLA, conjunctiva and sclera clear  NECK: Supple, No JVD, Normal thyroid  NERVOUS SYSTEM:  Alert but lethargic at time.  generalized weakness  CHEST/LUNG: Diffuse rhonchi sound with decrease air entry.  No retractions or accessory muscle usage  Heart regular rate and rhythm; No murmurs, rubs, or gallops  ABDOMEN: Soft, Nontender, Nondistended; Bowel sounds present  EXTREMITIES:  No clubbing, cyanosis, or edema  SKIN: No rashes or lesions      MEDICATIONS  (STANDING):  ALBUTerol    0.083% 2.5 milliGRAM(s) Nebulizer every 8 hours  chlorhexidine 2% Cloths 1 Application(s) Topical daily  dextrose 10%. 1000 milliLiter(s) (40 mL/Hr) IV Continuous <Continuous>  ferrous    sulfate 325 milliGRAM(s) Oral daily  heparin  Injectable 5000 Unit(s) SubCutaneous every 12 hours  influenza   Vaccine 0.5 milliLiter(s) IntraMuscular once  levothyroxine Injectable 37.5 MICROGram(s) IV Push at bedtime  multivitamin 1 Tablet(s) Oral daily  pantoprazole  Injectable 40 milliGRAM(s) IV Push two times a day  piperacillin/tazobactam IVPB. 3.375 Gram(s) IV Intermittent every 8 hours  tamsulosin 0.4 milliGRAM(s) Oral at bedtime    MEDICATIONS  (PRN):  metoprolol tartrate Injectable 5 milliGRAM(s) IV Push every 4 hours PRN HR>120

## 2019-01-06 NOTE — PROGRESS NOTE ADULT - ATTENDING COMMENTS
36 minutes of critical care time spent with the patient and coordinating care with nursing, hospitalist, and consultants. Patient is critically ill requiring ICU care due to respiratory failure, septic shock, rapid atrial fibrillation, heart failure. The patient is high risk for deterioration and death.

## 2019-01-06 NOTE — PROGRESS NOTE ADULT - ASSESSMENT
The patient is a 89 year old male with a history of hypothyroidism, BPH, PUD, atrial fibrillation, chronic diastolic heart failure who was admitted with respiratory failure, GI bleed, hypotension.    Plan:  - Echo from 2017 with grossly low normal LV systolic function, no significant valve issues  - Continue low rate D10W for now as patient is NPO without feeds  - Received furosemide 20 mg IV today. Continue prn dosing.  - When taking PO or NGT feeds, stop IV fluids  - Troponin peaked at 0.292 in the setting of type 2 NSTEMI (demand ischemia) from respiratory failure and GI bleed  - Remain off of anticoagulation. Given multiple episodes of GI bleed with supratherapeutic INR, warfarin may not be the best option. Eventually can consider apixaban or no anticoagulation.  - Continue metoprolol 5 mg IV q4h prn HR>120  - Weaned off of norepinephrine  - Consider NGT placement  - Continue HFNC. High risk for reintubation.

## 2019-01-06 NOTE — PROGRESS NOTE ADULT - SUBJECTIVE AND OBJECTIVE BOX
89M with PMHx of MVP, CKD, afib on coumadin, PUD s/p bilroth procedure, nephrolithiasis, hypothyroidism, BPH, diverticulosis, admitted on 12/31 with respiratory distress after an aspiration event. As per report, patient had choked on outpatient medications and then vomited. Hospital course complicated by the development of aspiration pneumonia, severe sepsis, UGIB, and GABO on CKD, and a-fib with RVR. Patient had been placed on NIPPV for worsening respiratory failure, but failed this treatment modality and was subsequently intubated. Transient shock state requiring vasopressor therapy. Patient self-extubated on 1/3 without need for reintubation.    24 hour events: Pt seen and examined at bedside. Chart/labs reviewed. Remains on HFNC. Still on D10 gtt    PAST MEDICAL & SURGICAL HISTORY:  Mitral valve disorder  Kidney stone  Diverticulosis  CKD (chronic kidney disease)  Afib  Hypothyroidism  BPH (benign prostatic hyperplasia)  Chronic peptic ulcer: S/P surgery more than 10 yrs ago      Review of Systems:  Constitutional: No fever, chills, fatigue  Neuro: No headache, numbness, weakness  Resp: No cough, wheezing, +shortness of breath  CVS: No chest pain, palpitations, leg swelling  GI: No abdominal pain, nausea, vomiting, diarrhea   : No dysuria, frequency, incontinence  Skin: No itching, burning, rashes, or lesions   Msk: No joint pain or swelling  Psych: No depression, anxiety, mood swings      T(F): 98.3 (01-06-19 @ 19:37), Max: 98.3 (01-06-19 @ 19:37)  HR: 128 (01-06-19 @ 22:00) (87 - 128)  BP: 137/89 (01-06-19 @ 22:00) (109/78 - 146/80)  RR: 24 (01-06-19 @ 22:00) (18 - 24)  SpO2: 98% (01-06-19 @ 22:00) (95% - 100%)  Wt(kg): --        CAPILLARY BLOOD GLUCOSE      POCT Blood Glucose.: 110 mg/dL (06 Jan 2019 16:41)      I&O's Summary    05 Jan 2019 07:01  -  06 Jan 2019 07:00  --------------------------------------------------------  IN: 1560 mL / OUT: 0 mL / NET: 1560 mL    06 Jan 2019 07:01  -  06 Jan 2019 22:35  --------------------------------------------------------  IN: 950 mL / OUT: 0 mL / NET: 950 mL        Physical Exam:     Gen: respiratory distress  Neuro: Awake, follows commands  HEENT: NC/AT  Resp: Rhonchi  CVS: nl S1/S2, RRR  Abd: soft, nt, nd, +bs  Ext: + edema, +pulses  Skin: well perfused, warm      Meds:    piperacillin/tazobactam IVPB. IV Intermittent  metoprolol tartrate Injectable IV Push PRN  tamsulosin Oral  levothyroxine Injectable IV Push  ALBUTerol    0.083% Nebulizer  heparin  Injectable SubCutaneous  pantoprazole  Injectable IV Push  dextrose 10%. IV Continuous  ferrous    sulfate Oral  multivitamin Oral  potassium chloride  10 mEq/100 mL IVPB IV Intermittent  influenza   Vaccine IntraMuscular  chlorhexidine 2% Cloths Topical                              11.6   7.30  )-----------( 108      ( 06 Jan 2019 07:17 )             35.6       01-06    138  |  99  |  25<H>  ----------------------------<  122<H>  3.2<L>   |  34<H>  |  1.67<H>    Ca    8.7      06 Jan 2019 19:48  Phos  2.3     01-06  Mg     1.6     01-06        .Sputum Sputum - trap   Normal Respiratory Abbie present   Numerous polymorphonuclear leukocytes seen per low power field  Few Squamous epithelial cells seen per low power field  Numerous Yeast like cells seen per oil power field 01-03 @ 10:25            CENTRAL LINE: yes    BROOKS: yes    A-LINE: no    GLOBAL ISSUE/BEST PRACTICE:  Analgesia: no  Sedation: no  HOB elevation: yes  Stress ulcer prophylaxis: yes  VTE prophylaxis: yes  Glycemic control: no  Nutrition: npo      CODE STATUS: Full  GOC discussion: Y  Critical care time spent (mins): 37  (Reviewing data, imaging, discussing with multidisciplinary team, non inclusive of procedures, discussing goals of care with patient/family)

## 2019-01-06 NOTE — PROGRESS NOTE ADULT - SUBJECTIVE AND OBJECTIVE BOX
ID Progress note covering Dr. Zhang    Name: HARSHAL COLORADO  Age: 89y  Gender: Male  MRN: [id number]    Interval History-- Events noted remains congested with copious secretions , got IV lasix today. On Hi Flow oxygen   Notes reviewed    Past Medical History--  Mitral valve disorder  Kidney stone  Diverticulosis  CKD (chronic kidney disease)  Afib  Hypothyroidism  BPH (benign prostatic hyperplasia)  Chronic peptic ulcer  No significant past surgical history      For details regarding the patient's social history, family history, and other miscellaneous elements, please refer the initial infectious diseases consultation and/or the admitting history and physical examination for this admission.    Allergies--  Allergies    No Known Allergies    Intolerances        Medications--  Antibiotics:  piperacillin/tazobactam IVPB. 3.375 Gram(s) IV Intermittent every 8 hours    Immunologic:  influenza   Vaccine 0.5 milliLiter(s) IntraMuscular once    Other:  ALBUTerol    0.083%  chlorhexidine 2% Cloths  dextrose 10%.  ferrous    sulfate  heparin  Injectable  levothyroxine Injectable  metoprolol tartrate Injectable PRN  multivitamin  pantoprazole  Injectable  tamsulosin      Review of Systems--  Review of systems unable to be obtained secondary to clinical condition.    Physical Examination--    Vital Signs: T(F): 98.2 (01-06-19 @ 04:04), Max: 98.3 (01-05-19 @ 12:56)  HR: 100 (01-06-19 @ 07:40)  BP: 125/81 (01-06-19 @ 06:00)  RR: 22 (01-06-19 @ 06:00)  SpO2: 98% (01-06-19 @ 07:40)  Wt(kg): --  General: Nontoxic cachectic  Male in no acute distress.  HEENT: AT/NC. PERRL. EOMI. Anicteric. Conjunctiva pink and moist. Oropharynx clear. Dentition fair.  Neck: Not rigid. No sense of mass.  Nodes: None palpable.  Lungs: Coarse rales  bilaterally without rales, wheezing or rhonchi  Heart: Regular rate and rhythm. No Murmur. No rub. No gallop. No palpable thrill.  Abdomen: Bowel sounds present and normoactive. Soft. Nondistended. Nontender. No sense of mass. No organomegaly.  Back: No spinal tenderness. No costovertebral angle tenderness.   Extremities: No cyanosis or clubbing. No edema.   Skin: Warm. Dry. Good turgor. No rash. No vasculitic stigmata.  Psychiatric: Appropriate affect and mood for situation.         Laboratory Studies--  CBC                        11.6   7.30  )-----------( 108      ( 06 Jan 2019 07:17 )             35.6       Chemistries  01-06    138  |  101  |  27<H>  ----------------------------<  124<H>  3.0<L>   |  29  |  1.56<H>    Ca    8.5      06 Jan 2019 07:12  Phos  2.3     01-06  Mg     1.6     01-06        Culture Data    Culture - Sputum (collected 03 Jan 2019 10:25)  Source: .Sputum Sputum - trap  Gram Stain (03 Jan 2019 15:36):    Numerous polymorphonuclear leukocytes seen per low power field    Few Squamous epithelial cells seen per low power field    Numerous Yeast like cells seen per oil power field  Final Report (05 Jan 2019 10:33):    Normal Respiratory Abbie present    Culture - Blood (collected 31 Dec 2018 12:33)  Source: .Blood Blood-Peripheral  Final Report (05 Jan 2019 13:00):    No growth at 5 days.    Culture - Blood (collected 31 Dec 2018 12:33)  Source: .Blood Blood-Peripheral  Final Report (05 Jan 2019 13:00):    No growth at 5 days.            Radiology:  Xray Chest 1 View- PORTABLE-Routine (01.06.19 @ 06:51) >  INTERPRETATION:  HISTORY: ADMDIAG1: J69.0 BREATHING FAST/; Pneumonia;   Pneumonia;      TECHNIQUE: Portable frontal view of the chest, 1 view.  COMPARISON: 1/2/2019.  FINDINGS:     HEART:  Enlarged  LUNGS: There are bibasilar opacities compatible with effusion/infiltrate.    OSSEOUS STRUCTURES:: degenerative changes    IMPRESSION: No significant change    Assessment--  89M with hypothyroidism, BPH, afib, CKD, who presents with SOB with acute hypoxic respiratory  failure complicated by vomiting with hematemesis likely severe aspiration pneumonia with   pneumonitis  Likely also component of pulm edema  + troponin  Coagulopathy  GABO on CKD  Leukocytosis multifactorial resolved   Plan :   Cont Zosyn   repeat CXR shows congestive changes , needs more diuretics   consider NGT feeds or comfort care if family refuses PEG or NGT feeding   Fu cultures  Trend temps and wbc  On high flow     Continue with present regiment.  Appropriate use of antibiotics and adverse effects reviewed.    I have discussed the above plan of care with patient/family in detail. They expressed understanding of the treatment plan . Risks, benefits and alternatives discussed in detail. I have asked if they have any questions or concerns and appropriately addressed them to the best of my ability .      > 35 minutes spent in direct patient care reviewing  the notes, lab data/ imaging , discussion with multidisciplinary team. All questions were addressed and answered to the best of my capacity .    Dr. zhang to resume care from am     Thank you for allowing me to participate in the care of your patient .        Thelma Snell MD  [phone]

## 2019-01-06 NOTE — PROGRESS NOTE ADULT - ASSESSMENT
89M with HTN, hypothyroidism, BPH, afib, CKD, who presents with SOB.  Patient was in his usual state of health with no recent illness when he tried taking two of his pills this evening.  SOB 2/2 aspiration pneumonitis.      Aspiration pneumonitis related to vomiting/hematemesis, Acute Hypoxemic Resp Failure   - Continue with SPCU level of care.    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   Daily CBC  Protonix IV BID  gi f/u appreciated.    AC and anti platelets on hold.     Acute resp dstress with hypoxia 2/2 pna and systolic chf too likely  echo holly m  iv lasix started    Hypoglycemia  - D10 to give less volume given LE edema    Coagulopathy upon admission  - s/p IV Vit K and FFP  - improved  - was on Warfarin at home for afib    Atrial fibrillation, unspecified type.   - IV metoprolol until can take PO  - cardio f/u appreciated   - Will need to consider Eliquis vs. no AC    CKD  - monitor rising Cr  - dose meds renally  - baseline unclear    Hypothyroidism- stable , continue with Levothyroxine.     Benign prostatic hyperplasia, unspecified whether lower urinary tract symptoms present.  Plan: - cont with BPH meds.     Hypothyroidism, unspecified type.  Plan: - cont with synthroid.     VTE PPx - Heparin SC    Prognosis poor

## 2019-01-06 NOTE — PROGRESS NOTE ADULT - SUBJECTIVE AND OBJECTIVE BOX
Date/Time Patient Seen:  		  Referring MD:   Data Reviewed	       Patient is a 89y old  Male who presents with a chief complaint of SOB (05 Jan 2019 20:35)      Subjective/HPI     PAST MEDICAL & SURGICAL HISTORY:  Mitral valve disorder  Kidney stone  Diverticulosis  CKD (chronic kidney disease)  Afib  Hypothyroidism  BPH (benign prostatic hyperplasia)  Chronic peptic ulcer: S/P surgery more than 10 yrs ago  No significant past surgical history        Medication list         MEDICATIONS  (STANDING):  ALBUTerol    0.083% 2.5 milliGRAM(s) Nebulizer every 8 hours  chlorhexidine 2% Cloths 1 Application(s) Topical daily  dextrose 10%. 1000 milliLiter(s) (40 mL/Hr) IV Continuous <Continuous>  ferrous    sulfate 325 milliGRAM(s) Oral daily  heparin  Injectable 5000 Unit(s) SubCutaneous every 12 hours  influenza   Vaccine 0.5 milliLiter(s) IntraMuscular once  levothyroxine Injectable 37.5 MICROGram(s) IV Push at bedtime  multivitamin 1 Tablet(s) Oral daily  pantoprazole  Injectable 40 milliGRAM(s) IV Push two times a day  piperacillin/tazobactam IVPB. 3.375 Gram(s) IV Intermittent every 8 hours  tamsulosin 0.4 milliGRAM(s) Oral at bedtime    MEDICATIONS  (PRN):  metoprolol tartrate Injectable 5 milliGRAM(s) IV Push every 4 hours PRN HR>120         Vitals log        ICU Vital Signs Last 24 Hrs  T(C): 36.8 (06 Jan 2019 04:04), Max: 36.8 (05 Jan 2019 12:56)  T(F): 98.2 (06 Jan 2019 04:04), Max: 98.3 (05 Jan 2019 12:56)  HR: 110 (06 Jan 2019 06:00) (87 - 110)  BP: 125/81 (06 Jan 2019 06:00) (109/78 - 148/93)  BP(mean): 94 (06 Jan 2019 06:00) (87 - 110)  ABP: --  ABP(mean): --  RR: 22 (06 Jan 2019 06:00) (18 - 24)  SpO2: 96% (06 Jan 2019 06:00) (94% - 98%)           Input and Output:  I&O's Detail    04 Jan 2019 07:01  -  05 Jan 2019 07:00  --------------------------------------------------------  IN:    dextrose 10%.: 840 mL    Solution: 300 mL    Solution: 200 mL  Total IN: 1340 mL    OUT:    Voided: 100 mL  Total OUT: 100 mL    Total NET: 1240 mL      05 Jan 2019 07:01  -  06 Jan 2019 06:47  --------------------------------------------------------  IN:    dextrose 10%.: 960 mL    Solution: 300 mL    Solution: 100 mL    Solution: 200 mL  Total IN: 1560 mL    OUT:  Total OUT: 0 mL    Total NET: 1560 mL          Lab Data                        12.1   10.82 )-----------( 104      ( 05 Jan 2019 07:08 )             36.8     01-05    140  |  102  |  35<H>  ----------------------------<  127<H>  3.0<L>   |  28  |  1.78<H>    Ca    8.8      05 Jan 2019 07:05  Mg     1.6     01-04              Review of Systems	      Objective     Physical Examination  high flow NC  heart s1s2  lung dec BS  abd dec BS  frail  weak        Pertinent Lab findings & Imaging      Apoorva:  NO   Adequate UO     I&O's Detail    04 Jan 2019 07:01  -  05 Jan 2019 07:00  --------------------------------------------------------  IN:    dextrose 10%.: 840 mL    Solution: 300 mL    Solution: 200 mL  Total IN: 1340 mL    OUT:    Voided: 100 mL  Total OUT: 100 mL    Total NET: 1240 mL      05 Jan 2019 07:01  -  06 Jan 2019 06:47  --------------------------------------------------------  IN:    dextrose 10%.: 960 mL    Solution: 300 mL    Solution: 100 mL    Solution: 200 mL  Total IN: 1560 mL    OUT:  Total OUT: 0 mL    Total NET: 1560 mL               Discussed with:     Cultures:	        Radiology

## 2019-01-06 NOTE — PROGRESS NOTE ADULT - SUBJECTIVE AND OBJECTIVE BOX
Chief Complaint: Shortness of breath, hematemesis    Interval Events: Remains on HFNC and short of breath. Not clearing secretions.    Review of Systems:  General: No fevers, chills, weight loss or gain  Skin: No rashes, color changes  Cardiovascular: No chest pain, orthopnea  Respiratory: (+) shortness of breath, cough  Gastrointestinal: No nausea, abdominal pain  Genitourinary: No incontinence, pain with urination  Musculoskeletal: No pain, swelling, decreased range of motion  Neurological: No headache, weakness  Psychiatric: No depression, anxiety  Endocrine: No weight loss or gain, increased thirst  All other systems are comprehensively negative.    Physical Exam:  Vital Signs Last 24 Hrs  T(C): 36.4 (06 Jan 2019 08:28), Max: 36.8 (05 Jan 2019 12:56)  T(F): 97.5 (06 Jan 2019 08:28), Max: 98.3 (05 Jan 2019 12:56)  HR: 111 (06 Jan 2019 10:01) (87 - 111)  BP: 133/105 (06 Jan 2019 10:01) (109/78 - 148/93)  BP(mean): 115 (06 Jan 2019 10:01) (87 - 115)  RR: 18 (06 Jan 2019 10:01) (18 - 24)  SpO2: 96% (06 Jan 2019 10:01) (94% - 98%)  General: Respiratory distress  HEENT: MMM  Neck: No JVD, no carotid bruit  Lungs: Rales, decreased at bases  CV: Irregular, nl S1/S2, no M/R/G  Abdomen: S/NT/ND, +BS  Extremities: 2+ LE edema, no cyanosis  Neuro: AAOx3, non-focal  Skin: No rash    Labs:    01-06    138  |  101  |  27<H>  ----------------------------<  124<H>  3.0<L>   |  29  |  1.56<H>    Ca    8.5      06 Jan 2019 07:12  Phos  2.3     01-06  Mg     1.6     01-06                          11.6   7.30  )-----------( 108      ( 06 Jan 2019 07:17 )             35.6       Telemetry: -120

## 2019-01-07 LAB
ANION GAP SERPL CALC-SCNC: 7 MMOL/L — SIGNIFICANT CHANGE UP (ref 5–17)
BASE EXCESS BLDA CALC-SCNC: 2.2 MMOL/L — HIGH (ref -2–2)
BASOPHILS # BLD AUTO: 0.01 K/UL — SIGNIFICANT CHANGE UP (ref 0–0.2)
BASOPHILS NFR BLD AUTO: 0.1 % — SIGNIFICANT CHANGE UP (ref 0–2)
BLOOD GAS SOURCE: SIGNIFICANT CHANGE UP
BUN SERPL-MCNC: 25 MG/DL — HIGH (ref 7–23)
CALCIUM SERPL-MCNC: 8.8 MG/DL — SIGNIFICANT CHANGE UP (ref 8.4–10.5)
CHLORIDE SERPL-SCNC: 98 MMOL/L — SIGNIFICANT CHANGE UP (ref 96–108)
CO2 SERPL-SCNC: 32 MMOL/L — HIGH (ref 22–31)
CREAT SERPL-MCNC: 1.61 MG/DL — HIGH (ref 0.5–1.3)
EOSINOPHIL # BLD AUTO: 0.02 K/UL — SIGNIFICANT CHANGE UP (ref 0–0.5)
EOSINOPHIL NFR BLD AUTO: 0.3 % — SIGNIFICANT CHANGE UP (ref 0–6)
GLUCOSE SERPL-MCNC: 138 MG/DL — HIGH (ref 70–99)
HCO3 BLDA-SCNC: 26 MMOL/L — SIGNIFICANT CHANGE UP (ref 21–29)
HCT VFR BLD CALC: 36.1 % — LOW (ref 39–50)
HGB BLD-MCNC: 11.8 G/DL — LOW (ref 13–17)
HOROWITZ INDEX BLDA+IHG-RTO: 60 — SIGNIFICANT CHANGE UP
IMM GRANULOCYTES NFR BLD AUTO: 0.6 % — SIGNIFICANT CHANGE UP (ref 0–1.5)
INR BLD: 1.84 RATIO — HIGH (ref 0.88–1.16)
LYMPHOCYTES # BLD AUTO: 0.48 K/UL — LOW (ref 1–3.3)
LYMPHOCYTES # BLD AUTO: 6.2 % — LOW (ref 13–44)
MAGNESIUM SERPL-MCNC: 1.8 MG/DL — SIGNIFICANT CHANGE UP (ref 1.6–2.6)
MAGNESIUM SERPL-MCNC: 1.9 MG/DL — SIGNIFICANT CHANGE UP (ref 1.6–2.6)
MCHC RBC-ENTMCNC: 30.7 PG — SIGNIFICANT CHANGE UP (ref 27–34)
MCHC RBC-ENTMCNC: 32.7 GM/DL — SIGNIFICANT CHANGE UP (ref 32–36)
MCV RBC AUTO: 94 FL — SIGNIFICANT CHANGE UP (ref 80–100)
MONOCYTES # BLD AUTO: 0.58 K/UL — SIGNIFICANT CHANGE UP (ref 0–0.9)
MONOCYTES NFR BLD AUTO: 7.5 % — SIGNIFICANT CHANGE UP (ref 2–14)
NEUTROPHILS # BLD AUTO: 6.56 K/UL — SIGNIFICANT CHANGE UP (ref 1.8–7.4)
NEUTROPHILS NFR BLD AUTO: 85.3 % — HIGH (ref 43–77)
PCO2 BLDA: 44 MMHG — SIGNIFICANT CHANGE UP (ref 32–46)
PH BLD: 7.4 — SIGNIFICANT CHANGE UP (ref 7.35–7.45)
PLATELET # BLD AUTO: 119 K/UL — LOW (ref 150–400)
PO2 BLDA: 143 MMHG — HIGH (ref 74–108)
POTASSIUM SERPL-MCNC: 3.3 MMOL/L — LOW (ref 3.5–5.3)
POTASSIUM SERPL-SCNC: 3.3 MMOL/L — LOW (ref 3.5–5.3)
PROTHROM AB SERPL-ACNC: 20.4 SEC — HIGH (ref 10–12.9)
RBC # BLD: 3.84 M/UL — LOW (ref 4.2–5.8)
RBC # FLD: 12.8 % — SIGNIFICANT CHANGE UP (ref 10.3–14.5)
SAO2 % BLDA: 97 % — HIGH (ref 92–96)
SODIUM SERPL-SCNC: 137 MMOL/L — SIGNIFICANT CHANGE UP (ref 135–145)
WBC # BLD: 7.7 K/UL — SIGNIFICANT CHANGE UP (ref 3.8–10.5)
WBC # FLD AUTO: 7.7 K/UL — SIGNIFICANT CHANGE UP (ref 3.8–10.5)

## 2019-01-07 PROCEDURE — 71045 X-RAY EXAM CHEST 1 VIEW: CPT | Mod: 26,77

## 2019-01-07 PROCEDURE — 99233 SBSQ HOSP IP/OBS HIGH 50: CPT

## 2019-01-07 PROCEDURE — 71045 X-RAY EXAM CHEST 1 VIEW: CPT | Mod: 26

## 2019-01-07 PROCEDURE — 76604 US EXAM CHEST: CPT | Mod: 26

## 2019-01-07 RX ORDER — POTASSIUM CHLORIDE 20 MEQ
10 PACKET (EA) ORAL
Qty: 0 | Refills: 0 | Status: COMPLETED | OUTPATIENT
Start: 2019-01-07 | End: 2019-01-07

## 2019-01-07 RX ORDER — FUROSEMIDE 40 MG
20 TABLET ORAL ONCE
Qty: 0 | Refills: 0 | Status: COMPLETED | OUTPATIENT
Start: 2019-01-07 | End: 2019-01-07

## 2019-01-07 RX ORDER — FUROSEMIDE 40 MG
40 TABLET ORAL ONCE
Qty: 0 | Refills: 0 | Status: COMPLETED | OUTPATIENT
Start: 2019-01-07 | End: 2019-01-07

## 2019-01-07 RX ADMIN — HEPARIN SODIUM 5000 UNIT(S): 5000 INJECTION INTRAVENOUS; SUBCUTANEOUS at 17:32

## 2019-01-07 RX ADMIN — Medication 100 MILLIEQUIVALENT(S): at 13:59

## 2019-01-07 RX ADMIN — PIPERACILLIN AND TAZOBACTAM 25 GRAM(S): 4; .5 INJECTION, POWDER, LYOPHILIZED, FOR SOLUTION INTRAVENOUS at 01:44

## 2019-01-07 RX ADMIN — ALBUTEROL 2.5 MILLIGRAM(S): 90 AEROSOL, METERED ORAL at 07:05

## 2019-01-07 RX ADMIN — Medication 100 MILLIEQUIVALENT(S): at 11:44

## 2019-01-07 RX ADMIN — HEPARIN SODIUM 5000 UNIT(S): 5000 INJECTION INTRAVENOUS; SUBCUTANEOUS at 05:26

## 2019-01-07 RX ADMIN — Medication 20 MILLIGRAM(S): at 07:49

## 2019-01-07 RX ADMIN — PIPERACILLIN AND TAZOBACTAM 25 GRAM(S): 4; .5 INJECTION, POWDER, LYOPHILIZED, FOR SOLUTION INTRAVENOUS at 17:32

## 2019-01-07 RX ADMIN — PIPERACILLIN AND TAZOBACTAM 25 GRAM(S): 4; .5 INJECTION, POWDER, LYOPHILIZED, FOR SOLUTION INTRAVENOUS at 09:45

## 2019-01-07 RX ADMIN — ALBUTEROL 2.5 MILLIGRAM(S): 90 AEROSOL, METERED ORAL at 23:46

## 2019-01-07 RX ADMIN — ALBUTEROL 2.5 MILLIGRAM(S): 90 AEROSOL, METERED ORAL at 16:22

## 2019-01-07 RX ADMIN — Medication 40 MILLIGRAM(S): at 02:31

## 2019-01-07 RX ADMIN — Medication 100 MILLIEQUIVALENT(S): at 09:45

## 2019-01-07 RX ADMIN — Medication 40 MILLILITER(S): at 14:04

## 2019-01-07 RX ADMIN — PANTOPRAZOLE SODIUM 40 MILLIGRAM(S): 20 TABLET, DELAYED RELEASE ORAL at 05:25

## 2019-01-07 RX ADMIN — Medication 5 MILLIGRAM(S): at 07:49

## 2019-01-07 RX ADMIN — PANTOPRAZOLE SODIUM 40 MILLIGRAM(S): 20 TABLET, DELAYED RELEASE ORAL at 17:32

## 2019-01-07 RX ADMIN — Medication 37.5 MICROGRAM(S): at 21:09

## 2019-01-07 RX ADMIN — Medication 5 MILLIGRAM(S): at 00:18

## 2019-01-07 RX ADMIN — ALBUTEROL 2.5 MILLIGRAM(S): 90 AEROSOL, METERED ORAL at 00:03

## 2019-01-07 RX ADMIN — TAMSULOSIN HYDROCHLORIDE 0.4 MILLIGRAM(S): 0.4 CAPSULE ORAL at 21:09

## 2019-01-07 NOTE — PROGRESS NOTE ADULT - ASSESSMENT
The patient is a 89 year old male with a history of hypothyroidism, BPH, PUD, atrial fibrillation, chronic diastolic heart failure who was admitted with respiratory failure, GI bleed, hypotension.    Plan:  - Echo from 2017 with grossly low normal LV systolic function, no significant valve issues  - Continue low rate D10W for now as patient is NPO without feeds  - Received furosemide 20 mg IV today. Continue prn dosing.  - When taking PO or NGT feeds, stop IV fluids  - Troponin peaked at 0.292 in the setting of type 2 NSTEMI (demand ischemia) from respiratory failure and GI bleed  - Remain off of anticoagulation. Given multiple episodes of GI bleed with supratherapeutic INR, warfarin may not be the best option. Eventually can consider apixaban or no anticoagulation.  - Continue metoprolol 5 mg IV q4h prn HR>120  - Weaned off of norepinephrine  - Consider NGT placement. Needs nutrition.  - Continue HFNC. High risk for reintubation.  - Palliative care evaluation

## 2019-01-07 NOTE — PROGRESS NOTE ADULT - SUBJECTIVE AND OBJECTIVE BOX
Date/Time Patient Seen:  		  Referring MD:   Data Reviewed	       Patient is a 89y old  Male who presents with a chief complaint of SOB (07 Jan 2019 02:16)      Subjective/HPI     PAST MEDICAL & SURGICAL HISTORY:  Mitral valve disorder  Kidney stone  Diverticulosis  CKD (chronic kidney disease)  Afib  Hypothyroidism  BPH (benign prostatic hyperplasia)  Chronic peptic ulcer: S/P surgery more than 10 yrs ago  No significant past surgical history        Medication list         MEDICATIONS  (STANDING):  ALBUTerol    0.083% 2.5 milliGRAM(s) Nebulizer every 8 hours  chlorhexidine 2% Cloths 1 Application(s) Topical daily  dextrose 10%. 1000 milliLiter(s) (40 mL/Hr) IV Continuous <Continuous>  ferrous    sulfate 325 milliGRAM(s) Oral daily  heparin  Injectable 5000 Unit(s) SubCutaneous every 12 hours  influenza   Vaccine 0.5 milliLiter(s) IntraMuscular once  levothyroxine Injectable 37.5 MICROGram(s) IV Push at bedtime  multivitamin 1 Tablet(s) Oral daily  pantoprazole  Injectable 40 milliGRAM(s) IV Push two times a day  piperacillin/tazobactam IVPB. 3.375 Gram(s) IV Intermittent every 8 hours  tamsulosin 0.4 milliGRAM(s) Oral at bedtime    MEDICATIONS  (PRN):  metoprolol tartrate Injectable 5 milliGRAM(s) IV Push every 4 hours PRN HR>120         Vitals log        ICU Vital Signs Last 24 Hrs  T(C): 36.7 (07 Jan 2019 04:05), Max: 36.8 (06 Jan 2019 19:37)  T(F): 98.1 (07 Jan 2019 04:05), Max: 98.3 (06 Jan 2019 19:37)  HR: 98 (07 Jan 2019 06:00) (89 - 142)  BP: 119/83 (07 Jan 2019 06:00) (111/84 - 139/93)  BP(mean): 95 (07 Jan 2019 06:00) (86 - 115)  ABP: --  ABP(mean): --  RR: 29 (07 Jan 2019 06:00) (18 - 35)  SpO2: 98% (07 Jan 2019 06:00) (85% - 100%)           Input and Output:  I&O's Detail    06 Jan 2019 07:01  -  07 Jan 2019 07:00  --------------------------------------------------------  IN:    dextrose 10%.: 920 mL    Solution: 50 mL    Solution: 300 mL    Solution: 200 mL  Total IN: 1470 mL    OUT:    Indwelling Catheter - Urethral: 1100 mL  Total OUT: 1100 mL    Total NET: 370 mL          Lab Data                        11.6   7.30  )-----------( 108      ( 06 Jan 2019 07:17 )             35.6     01-06    138  |  99  |  25<H>  ----------------------------<  122<H>  3.2<L>   |  34<H>  |  1.67<H>    Ca    8.7      06 Jan 2019 19:48  Phos  2.3     01-06  Mg     1.6     01-06              Review of Systems	      Objective     Physical Examination    heart s1s2  lung dec BS  abd soft  on high flow NC      Pertinent Lab findings & Imaging      Apoorva:  NO   Adequate UO     I&O's Detail    06 Jan 2019 07:01  -  07 Jan 2019 07:00  --------------------------------------------------------  IN:    dextrose 10%.: 920 mL    Solution: 50 mL    Solution: 300 mL    Solution: 200 mL  Total IN: 1470 mL    OUT:    Indwelling Catheter - Urethral: 1100 mL  Total OUT: 1100 mL    Total NET: 370 mL               Discussed with:     Cultures:	        Radiology

## 2019-01-07 NOTE — PROGRESS NOTE ADULT - SUBJECTIVE AND OBJECTIVE BOX
Chief Complaint:        INTERVAL HPI/OVERNIGHT EVENTS:    pt lethargic  not responding to stimuli    MEDICATIONS  (STANDING):  ALBUTerol    0.083% 2.5 milliGRAM(s) Nebulizer every 8 hours  chlorhexidine 2% Cloths 1 Application(s) Topical daily  dextrose 10%. 1000 milliLiter(s) (40 mL/Hr) IV Continuous <Continuous>  ferrous    sulfate 325 milliGRAM(s) Oral daily  heparin  Injectable 5000 Unit(s) SubCutaneous every 12 hours  influenza   Vaccine 0.5 milliLiter(s) IntraMuscular once  levothyroxine Injectable 37.5 MICROGram(s) IV Push at bedtime  multivitamin 1 Tablet(s) Oral daily  pantoprazole  Injectable 40 milliGRAM(s) IV Push two times a day  piperacillin/tazobactam IVPB. 3.375 Gram(s) IV Intermittent every 8 hours  potassium chloride  10 mEq/100 mL IVPB 10 milliEquivalent(s) IV Intermittent every 1 hour  tamsulosin 0.4 milliGRAM(s) Oral at bedtime    MEDICATIONS  (PRN):  metoprolol tartrate Injectable 5 milliGRAM(s) IV Push every 4 hours PRN HR>120            Vital Signs Last 24 Hrs  T(C): 36.4 (07 Jan 2019 08:15), Max: 36.8 (06 Jan 2019 19:37)  T(F): 97.5 (07 Jan 2019 08:15), Max: 98.3 (06 Jan 2019 19:37)  HR: 93 (07 Jan 2019 08:00) (89 - 142)  BP: 130/83 (07 Jan 2019 08:00) (117/71 - 139/93)  BP(mean): 99 (07 Jan 2019 08:00) (86 - 108)  RR: 29 (07 Jan 2019 08:00) (18 - 35)  SpO2: 100% (07 Jan 2019 08:00) (85% - 100%)    Physical exam:    abd soft, non tender  cv s1s2  chest air entry  ext no edema        LABS:                        11.8   7.70  )-----------( 119      ( 07 Jan 2019 06:50 )             36.1     01-07    137  |  98  |  25<H>  ----------------------------<  138<H>  3.3<L>   |  32<H>  |  1.61<H>    Ca    8.8      07 Jan 2019 06:50  Phos  2.3     01-06  Mg     1.9     01-07            RADIOLOGY & ADDITIONAL TESTS:

## 2019-01-07 NOTE — CHART NOTE - NSCHARTNOTEFT_GEN_A_CORE
Assessment: Nutrition consult ordered for tube feeding.  Pt admitted c sepsis and aspiration pneumonia after choking on his pills and vomiting. ABT course in progress. Pt previously self-extubated without need for reintubation and is tolerating high flow NC. Found to have clinical characteristics of severe protein calorie malnutrition per dietitian initial evaluation 1/2/19. Pt has been NPO x8 days and is currently on D10@40ml/hr.  [place] was [place] [facility] [place] but was too lethargic for evaluation.  Per GI note, recommendation is for NGT (thin eric tube to allow for high flow NC in addition to gt for nutrition).  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  Per RN, thin NGT to be placed.  As feasible, recommend calorically/protein dense, peptide based formula and starting low as pt is at risk fo refeeding syndrome given prolonged NPO status.  Recommendation: Vital 1.5 via NGT, start at 10ml/hr increasing by 10ml every 6 hrs, to goal of 60ml/hr x24hrs is reached (to provide 2160kcal based on 27kcal/kg IBW, 97g protein based on 1.2g/kg IBW).    Factors impacting intake: [ ] none [ ] nausea  [ ] vomiting [ ] diarrhea [ ] constipation  [ ]chewing problems [ ] swallowing issues  [ ] other:     Diet Presciption: Diet, NPO:   With Ice Chips/Sips of Water (01-04-19 @ 12:29)    Intake: N/A, on D5    Current Weight: usual bw 160# per initial assessment, 1/6 158.7#, 1/5 159#    Pertinent Medications: MEDICATIONS  (STANDING):  ALBUTerol    0.083% 2.5 milliGRAM(s) Nebulizer every 8 hours  dextrose 10%. 1000 milliLiter(s) (40 mL/Hr) IV Continuous <Continuous>  ferrous    sulfate 325 milliGRAM(s) Oral daily  heparin  Injectable 5000 Unit(s) SubCutaneous every 12 hours  influenza   Vaccine 0.5 milliLiter(s) IntraMuscular once  levothyroxine Injectable 37.5 MICROGram(s) IV Push at bedtime  multivitamin 1 Tablet(s) Oral daily  pantoprazole  Injectable 40 milliGRAM(s) IV Push two times a day  piperacillin/tazobactam IVPB. 3.375 Gram(s) IV Intermittent every 8 hours  tamsulosin 0.4 milliGRAM(s) Oral at bedtime    MEDICATIONS  (PRN):  metoprolol tartrate Injectable 5 milliGRAM(s) IV Push every 4 hours PRN HR>120    Pertinent Labs: 01-07 Na137 mmol/L Glu 138 mg/dL<H> K+ 3.3 mmol/L<L> Cr  1.61 mg/dL<H> BUN 25 mg/dL<H> 01-06 Phos 2.3 mg/dL<L>     CAPILLARY BLOOD GLUCOSE      POCT Blood Glucose.: 102 mg/dL (07 Jan 2019 12:13)  POCT Blood Glucose.: 138 mg/dL (07 Jan 2019 05:49)  POCT Blood Glucose.: 113 mg/dL (06 Jan 2019 23:37)  POCT Blood Glucose.: 110 mg/dL (06 Jan 2019 16:41)    Skin: intact    Estimated Needs:   [ x] no change since previous assessment  [ ] recalculated:     Previous Nutrition Diagnosis:   [ ] Inadequate Energy Intake [ ]Inadequate Oral Intake [ ] Excessive Energy Intake   [ ] Underweight [ ] Increased Nutrient Needs [ ] Overweight/Obesity   [ ] Altered GI Function [ ] Unintended Weight Loss [ ] Food & Nutrition Related Knowledge Deficit [x ] Malnutrition     Nutrition Diagnosis is [ x] ongoing  [ ] resolved [ ] not applicable     New Nutrition Diagnosis: [ ] not applicable       Interventions: place NGT, start short term feedings of Vital 1.5  Recommend  [ ] Change Diet To:  [ ] Nutrition Supplement  [ ] Nutrition Support  [ ] Other:     Monitoring and Evaluation:   [ ] PO intake [ x ] Tolerance to TF [ x ] weights [ x ] labs[ x ] follow up per protocol  [x ] other: decision regarding long term nutrition if unable to pass SLP reevals

## 2019-01-07 NOTE — PROGRESS NOTE ADULT - SUBJECTIVE AND OBJECTIVE BOX
Chief Complaint: Shortness of breath, hematemesis    Interval Events: Remains on HFNC and short of breath. Lethargic.    Review of Systems:  General: No fevers, chills, weight loss or gain  Skin: No rashes, color changes  Cardiovascular: No chest pain, orthopnea  Respiratory: (+) shortness of breath, cough  Gastrointestinal: No nausea, abdominal pain  Genitourinary: No incontinence, pain with urination  Musculoskeletal: No pain, swelling, decreased range of motion  Neurological: No headache, weakness  Psychiatric: No depression, anxiety  Endocrine: No weight loss or gain, increased thirst  All other systems are comprehensively negative.    Physical Exam:  Vital Signs Last 24 Hrs  T(C): 36.7 (07 Jan 2019 04:05), Max: 36.8 (06 Jan 2019 19:37)  T(F): 98.1 (07 Jan 2019 04:05), Max: 98.3 (06 Jan 2019 19:37)  HR: 93 (07 Jan 2019 08:00) (89 - 142)  BP: 130/83 (07 Jan 2019 08:00) (117/71 - 139/93)  BP(mean): 99 (07 Jan 2019 08:00) (86 - 115)  RR: 29 (07 Jan 2019 08:00) (18 - 35)  SpO2: 100% (07 Jan 2019 08:00) (85% - 100%)  General: Respiratory distress  HEENT: MMM  Neck: No JVD, no carotid bruit  Lungs: Rales, decreased at bases  CV: Irregular, nl S1/S2, no M/R/G  Abdomen: S/NT/ND, +BS  Extremities: 1+ LE edema, no cyanosis  Neuro: AAOx3, non-focal  Skin: No rash    Labs:    01-07    137  |  98  |  25<H>  ----------------------------<  138<H>  3.3<L>   |  32<H>  |  1.61<H>    Ca    8.8      07 Jan 2019 06:50  Phos  2.3     01-06  Mg     1.9     01-07                          11.8   7.70  )-----------( 119      ( 07 Jan 2019 06:50 )             36.1     Telemetry: AF 80s

## 2019-01-07 NOTE — PROGRESS NOTE ADULT - ASSESSMENT
89 m w/ above hx a/w/ respiratory distress which began after vomiting.   reported to have blood in emesis at presentation   hx of billroth II w/ gastrojej ulcer in 2017   without active/overt gi bleeding during hospital course  protonix 40mg BID  holding all antiplatelet medications and AC  NO plan for endoscopy  swallow eval 1/2 noted- presently npo. 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

## 2019-01-07 NOTE — PROGRESS NOTE ADULT - ASSESSMENT
89M with HTN, hypothyroidism, BPH, afib, CKD, who presents with SOB.  Patient was in his usual state of health with no recent illness when he tried taking two of his pills this evening.  SOB 2/2 aspiration pneumonitis.      Acute hypoxic respiratory failure:  -Likely due to aspiration pneumonia with possible element of CHF.  -Received Lasix overnight.   -Echo pending.   -On high flow O2   -Chest PT, suction as necessary  -See below for management of PNA.   -U/S chest ordered today to eval for pleural effusions.    Aspiration pneumonia   - Continue with SPCU level of care.    - Continue IV Zosyn  - Pulm/ID following.  - f/u cultures  - NPO -swallow re-eval on 1/4 - recommended ice chips ONLY  - patient had no swallow problems prior to emesis event, swallow re-eval with modified barium tomorrow  - not a candidate for PEG given subtotal gastrectomy, would need jejunostomy if it came down to that    GI bleed:  -History of Billroth II w/ gastrojejunal ulcer in 2017- now resolved.   -Daily CBC  -Protonix IV BID  -AC and anti platelets on hold.   -GI following    Hypoglycemia  - D10 to give less volume given LE edema    Coagulopathy   - s/p IV Vit K and FFP  - improved  - was on Warfarin at home for afib    Atrial fibrillation  - IV metoprolol until can take PO  - cardio f/u appreciated   - Will need to consider Eliquis vs. no AC    CKD 3  -Appears to be at baseline based on review of labs in HIE  -Dose meds renally  -Monitor renal indices    Hypothyroidism  -Continue with Levothyroxine.     Benign prostatic hyperplasia  -Continue Flomax    Hypothyroidism  -Continue with Synthroid.     VTE PPx   -Continue Heparin SC 89M with HTN, hypothyroidism, BPH, afib, CKD, who presents with SOB.  Patient was in his usual state of health with no recent illness when he tried taking two of his pills this evening.  SOB 2/2 aspiration pneumonitis.      Acute hypoxic respiratory failure:  - Likely due to aspiration pneumonia with possible element of CHF.  - Received Lasix overnight.   - Echo pending.   - On high flow O2   - Chest PT, suction as necessary  - See below for management of PNA.   - U/S chest ordered today to eval for pleural effusions.    Aspiration pneumonia:   - Continue with SPCU level of care.    - Continue IV Zosyn  - Pulm/ID following.  - f/u cultures  - NPO -swallow re-eval on 1/4 - recommended ice chips ONLY  - patient had no swallow problems prior to emesis event, swallow re-eval with modified barium tomorrow  - not a candidate for PEG given subtotal gastrectomy, would need jejunostomy if it came down to that    Dysphagia:  - Modified barium swallow study ordered for today, but patient will not likely be able to participate given current mental status. May benefit from temporary tube feeds. If he continues to require high flow oxygen, he will not be able to get NG tube and may require OG tube. Will discuss with GI.     Metabolic encephalopathy:  -Likely due to acute infection.  -Will rule CO2 retention with VBG now.     GI bleed:  - History of Billroth II w/ gastrojejunal ulcer in 2017- now resolved.   - Daily CBC  - Protonix IV BID  - AC and anti platelets on hold.   - GI following    Hypoglycemia  - D10 to give less volume given LE edema  - Will discuss options for nutrition with GI. Call out to Dr. Beaevrs.     Coagulopathy   - s/p IV Vit K and FFP  - improved  - was on Warfarin at home for afib    Atrial fibrillation  - IV metoprolol until can take PO  - cardio f/u appreciated   - Will need to consider Eliquis vs. no AC    CKD 3  -Appears to be at baseline based on review of labs in HIE  -Dose meds renally  -Monitor renal indices    Hypothyroidism  -Continue with Levothyroxine.     Benign prostatic hyperplasia  -Continue Flomax    Hypothyroidism  -Continue with Synthroid.     VTE PPx   -Continue Heparin SC

## 2019-01-07 NOTE — PROGRESS NOTE ADULT - SUBJECTIVE AND OBJECTIVE BOX
ADMISSION HPI:  89M with hypothyroidism, BPH, afib, CKD, who presents with SOB.  Patient was in his usual state of health with no recent illness when he tried taking two of his pills this evening.  Started to choke on his pills and his wife tried getting the pills out by hitting his back.  Patient then started to vomit through his mouth and nose and since then he has had trouble breathing.  Associated with chest pain and chest tightness.  Patient was brought here where he was found to be desat'ing despite O2 NC.   CXR showed diffuse infiltrates.  Patient placed on BiPAP where he started to vomit.  Patient placed on high flow O2. Currently the patient still with difficulty breathing and complaining of his chest is hurting when he breathes.  Started on Zosyn empirically. (31 Dec 2018 01:25)    INTERVAL HPI:      REVIEW OF SYSTEMS:    CONSTITUTIONAL: No weakness, fevers or chills  EYES/ENT: No visual changes, no throat pain   RESPIRATORY: No cough, wheezing, hemoptysis; No shortness of breath  CARDIOVASCULAR: No chest pain or palpitations  GASTROINTESTINAL: No abdominal, nausea, vomiting, or hematemesis; No diarrhea or constipation. No melena or hematochezia.  GENITOURINARY: No dysuria, frequency or hematuria  NEUROLOGICAL: No dizziness, numbness, or weakness  SKIN: No itching, burning, rashes, or lesions   All other review of systems is negative unless indicated above.    VITAL SIGNS:  Vital Signs Last 24 Hrs  T(C): 36.7 (01-07-19 @ 04:05), Max: 36.8 (01-06-19 @ 19:37)  T(F): 98.1 (01-07-19 @ 04:05), Max: 98.3 (01-06-19 @ 19:37)  HR: 93 (01-07-19 @ 08:00) (89 - 142)  BP: 130/83 (01-07-19 @ 08:00) (117/71 - 139/93)  BP(mean): 99 (01-07-19 @ 08:00) (86 - 115)  RR: 29 (01-07-19 @ 08:00) (18 - 35)  SpO2: 100% (01-07-19 @ 08:00) (85% - 100%)      PHYSICAL EXAM:     GENERAL: no acute distress  HEENT: NC/AT, EOMI, neck supple, MMM  RESPIRATORY: LCTAB/L, no rhonchi, rales, or wheezing  CARDIOVASCULAR: RRR, no murmurs, gallops, rubs  ABDOMINAL: soft, non-tender, non-distended, positive bowel sounds   EXTREMITIES: no clubbing, cyanosis, or edema  NEUROLOGICAL: alert and oriented x 3, non-focal  SKIN: no rashes or lesions   MUSCULOSKELETAL: no gross joint deformity                          11.8   7.70  )-----------( 119      ( 07 Jan 2019 06:50 )             36.1     01-07    137  |  98  |  25<H>  ----------------------------<  138<H>  3.3<L>   |  32<H>  |  1.61<H>    Ca    8.8      07 Jan 2019 06:50  Phos  2.3     01-06  Mg     1.9     01-07        CAPILLARY BLOOD GLUCOSE      POCT Blood Glucose.: 138 mg/dL (07 Jan 2019 05:49)  POCT Blood Glucose.: 113 mg/dL (06 Jan 2019 23:37)  POCT Blood Glucose.: 110 mg/dL (06 Jan 2019 16:41)  POCT Blood Glucose.: 112 mg/dL (06 Jan 2019 11:16)      MEDICATIONS  (STANDING):  ALBUTerol    0.083% 2.5 milliGRAM(s) Nebulizer every 8 hours  chlorhexidine 2% Cloths 1 Application(s) Topical daily  dextrose 10%. 1000 milliLiter(s) (40 mL/Hr) IV Continuous <Continuous>  ferrous    sulfate 325 milliGRAM(s) Oral daily  heparin  Injectable 5000 Unit(s) SubCutaneous every 12 hours  influenza   Vaccine 0.5 milliLiter(s) IntraMuscular once  levothyroxine Injectable 37.5 MICROGram(s) IV Push at bedtime  multivitamin 1 Tablet(s) Oral daily  pantoprazole  Injectable 40 milliGRAM(s) IV Push two times a day  piperacillin/tazobactam IVPB. 3.375 Gram(s) IV Intermittent every 8 hours  tamsulosin 0.4 milliGRAM(s) Oral at bedtime    MEDICATIONS  (PRN):  metoprolol tartrate Injectable 5 milliGRAM(s) IV Push every 4 hours PRN HR>120 ADMISSION HPI:  89M with hypothyroidism, BPH, afib, CKD, who presents with SOB.  Patient was in his usual state of health with no recent illness when he tried taking two of his pills this evening.  Started to choke on his pills and his wife tried getting the pills out by hitting his back.  Patient then started to vomit through his mouth and nose and since then he has had trouble breathing.  Associated with chest pain and chest tightness.  Patient was brought here where he was found to be desat'ing despite O2 NC.   CXR showed diffuse infiltrates.  Patient placed on BiPAP where he started to vomit.  Patient placed on high flow O2. Currently the patient still with difficulty breathing and complaining of his chest is hurting when he breathes.  Started on Zosyn empirically. (31 Dec 2018 01:25)    INTERVAL HPI:  Patient seen and examined. On high flow oxygen and s/p Lasix overnight. More lethargic than earlier this AM per nursing.     REVIEW OF SYSTEMS:  unable to obtain    VITAL SIGNS:  Vital Signs Last 24 Hrs  T(C): 36.7 (01-07-19 @ 04:05), Max: 36.8 (01-06-19 @ 19:37)  T(F): 98.1 (01-07-19 @ 04:05), Max: 98.3 (01-06-19 @ 19:37)  HR: 93 (01-07-19 @ 08:00) (89 - 142)  BP: 130/83 (01-07-19 @ 08:00) (117/71 - 139/93)  BP(mean): 99 (01-07-19 @ 08:00) (86 - 115)  RR: 29 (01-07-19 @ 08:00) (18 - 35)  SpO2: 100% (01-07-19 @ 08:00) (85% - 100%)      PHYSICAL EXAM:     GENERAL: elderly male, lethargic, no distress  HEENT: NC/AT, EOMI, neck supple, MMM  RESPIRATORY: decreased breath sounds bilaterally, no distress, on high flow oxygen  CARDIOVASCULAR: irregular, no murmur  ABDOMINAL: soft, non-tender, non-distended, positive bowel sounds   EXTREMITIES: no clubbing, cyanosis, or edema  NEUROLOGICAL: lethargic, withdraws to painful stimuli  SKIN: warm, dry, intact.  MUSCULOSKELETAL: no gross joint deformity                          11.8   7.70  )-----------( 119      ( 07 Jan 2019 06:50 )             36.1     01-07    137  |  98  |  25<H>  ----------------------------<  138<H>  3.3<L>   |  32<H>  |  1.61<H>    Ca    8.8      07 Jan 2019 06:50  Phos  2.3     01-06  Mg     1.9     01-07        CAPILLARY BLOOD GLUCOSE  POCT Blood Glucose.: 138 mg/dL (07 Jan 2019 05:49)  POCT Blood Glucose.: 113 mg/dL (06 Jan 2019 23:37)  POCT Blood Glucose.: 110 mg/dL (06 Jan 2019 16:41)  POCT Blood Glucose.: 112 mg/dL (06 Jan 2019 11:16)      MEDICATIONS  (STANDING):  ALBUTerol    0.083% 2.5 milliGRAM(s) Nebulizer every 8 hours  chlorhexidine 2% Cloths 1 Application(s) Topical daily  dextrose 10%. 1000 milliLiter(s) (40 mL/Hr) IV Continuous <Continuous>  ferrous    sulfate 325 milliGRAM(s) Oral daily  heparin  Injectable 5000 Unit(s) SubCutaneous every 12 hours  influenza   Vaccine 0.5 milliLiter(s) IntraMuscular once  levothyroxine Injectable 37.5 MICROGram(s) IV Push at bedtime  multivitamin 1 Tablet(s) Oral daily  pantoprazole  Injectable 40 milliGRAM(s) IV Push two times a day  piperacillin/tazobactam IVPB. 3.375 Gram(s) IV Intermittent every 8 hours  tamsulosin 0.4 milliGRAM(s) Oral at bedtime    MEDICATIONS  (PRN):  metoprolol tartrate Injectable 5 milliGRAM(s) IV Push every 4 hours PRN HR>120

## 2019-01-07 NOTE — PROGRESS NOTE ADULT - ATTENDING COMMENTS
34 minutes of critical care time spent with the patient and coordinating care with nursing, hospitalist, and consultants. Patient is critically ill requiring ICU care due to respiratory failure, septic shock, rapid atrial fibrillation, heart failure. The patient is high risk for deterioration and death.

## 2019-01-07 NOTE — PROGRESS NOTE ADULT - SUBJECTIVE AND OBJECTIVE BOX
89M with PMHx of MVP, CKD, afib on coumadin, PUD s/p bilroth procedure, nephrolithiasis, hypothyroidism, BPH, diverticulosis, admitted on 12/31 with respiratory distress after an aspiration event. As per report, patient had choked on outpatient medications and then vomited. Hospital course complicated by the development of aspiration pneumonia, severe sepsis, UGIB, and GABO on CKD, and a-fib with RVR. Patient had been placed on NIPPV for worsening respiratory failure, but failed this treatment modality and was subsequently intubated. Transient shock state requiring vasopressor therapy. Patient self-extubated on 1/3 without need for reintubation.    24 hour events: Pt seen and examined at bedside. Chart/labs reviewed. Called to bedside by RN due to acute desaturation to 84% on HFNC. Pt with bilateral rales, JVD, , /110, respiratory distress. RN suctioning copious, thick secretions. Stat dose of lasix 40mg IVP ordered, CXR and increase in FiO2 to 60%    PAST MEDICAL & SURGICAL HISTORY:  Mitral valve disorder  Kidney stone  Diverticulosis  CKD (chronic kidney disease)  Afib  Hypothyroidism  BPH (benign prostatic hyperplasia)  Chronic peptic ulcer: S/P surgery more than 10 yrs ago      Review of Systems:  unable to obtain    T(F): 98.1 (01-07-19 @ 00:05), Max: 98.3 (01-06-19 @ 19:37)  HR: 103 (01-07-19 @ 00:30) (89 - 142)  BP: 139/93 (01-07-19 @ 00:00) (111/84 - 139/93)  RR: 25 (01-07-19 @ 00:00) (18 - 25)  SpO2: 93% (01-07-19 @ 00:00) (93% - 100%)  Wt(kg): --        CAPILLARY BLOOD GLUCOSE      POCT Blood Glucose.: 113 mg/dL (06 Jan 2019 23:37)      I&O's Summary    05 Jan 2019 07:01  -  06 Jan 2019 07:00  --------------------------------------------------------  IN: 1560 mL / OUT: 0 mL / NET: 1560 mL    06 Jan 2019 07:01  -  07 Jan 2019 02:16  --------------------------------------------------------  IN: 1270 mL / OUT: 0 mL / NET: 1270 mL        Physical Exam:     Gen: respiratory distress  Neuro: Awake, follows commands  HEENT: NC/AT  Resp: + Rales  CVS: nl S1/S2, RRR  Abd: soft, nt, nd, +bs  Ext: + edema, +pulses  Skin: well perfused, warm      Meds:    piperacillin/tazobactam IVPB. IV Intermittent  furosemide   Injectable IV Push  metoprolol tartrate Injectable IV Push PRN  tamsulosin Oral  levothyroxine Injectable IV Push  ALBUTerol    0.083% Nebulizer  heparin  Injectable SubCutaneous  pantoprazole  Injectable IV Push  dextrose 10%. IV Continuous  ferrous    sulfate Oral  multivitamin Oral  influenza   Vaccine IntraMuscular  chlorhexidine 2% Cloths Topical                          11.6   7.30  )-----------( 108      ( 06 Jan 2019 07:17 )             35.6       01-06    138  |  99  |  25<H>  ----------------------------<  122<H>  3.2<L>   |  34<H>  |  1.67<H>    Ca    8.7      06 Jan 2019 19:48  Phos  2.3     01-06  Mg     1.6     01-06                .Sputum Sputum - trap   Normal Respiratory Abbie present   Numerous polymorphonuclear leukocytes seen per low power field  Few Squamous epithelial cells seen per low power field  Numerous Yeast like cells seen per oil power field 01-03 @ 10:25        CXR:  Pending    CENTRAL LINE: yes    BROOKS: yes    A-LINE: no    GLOBAL ISSUE/BEST PRACTICE:  Analgesia: no  Sedation: no  HOB elevation: yes  Stress ulcer prophylaxis: yes  VTE prophylaxis: yes  Glycemic control: no  Nutrition: npo      CODE STATUS: Full  GOC discussion: Y  Critical care time spent (mins): 49  (Reviewing data, imaging, discussing with multidisciplinary team, non inclusive of procedures, discussing goals of care with patient/family)

## 2019-01-07 NOTE — PROGRESS NOTE ADULT - ASSESSMENT
89M with PMhx as above, admitted with acute hypoxic respiratory failure, septic shock, aspiration pna, acute pulmonary edema/CHF, afib with rvr, yolanda on ckd, UGIB, hypoglycemia requiring d10 gtt, coagulopathy, hypokalemia/hypomagnesemia. Pt now with acute desaturation to 80's    -Avoid sedatives/benzo's  -Tachycardia and hypertension likely related to respiratory distress/hypoxia.   -Afirb rate controlled. Continue BB  -Cardiology recommendations appreciated  -Off AC due to coagulopathy and GIB. Monitor  -Continue HFNC. Will increase FiO2 to 60%. Low threshold for reintubation  -Check CXR  -Stat dose of lasix ordered and given  -Will place dietz for I/O's  -Would DC IVF's but pt is on a D10 gtt at 40cc/hr with FS in the low 100's.  -NPO/ PPI  -May require NGT and TF's for nutrition  -Trend H/H and transfuse prn for Hgb > 7  -Trend PT/INR, PTT  -Monitor for further bleeding  -Monitor FS and transition to D5W LR once serum glucose improved  -Monitor UOP/Lytes  -Trend renal function  -Replete Mg+/K+  -Hold AC given bleeding/coagulapathy  -SCD for DVT ppx

## 2019-01-07 NOTE — PROGRESS NOTE ADULT - SUBJECTIVE AND OBJECTIVE BOX
HARSHAL COLORADO is a 89yMale , patient examined and chart reviewed.     INTERVAL HPI/ OVERNIGHT EVENTS:  Lethargic.   Weak. Failed swallow study.    Past Medical History--  PAST MEDICAL & SURGICAL HISTORY:  Mitral valve disorder  Kidney stone  Diverticulosis  CKD (chronic kidney disease)  Afib  Hypothyroidism  BPH (benign prostatic hyperplasia)  Chronic peptic ulcer: S/P surgery more than 10 yrs ago    For details regarding the patient's social history, family history, and other miscellaneous elements, please refer the initial infectious diseases consultation and/or the admitting history and physical examination for this admission.    ROS: UTO sec pt's condition    Current inpatient medications :    ANTIBIOTICS/RELEVANT:  piperacillin/tazobactam IVPB. 3.375 Gram(s) IV Intermittent every 8 hours      MEDICATIONS  (STANDING):  ALBUTerol    0.083% 2.5 milliGRAM(s) Nebulizer every 8 hours  chlorhexidine 2% Cloths 1 Application(s) Topical daily  dextrose 10%. 1000 milliLiter(s) (40 mL/Hr) IV Continuous <Continuous>  ferrous    sulfate 325 milliGRAM(s) Oral daily  heparin  Injectable 5000 Unit(s) SubCutaneous every 12 hours  influenza   Vaccine 0.5 milliLiter(s) IntraMuscular once  levothyroxine Injectable 37.5 MICROGram(s) IV Push at bedtime  multivitamin 1 Tablet(s) Oral daily  pantoprazole  Injectable 40 milliGRAM(s) IV Push two times a day  tamsulosin 0.4 milliGRAM(s) Oral at bedtime    MEDICATIONS  (PRN):  metoprolol tartrate Injectable 5 milliGRAM(s) IV Push every 4 hours PRN HR>120      Objective:  ICU Vital Signs Last 24 Hrs  T(C): 37.2 (07 Jan 2019 20:18), Max: 37.2 (07 Jan 2019 20:18)  T(F): 98.9 (07 Jan 2019 20:18), Max: 98.9 (07 Jan 2019 20:18)  HR: 101 (07 Jan 2019 20:03) (61 - 142)  BP: 120/94 (07 Jan 2019 20:03) (98/71 - 139/93)  BP(mean): 103 (07 Jan 2019 20:03) (80 - 108)  RR: 9 (07 Jan 2019 20:03) (9 - 35)  SpO2: 100% (07 Jan 2019 20:03) (85% - 100%)      Physical Exam:  GEN: Weak lethargic  HEENT: normocephalic and atraumatic. EOMI. NITHYA. Moist mucosa. Clear Posterior pharynx.  NECK: Supple. No carotid bruits.  No lymphadenopathy or thyromegaly.  LUNGS: Decreased to auscultation.  HEART: Regular rate and rhythm without murmur.  ABDOMEN: Soft, nontender, and nondistended.  Positive bowel sounds.   EXTREMITIES: + edema.  NEUROLOGIC: Lethargic No focal neurological deficits        LABS:                        11.8   7.70  )-----------( 119      ( 07 Jan 2019 06:50 )             36.1   01-07    137  |  98  |  25<H>  ----------------------------<  138<H>  3.3<L>   |  32<H>  |  1.61<H>    Ca    8.8      07 Jan 2019 06:50  Phos  2.3     01-06  Mg     1.8     01-07        MICROBIOLOGY:    Culture - Sputum (collected 03 Jan 2019 10:25)  Source: .Sputum Sputum - trap  Gram Stain (03 Jan 2019 15:36):    Numerous polymorphonuclear leukocytes seen per low power field    Few Squamous epithelial cells seen per low power field    Numerous Yeast like cells seen per oil power field  Preliminary Report (04 Jan 2019 09:19):    Normal Respiratory Abbie present    Culture - Blood (12.31.18 @ 12:33)    Specimen Source: .Blood Blood-Peripheral    Culture Results:   No growth to date.      RADIOLOGY & ADDITIONAL STUDIES:    EXAM:  CT CHEST                            PROCEDURE DATE:  12/31/2018          INTERPRETATION:  Clinical information: Respiratory distress, sepsis    Comparison CT chest study dated 4/25/2017. Comparison CT abdomen-pelvis   study dated 11/14/2014.    Axial images obtained, coronal and sagittal images computer reformatted.   Noncontrast exam. Neither intravenous or oral contrast material was   administered which limits the study.        CHEST: An NG tube is present. No thoracic aortic aneurysm or pericardial   effusion. Global cardiomegaly present. Coronary artery calcifications   present. Central airway intact. Thyroid gland not enlarged.    Minimal bibasilar effusions right greater than left. Pleural thickening   with pleural parenchymal scarring at the apices unchanged since the prior   exam. Calcifications evident within the right apical scarring.    Multifocal bilateral airspace disease present. Airspace disease present   in the left upper lobe, left lower lobe, right middle lobe, and right   lower lobe.    No acute osseous abnormalities visible in the thoracic skeleton.    Small anterior mediastinal lymph nodes present could be reactive. Hilar   regions obscured by the extensive airspace disease.        ABDOMEN-PELVIS: Postcholecystectomy clips present. Hepatic parenchyma   homogeneous. The spleen is not enlarged. No adrenal lesions evident. No   hydronephrotic changes identified. Traces bilateral perinephric stranding   noted. Unenhanced pancreas shows no lesions. No aortic aneurysm. No   retroperitoneal lymphadenopathy. No ascites. No biliary ductal   dilatation. Past history of gastric surgery type unspecified.    No bowel obstruction. Edematous appearance of the mesentery could be   related to volume overload. Diverticulosis present. Urinary bladder shows   a thickened wall. A calcification is visible posterior aspect of the   urinary bladder slightly to the right of midline, could represent a stone   in a diverticulum or localized to the wall of the urinary bladder.   Calcification measures 4 mm. The prostate is markedly enlarged. No free   pelvic fluid evident. Inguinal regions intact. Multilevel disc   degenerative disease lower lumbar region.      Assessment :  89M with hypothyroidism, BPH, afib, CKD, who presents with SOB with acute hypoxic respiratory  failure complicated by vomiting with hematemesis likely severe aspiration pneumonia with   pneumonitis  Likely also component of pulm edema  + troponin  Coagulopathy  GABO on CKD  Dysphagia      Plan :   Cont Zosyn day 7/10  Trend temps and wbc  Diurese per primary team  Failed swallow study - may need PEG  Prognosis overall poor    Continue with present regiment.  Appropriate use of antibiotics and adverse effects reviewed.    I have discussed the above plan of care with patient and family in detail. They expressed understanding of the the treatment plan . Risks, benefits and alternatives discussed in detail. I have asked if they have any questions or concerns and appropriately addressed them to the best of my ability .      Critical care time greater then 45 minutes reviewing notes, labs data/ imaging , discussion with multidisciplinary team.    Thank you for allowing me to participate in care of your patient .      Anibal Saldaña MD  Infectious Disease  [phone]

## 2019-01-08 DIAGNOSIS — R09.02 HYPOXEMIA: ICD-10-CM

## 2019-01-08 DIAGNOSIS — R13.10 DYSPHAGIA, UNSPECIFIED: ICD-10-CM

## 2019-01-08 DIAGNOSIS — J90 PLEURAL EFFUSION, NOT ELSEWHERE CLASSIFIED: ICD-10-CM

## 2019-01-08 DIAGNOSIS — J69.0 PNEUMONITIS DUE TO INHALATION OF FOOD AND VOMIT: ICD-10-CM

## 2019-01-08 LAB
ALBUMIN SERPL ELPH-MCNC: 1.9 G/DL — LOW (ref 3.3–5)
ALP SERPL-CCNC: 76 U/L — SIGNIFICANT CHANGE UP (ref 30–120)
ALT FLD-CCNC: 21 U/L DA — SIGNIFICANT CHANGE UP (ref 10–60)
ANION GAP SERPL CALC-SCNC: 7 MMOL/L — SIGNIFICANT CHANGE UP (ref 5–17)
ANION GAP SERPL CALC-SCNC: 9 MMOL/L — SIGNIFICANT CHANGE UP (ref 5–17)
APTT BLD: 33.6 SEC — SIGNIFICANT CHANGE UP (ref 28.5–37)
AST SERPL-CCNC: 21 U/L — SIGNIFICANT CHANGE UP (ref 10–40)
BILIRUB SERPL-MCNC: 1 MG/DL — SIGNIFICANT CHANGE UP (ref 0.2–1.2)
BUN SERPL-MCNC: 25 MG/DL — HIGH (ref 7–23)
BUN SERPL-MCNC: 27 MG/DL — HIGH (ref 7–23)
CALCIUM SERPL-MCNC: 8.7 MG/DL — SIGNIFICANT CHANGE UP (ref 8.4–10.5)
CALCIUM SERPL-MCNC: 8.8 MG/DL — SIGNIFICANT CHANGE UP (ref 8.4–10.5)
CHLORIDE SERPL-SCNC: 97 MMOL/L — SIGNIFICANT CHANGE UP (ref 96–108)
CHLORIDE SERPL-SCNC: 99 MMOL/L — SIGNIFICANT CHANGE UP (ref 96–108)
CO2 SERPL-SCNC: 33 MMOL/L — HIGH (ref 22–31)
CO2 SERPL-SCNC: 35 MMOL/L — HIGH (ref 22–31)
CREAT SERPL-MCNC: 1.69 MG/DL — HIGH (ref 0.5–1.3)
CREAT SERPL-MCNC: 1.75 MG/DL — HIGH (ref 0.5–1.3)
GLUCOSE SERPL-MCNC: 113 MG/DL — HIGH (ref 70–99)
GLUCOSE SERPL-MCNC: 89 MG/DL — SIGNIFICANT CHANGE UP (ref 70–99)
HCT VFR BLD CALC: 36.9 % — LOW (ref 39–50)
HCT VFR BLD CALC: 37.4 % — LOW (ref 39–50)
HGB BLD-MCNC: 11.9 G/DL — LOW (ref 13–17)
HGB BLD-MCNC: 12 G/DL — LOW (ref 13–17)
INR BLD: 1.55 RATIO — HIGH (ref 0.88–1.16)
LDH SERPL L TO P-CCNC: 196 U/L — SIGNIFICANT CHANGE UP (ref 50–242)
MAGNESIUM SERPL-MCNC: 1.7 MG/DL — SIGNIFICANT CHANGE UP (ref 1.6–2.6)
MAGNESIUM SERPL-MCNC: 1.8 MG/DL — SIGNIFICANT CHANGE UP (ref 1.6–2.6)
MCHC RBC-ENTMCNC: 30.7 PG — SIGNIFICANT CHANGE UP (ref 27–34)
MCHC RBC-ENTMCNC: 30.8 PG — SIGNIFICANT CHANGE UP (ref 27–34)
MCHC RBC-ENTMCNC: 32.1 GM/DL — SIGNIFICANT CHANGE UP (ref 32–36)
MCHC RBC-ENTMCNC: 32.2 GM/DL — SIGNIFICANT CHANGE UP (ref 32–36)
MCV RBC AUTO: 95.1 FL — SIGNIFICANT CHANGE UP (ref 80–100)
MCV RBC AUTO: 95.9 FL — SIGNIFICANT CHANGE UP (ref 80–100)
NRBC # BLD: 0 /100 WBCS — SIGNIFICANT CHANGE UP (ref 0–0)
NRBC # BLD: 0 /100 WBCS — SIGNIFICANT CHANGE UP (ref 0–0)
PHOSPHATE SERPL-MCNC: 2.7 MG/DL — SIGNIFICANT CHANGE UP (ref 2.5–4.5)
PHOSPHATE SERPL-MCNC: 3.1 MG/DL — SIGNIFICANT CHANGE UP (ref 2.5–4.5)
PLATELET # BLD AUTO: 126 K/UL — LOW (ref 150–400)
PLATELET # BLD AUTO: 155 K/UL — SIGNIFICANT CHANGE UP (ref 150–400)
POTASSIUM SERPL-MCNC: 3.4 MMOL/L — LOW (ref 3.5–5.3)
POTASSIUM SERPL-MCNC: 3.5 MMOL/L — SIGNIFICANT CHANGE UP (ref 3.5–5.3)
POTASSIUM SERPL-SCNC: 3.4 MMOL/L — LOW (ref 3.5–5.3)
POTASSIUM SERPL-SCNC: 3.5 MMOL/L — SIGNIFICANT CHANGE UP (ref 3.5–5.3)
PROT SERPL-MCNC: 5.9 G/DL — LOW (ref 6–8.3)
PROTHROM AB SERPL-ACNC: 17.1 SEC — HIGH (ref 10–12.9)
RBC # BLD: 3.88 M/UL — LOW (ref 4.2–5.8)
RBC # BLD: 3.9 M/UL — LOW (ref 4.2–5.8)
RBC # FLD: 12.7 % — SIGNIFICANT CHANGE UP (ref 10.3–14.5)
RBC # FLD: 12.9 % — SIGNIFICANT CHANGE UP (ref 10.3–14.5)
SODIUM SERPL-SCNC: 139 MMOL/L — SIGNIFICANT CHANGE UP (ref 135–145)
SODIUM SERPL-SCNC: 141 MMOL/L — SIGNIFICANT CHANGE UP (ref 135–145)
WBC # BLD: 10.02 K/UL — SIGNIFICANT CHANGE UP (ref 3.8–10.5)
WBC # BLD: 9.96 K/UL — SIGNIFICANT CHANGE UP (ref 3.8–10.5)
WBC # FLD AUTO: 10.02 K/UL — SIGNIFICANT CHANGE UP (ref 3.8–10.5)
WBC # FLD AUTO: 9.96 K/UL — SIGNIFICANT CHANGE UP (ref 3.8–10.5)

## 2019-01-08 PROCEDURE — 99233 SBSQ HOSP IP/OBS HIGH 50: CPT

## 2019-01-08 RX ORDER — POTASSIUM CHLORIDE 20 MEQ
40 PACKET (EA) ORAL ONCE
Qty: 0 | Refills: 0 | Status: COMPLETED | OUTPATIENT
Start: 2019-01-08 | End: 2019-01-08

## 2019-01-08 RX ORDER — OXYMETAZOLINE HYDROCHLORIDE 0.5 MG/ML
1 SPRAY NASAL
Qty: 0 | Refills: 0 | Status: DISCONTINUED | OUTPATIENT
Start: 2019-01-08 | End: 2019-01-13

## 2019-01-08 RX ORDER — MAGNESIUM SULFATE 500 MG/ML
1 VIAL (ML) INJECTION ONCE
Qty: 0 | Refills: 0 | Status: COMPLETED | OUTPATIENT
Start: 2019-01-08 | End: 2019-01-08

## 2019-01-08 RX ADMIN — PANTOPRAZOLE SODIUM 40 MILLIGRAM(S): 20 TABLET, DELAYED RELEASE ORAL at 05:25

## 2019-01-08 RX ADMIN — ALBUTEROL 2.5 MILLIGRAM(S): 90 AEROSOL, METERED ORAL at 20:05

## 2019-01-08 RX ADMIN — Medication 325 MILLIGRAM(S): at 11:44

## 2019-01-08 RX ADMIN — Medication 40 MILLIEQUIVALENT(S): at 21:48

## 2019-01-08 RX ADMIN — OXYMETAZOLINE HYDROCHLORIDE 1 SPRAY(S): 0.5 SPRAY NASAL at 17:21

## 2019-01-08 RX ADMIN — ALBUTEROL 2.5 MILLIGRAM(S): 90 AEROSOL, METERED ORAL at 07:41

## 2019-01-08 RX ADMIN — TAMSULOSIN HYDROCHLORIDE 0.4 MILLIGRAM(S): 0.4 CAPSULE ORAL at 21:48

## 2019-01-08 RX ADMIN — Medication 37.5 MICROGRAM(S): at 22:25

## 2019-01-08 RX ADMIN — Medication 5 MILLIGRAM(S): at 07:57

## 2019-01-08 RX ADMIN — PIPERACILLIN AND TAZOBACTAM 25 GRAM(S): 4; .5 INJECTION, POWDER, LYOPHILIZED, FOR SOLUTION INTRAVENOUS at 09:25

## 2019-01-08 RX ADMIN — PIPERACILLIN AND TAZOBACTAM 25 GRAM(S): 4; .5 INJECTION, POWDER, LYOPHILIZED, FOR SOLUTION INTRAVENOUS at 17:21

## 2019-01-08 RX ADMIN — Medication 100 GRAM(S): at 21:46

## 2019-01-08 RX ADMIN — Medication 1 TABLET(S): at 11:44

## 2019-01-08 RX ADMIN — PIPERACILLIN AND TAZOBACTAM 25 GRAM(S): 4; .5 INJECTION, POWDER, LYOPHILIZED, FOR SOLUTION INTRAVENOUS at 02:14

## 2019-01-08 RX ADMIN — PANTOPRAZOLE SODIUM 40 MILLIGRAM(S): 20 TABLET, DELAYED RELEASE ORAL at 17:22

## 2019-01-08 RX ADMIN — HEPARIN SODIUM 5000 UNIT(S): 5000 INJECTION INTRAVENOUS; SUBCUTANEOUS at 05:25

## 2019-01-08 RX ADMIN — Medication 5 MILLIGRAM(S): at 20:34

## 2019-01-08 NOTE — PROGRESS NOTE ADULT - SUBJECTIVE AND OBJECTIVE BOX
Date/Time Patient Seen:  		  Referring MD:   Data Reviewed	       Patient is a 89y old  Male who presents with a chief complaint of SOB (08 Jan 2019 02:47)      Subjective/HPI     PAST MEDICAL & SURGICAL HISTORY:  Mitral valve disorder  Kidney stone  Diverticulosis  CKD (chronic kidney disease)  Afib  Hypothyroidism  BPH (benign prostatic hyperplasia)  Chronic peptic ulcer: S/P surgery more than 10 yrs ago  No significant past surgical history        Medication list         MEDICATIONS  (STANDING):  ALBUTerol    0.083% 2.5 milliGRAM(s) Nebulizer every 8 hours  ferrous    sulfate 325 milliGRAM(s) Oral daily  heparin  Injectable 5000 Unit(s) SubCutaneous every 12 hours  influenza   Vaccine 0.5 milliLiter(s) IntraMuscular once  levothyroxine Injectable 37.5 MICROGram(s) IV Push at bedtime  multivitamin 1 Tablet(s) Oral daily  pantoprazole  Injectable 40 milliGRAM(s) IV Push two times a day  piperacillin/tazobactam IVPB. 3.375 Gram(s) IV Intermittent every 8 hours  tamsulosin 0.4 milliGRAM(s) Oral at bedtime    MEDICATIONS  (PRN):  metoprolol tartrate Injectable 5 milliGRAM(s) IV Push every 4 hours PRN HR>120         Vitals log        ICU Vital Signs Last 24 Hrs  T(C): 36.6 (08 Jan 2019 00:06), Max: 37.2 (07 Jan 2019 20:18)  T(F): 97.8 (08 Jan 2019 00:06), Max: 98.9 (07 Jan 2019 20:18)  HR: 102 (08 Jan 2019 06:02) (61 - 103)  BP: 124/78 (08 Jan 2019 06:02) (98/71 - 135/78)  BP(mean): 93 (08 Jan 2019 06:02) (80 - 103)  ABP: --  ABP(mean): --  RR: 20 (08 Jan 2019 06:02) (9 - 29)  SpO2: 99% (08 Jan 2019 06:02) (97% - 100%)           Input and Output:  I&O's Detail    06 Jan 2019 07:01  -  07 Jan 2019 07:00  --------------------------------------------------------  IN:    dextrose 10%: 920 mL    Solution: 50 mL    Solution: 300 mL    Solution: 200 mL  Total IN: 1470 mL    OUT:    Indwelling Catheter - Urethral: 1100 mL  Total OUT: 1100 mL    Total NET: 370 mL      07 Jan 2019 07:01  -  08 Jan 2019 06:44  --------------------------------------------------------  IN:    dextrose 10%: 240 mL    Enteral Tube Flush: 200 mL    ns in tub fed vital15: 210 mL    Solution: 300 mL    Solution: 300 mL  Total IN: 1250 mL    OUT:    Indwelling Catheter - Urethral: 2150 mL  Total OUT: 2150 mL    Total NET: -900 mL          Lab Data                        11.8   7.70  )-----------( 119      ( 07 Jan 2019 06:50 )             36.1     01-07    137  |  98  |  25<H>  ----------------------------<  138<H>  3.3<L>   |  32<H>  |  1.61<H>    Ca    8.8      07 Jan 2019 06:50  Phos  2.3     01-06  Mg     1.8     01-07      ABG - ( 07 Jan 2019 10:27 )  pH, Arterial: x     pH, Blood: 7.40  /  pCO2: 44    /  pO2: 143   / HCO3: 26    / Base Excess: 2.2   /  SaO2: 97                      Review of Systems	      Objective     Physical Examination    heart s1s2  lung dec BS  abd soft      Pertinent Lab findings & Imaging      Apoorva:  NO   Adequate UO     I&O's Detail    06 Jan 2019 07:01  -  07 Jan 2019 07:00  --------------------------------------------------------  IN:    dextrose 10%: 920 mL    Solution: 50 mL    Solution: 300 mL    Solution: 200 mL  Total IN: 1470 mL    OUT:    Indwelling Catheter - Urethral: 1100 mL  Total OUT: 1100 mL    Total NET: 370 mL      07 Jan 2019 07:01  -  08 Jan 2019 06:44  --------------------------------------------------------  IN:    dextrose 10%: 240 mL    Enteral Tube Flush: 200 mL    ns in tub fed vital15: 210 mL    Solution: 300 mL    Solution: 300 mL  Total IN: 1250 mL    OUT:    Indwelling Catheter - Urethral: 2150 mL  Total OUT: 2150 mL    Total NET: -900 mL               Discussed with:     Cultures:	        Radiology

## 2019-01-08 NOTE — PROGRESS NOTE ADULT - ASSESSMENT
89M admitted with SPCU with severe hypoxemic resp.failure initially required intubation on arrival. Likely from aspiration event, ASP PNA.     Events last 24 hours:   -patient extubated, howevr remains with hypoxemia and has required High flow nasal canula to maintain SaO2 >90%  -also required dobhoff tube placement for feeds/meds as her failed a speech/swallow study.

## 2019-01-08 NOTE — PROGRESS NOTE ADULT - SUBJECTIVE AND OBJECTIVE BOX
Patient is a 89y old  Male who presents with a chief complaint of SOB (07 Jan 2019 16:42)      BRIEF HOSPITAL COURSE:     89M admitted with SPCU with severe hypoxemic resp.failure initially required intubation on arrival. Likely from aspiration event, ASP PNA.     Events last 24 hours:   -patient extubated, howevr remains with hypoxemia and has required High flow nasal canula to maintain SaO2 >90%  -also required dobhoff tube placement for feeds/meds as her failed a speech/swallow study.     PAST MEDICAL & SURGICAL HISTORY:  Mitral valve disorder  Kidney stone  Diverticulosis  CKD (chronic kidney disease)  Afib  Hypothyroidism  BPH (benign prostatic hyperplasia)  Chronic peptic ulcer: S/P surgery more than 10 yrs ago      Review of Systems:  unable to obtain as patient not cooperative      Medications:  piperacillin/tazobactam IVPB. 3.375 Gram(s) IV Intermittent every 8 hours    metoprolol tartrate Injectable 5 milliGRAM(s) IV Push every 4 hours PRN  tamsulosin 0.4 milliGRAM(s) Oral at bedtime    ALBUTerol    0.083% 2.5 milliGRAM(s) Nebulizer every 8 hours        heparin  Injectable 5000 Unit(s) SubCutaneous every 12 hours    pantoprazole  Injectable 40 milliGRAM(s) IV Push two times a day      levothyroxine Injectable 37.5 MICROGram(s) IV Push at bedtime    ferrous    sulfate 325 milliGRAM(s) Oral daily  multivitamin 1 Tablet(s) Oral daily    influenza   Vaccine 0.5 milliLiter(s) IntraMuscular once              ICU Vital Signs Last 24 Hrs  T(C): 36.6 (08 Jan 2019 00:06), Max: 37.2 (07 Jan 2019 20:18)  T(F): 97.8 (08 Jan 2019 00:06), Max: 98.9 (07 Jan 2019 20:18)  HR: 95 (08 Jan 2019 02:00) (61 - 104)  BP: 120/90 (08 Jan 2019 02:00) (98/71 - 130/83)  BP(mean): 100 (08 Jan 2019 02:00) (80 - 103)  ABP: --  ABP(mean): --  RR: 15 (08 Jan 2019 02:00) (9 - 29)  SpO2: 97% (08 Jan 2019 02:00) (97% - 100%)      ABG - ( 07 Jan 2019 10:27 )  pH, Arterial: x     pH, Blood: 7.40  /  pCO2: 44    /  pO2: 143   / HCO3: 26    / Base Excess: 2.2   /  SaO2: 97                  I&O's Detail    06 Jan 2019 07:01  -  07 Jan 2019 07:00  --------------------------------------------------------  IN:    dextrose 10%: 920 mL    Solution: 50 mL    Solution: 300 mL    Solution: 200 mL  Total IN: 1470 mL    OUT:    Indwelling Catheter - Urethral: 1100 mL  Total OUT: 1100 mL    Total NET: 370 mL      07 Jan 2019 07:01  -  08 Jan 2019 02:48  --------------------------------------------------------  IN:    dextrose 10%: 240 mL    Enteral Tube Flush: 140 mL    ns in tub fed vital15: 140 mL    Solution: 250 mL    Solution: 300 mL  Total IN: 1070 mL    OUT:    Indwelling Catheter - Urethral: 2000 mL  Total OUT: 2000 mL    Total NET: -930 mL            LABS:                        11.8   7.70  )-----------( 119      ( 07 Jan 2019 06:50 )             36.1     01-07    137  |  98  |  25<H>  ----------------------------<  138<H>  3.3<L>   |  32<H>  |  1.61<H>    Ca    8.8      07 Jan 2019 06:50  Phos  2.3     01-06  Mg     1.8     01-07            CAPILLARY BLOOD GLUCOSE      POCT Blood Glucose.: 94 mg/dL (07 Jan 2019 23:28)    PT/INR - ( 07 Jan 2019 10:15 )   PT: 20.4 sec;   INR: 1.84 ratio             CULTURES:  Culture Results:   Normal Respiratory Abbie present (01-03-19 @ 10:25)      Physical Examination:    General: On High flow nasal canula,     HEENT: Pupils equal, reactive to light.  Symmetric.    PULM: crackles on right, diminished at bases bilaterally, no significant sputum production    CVS: Regular rate and rhythm, no murmurs, rubs, or gallops    ABD: Soft, nondistended, nontender, normoactive bowel sounds, no masses    EXT: No edema, nontender    SKIN: Warm and well perfused, no rashes noted.    RADIOLOGY:     < from: Xray Chest 1 View- PORTABLE-Urgent (01.07.19 @ 16:51) >  IMPRESSION: Interstitial edema with persistent small to low moderate   bilateral pleural effusion slightly improved. Cardiomegaly. Nasogastric   tube in the region of stomach.  Epigastric surgical clips    < end of copied text >

## 2019-01-08 NOTE — PROGRESS NOTE ADULT - ASSESSMENT
Patient is 89M with HTN, hypothyroidism, BPH, afib, CKD, who presents with SOB.  Patient was in his usual state of health with no recent illness when he tried taking two of his pills this evening.  SOB 2/2 aspiration pneumonitis.      1. Acute hypoxic respiratory failure:  - Likely due to aspiration pneumonia with possible element of CHF.  - Received Lasix overnight.   - Echo shows grossly normal left ventricular size and function with a calculated ejection fraction of 58%. Mild mitral insufficiency. Mild pulmonary hypertension. A large pleural effusion was incidentally noted,    - Chest PT, suction as necessary  - See below for management of PNA.   - Possible thoracentesis today    2. Aspiration pneumonia:   - Continue with SPCU level of care.    - Continue IV Zosyn  - Pulm/ID following.  - f/u cultures  - NPO -swallow re-eval on 1/4 - recommended ice chips ONLY  - patient had no swallow problems prior to emesis event, swallow re-eval with modified barium tomorrow  - not a candidate for PEG given subtotal gastrectomy, would need jejunostomy if it came down to that    3. Dysphagia:  - Continue with tube feeding.    -Further care as per GI  -Speech therapy evaluation today  -Might consider barium swallow        4. Metabolic encephalopathy:  -Likely due to acute infection.  -VBG noticed     5. GI bleed:  - History of Billroth II w/ gastrojejunal ulcer in 2017- now resolved.   - Daily CBC  - Protonix IV BID  - AC and anti platelets on hold.   - GI following    6. Hypoglycemia  - Continue with tube feeding    7. Coagulopathy   - s/p IV Vit K and FFP  - improved  - was on Warfarin at home for afib    8. Atrial fibrillation  - IV metoprolol until can take PO  - cardio f/u appreciated   - Will need to consider Eliquis vs. no AC    9. CKD 3  -Appears to be at baseline based on review of labs in HIE  -Dose meds renally  -Monitor renal indices    10 Hypothyroidism  -Continue with Levothyroxine.     11. Benign prostatic hyperplasia  -Continue Flomax    12. Hypothyroidism  -Continue with Synthroid.     13. VTE PPx   -Continue Heparin SC      overall prognosis poor  called, and left a message for wife and daughter/natali Patient is 89M with HTN, hypothyroidism, BPH, afib, CKD, who presents with SOB.  Patient was in his usual state of health with no recent illness when he tried taking two of his pills this evening.  SOB 2/2 aspiration pneumonitis.      1. Acute hypoxic respiratory failure:  - Likely due to aspiration pneumonia with possible element of CHF.  - Received Lasix overnight.   - Echo shows grossly normal left ventricular size and function with a calculated ejection fraction of 58%. Mild mitral insufficiency. Mild pulmonary hypertension. A large pleural effusion was incidentally noted,    - Chest PT, suction as necessary  - See below for management of PNA.   - Possible thoracentesis today    2. Aspiration pneumonia:   - Continue with SPCU level of care.    - Continue IV Zosyn  - Pulm/ID following.  - f/u cultures  - NPO -swallow re-eval on 1/4 - recommended ice chips ONLY  - patient had no swallow problems prior to emesis event, swallow re-eval with modified barium tomorrow  - not a candidate for PEG given subtotal gastrectomy, would need jejunostomy if it came down to that    3. Dysphagia:  - Continue with tube feeding.    -Further care as per GI  -Speech therapy evaluation today  -Might consider barium swallow        4. Metabolic encephalopathy:  -Likely due to acute infection.  -VBG noticed     5. GI bleed:  - History of Billroth II w/ gastrojejunal ulcer in 2017- now resolved.   - Daily CBC  - Protonix IV BID  - AC and anti platelets on hold.   - GI following    6. Hypoglycemia  - Continue with tube feeding    7. Coagulopathy   - s/p IV Vit K and FFP  - improved  - was on Warfarin at home for afib    8. Atrial fibrillation  - IV metoprolol until can take PO  - cardio f/u appreciated   - Will need to consider Eliquis vs. no AC    9. CKD 3  -Appears to be at baseline based on review of labs in HIE  -Dose meds renally  -Monitor renal indices    10 Hypothyroidism  -Continue with Levothyroxine.     11. Benign prostatic hyperplasia  -Continue Flomax    12. Hypothyroidism  -Continue with Synthroid.     13. VTE PPx   -Continue Heparin SC    14.  Nasal bleeding  Patient developed nasal bleeding in the radiology dept prior to procedure.  Pressure applied, and nasal bleeding stopped.  Probably due to oxygen  Humified air, and afrin  Monitor closely  Obtain PT/PTT/INR.  discussed with ICU PA  overall prognosis poor  called, and left a message for wife and daughter/natali Patient is 89M with HTN, hypothyroidism, BPH, afib, CKD, who presents with SOB.  Patient was in his usual state of health with no recent illness when he tried taking two of his pills this evening.  SOB 2/2 aspiration pneumonitis.      1. Acute hypoxic respiratory failure:  - Likely due to aspiration pneumonia with possible element of CHF.  - Received Lasix overnight.   - Echo shows grossly normal left ventricular size and function with a calculated ejection fraction of 58%. Mild mitral insufficiency. Mild pulmonary hypertension. A large pleural effusion was incidentally noted,    - Chest PT, suction as necessary  - See below for management of PNA.   - Possible thoracentesis today    2. Aspiration pneumonia:   - Continue with SPCU level of care.    - Continue IV Zosyn  - Pulm/ID following.  - f/u cultures  - NPO -swallow re-eval on 1/4 - recommended ice chips ONLY  - patient had no swallow problems prior to emesis event, swallow re-eval with modified barium tomorrow  - not a candidate for PEG given subtotal gastrectomy, would need jejunostomy if it came down to that    3. Dysphagia:  - Continue with tube feeding.    -Further care as per GI  -Speech therapy evaluation today  -Might consider barium swallow        4. Metabolic encephalopathy:  -Likely due to acute infection.  -VBG noticed     5. GI bleed:  - History of Billroth II w/ gastrojejunal ulcer in 2017- now resolved.   - Daily CBC  - Protonix IV BID  - AC and anti platelets on hold.   - GI following    6. Hypoglycemia  - Continue with tube feeding    7. Coagulopathy   - s/p IV Vit K and FFP  - improved  - was on Warfarin at home for afib    8. Atrial fibrillation  - IV metoprolol until can take PO  - cardio f/u appreciated   - Will need to consider Eliquis vs. no AC    9. CKD 3  -Appears to be at baseline based on review of labs in HIE  -Dose meds renally  -Monitor renal indices    10 Hypothyroidism  -Continue with Levothyroxine.     11. Benign prostatic hyperplasia  -Continue Flomax    12. Hypothyroidism  -Continue with Synthroid.     13. VTE PPx   -Compression stocking in the setting of nasal bleed    14.  Nasal bleeding  Patient developed nasal bleeding in the radiology dept prior to procedure.  Pressure applied, and nasal bleeding stopped.  Probably due to oxygen  Humified air, and afrin  Monitor closely  Obtain PT/PTT/INR.  discussed with ICU PA  Hold heparin, compression stocking for now  overall prognosis poor  called, and left a message for wife and daughter/natali

## 2019-01-08 NOTE — PROGRESS NOTE ADULT - SUBJECTIVE AND OBJECTIVE BOX
CC.  SOB  HPI.  Patient reports SOB, and cough are improving.  afebrile.  offers no other complaints    poor historian due to underline dementia    REVIEW OF SYSTEMS:  unable to obtain    Vital Signs Last 24 Hrs  T(C): 36.7 (08 Jan 2019 12:58), Max: 37.2 (07 Jan 2019 20:18)  T(F): 98 (08 Jan 2019 12:58), Max: 98.9 (07 Jan 2019 20:18)  HR: 94 (08 Jan 2019 14:00) (81 - 120)  BP: 125/82 (08 Jan 2019 14:00) (106/74 - 135/78)  BP(mean): 94 (08 Jan 2019 14:00) (84 - 103)  RR: 17 (08 Jan 2019 14:00) (9 - 27)  SpO2: 100% (08 Jan 2019 14:00) (95% - 100%)      PHYSICAL EXAM:       PHYSICAL EXAM-  GENERAL: Chronic ill appearing male  HEAD:  Atraumatic, Normocephalic  EYES: EOMI, PERRLA, conjunctiva and sclera clear  NECK: Supple, No JVD, Normal thyroid  NERVOUS SYSTEM:  Alert & Oriented X 1.  Moving all extremities  CHEST/LUNG: + rhonchi with good air entry.  No retractions or accessory muscle usage  HEART: Regular rate and rhythm; No   gallops  ABDOMEN: Soft, Nontender, Nondistended; Bowel sounds present  EXTREMITIES:   No clubbing, cyanosis,   SKIN: No rashes or lesions    MEDICATIONS  (STANDING):  ALBUTerol    0.083% 2.5 milliGRAM(s) Nebulizer every 8 hours  ferrous    sulfate 325 milliGRAM(s) Oral daily  heparin  Injectable 5000 Unit(s) SubCutaneous every 12 hours  influenza   Vaccine 0.5 milliLiter(s) IntraMuscular once  levothyroxine Injectable 37.5 MICROGram(s) IV Push at bedtime  multivitamin 1 Tablet(s) Oral daily  pantoprazole  Injectable 40 milliGRAM(s) IV Push two times a day  piperacillin/tazobactam IVPB. 3.375 Gram(s) IV Intermittent every 8 hours  tamsulosin 0.4 milliGRAM(s) Oral at bedtime    MEDICATIONS  (PRN):  metoprolol tartrate Injectable 5 milliGRAM(s) IV Push every 4 hours PRN HR>120                              12.0   9.96  )-----------( 126      ( 08 Jan 2019 06:49 )             37.4     01-08    141  |  99  |  25<H>  ----------------------------<  89  3.5   |  35<H>  |  1.75<H>    Ca    8.8      08 Jan 2019 06:49  Phos  2.7     01-08  Mg     1.8     01-08    TPro  5.9<L>  /  Alb  1.9<L>  /  TBili  1.0  /  DBili  x   /  AST  21  /  ALT  21  /  AlkPhos  76  01-08            PT/INR - ( 07 Jan 2019 10:15 )   PT: 20.4 sec;   INR: 1.84 ratio         Imaging Personally Reviewed:     [x ] YES  [ ] NO    Consultant(s) Notes Reviewed:  [x ] YES  [ ] NO    Care Discussed with Consultants/Other Providers [x ] YES  [ ] NO

## 2019-01-08 NOTE — PROGRESS NOTE ADULT - PROBLEM SELECTOR PLAN 3
-as above
-failed speech swallow, dobhoff in place for feeds and meds  -will need to re-evaluate speech swallow in future, and discuss PEG if fails continuously
Secondary to aspiration pneumonia. Continue empiric antibiotic therapy with Zosyn, azithromycin has been stopped. Blood and sputum cultures are without growth. Lactate has cleared. WBC downtrending.
Secondary to aspiration pneumonia. Continue empiric antibiotic therapy with Zosyn, azithromycin, and renally dosed vancomycin. Blood cultures are without growth thus far. Lactate has cleared.
cont IV abx with broad spectrum coverage for now  cxs pending will follow for adjustments based on results

## 2019-01-08 NOTE — PROGRESS NOTE ADULT - SUBJECTIVE AND OBJECTIVE BOX
89M with PMHx of MVP, CKD, afib on coumadin, PUD s/p bilroth procedure, nephrolithiasis, hypothyroidism, BPH, diverticulosis, admitted on 12/31 with respiratory distress after an aspiration event. As per report, patient had choked on outpatient medications and then vomited. Hospital course complicated by the development of aspiration pneumonia, severe sepsis, UGIB, and GABO on CKD, and a-fib with RVR. Patient had been placed on NIPPV for worsening respiratory failure, but failed this treatment modality and was subsequently intubated. Transient shock state requiring vasopressor therapy. Patient self-extubated on 1/3 without need for reintubation.    24 hour events: Pt seen and examined at bedside. Chart/labs reviewed. TF's started. Now off D10 gtt    PAST MEDICAL & SURGICAL HISTORY:  Mitral valve disorder  Kidney stone  Diverticulosis  CKD (chronic kidney disease)  Afib  Hypothyroidism  BPH (benign prostatic hyperplasia)  Chronic peptic ulcer: S/P surgery more than 10 yrs ago      Review of Systems:  unable to obtain    T(F): 98.3   HR: 104   BP: 129/96   RR: 22   SpO2: 97%   Wt(kg): --      CAPILLARY BLOOD GLUCOSE      POCT Blood Glucose.: 126 mg/dL (08 Jan 2019 23:26)      I&O's Summary    07 Jan 2019 07:01  -  08 Jan 2019 07:00  --------------------------------------------------------  IN: 1250 mL / OUT: 2150 mL / NET: -900 mL        Physical Exam:     Gen: No distress  Neuro: Awake, follows commands  HEENT: NC/AT  Resp: Rhonchi  CVS: nl S1/S2, RRR  Abd: soft, nt, nd, +bs  Ext: + edema, +pulses  Skin: well perfused, warm      Meds:    piperacillin/tazobactam IVPB. IV Intermittent  metoprolol tartrate Injectable IV Push PRN  tamsulosin Oral  levothyroxine Injectable IV Push  ALBUTerol    0.083% Nebulizer  pantoprazole  Injectable IV Push  ferrous    sulfate Oral  multivitamin Oral  influenza   Vaccine IntraMuscular  oxymetazoline 0.05% Nasal Spray Both Nostrils                              11.9   10.02 )-----------( 155      ( 08 Jan 2019 20:00 )             36.9       01-08    139  |  97  |  27<H>  ----------------------------<  113<H>  3.4<L>   |  33<H>  |  1.69<H>    Ca    8.7      08 Jan 2019 20:00  Phos  3.1     01-08  Mg     1.7     01-08    TPro  5.9<L>  /  Alb  1.9<L>  /  TBili  1.0  /  DBili  x   /  AST  21  /  ALT  21  /  AlkPhos  76  01-08          PT/INR - ( 08 Jan 2019 20:00 )   PT: 17.1 sec;   INR: 1.55 ratio         PTT - ( 08 Jan 2019 20:00 )  PTT:33.6 sec            CXR:    INTERPRETATION: Clinical information: NGT.     Technique: Frontal view of the chest.     Comparison: Prior chest x-ray 1/7/2019.     IMPRESSION: Interstitial edema with persistent small to low moderate   bilateral pleural effusion slightly improved. Cardiomegaly. Nasogastric tube         CENTRAL LINE: yes    BROOKS: yes    A-LINE: no    GLOBAL ISSUE/BEST PRACTICE:  Analgesia: no  Sedation: no  HOB elevation: yes  Stress ulcer prophylaxis: yes  VTE prophylaxis: yes  Glycemic control: no  Nutrition: npo      CODE STATUS: Full  GOC discussion: SRIDHAR

## 2019-01-08 NOTE — PROGRESS NOTE ADULT - ASSESSMENT
The patient is a 89 year old male with a history of hypothyroidism, BPH, PUD, atrial fibrillation, chronic diastolic heart failure who was admitted with respiratory failure, GI bleed, hypotension.    Plan:  - Echo from 2017 with grossly low normal LV systolic function, no significant valve issues  - Off of IV fluids  - Furosemide prn  - Troponin peaked at 0.292 in the setting of type 2 NSTEMI (demand ischemia) from respiratory failure and GI bleed  - Remain off of anticoagulation. Given multiple episodes of GI bleed with supratherapeutic INR, warfarin may not be the best option. Eventually can consider apixaban or no anticoagulation.  - Continue metoprolol 5 mg IV q4h prn HR>120  - Weaned off of norepinephrine  - Continue tube feeds  - For thoracentesis today

## 2019-01-08 NOTE — PROGRESS NOTE ADULT - PROBLEM SELECTOR PROBLEM 4
Aspiration pneumonitis
Aspiration pneumonitis
GABO (acute kidney injury)
Pleural effusion
Atrial fibrillation, unspecified type

## 2019-01-08 NOTE — PROGRESS NOTE ADULT - ASSESSMENT
89M with PMhx as above, admitted with acute hypoxic respiratory failure, septic shock, aspiration pna, acute pulmonary edema/CHF, afib with rvr, yolanda on ckd, UGIB, hypoglycemia requiring d10 gtt, coagulopathy, hypokalemia/hypomagnesemia    -Avoid sedatives/benzo's  -BP improved off pressor support. Monitor HD's.  -Afirb rate controlled. Continue BB  -Cardiology recommendations appreciated  -Off AC due to coagulopathy and GIB. Monitor  -O2 saturation now improved and currently off HFNC. Continue NC and wean as tolerated by O2 saturation > 92%. HFNC prn hypoxia  -Keep HOB elevated to 40 degrees  -NPO/ PPI. Continue TF's  -Trend H/H and transfuse prn for Hgb > 7  -Trend PT/INR, PTT  -Monitor for further bleeding  -Monitor Serum glucose off D10 gtt  -Monitor UOP/Lytes  -Trend renal function  -Replete Mg+/K+  -Hold AC given bleeding/coagulapathy  -SCD for DVT ppx

## 2019-01-08 NOTE — PROGRESS NOTE ADULT - PROBLEM SELECTOR PLAN 2
-on zosyn, ID following  -aspiration precautions, HOB >30 degrees  -speech swallow studies done, did not pas, dobhoff in place
-secondary to multifocal pneumonia. lactate now 2.5 up from 1.8 yesterday. likely secondary to hypoxia. Will repeat again at midnight.   -started patient on levophed titrate to MAP > 65 to ensure end organ perfusion  -legionella antigen ordered, RVP pending.   -continue azithromycin and zosyn. also received dose of vancomycin earlier today  -trend WBC count and fever curve.   -blood cultures NGTD. sputum culture ordered
Likely secondary to sedative agents and increased intrathoracic pressure from intubation/mechanical ventilation. Doubt this was sepsis related. Transiently required IV vasopressor therapy and has since been weaned off. Monitor hemodynamic status closely and maintain a MAP > 65.
Likely secondary to sedative agents and increased intrathoracic pressure from intubation/mechanical ventilation. Transiently required IV vasopressor therapy and has since been weaned off. Monitor hemodynamic status closely and maintain a MAP > 65.
cont weaning FiO2 for sats>90%  secondary aspiration pneumonitis

## 2019-01-08 NOTE — PROGRESS NOTE ADULT - SUBJECTIVE AND OBJECTIVE BOX
Chief Complaint: Shortness of breath, hematemesis    Interval Events: Breathing improved compared to yesterday. NGT placed.    Review of Systems:  General: No fevers, chills, weight loss or gain  Skin: No rashes, color changes  Cardiovascular: No chest pain, orthopnea  Respiratory: No shortness of breath, cough  Gastrointestinal: No nausea, abdominal pain  Genitourinary: No incontinence, pain with urination  Musculoskeletal: No pain, swelling, decreased range of motion  Neurological: No headache, weakness  Psychiatric: No depression, anxiety  Endocrine: No weight loss or gain, increased thirst  All other systems are comprehensively negative.    Physical Exam:  Vital Signs Last 24 Hrs  T(C): 36.8 (08 Jan 2019 08:02), Max: 37.2 (07 Jan 2019 20:18)  T(F): 98.2 (08 Jan 2019 08:02), Max: 98.9 (07 Jan 2019 20:18)  HR: 88 (08 Jan 2019 08:00) (61 - 120)  BP: 115/69 (08 Jan 2019 08:00) (100/69 - 135/78)  BP(mean): 85 (08 Jan 2019 08:00) (80 - 103)  RR: 27 (08 Jan 2019 08:00) (9 - 27)  SpO2: 98% (08 Jan 2019 08:00) (97% - 100%)  General: NAD  HEENT: MMM  Neck: No JVD, no carotid bruit  Lungs: Decreased at bases  CV: Irregular, nl S1/S2, no M/R/G  Abdomen: S/NT/ND, +BS  Extremities: 1+ LE edema, no cyanosis  Neuro: AAOx3, non-focal  Skin: No rash    Labs:    01-08    141  |  99  |  25<H>  ----------------------------<  89  3.5   |  35<H>  |  1.75<H>    Ca    8.8      08 Jan 2019 06:49  Phos  2.7     01-08  Mg     1.8     01-08    TPro  5.9<L>  /  Alb  1.9<L>  /  TBili  1.0  /  DBili  x   /  AST  21  /  ALT  21  /  AlkPhos  76  01-08                        12.0   9.96  )-----------( 126      ( 08 Jan 2019 06:49 )             37.4     Telemetry: AF

## 2019-01-08 NOTE — PROGRESS NOTE ADULT - ASSESSMENT
· Assessment		  89 m w/ above hx a/w/ respiratory distress which began after vomiting.   reported to have blood in emesis at presentation   hx of billroth II w/ gastrojej ulcer in 2017   without active/overt gi bleeding during hospital course  protonix 40mg BID  holding all antiplatelet medications and AC  NO plan for endoscopy  swallow eval 1/2 noted- presently npo.     cont w/ enteral feeding via ngt      swallow followup. Need for MBS?     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 Would need surgical jejunostomy if unable to safely take PO diet.

## 2019-01-08 NOTE — PROGRESS NOTE ADULT - SUBJECTIVE AND OBJECTIVE BOX
Neurology follow up note    HARSHAL [id number]      Interval History:    Patient feels ok no new complaints.    MEDICATIONS    ALBUTerol    0.083% 2.5 milliGRAM(s) Nebulizer every 8 hours  ferrous    sulfate 325 milliGRAM(s) Oral daily  influenza   Vaccine 0.5 milliLiter(s) IntraMuscular once  levothyroxine Injectable 37.5 MICROGram(s) IV Push at bedtime  metoprolol tartrate Injectable 5 milliGRAM(s) IV Push every 4 hours PRN  multivitamin 1 Tablet(s) Oral daily  oxymetazoline 0.05% Nasal Spray 1 Spray(s) Both Nostrils two times a day  pantoprazole  Injectable 40 milliGRAM(s) IV Push two times a day  piperacillin/tazobactam IVPB. 3.375 Gram(s) IV Intermittent every 8 hours  tamsulosin 0.4 milliGRAM(s) Oral at bedtime      Allergies    No Known Allergies    Intolerances            Vital Signs Last 24 Hrs  T(C): 36.7 (08 Jan 2019 16:20), Max: 37.2 (07 Jan 2019 20:18)  T(F): 98.1 (08 Jan 2019 16:20), Max: 98.9 (07 Jan 2019 20:18)  HR: 100 (08 Jan 2019 15:55) (88 - 120)  BP: 130/77 (08 Jan 2019 15:55) (111/78 - 135/78)  BP(mean): 94 (08 Jan 2019 14:00) (85 - 103)  RR: 18 (08 Jan 2019 15:55) (9 - 27)  SpO2: 100% (08 Jan 2019 15:55) (95% - 100%)      REVIEW OF SYSTEMS:     Constitutional: No fever, chills, fatigue, weakness  Eyes: no eye pain, visual disturbances, or discharge  ENT:  No difficulty hearing, tinnitus, vertigo; No sinus or throat pain  Neck: No pain or stiffness  Respiratory: No cough, dyspnea, wheezing   Cardiovascular: No chest pain, palpitations,   Gastrointestinal: No abdominal or epigastric pain. No nausea, vomiting  No diarrhea or constipation.   Genitourinary: No dysuria, frequency, hematuria or incontinence  Neurological: No headaches, lightheadedness, vertigo, numbness or tremors  Psychiatric: No depression, anxiety, mood swings or difficulty sleeping  Musculoskeletal: No joint pain or swelling; No muscle, back or extremity pain  Skin: No itching, burning, rashes or lesions   Lymph Nodes: No enlarged glands  Endocrine: No heat or cold intolerance; No hair loss   Allergy and Immunologic: No hives or eczema    On Neurological Examination:    Mental Status - Patient is alert, awake,       Follow simple commands      Speech -   Fluent                         Cranial Nerves - Pupils 3 mm equal and reactive to light,   extraocular eye movements intact.   smile symmetric  intact bilateral NLF    Motor Exam -   With stimuli positive movement of all 4 extremities    Muscle tone - is normal all over.  No asymmetry is seen.      Sensory    Bilateral intact to light touch    GENERAL Exam: Nontoxic , No Acute Distress   	  HEENT:  normocephalic, atraumatic  		  LUNGS: Clear bilaterally  	  HEART: Normal S1S2   No murmur RRR        	  GI/ ABDOMEN:  Soft  Non tender    EXTREMITIES:   No Edema  No Clubbing  No Cyanosis   	   SKIN: Normal  No Ecchymosis               LABS:  CBC Full  -  ( 08 Jan 2019 06:49 )  WBC Count : 9.96 K/uL  Hemoglobin : 12.0 g/dL  Hematocrit : 37.4 %  Platelet Count - Automated : 126 K/uL  Mean Cell Volume : 95.9 fl  Mean Cell Hemoglobin : 30.8 pg  Mean Cell Hemoglobin Concentration : 32.1 gm/dL  Auto Neutrophil # : x  Auto Lymphocyte # : x  Auto Monocyte # : x  Auto Eosinophil # : x  Auto Basophil # : x  Auto Neutrophil % : x  Auto Lymphocyte % : x  Auto Monocyte % : x  Auto Eosinophil % : x  Auto Basophil % : x      01-08    141  |  99  |  25<H>  ----------------------------<  89  3.5   |  35<H>  |  1.75<H>    Ca    8.8      08 Jan 2019 06:49  Phos  2.7     01-08  Mg     1.8     01-08    TPro  5.9<L>  /  Alb  1.9<L>  /  TBili  1.0  /  DBili  x   /  AST  21  /  ALT  21  /  AlkPhos  76  01-08    Hemoglobin A1C:     LIVER FUNCTIONS - ( 08 Jan 2019 06:49 )  Alb: 1.9 g/dL / Pro: 5.9 g/dL / ALK PHOS: 76 U/L / ALT: 21 U/L DA / AST: 21 U/L / GGT: x           Vitamin B12   PT/INR - ( 07 Jan 2019 10:15 )   PT: 20.4 sec;   INR: 1.84 ratio               RADIOLOGY    ASSESSMENT AND PLAN    change in mental status,   For changes in mental status and lethargy, suspect most likely this is metabolic, which is multifactorial secondary aspiration pneumonia with possible element of CHF.  For history of sepsis, antibiotics as needed.  monitor renal function  monitor sbp keep above 100 if possible   spoke to family in detail   Greater than 45 minutes of time was spent with the patient, plan of care, reviewing data, speaking to the family and multidisciplinary healthcare team

## 2019-01-08 NOTE — PROGRESS NOTE ADULT - SUBJECTIVE AND OBJECTIVE BOX
HARSHAL COLORADO is a 89yMale , patient examined and chart reviewed.     INTERVAL HPI/ OVERNIGHT EVENTS:  MOre awake. Thoracentesis cancelled sec nose bleed    Past Medical History--  PAST MEDICAL & SURGICAL HISTORY:  Mitral valve disorder  Kidney stone  Diverticulosis  CKD (chronic kidney disease)  Afib  Hypothyroidism  BPH (benign prostatic hyperplasia)  Chronic peptic ulcer: S/P surgery more than 10 yrs ago      For details regarding the patient's social history, family history, and other miscellaneous elements, please refer the initial infectious diseases consultation and/or the admitting history and physical examination for this admission.    ROS:  CONSTITUTIONAL:  Negative fever or chills  EYES:  Negative  blurry vision or double vision  CARDIOVASCULAR:  Negative for chest pain or palpitations  RESPIRATORY:  Negative for cough, wheezing, or SOB   GASTROINTESTINAL:  Negative for nausea, vomiting, diarrhea, constipation, or abdominal pain  GENITOURINARY:  Negative frequency, urgency , dysuria or hematuria   NEUROLOGIC:  No headache, confusion, dizziness, lightheadedness  All other systems were reviewed and are negative       Current inpatient medications :    ANTIBIOTICS/RELEVANT:  piperacillin/tazobactam IVPB. 3.375 Gram(s) IV Intermittent every 8 hours      ALBUTerol    0.083% 2.5 milliGRAM(s) Nebulizer every 8 hours  ferrous    sulfate 325 milliGRAM(s) Oral daily  levothyroxine Injectable 37.5 MICROGram(s) IV Push at bedtime  metoprolol tartrate 25 milliGRAM(s) Oral two times a day  metoprolol tartrate Injectable 5 milliGRAM(s) IV Push every 4 hours PRN  multivitamin 1 Tablet(s) Oral daily  oxymetazoline 0.05% Nasal Spray 1 Spray(s) Both Nostrils two times a day  pantoprazole  Injectable 40 milliGRAM(s) IV Push two times a day  tamsulosin 0.4 milliGRAM(s) Oral at bedtime      Objective:  Vital Signs Last 24 Hrs  T(C): 36.7 (08 Jan 2019 12:58), Max: 37.2 (07 Jan 2019 20:18)  T(F): 98 (08 Jan 2019 12:58), Max: 98.9 (07 Jan 2019 20:18)  HR: 94 (08 Jan 2019 14:00) (81 - 120)  BP: 125/82 (08 Jan 2019 14:00) (106/74 - 135/78)  BP(mean): 94 (08 Jan 2019 14:00) (84 - 103)  RR: 17 (08 Jan 2019 14:00) (9 - 27)  SpO2: 100% (08 Jan 2019 14:00) (95% - 100%)    Physical Exam:  GEN: weak frail NGT in place  HEENT: normocephalic and atraumatic. EOMI. NITHYA. Moist mucosa. Clear Posterior pharynx.  NECK: Supple. No carotid bruits.  No lymphadenopathy or thyromegaly.  LUNGS: Decreased to auscultation.  HEART: Regular rate and rhythm without murmur.  ABDOMEN: Soft, nontender, and nondistended.  Positive bowel sounds.  No hepatosplenomegaly was noted.  EXTREMITIES: Without any cyanosis, clubbing, rash, lesions or edema.  NEUROLOGIC: A & O x3, No focal neurological deficits   SKIN: No ulceration or induration present.      LABS:                                12.0   9.96  )-----------( 126      ( 08 Jan 2019 06:49 )             37.4     01-08    141  |  99  |  25<H>  ----------------------------<  89  3.5   |  35<H>  |  1.75<H>    Ca    8.8      08 Jan 2019 06:49  Phos  2.7     01-08  Mg     1.8     01-08    TPro  5.9<L>  /  Alb  1.9<L>  /  TBili  1.0  /  DBili  x   /  AST  21  /  ALT  21  /  AlkPhos  76  01-08      PT/INR - ( 07 Jan 2019 10:15 )   PT: 20.4 sec;   INR: 1.84 ratio        Assessment :    Culture - Sputum (collected 03 Jan 2019 10:25)  Source: .Sputum Sputum - trap  Gram Stain (03 Jan 2019 15:36):    Numerous polymorphonuclear leukocytes seen per low power field    Few Squamous epithelial cells seen per low power field    Numerous Yeast like cells seen per oil power field  Preliminary Report (04 Jan 2019 09:19):    Normal Respiratory Abbie present    Culture - Blood (12.31.18 @ 12:33)    Specimen Source: .Blood Blood-Peripheral    Culture Results:   No growth to date.      RADIOLOGY & ADDITIONAL STUDIES:    EXAM:  CT CHEST                            PROCEDURE DATE:  12/31/2018          INTERPRETATION:  Clinical information: Respiratory distress, sepsis    Comparison CT chest study dated 4/25/2017. Comparison CT abdomen-pelvis   study dated 11/14/2014.    Axial images obtained, coronal and sagittal images computer reformatted.   Noncontrast exam. Neither intravenous or oral contrast material was   administered which limits the study.        CHEST: An NG tube is present. No thoracic aortic aneurysm or pericardial   effusion. Global cardiomegaly present. Coronary artery calcifications   present. Central airway intact. Thyroid gland not enlarged.    Minimal bibasilar effusions right greater than left. Pleural thickening   with pleural parenchymal scarring at the apices unchanged since the prior   exam. Calcifications evident within the right apical scarring.    Multifocal bilateral airspace disease present. Airspace disease present   in the left upper lobe, left lower lobe, right middle lobe, and right   lower lobe.    No acute osseous abnormalities visible in the thoracic skeleton.    Small anterior mediastinal lymph nodes present could be reactive. Hilar   regions obscured by the extensive airspace disease.        ABDOMEN-PELVIS: Postcholecystectomy clips present. Hepatic parenchyma   homogeneous. The spleen is not enlarged. No adrenal lesions evident. No   hydronephrotic changes identified. Traces bilateral perinephric stranding   noted. Unenhanced pancreas shows no lesions. No aortic aneurysm. No   retroperitoneal lymphadenopathy. No ascites. No biliary ductal   dilatation. Past history of gastric surgery type unspecified.    No bowel obstruction. Edematous appearance of the mesentery could be   related to volume overload. Diverticulosis present. Urinary bladder shows   a thickened wall. A calcification is visible posterior aspect of the   urinary bladder slightly to the right of midline, could represent a stone   in a diverticulum or localized to the wall of the urinary bladder.   Calcification measures 4 mm. The prostate is markedly enlarged. No free   pelvic fluid evident. Inguinal regions intact. Multilevel disc   degenerative disease lower lumbar region.      Assessment :  89M with hypothyroidism, BPH, afib, CKD, who presents with SOB with acute hypoxic respiratory  failure complicated by vomiting with hematemesis likely severe aspiration pneumonia with   pneumonitis  Likely also component of pulm edema  + troponin  Coagulopathy  GABO on CKD  Dysphagia  Thoracentesis cancelled today    Plan :   Cont Zosyn day 8/10  Trend temps and wbc  Diurese per primary team  Failed swallow study - may need PEG  Prognosis overall poor    Continue with present regiment.  Appropriate use of antibiotics and adverse effects reviewed.    I have discussed the above plan of care with patient/ family in detail. They expressed understanding of the the treatment plan . Risks, benefits and alternatives discussed in detail. I have asked if they have any questions or concerns and appropriately addressed them to the best of my ability .      Critical care time greater then 35 minutes reviewing notes, labs data/ imaging , discussion with multidisciplinary team.    Thank you for allowing me to participate in care of your patient .        Anibal Saldaña MD  Infectious Disease  [phone]

## 2019-01-08 NOTE — PROGRESS NOTE ADULT - SUBJECTIVE AND OBJECTIVE BOX
Chief Complaint:        INTERVAL HPI/OVERNIGHT EVENTS:  no significant events overnight reported   pt more alert and awake today         MEDICATIONS  (STANDING):  ALBUTerol    0.083% 2.5 milliGRAM(s) Nebulizer every 8 hours  ferrous    sulfate 325 milliGRAM(s) Oral daily  heparin  Injectable 5000 Unit(s) SubCutaneous every 12 hours  influenza   Vaccine 0.5 milliLiter(s) IntraMuscular once  levothyroxine Injectable 37.5 MICROGram(s) IV Push at bedtime  multivitamin 1 Tablet(s) Oral daily  pantoprazole  Injectable 40 milliGRAM(s) IV Push two times a day  piperacillin/tazobactam IVPB. 3.375 Gram(s) IV Intermittent every 8 hours  tamsulosin 0.4 milliGRAM(s) Oral at bedtime    MEDICATIONS  (PRN):  metoprolol tartrate Injectable 5 milliGRAM(s) IV Push every 4 hours PRN HR>120            Vital Signs Last 24 Hrs  T(C): 36.8 (08 Jan 2019 08:02), Max: 37.2 (07 Jan 2019 20:18)  T(F): 98.2 (08 Jan 2019 08:02), Max: 98.9 (07 Jan 2019 20:18)  HR: 88 (08 Jan 2019 08:00) (61 - 120)  BP: 115/69 (08 Jan 2019 08:00) (100/69 - 135/78)  BP(mean): 85 (08 Jan 2019 08:00) (80 - 103)  RR: 27 (08 Jan 2019 08:00) (9 - 27)  SpO2: 98% (08 Jan 2019 08:00) (97% - 100%)    Physical exam:    abd soft, non tender  cv s1s2  chest air entry        LABS:                        12.0   9.96  )-----------( 126      ( 08 Jan 2019 06:49 )             37.4     01-08    141  |  99  |  25<H>  ----------------------------<  89  3.5   |  35<H>  |  1.75<H>    Ca    8.8      08 Jan 2019 06:49  Phos  2.7     01-08  Mg     1.8     01-08    TPro  5.9<L>  /  Alb  1.9<L>  /  TBili  1.0  /  DBili  x   /  AST  21  /  ALT  21  /  AlkPhos  76  01-08    PT/INR - ( 07 Jan 2019 10:15 )   PT: 20.4 sec;   INR: 1.84 ratio               RADIOLOGY & ADDITIONAL TESTS:

## 2019-01-08 NOTE — PROGRESS NOTE ADULT - PROBLEM SELECTOR PLAN 4
-worsening Cr now 2.24. hold off on further diuresis for now as could be worsening kidney status. urine output difficult to measure as incontinent but diapers wet with urine. trend lytes and replete as necessary. no urgent need for HD at this time. avoid nephrotoxic agents
Manage as above. Chest PT and aggressive suctioning. Not a good candidate for NIPPV going forward.
Manage as above. Legionella study pending. Chest PT. Not a good candidate for NIPPV going forward.
may go for a thoracentesis later today  -continue diuresis as needed
now in NSR  cont sotolal with addition of amio if returns to afib   [id number], holding AC for now given acute bleed and supratherapeutic INR

## 2019-01-09 ENCOUNTER — RESULT REVIEW (OUTPATIENT)
Age: 84
End: 2019-01-09

## 2019-01-09 LAB — PH FLD: 7.54 — SIGNIFICANT CHANGE UP

## 2019-01-09 PROCEDURE — 99233 SBSQ HOSP IP/OBS HIGH 50: CPT

## 2019-01-09 PROCEDURE — 71045 X-RAY EXAM CHEST 1 VIEW: CPT | Mod: 26

## 2019-01-09 PROCEDURE — 88305 TISSUE EXAM BY PATHOLOGIST: CPT | Mod: 26

## 2019-01-09 PROCEDURE — 32555 ASPIRATE PLEURA W/ IMAGING: CPT | Mod: RT

## 2019-01-09 PROCEDURE — 88108 CYTOPATH CONCENTRATE TECH: CPT | Mod: 26

## 2019-01-09 RX ORDER — MAGNESIUM SULFATE 500 MG/ML
1 VIAL (ML) INJECTION ONCE
Qty: 0 | Refills: 0 | Status: COMPLETED | OUTPATIENT
Start: 2019-01-09 | End: 2019-01-09

## 2019-01-09 RX ORDER — FUROSEMIDE 40 MG
20 TABLET ORAL ONCE
Qty: 0 | Refills: 0 | Status: COMPLETED | OUTPATIENT
Start: 2019-01-09 | End: 2019-01-09

## 2019-01-09 RX ORDER — POTASSIUM CHLORIDE 20 MEQ
10 PACKET (EA) ORAL
Qty: 0 | Refills: 0 | Status: COMPLETED | OUTPATIENT
Start: 2019-01-09 | End: 2019-01-09

## 2019-01-09 RX ORDER — METOPROLOL TARTRATE 50 MG
25 TABLET ORAL
Qty: 0 | Refills: 0 | Status: DISCONTINUED | OUTPATIENT
Start: 2019-01-09 | End: 2019-01-13

## 2019-01-09 RX ADMIN — PANTOPRAZOLE SODIUM 40 MILLIGRAM(S): 20 TABLET, DELAYED RELEASE ORAL at 05:42

## 2019-01-09 RX ADMIN — TAMSULOSIN HYDROCHLORIDE 0.4 MILLIGRAM(S): 0.4 CAPSULE ORAL at 22:07

## 2019-01-09 RX ADMIN — ALBUTEROL 2.5 MILLIGRAM(S): 90 AEROSOL, METERED ORAL at 13:53

## 2019-01-09 RX ADMIN — OXYMETAZOLINE HYDROCHLORIDE 1 SPRAY(S): 0.5 SPRAY NASAL at 05:43

## 2019-01-09 RX ADMIN — Medication 100 MILLIEQUIVALENT(S): at 16:07

## 2019-01-09 RX ADMIN — ALBUTEROL 2.5 MILLIGRAM(S): 90 AEROSOL, METERED ORAL at 07:15

## 2019-01-09 RX ADMIN — Medication 100 MILLIEQUIVALENT(S): at 17:02

## 2019-01-09 RX ADMIN — Medication 25 MILLIGRAM(S): at 17:03

## 2019-01-09 RX ADMIN — OXYMETAZOLINE HYDROCHLORIDE 1 SPRAY(S): 0.5 SPRAY NASAL at 17:06

## 2019-01-09 RX ADMIN — Medication 20 MILLIGRAM(S): at 16:07

## 2019-01-09 RX ADMIN — PANTOPRAZOLE SODIUM 40 MILLIGRAM(S): 20 TABLET, DELAYED RELEASE ORAL at 17:03

## 2019-01-09 RX ADMIN — PIPERACILLIN AND TAZOBACTAM 25 GRAM(S): 4; .5 INJECTION, POWDER, LYOPHILIZED, FOR SOLUTION INTRAVENOUS at 09:02

## 2019-01-09 RX ADMIN — PIPERACILLIN AND TAZOBACTAM 25 GRAM(S): 4; .5 INJECTION, POWDER, LYOPHILIZED, FOR SOLUTION INTRAVENOUS at 02:11

## 2019-01-09 RX ADMIN — PIPERACILLIN AND TAZOBACTAM 25 GRAM(S): 4; .5 INJECTION, POWDER, LYOPHILIZED, FOR SOLUTION INTRAVENOUS at 17:03

## 2019-01-09 RX ADMIN — Medication 37.5 MICROGRAM(S): at 22:07

## 2019-01-09 RX ADMIN — Medication 100 MILLIEQUIVALENT(S): at 18:29

## 2019-01-09 RX ADMIN — Medication 325 MILLIGRAM(S): at 11:52

## 2019-01-09 RX ADMIN — Medication 100 GRAM(S): at 16:07

## 2019-01-09 RX ADMIN — Medication 1 TABLET(S): at 11:52

## 2019-01-09 RX ADMIN — ALBUTEROL 2.5 MILLIGRAM(S): 90 AEROSOL, METERED ORAL at 23:53

## 2019-01-09 NOTE — GOALS OF CARE CONVERSATION - PERSONAL ADVANCE DIRECTIVE - CONVERSATION DETAILS
left message for daughter to return call to discuss advance directives
Pt and wife both agree he wants all done FULL CODE

## 2019-01-09 NOTE — PROGRESS NOTE ADULT - SUBJECTIVE AND OBJECTIVE BOX
Date/Time Patient Seen:  		  Referring MD:   Data Reviewed	       Patient is a 89y old  Male who presents with a chief complaint of SOB (09 Jan 2019 02:35)      Subjective/HPI     PAST MEDICAL & SURGICAL HISTORY:  Mitral valve disorder  Kidney stone  Diverticulosis  CKD (chronic kidney disease)  Afib  Hypothyroidism  BPH (benign prostatic hyperplasia)  Chronic peptic ulcer: S/P surgery more than 10 yrs ago  No significant past surgical history        Medication list         MEDICATIONS  (STANDING):  ALBUTerol    0.083% 2.5 milliGRAM(s) Nebulizer every 8 hours  ferrous    sulfate 325 milliGRAM(s) Oral daily  influenza   Vaccine 0.5 milliLiter(s) IntraMuscular once  levothyroxine Injectable 37.5 MICROGram(s) IV Push at bedtime  multivitamin 1 Tablet(s) Oral daily  oxymetazoline 0.05% Nasal Spray 1 Spray(s) Both Nostrils two times a day  pantoprazole  Injectable 40 milliGRAM(s) IV Push two times a day  piperacillin/tazobactam IVPB. 3.375 Gram(s) IV Intermittent every 8 hours  tamsulosin 0.4 milliGRAM(s) Oral at bedtime    MEDICATIONS  (PRN):  metoprolol tartrate Injectable 5 milliGRAM(s) IV Push every 4 hours PRN HR>120         Vitals log        ICU Vital Signs Last 24 Hrs  T(C): 36.8 (09 Jan 2019 00:43), Max: 36.8 (08 Jan 2019 08:02)  T(F): 98.3 (09 Jan 2019 00:43), Max: 98.3 (09 Jan 2019 00:43)  HR: 102 (09 Jan 2019 04:00) (88 - 144)  BP: 126/80 (09 Jan 2019 04:00) (110/72 - 130/86)  BP(mean): 94 (09 Jan 2019 04:00) (79 - 107)  ABP: --  ABP(mean): --  RR: 23 (09 Jan 2019 04:00) (16 - 27)  SpO2: 93% (09 Jan 2019 04:00) (93% - 100%)           Input and Output:  I&O's Detail    07 Jan 2019 07:01  -  08 Jan 2019 07:00  --------------------------------------------------------  IN:    dextrose 10%: 240 mL    Enteral Tube Flush: 200 mL    ns in tub fed vital15: 210 mL    Solution: 300 mL    Solution: 300 mL  Total IN: 1250 mL    OUT:    Indwelling Catheter - Urethral: 2150 mL  Total OUT: 2150 mL    Total NET: -900 mL      08 Jan 2019 07:01  -  09 Jan 2019 05:56  --------------------------------------------------------  IN:    Enteral Tube Flush: 220 mL    ns in tub fed vital15: 380 mL    Solution: 100 mL    Solution: 300 mL  Total IN: 1000 mL    OUT:    Indwelling Catheter - Urethral: 150 mL  Total OUT: 150 mL    Total NET: 850 mL          Lab Data                        11.9   10.02 )-----------( 155      ( 08 Jan 2019 20:00 )             36.9     01-08    139  |  97  |  27<H>  ----------------------------<  113<H>  3.4<L>   |  33<H>  |  1.69<H>    Ca    8.7      08 Jan 2019 20:00  Phos  3.1     01-08  Mg     1.7     01-08    TPro  5.9<L>  /  Alb  1.9<L>  /  TBili  1.0  /  DBili  x   /  AST  21  /  ALT  21  /  AlkPhos  76  01-08    ABG - ( 07 Jan 2019 10:27 )  pH, Arterial: x     pH, Blood: 7.40  /  pCO2: 44    /  pO2: 143   / HCO3: 26    / Base Excess: 2.2   /  SaO2: 97                      Review of Systems	      Objective     Physical Examination    heart s1s2  lung dec BS  abd soft  ng tube in place      Pertinent Lab findings & Imaging      Apoorva:  NO   Adequate UO     I&O's Detail    07 Jan 2019 07:01  -  08 Jan 2019 07:00  --------------------------------------------------------  IN:    dextrose 10%: 240 mL    Enteral Tube Flush: 200 mL    ns in tub fed vital15: 210 mL    Solution: 300 mL    Solution: 300 mL  Total IN: 1250 mL    OUT:    Indwelling Catheter - Urethral: 2150 mL  Total OUT: 2150 mL    Total NET: -900 mL      08 Jan 2019 07:01  -  09 Jan 2019 05:56  --------------------------------------------------------  IN:    Enteral Tube Flush: 220 mL    ns in tub fed vital15: 380 mL    Solution: 100 mL    Solution: 300 mL  Total IN: 1000 mL    OUT:    Indwelling Catheter - Urethral: 150 mL  Total OUT: 150 mL    Total NET: 850 mL               Discussed with:     Cultures:	        Radiology

## 2019-01-09 NOTE — PROGRESS NOTE ADULT - ASSESSMENT
· Assessment		    · Assessment		  89 m w/ above hx a/w/ respiratory distress which began after vomiting.   hx of billroth II w/ gastrojej ulcer in 2017   without active/overt gi bleeding during hospital course  protonix 40mg BID  holding all antiplatelet medications and AC  NO plan for endoscopy  swallow eval 1/2 noted- presently npo.     cont w/ enteral feeding via ngt      swallow followup. Need for MBS?   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 Would need surgical jejunostomy if unable to safely take PO diet.

## 2019-01-09 NOTE — PROGRESS NOTE ADULT - SUBJECTIVE AND OBJECTIVE BOX
Neurology follow up note    HARSHAL [id number]      Interval History:    Patient feels ok no new complaints.    MEDICATIONS    ALBUTerol    0.083% 2.5 milliGRAM(s) Nebulizer every 8 hours  ferrous    sulfate 325 milliGRAM(s) Oral daily  influenza   Vaccine 0.5 milliLiter(s) IntraMuscular once  levothyroxine Injectable 37.5 MICROGram(s) IV Push at bedtime  metoprolol tartrate 25 milliGRAM(s) Oral two times a day  metoprolol tartrate Injectable 5 milliGRAM(s) IV Push every 4 hours PRN  multivitamin 1 Tablet(s) Oral daily  oxymetazoline 0.05% Nasal Spray 1 Spray(s) Both Nostrils two times a day  pantoprazole  Injectable 40 milliGRAM(s) IV Push two times a day  piperacillin/tazobactam IVPB. 3.375 Gram(s) IV Intermittent every 8 hours  tamsulosin 0.4 milliGRAM(s) Oral at bedtime      Allergies    No Known Allergies    Intolerances            Vital Signs Last 24 Hrs  T(C): 36.6 (09 Jan 2019 04:00), Max: 36.8 (09 Jan 2019 00:43)  T(F): 97.9 (09 Jan 2019 04:00), Max: 98.3 (09 Jan 2019 00:43)  HR: 98 (09 Jan 2019 08:00) (88 - 144)  BP: 118/91 (09 Jan 2019 08:00) (110/72 - 130/86)  BP(mean): 100 (09 Jan 2019 08:00) (79 - 107)  RR: 20 (09 Jan 2019 08:00) (16 - 25)  SpO2: 98% (09 Jan 2019 08:00) (93% - 100%)    REVIEW OF SYSTEMS:     Constitutional: No fever, chills, fatigue, weakness  Eyes: no eye pain, visual disturbances, or discharge  ENT:  No difficulty hearing, tinnitus, vertigo; No sinus or throat pain  Neck: No pain or stiffness  Respiratory: No cough, dyspnea, wheezing   Cardiovascular: No chest pain, palpitations,   Gastrointestinal: No abdominal or epigastric pain. No nausea, vomiting  No diarrhea or constipation.   Genitourinary: No dysuria, frequency, hematuria or incontinence  Neurological: No headaches, lightheadedness, vertigo, numbness or tremors  Psychiatric: No depression, anxiety, mood swings or difficulty sleeping  Musculoskeletal: No joint pain or swelling; No muscle, back or extremity pain  Skin: No itching, burning, rashes or lesions   Lymph Nodes: No enlarged glands  Endocrine: No heat or cold intolerance; No hair loss   Allergy and Immunologic: No hives or eczema    On Neurological Examination:    Mental Status - Patient is alert, awake,   loc hospital      Follow simple commands      Speech -   Fluent                         Cranial Nerves - Pupils 3 mm equal and reactive to light,   extraocular eye movements intact.   smile symmetric  intact bilateral NLF    Motor Exam -   With stimuli positive movement of all 4 extremities    Muscle tone - is normal all over.  No asymmetry is seen.      Sensory    Bilateral intact to light touch    GENERAL Exam: Nontoxic , No Acute Distress   	  HEENT:  normocephalic, atraumatic  		  LUNGS: Clear bilaterally  	  HEART: Normal S1S2   No murmur RRR        	  GI/ ABDOMEN:  Soft  Non tender    EXTREMITIES:   No Edema  No Clubbing  No Cyanosis   	   SKIN: Normal  No Ecchymosis               LABS:  CBC Full  -  ( 08 Jan 2019 20:00 )  WBC Count : 10.02 K/uL  Hemoglobin : 11.9 g/dL  Hematocrit : 36.9 %  Platelet Count - Automated : 155 K/uL  Mean Cell Volume : 95.1 fl  Mean Cell Hemoglobin : 30.7 pg  Mean Cell Hemoglobin Concentration : 32.2 gm/dL  Auto Neutrophil # : x  Auto Lymphocyte # : x  Auto Monocyte # : x  Auto Eosinophil # : x  Auto Basophil # : x  Auto Neutrophil % : x  Auto Lymphocyte % : x  Auto Monocyte % : x  Auto Eosinophil % : x  Auto Basophil % : x      01-08    139  |  97  |  27<H>  ----------------------------<  113<H>  3.4<L>   |  33<H>  |  1.69<H>    Ca    8.7      08 Jan 2019 20:00  Phos  3.1     01-08  Mg     1.7     01-08    TPro  5.9<L>  /  Alb  1.9<L>  /  TBili  1.0  /  DBili  x   /  AST  21  /  ALT  21  /  AlkPhos  76  01-08    Hemoglobin A1C:     LIVER FUNCTIONS - ( 08 Jan 2019 06:49 )  Alb: 1.9 g/dL / Pro: 5.9 g/dL / ALK PHOS: 76 U/L / ALT: 21 U/L DA / AST: 21 U/L / GGT: x           Vitamin B12   PT/INR - ( 08 Jan 2019 20:00 )   PT: 17.1 sec;   INR: 1.55 ratio         PTT - ( 08 Jan 2019 20:00 )  PTT:33.6 sec      RADIOLOGY      ASSESSMENT AND PLAN    change in mental status,   For changes in mental status and lethargy, suspect most likely this is metabolic, which is multifactorial secondary aspiration pneumonia with possible element of CHF.  For history of sepsis, antibiotics as needed.  monitor renal function  overall more awake   monitor sbp keep above 100 if possible   spoke to family in detail in past   Greater than 45 minutes of time was spent with the patient, plan of care, reviewing data, speaking to the family and multidisciplinary healthcare team

## 2019-01-09 NOTE — BRIEF OPERATIVE NOTE - PROCEDURE
<<-----Click on this checkbox to enter Procedure Thoracentesis of right pleural cavity using catheter with ultrasound guidance  01/09/2019    Active  WFORMAN

## 2019-01-09 NOTE — PROGRESS NOTE ADULT - SUBJECTIVE AND OBJECTIVE BOX
CC.  SOB  HPI.  Patient reports SOB, and cough are improving.  afebrile.  offers no other complaints    poor historian due to underline dementia  More awake today    REVIEW OF SYSTEMS:  unable to obtain    Vital Signs Last 24 Hrs  T(C): 36.3 (09 Jan 2019 08:58), Max: 36.8 (09 Jan 2019 00:43)  T(F): 97.3 (09 Jan 2019 08:58), Max: 98.3 (09 Jan 2019 00:43)  HR: 90 (09 Jan 2019 15:46) (88 - 144)  BP: 125/73 (09 Jan 2019 15:46) (107/66 - 129/96)  BP(mean): 90 (09 Jan 2019 15:46) (79 - 107)  RR: 19 (09 Jan 2019 15:46) (17 - 25)  SpO2: 95% (09 Jan 2019 15:46) (93% - 100%)      PHYSICAL EXAM:       PHYSICAL EXAM-  GENERAL: Chronic ill appearing male  HEAD:  Atraumatic, Normocephalic  EYES: EOMI, PERRLA, conjunctiva and sclera clear  NECK: Supple, No JVD, Normal thyroid  NERVOUS SYSTEM:  Alert & Oriented X 1.  Moving all extremities  CHEST/LUNG: + rhonchi with good air entry.  No retractions or accessory muscle usage  HEART: Regular rate and rhythm; No   gallops  ABDOMEN: Soft, Nontender, Nondistended; Bowel sounds present  EXTREMITIES:   No clubbing, cyanosis,   SKIN: No rashes or lesions  MEDICATIONS  (STANDING):  ALBUTerol    0.083% 2.5 milliGRAM(s) Nebulizer every 8 hours  ferrous    sulfate 325 milliGRAM(s) Oral daily  furosemide   Injectable 20 milliGRAM(s) IV Push once  levothyroxine Injectable 37.5 MICROGram(s) IV Push at bedtime  magnesium sulfate  IVPB 1 Gram(s) IV Intermittent once  metoprolol tartrate 25 milliGRAM(s) Oral two times a day  multivitamin 1 Tablet(s) Oral daily  oxymetazoline 0.05% Nasal Spray 1 Spray(s) Both Nostrils two times a day  pantoprazole  Injectable 40 milliGRAM(s) IV Push two times a day  piperacillin/tazobactam IVPB. 3.375 Gram(s) IV Intermittent every 8 hours  potassium chloride  10 mEq/100 mL IVPB 10 milliEquivalent(s) IV Intermittent every 1 hour  tamsulosin 0.4 milliGRAM(s) Oral at bedtime    MEDICATIONS  (PRN):  metoprolol tartrate Injectable 5 milliGRAM(s) IV Push every 4 hours PRN HR>120             [x ] YES  [ ] No medical contraindication for discharge           11.9   10.02 )-----------( 155      ( 08 Jan 2019 20:00 )             36.9     01-08    139  |  97  |  27<H>  ----------------------------<  113<H>  3.4<L>   |  33<H>  |  1.69<H>    Ca    8.7      08 Jan 2019 20:00  Phos  3.1     01-08  Mg     1.7     01-08    TPro  5.9<L>  /  Alb  1.9<L>  /  TBili  1.0  /  DBili  x   /  AST  21  /  ALT  21  /  AlkPhos  76  01-08            PT/INR - ( 08 Jan 2019 20:00 )   PT: 17.1 sec;   INR: 1.55 ratio         PTT - ( 08 Jan 2019 20:00 )  PTT:33.6 sec    Imaging Personally Reviewed:     [x ] YES  [ ] NO      Consultant(s) Notes Reviewed:  [x ] YES  [ ] NO    Care Discussed with Consultants/Other Providers [x ] YES  [ ] NO

## 2019-01-09 NOTE — PROGRESS NOTE ADULT - SUBJECTIVE AND OBJECTIVE BOX
Chief Complaint:        INTERVAL HPI/OVERNIGHT EVENTS:    awake and alert   somewhat confused  thoracentesis cancelled yesterday    MEDICATIONS  (STANDING):  ALBUTerol    0.083% 2.5 milliGRAM(s) Nebulizer every 8 hours  ferrous    sulfate 325 milliGRAM(s) Oral daily  influenza   Vaccine 0.5 milliLiter(s) IntraMuscular once  levothyroxine Injectable 37.5 MICROGram(s) IV Push at bedtime  metoprolol tartrate 25 milliGRAM(s) Oral two times a day  multivitamin 1 Tablet(s) Oral daily  oxymetazoline 0.05% Nasal Spray 1 Spray(s) Both Nostrils two times a day  pantoprazole  Injectable 40 milliGRAM(s) IV Push two times a day  piperacillin/tazobactam IVPB. 3.375 Gram(s) IV Intermittent every 8 hours  tamsulosin 0.4 milliGRAM(s) Oral at bedtime    MEDICATIONS  (PRN):  metoprolol tartrate Injectable 5 milliGRAM(s) IV Push every 4 hours PRN HR>120            Vital Signs Last 24 Hrs  T(C): 36.3 (09 Jan 2019 08:58), Max: 36.8 (09 Jan 2019 00:43)  T(F): 97.3 (09 Jan 2019 08:58), Max: 98.3 (09 Jan 2019 00:43)  HR: 98 (09 Jan 2019 08:00) (88 - 144)  BP: 118/91 (09 Jan 2019 08:00) (110/72 - 130/86)  BP(mean): 100 (09 Jan 2019 08:00) (79 - 107)  RR: 20 (09 Jan 2019 08:00) (16 - 25)  SpO2: 98% (09 Jan 2019 08:00) (93% - 100%)    Physical exam:      abd soft, non tender  cv s1s2  chest air entry  ext no edema      LABS:                        11.9   10.02 )-----------( 155      ( 08 Jan 2019 20:00 )             36.9     01-08    139  |  97  |  27<H>  ----------------------------<  113<H>  3.4<L>   |  33<H>  |  1.69<H>    Ca    8.7      08 Jan 2019 20:00  Phos  3.1     01-08  Mg     1.7     01-08    TPro  5.9<L>  /  Alb  1.9<L>  /  TBili  1.0  /  DBili  x   /  AST  21  /  ALT  21  /  AlkPhos  76  01-08    PT/INR - ( 08 Jan 2019 20:00 )   PT: 17.1 sec;   INR: 1.55 ratio         PTT - ( 08 Jan 2019 20:00 )  PTT:33.6 sec      RADIOLOGY & ADDITIONAL TESTS:

## 2019-01-09 NOTE — PROGRESS NOTE ADULT - SUBJECTIVE AND OBJECTIVE BOX
HARSHAL COLORADO is a 89yMale , patient examined and chart reviewed.     INTERVAL HPI/ OVERNIGHT EVENTS:  Awake. Feeling better.  No events.    Past Medical History--  PAST MEDICAL & SURGICAL HISTORY:  Mitral valve disorder  Kidney stone  Diverticulosis  CKD (chronic kidney disease)  Afib  Hypothyroidism  BPH (benign prostatic hyperplasia)  Chronic peptic ulcer: S/P surgery more than 10 yrs ago    For details regarding the patient's social history, family history, and other miscellaneous elements, please refer the initial infectious diseases consultation and/or the admitting history and physical examination for this admission.    ROS: All systems reviewed and are negative    Current inpatient medications :    ANTIBIOTICS/RELEVANT:  piperacillin/tazobactam IVPB. 3.375 Gram(s) IV Intermittent every 8 hours    MEDICATIONS  (STANDING):  ALBUTerol    0.083% 2.5 milliGRAM(s) Nebulizer every 8 hours  ferrous    sulfate 325 milliGRAM(s) Oral daily  levothyroxine Injectable 37.5 MICROGram(s) IV Push at bedtime  metoprolol tartrate 25 milliGRAM(s) Oral two times a day  multivitamin 1 Tablet(s) Oral daily  oxymetazoline 0.05% Nasal Spray 1 Spray(s) Both Nostrils two times a day  pantoprazole  Injectable 40 milliGRAM(s) IV Push two times a day  tamsulosin 0.4 milliGRAM(s) Oral at bedtime    MEDICATIONS  (PRN):  metoprolol tartrate Injectable 5 milliGRAM(s) IV Push every 4 hours PRN HR>120      Objective:  Vital Signs Last 24 Hrs  T(C): 36.3 (09 Jan 2019 08:58), Max: 36.8 (09 Jan 2019 00:43)  T(F): 97.3 (09 Jan 2019 08:58), Max: 98.3 (09 Jan 2019 00:43)  HR: 91 (09 Jan 2019 12:00) (88 - 144)  BP: 115/83 (09 Jan 2019 12:00) (107/66 - 130/86)  BP(mean): 94 (09 Jan 2019 12:00) (79 - 107)  RR: 19 (09 Jan 2019 12:00) (16 - 25)  SpO2: 99% (09 Jan 2019 12:00) (93% - 100%)      Physical Exam:  GEN: Weak Awake  HEENT: normocephalic and atraumatic. EOMI. NITHYA. Moist mucosa. Clear Posterior pharynx.  NECK: Supple. No carotid bruits.  No lymphadenopathy or thyromegaly.  LUNGS: Decreased to auscultation.  HEART: Regular rate and rhythm without murmur.  ABDOMEN: Soft, nontender, and nondistended.  Positive bowel sounds.   EXTREMITIES: + edema.  NEUROLOGIC: Lethargic No focal neurological deficits        LABS:                             11.9   10.02 )-----------( 155      ( 08 Jan 2019 20:00 )             36.9   01-08    139  |  97  |  27<H>  ----------------------------<  113<H>  3.4<L>   |  33<H>  |  1.69<H>    Ca    8.7      08 Jan 2019 20:00  Phos  3.1     01-08  Mg     1.7     01-08    TPro  5.9<L>  /  Alb  1.9<L>  /  TBili  1.0  /  DBili  x   /  AST  21  /  ALT  21  /  AlkPhos  76  01-08      MICROBIOLOGY:    Culture - Sputum (collected 03 Jan 2019 10:25)  Source: .Sputum Sputum - trap  Gram Stain (03 Jan 2019 15:36):    Numerous polymorphonuclear leukocytes seen per low power field    Few Squamous epithelial cells seen per low power field    Numerous Yeast like cells seen per oil power field  Preliminary Report (04 Jan 2019 09:19):    Normal Respiratory Abbie present    Culture - Blood (12.31.18 @ 12:33)    Specimen Source: .Blood Blood-Peripheral    Culture Results:   No growth to date.      RADIOLOGY & ADDITIONAL STUDIES:    EXAM:  CT CHEST                            PROCEDURE DATE:  12/31/2018          INTERPRETATION:  Clinical information: Respiratory distress, sepsis    Comparison CT chest study dated 4/25/2017. Comparison CT abdomen-pelvis   study dated 11/14/2014.    Axial images obtained, coronal and sagittal images computer reformatted.   Noncontrast exam. Neither intravenous or oral contrast material was   administered which limits the study.        CHEST: An NG tube is present. No thoracic aortic aneurysm or pericardial   effusion. Global cardiomegaly present. Coronary artery calcifications   present. Central airway intact. Thyroid gland not enlarged.    Minimal bibasilar effusions right greater than left. Pleural thickening   with pleural parenchymal scarring at the apices unchanged since the prior   exam. Calcifications evident within the right apical scarring.    Multifocal bilateral airspace disease present. Airspace disease present   in the left upper lobe, left lower lobe, right middle lobe, and right   lower lobe.    No acute osseous abnormalities visible in the thoracic skeleton.    Small anterior mediastinal lymph nodes present could be reactive. Hilar   regions obscured by the extensive airspace disease.        ABDOMEN-PELVIS: Postcholecystectomy clips present. Hepatic parenchyma   homogeneous. The spleen is not enlarged. No adrenal lesions evident. No   hydronephrotic changes identified. Traces bilateral perinephric stranding   noted. Unenhanced pancreas shows no lesions. No aortic aneurysm. No   retroperitoneal lymphadenopathy. No ascites. No biliary ductal   dilatation. Past history of gastric surgery type unspecified.    No bowel obstruction. Edematous appearance of the mesentery could be   related to volume overload. Diverticulosis present. Urinary bladder shows   a thickened wall. A calcification is visible posterior aspect of the   urinary bladder slightly to the right of midline, could represent a stone   in a diverticulum or localized to the wall of the urinary bladder.   Calcification measures 4 mm. The prostate is markedly enlarged. No free   pelvic fluid evident. Inguinal regions intact. Multilevel disc   degenerative disease lower lumbar region.      Assessment :  89M with hypothyroidism, BPH, afib, CKD, who presents with SOB with acute hypoxic respiratory  failure complicated by vomiting with hematemesis likely severe aspiration pneumonia with   pneumonitis  Likely also component of pulm edema  + troponin  Coagulopathy  GABO on CKD  Dysphagia  Awaiting thoracentesis    Plan :   Cont Zosyn day 9/10  Trend temps and wbc  Diurese per primary team  Failed swallow study - may need PEG  Awaiting thoracentesis  Prognosis overall poor    Continue with present regiment.  Appropriate use of antibiotics and adverse effects reviewed.    I have discussed the above plan of care with patient and family in detail. They expressed understanding of the the treatment plan . Risks, benefits and alternatives discussed in detail. I have asked if they have any questions or concerns and appropriately addressed them to the best of my ability .      Critical care time greater then 45 minutes reviewing notes, labs data/ imaging , discussion with multidisciplinary team.    Thank you for allowing me to participate in care of your patient .      Anibal Saldaña MD  Infectious Disease  [phone]

## 2019-01-09 NOTE — PROGRESS NOTE ADULT - ASSESSMENT
The patient is a 89 year old male with a history of hypothyroidism, BPH, PUD, atrial fibrillation, chronic diastolic heart failure who was admitted with respiratory failure, GI bleed, hypotension.    Plan:  - Echo from 2017 with grossly low normal LV systolic function, no significant valve issues  - Off of IV fluids  - Furosemide prn  - Troponin peaked at 0.292 in the setting of type 2 NSTEMI (demand ischemia) from respiratory failure and GI bleed  - Remain off of anticoagulation. Given multiple episodes of GI bleed with supratherapeutic INR, warfarin may not be the best option. Eventually can consider apixaban or no anticoagulation.  - Start metoprolol tartrate 25 mg bid  - Continue metoprolol 5 mg IV q4h prn HR>120  - Continue tube feeds  - For thoracentesis today

## 2019-01-09 NOTE — PROGRESS NOTE ADULT - SUBJECTIVE AND OBJECTIVE BOX
Chief Complaint: Shortness of breath, hematemesis    Interval Events: Thoracentesis cancelled yesterday due to nosebleed.    Review of Systems:  General: No fevers, chills, weight loss or gain  Skin: No rashes, color changes  Cardiovascular: No chest pain, orthopnea  Respiratory: No shortness of breath, cough  Gastrointestinal: No nausea, abdominal pain  Genitourinary: No incontinence, pain with urination  Musculoskeletal: No pain, swelling, decreased range of motion  Neurological: No headache, weakness  Psychiatric: No depression, anxiety  Endocrine: No weight loss or gain, increased thirst  All other systems are comprehensively negative.    Physical Exam:  Vital Signs Last 24 Hrs  T(C): 36.6 (09 Jan 2019 04:00), Max: 36.8 (09 Jan 2019 00:43)  T(F): 97.9 (09 Jan 2019 04:00), Max: 98.3 (09 Jan 2019 00:43)  HR: 98 (09 Jan 2019 08:00) (88 - 144)  BP: 118/91 (09 Jan 2019 08:00) (110/72 - 130/86)  BP(mean): 100 (09 Jan 2019 08:00) (79 - 107)  RR: 20 (09 Jan 2019 08:00) (16 - 25)  SpO2: 98% (09 Jan 2019 08:00) (93% - 100%)  General: NAD  HEENT: MMM  Neck: No JVD, no carotid bruit  Lungs: Decreased at bases  CV: Irregular, nl S1/S2, no M/R/G  Abdomen: S/NT/ND, +BS  Extremities: 1+ LE edema, no cyanosis  Neuro: AAOx3, non-focal  Skin: No rash    Labs:    01-08    139  |  97  |  27<H>  ----------------------------<  113<H>  3.4<L>   |  33<H>  |  1.69<H>    Ca    8.7      08 Jan 2019 20:00  Phos  3.1     01-08  Mg     1.7     01-08    TPro  5.9<L>  /  Alb  1.9<L>  /  TBili  1.0  /  DBili  x   /  AST  21  /  ALT  21  /  AlkPhos  76  01-08                        11.9   10.02 )-----------( 155      ( 08 Jan 2019 20:00 )             36.9       Telemetry: AF

## 2019-01-09 NOTE — PROGRESS NOTE ADULT - ASSESSMENT
Patient is 89M with HTN, hypothyroidism, BPH, afib, CKD, who presents with SOB.  Patient was in his usual state of health with no recent illness when he tried taking two of his pills this evening.  SOB 2/2 aspiration pneumonitis.      1. Acute hypoxic respiratory failure:  - Likely due to aspiration pneumonia with possible element of CHF.  - S/P thoracentesis today.  Fluid analysis pending  - Echo shows grossly normal left ventricular size and function with a calculated ejection fraction of 58%. Mild mitral insufficiency. Mild pulmonary hypertension. A large pleural effusion was incidentally noted,    - Chest PT, suction as necessary  - See below for management of PNA.   - Pulmonary to follow up     2. Aspiration pneumonia:   - Continue with SPCU level of care.    - Continue IV Zosyn  - Pulm/ID following.  - f/u cultures  - NPO -swallow re-eval on 1/4 - recommended ice chips ONLY  - patient had no swallow problems prior to emesis event, swallow re-eval with modified barium tomorrow  - not a candidate for PEG given subtotal gastrectomy, would need jejunostomy if it came down to that    3. Dysphagia:  - Continue with tube feeding.    -Further care as per GI  -Speech therapy Re-evaluation today  -Modified barium swallow in am        4. Metabolic encephalopathy:  -Likely due to acute infection.  -VBG noticed     5. GI bleed:  - History of Billroth II w/ gastrojejunal ulcer in 2017- now resolved.   - Daily CBC  - Protonix IV BID  -H/H stable  - AC and anti platelets on hold.   - GI following    6. Hypoglycemia  - Continue with tube feeding    7. Coagulopathy   - s/p IV Vit K and FFP  - improved  - was on Warfarin at home for afib    8. Atrial fibrillation  - IV metoprolol until can take PO  - cardio f/u appreciated   - Will need to consider Eliquis vs. no AC    9. CKD 3  -Appears to be at baseline based on review of labs in [facility]  -Dose meds renally  -Monitor renal indices    10 Hypothyroidism  -Continue with Levothyroxine.     11. Benign prostatic hyperplasia  -Continue Flomax    12. Hypothyroidism  -Continue with Synthroid.     13. VTE PPx   -Compression stocking in the setting of nasal bleed    14.  Nasal bleeding  -Resolved now.    Continue with Humified Oxygen and afrin  -Monitor closely  Hold heparin, compression stocking for now    overall prognosis poor    Plan of care was discussed with patient, wife and son in law in great details, All questions were answered to their satisfaction  Seems to understand, and in agreement

## 2019-01-10 LAB
ALBUMIN FLD-MCNC: 0.9 G/DL — SIGNIFICANT CHANGE UP
ALBUMIN SERPL ELPH-MCNC: 2 G/DL — LOW (ref 3.3–5)
ALP SERPL-CCNC: 89 U/L — SIGNIFICANT CHANGE UP (ref 30–120)
ALT FLD-CCNC: 18 U/L DA — SIGNIFICANT CHANGE UP (ref 10–60)
ANION GAP SERPL CALC-SCNC: 5 MMOL/L — SIGNIFICANT CHANGE UP (ref 5–17)
ANION GAP SERPL CALC-SCNC: 6 MMOL/L — SIGNIFICANT CHANGE UP (ref 5–17)
AST SERPL-CCNC: 23 U/L — SIGNIFICANT CHANGE UP (ref 10–40)
B PERT IGG+IGM PNL SER: CLEAR — SIGNIFICANT CHANGE UP
BILIRUB SERPL-MCNC: 0.6 MG/DL — SIGNIFICANT CHANGE UP (ref 0.2–1.2)
BUN SERPL-MCNC: 29 MG/DL — HIGH (ref 7–23)
BUN SERPL-MCNC: 31 MG/DL — HIGH (ref 7–23)
CALCIUM SERPL-MCNC: 8.7 MG/DL — SIGNIFICANT CHANGE UP (ref 8.4–10.5)
CALCIUM SERPL-MCNC: 8.9 MG/DL — SIGNIFICANT CHANGE UP (ref 8.4–10.5)
CHLORIDE SERPL-SCNC: 97 MMOL/L — SIGNIFICANT CHANGE UP (ref 96–108)
CHLORIDE SERPL-SCNC: 98 MMOL/L — SIGNIFICANT CHANGE UP (ref 96–108)
CO2 SERPL-SCNC: 34 MMOL/L — HIGH (ref 22–31)
CO2 SERPL-SCNC: 35 MMOL/L — HIGH (ref 22–31)
COLOR FLD: YELLOW — SIGNIFICANT CHANGE UP
CREAT SERPL-MCNC: 1.58 MG/DL — HIGH (ref 0.5–1.3)
CREAT SERPL-MCNC: 1.7 MG/DL — HIGH (ref 0.5–1.3)
FLUID INTAKE SUBSTANCE CLASS: SIGNIFICANT CHANGE UP
FLUID SEGMENTED GRANULOCYTES: 85 % — SIGNIFICANT CHANGE UP
GLUCOSE FLD-MCNC: 147 MG/DL — SIGNIFICANT CHANGE UP
GLUCOSE SERPL-MCNC: 112 MG/DL — HIGH (ref 70–99)
GLUCOSE SERPL-MCNC: 116 MG/DL — HIGH (ref 70–99)
GRAM STN FLD: SIGNIFICANT CHANGE UP
HCT VFR BLD CALC: 36.1 % — LOW (ref 39–50)
HCT VFR BLD CALC: 39.6 % — SIGNIFICANT CHANGE UP (ref 39–50)
HGB BLD-MCNC: 11.7 G/DL — LOW (ref 13–17)
HGB BLD-MCNC: 12.7 G/DL — LOW (ref 13–17)
LDH SERPL L TO P-CCNC: 148 U/L — SIGNIFICANT CHANGE UP
LYMPHOCYTES # FLD: 11 % — SIGNIFICANT CHANGE UP
MAGNESIUM SERPL-MCNC: 2.1 MG/DL — SIGNIFICANT CHANGE UP (ref 1.6–2.6)
MCHC RBC-ENTMCNC: 30.7 PG — SIGNIFICANT CHANGE UP (ref 27–34)
MCHC RBC-ENTMCNC: 30.8 PG — SIGNIFICANT CHANGE UP (ref 27–34)
MCHC RBC-ENTMCNC: 32.1 GM/DL — SIGNIFICANT CHANGE UP (ref 32–36)
MCHC RBC-ENTMCNC: 32.4 GM/DL — SIGNIFICANT CHANGE UP (ref 32–36)
MCV RBC AUTO: 94.8 FL — SIGNIFICANT CHANGE UP (ref 80–100)
MCV RBC AUTO: 96.1 FL — SIGNIFICANT CHANGE UP (ref 80–100)
MONOS+MACROS # FLD: 4 % — SIGNIFICANT CHANGE UP
NIGHT BLUE STAIN TISS: SIGNIFICANT CHANGE UP
NRBC # BLD: 0 /100 WBCS — SIGNIFICANT CHANGE UP (ref 0–0)
NRBC # BLD: 0 /100 WBCS — SIGNIFICANT CHANGE UP (ref 0–0)
PHOSPHATE SERPL-MCNC: 2.8 MG/DL — SIGNIFICANT CHANGE UP (ref 2.5–4.5)
PLATELET # BLD AUTO: 188 K/UL — SIGNIFICANT CHANGE UP (ref 150–400)
PLATELET # BLD AUTO: 219 K/UL — SIGNIFICANT CHANGE UP (ref 150–400)
POTASSIUM SERPL-MCNC: 3.9 MMOL/L — SIGNIFICANT CHANGE UP (ref 3.5–5.3)
POTASSIUM SERPL-MCNC: 4.1 MMOL/L — SIGNIFICANT CHANGE UP (ref 3.5–5.3)
POTASSIUM SERPL-SCNC: 3.9 MMOL/L — SIGNIFICANT CHANGE UP (ref 3.5–5.3)
POTASSIUM SERPL-SCNC: 4.1 MMOL/L — SIGNIFICANT CHANGE UP (ref 3.5–5.3)
PROT FLD-MCNC: 1.9 G/DL — SIGNIFICANT CHANGE UP
PROT SERPL-MCNC: 6.4 G/DL — SIGNIFICANT CHANGE UP (ref 6–8.3)
RBC # BLD: 3.81 M/UL — LOW (ref 4.2–5.8)
RBC # BLD: 4.12 M/UL — LOW (ref 4.2–5.8)
RBC # FLD: 12.8 % — SIGNIFICANT CHANGE UP (ref 10.3–14.5)
RBC # FLD: 12.9 % — SIGNIFICANT CHANGE UP (ref 10.3–14.5)
RCV VOL RI: 2000 /UL — HIGH (ref 0–5)
SODIUM SERPL-SCNC: 137 MMOL/L — SIGNIFICANT CHANGE UP (ref 135–145)
SODIUM SERPL-SCNC: 138 MMOL/L — SIGNIFICANT CHANGE UP (ref 135–145)
SPECIMEN SOURCE: SIGNIFICANT CHANGE UP
SPECIMEN SOURCE: SIGNIFICANT CHANGE UP
TOTAL NUCLEATED CELL COUNT, BODY FLUID: 630 /UL — HIGH (ref 0–5)
TUBE TYPE: SIGNIFICANT CHANGE UP
WBC # BLD: 11.69 K/UL — HIGH (ref 3.8–10.5)
WBC # BLD: 13.32 K/UL — HIGH (ref 3.8–10.5)
WBC # FLD AUTO: 11.69 K/UL — HIGH (ref 3.8–10.5)
WBC # FLD AUTO: 13.32 K/UL — HIGH (ref 3.8–10.5)

## 2019-01-10 PROCEDURE — 71045 X-RAY EXAM CHEST 1 VIEW: CPT | Mod: 26

## 2019-01-10 PROCEDURE — 74230 X-RAY XM SWLNG FUNCJ C+: CPT | Mod: 26

## 2019-01-10 PROCEDURE — 99233 SBSQ HOSP IP/OBS HIGH 50: CPT

## 2019-01-10 RX ADMIN — Medication 1 TABLET(S): at 11:52

## 2019-01-10 RX ADMIN — Medication 325 MILLIGRAM(S): at 11:52

## 2019-01-10 RX ADMIN — Medication 25 MILLIGRAM(S): at 05:25

## 2019-01-10 RX ADMIN — ALBUTEROL 2.5 MILLIGRAM(S): 90 AEROSOL, METERED ORAL at 15:47

## 2019-01-10 RX ADMIN — PIPERACILLIN AND TAZOBACTAM 25 GRAM(S): 4; .5 INJECTION, POWDER, LYOPHILIZED, FOR SOLUTION INTRAVENOUS at 10:27

## 2019-01-10 RX ADMIN — PANTOPRAZOLE SODIUM 40 MILLIGRAM(S): 20 TABLET, DELAYED RELEASE ORAL at 05:25

## 2019-01-10 RX ADMIN — PIPERACILLIN AND TAZOBACTAM 25 GRAM(S): 4; .5 INJECTION, POWDER, LYOPHILIZED, FOR SOLUTION INTRAVENOUS at 02:17

## 2019-01-10 RX ADMIN — PIPERACILLIN AND TAZOBACTAM 25 GRAM(S): 4; .5 INJECTION, POWDER, LYOPHILIZED, FOR SOLUTION INTRAVENOUS at 17:41

## 2019-01-10 RX ADMIN — OXYMETAZOLINE HYDROCHLORIDE 1 SPRAY(S): 0.5 SPRAY NASAL at 17:41

## 2019-01-10 RX ADMIN — OXYMETAZOLINE HYDROCHLORIDE 1 SPRAY(S): 0.5 SPRAY NASAL at 05:24

## 2019-01-10 RX ADMIN — ALBUTEROL 2.5 MILLIGRAM(S): 90 AEROSOL, METERED ORAL at 07:45

## 2019-01-10 RX ADMIN — Medication 37.5 MICROGRAM(S): at 21:54

## 2019-01-10 RX ADMIN — Medication 25 MILLIGRAM(S): at 17:41

## 2019-01-10 RX ADMIN — TAMSULOSIN HYDROCHLORIDE 0.4 MILLIGRAM(S): 0.4 CAPSULE ORAL at 21:54

## 2019-01-10 RX ADMIN — PANTOPRAZOLE SODIUM 40 MILLIGRAM(S): 20 TABLET, DELAYED RELEASE ORAL at 17:41

## 2019-01-10 NOTE — SWALLOW VFSS/MBS ASSESSMENT ADULT - ORAL PHASE
Delayed oral transit time/Reduced anterior - posterior transport within functional limits Uncontrolled bolus / spillover in goran-pharynx

## 2019-01-10 NOTE — PROGRESS NOTE ADULT - SUBJECTIVE AND OBJECTIVE BOX
HARSHAL COLORADO is a 89yMale , patient examined and chart reviewed.     INTERVAL HPI/ OVERNIGHT EVENTS:  Afebrile. Resting comfortably.  Sp thoracentesis yesterday -980m L clear orange fluid.   NGT dc as pt passed swallow study.  No events.    Past Medical History--  PAST MEDICAL & SURGICAL HISTORY:  Mitral valve disorder  Kidney stone  Diverticulosis  CKD (chronic kidney disease)  Afib  Hypothyroidism  BPH (benign prostatic hyperplasia)  Chronic peptic ulcer: S/P surgery more than 10 yrs ago    For details regarding the patient's social history, family history, and other miscellaneous elements, please refer the initial infectious diseases consultation and/or the admitting history and physical examination for this admission.    ROS: All systems reviewed and are negative    Current inpatient medications :    ANTIBIOTICS/RELEVANT:  piperacillin/tazobactam IVPB. 3.375 Gram(s) IV Intermittent every 8 hours    MEDICATIONS  (STANDING):  ALBUTerol    0.083% 2.5 milliGRAM(s) Nebulizer every 8 hours  ferrous    sulfate 325 milliGRAM(s) Oral daily  levothyroxine Injectable 37.5 MICROGram(s) IV Push at bedtime  metoprolol tartrate 25 milliGRAM(s) Oral two times a day  multivitamin 1 Tablet(s) Oral daily  oxymetazoline 0.05% Nasal Spray 1 Spray(s) Both Nostrils two times a day  pantoprazole  Injectable 40 milliGRAM(s) IV Push two times a day  tamsulosin 0.4 milliGRAM(s) Oral at bedtime    MEDICATIONS  (PRN):  metoprolol tartrate Injectable 5 milliGRAM(s) IV Push every 4 hours PRN HR>120      Objective:  ICU Vital Signs Last 24 Hrs  T(C): 36.7 (10 Bret 2019 16:03), Max: 37 (10 Bret 2019 04:30)  T(F): 98.1 (10 Bret 2019 16:03), Max: 98.6 (10 Bret 2019 04:30)  HR: 96 (10 Bret 2019 15:47) (81 - 123)  BP: 99/72 (10 Bret 2019 15:17) (68/52 - 123/105)  BP(mean): 80 (10 Bret 2019 15:17) (58 - 113)  RR: 22 (10 Bret 2019 15:17) (17 - 32)  SpO2: 100% (10 Bret 2019 15:47) (84% - 100%)      Physical Exam:  GEN: Weak Awake  HEENT: normocephalic and atraumatic. EOMI. NITHYA. Moist mucosa. Clear Posterior pharynx.  NECK: Supple. No carotid bruits.  No lymphadenopathy or thyromegaly.  LUNGS: Decreased to auscultation.  HEART: Regular rate and rhythm without murmur.  ABDOMEN: Soft, nontender, and nondistended.  Positive bowel sounds.   EXTREMITIES: + edema.  NEUROLOGIC: Lethargic No focal neurological deficits        LABS:                             11.9   10.02 )-----------( 155      ( 08 Jan 2019 20:00 )             36.9   01-08    139  |  97  |  27<H>  ----------------------------<  113<H>  3.4<L>   |  33<H>  |  1.69<H>    Ca    8.7      08 Jan 2019 20:00  Phos  3.1     01-08  Mg     1.7     01-08    TPro  5.9<L>  /  Alb  1.9<L>  /  TBili  1.0  /  DBili  x   /  AST  21  /  ALT  21  /  AlkPhos  76  01-08      MICROBIOLOGY:    Culture - Fungal, Body Fluid (collected 09 Jan 2019 21:22)  Source: Pleural Fl Pleural Fluid  Preliminary Report (10 Bret 2019 08:40):    Testing in progress    Culture - Body Fluid with Gram Stain (collected 09 Jan 2019 21:22)  Source: Pleural Fl Pleural Fluid  Gram Stain (10 Bret 2019 01:20):    polymorphonuclear leukocytes seen    No organisms seen    by cytocentrifuge  Preliminary Report (10 Bret 2019 15:50):    No growth to date.    Culture - Acid Fast - Body Fluid w/Smear (collected 09 Jan 2019 21:22)  Source: Pleural Fl Pleural Fluid      Culture - Sputum (collected 03 Jan 2019 10:25)  Source: .Sputum Sputum - trap  Gram Stain (03 Jan 2019 15:36):    Numerous polymorphonuclear leukocytes seen per low power field    Few Squamous epithelial cells seen per low power field    Numerous Yeast like cells seen per oil power field  Preliminary Report (04 Jan 2019 09:19):    Normal Respiratory Abbie present    Culture - Blood (12.31.18 @ 12:33)    Specimen Source: .Blood Blood-Peripheral    Culture Results:   No growth to date.      RADIOLOGY & ADDITIONAL STUDIES:    EXAM:  CT CHEST                            PROCEDURE DATE:  12/31/2018          INTERPRETATION:  Clinical information: Respiratory distress, sepsis    Comparison CT chest study dated 4/25/2017. Comparison CT abdomen-pelvis   study dated 11/14/2014.    Axial images obtained, coronal and sagittal images computer reformatted.   Noncontrast exam. Neither intravenous or oral contrast material was   administered which limits the study.        CHEST: An NG tube is present. No thoracic aortic aneurysm or pericardial   effusion. Global cardiomegaly present. Coronary artery calcifications   present. Central airway intact. Thyroid gland not enlarged.    Minimal bibasilar effusions right greater than left. Pleural thickening   with pleural parenchymal scarring at the apices unchanged since the prior   exam. Calcifications evident within the right apical scarring.    Multifocal bilateral airspace disease present. Airspace disease present   in the left upper lobe, left lower lobe, right middle lobe, and right   lower lobe.    No acute osseous abnormalities visible in the thoracic skeleton.    Small anterior mediastinal lymph nodes present could be reactive. Hilar   regions obscured by the extensive airspace disease.        ABDOMEN-PELVIS: Postcholecystectomy clips present. Hepatic parenchyma   homogeneous. The spleen is not enlarged. No adrenal lesions evident. No   hydronephrotic changes identified. Traces bilateral perinephric stranding   noted. Unenhanced pancreas shows no lesions. No aortic aneurysm. No   retroperitoneal lymphadenopathy. No ascites. No biliary ductal   dilatation. Past history of gastric surgery type unspecified.    No bowel obstruction. Edematous appearance of the mesentery could be   related to volume overload. Diverticulosis present. Urinary bladder shows   a thickened wall. A calcification is visible posterior aspect of the   urinary bladder slightly to the right of midline, could represent a stone   in a diverticulum or localized to the wall of the urinary bladder.   Calcification measures 4 mm. The prostate is markedly enlarged. No free   pelvic fluid evident. Inguinal regions intact. Multilevel disc   degenerative disease lower lumbar region.      Assessment :  89M with hypothyroidism, BPH, afib, CKD, who presents with SOB with acute hypoxic respiratory  failure complicated by vomiting with hematemesis likely severe aspiration pneumonia with   pneumonitis  Likely also component of pulm edema  + troponin  Coagulopathy  GABO on CKD  Sp thoracentesis    Plan :   Cont Zosyn day 10/10  Dc antibiotic after tonight's dose  Trend temps and wbc  Asp precautions  Prognosis overall poor    Continue with present regiment.  Appropriate use of antibiotics and adverse effects reviewed.    I have discussed the above plan of care with patient and family in detail. They expressed understanding of the the treatment plan . Risks, benefits and alternatives discussed in detail. I have asked if they have any questions or concerns and appropriately addressed them to the best of my ability .    Critical care time greater then 45 minutes reviewing notes, labs data/ imaging , discussion with multidisciplinary team.    Thank you for allowing me to participate in care of your patient .      Anibal Saldaña MD  Infectious Disease  [phone] HARSHAL COLORADO is a 89yMale , patient examined and chart reviewed.     INTERVAL HPI/ OVERNIGHT EVENTS:  Afebrile. Resting comfortably.  Sp thoracentesis yesterday -980m L clear orange fluid.   NGT dc as pt passed swallow study.  No events.    Past Medical History--  PAST MEDICAL & SURGICAL HISTORY:  Mitral valve disorder  Kidney stone  Diverticulosis  CKD (chronic kidney disease)  Afib  Hypothyroidism  BPH (benign prostatic hyperplasia)  Chronic peptic ulcer: S/P surgery more than 10 yrs ago    For details regarding the patient's social history, family history, and other miscellaneous elements, please refer the initial infectious diseases consultation and/or the admitting history and physical examination for this admission.    ROS: All systems reviewed and are negative    Current inpatient medications :    ANTIBIOTICS/RELEVANT:  piperacillin/tazobactam IVPB. 3.375 Gram(s) IV Intermittent every 8 hours    MEDICATIONS  (STANDING):  ALBUTerol    0.083% 2.5 milliGRAM(s) Nebulizer every 8 hours  ferrous    sulfate 325 milliGRAM(s) Oral daily  levothyroxine Injectable 37.5 MICROGram(s) IV Push at bedtime  metoprolol tartrate 25 milliGRAM(s) Oral two times a day  multivitamin 1 Tablet(s) Oral daily  oxymetazoline 0.05% Nasal Spray 1 Spray(s) Both Nostrils two times a day  pantoprazole  Injectable 40 milliGRAM(s) IV Push two times a day  tamsulosin 0.4 milliGRAM(s) Oral at bedtime    MEDICATIONS  (PRN):  metoprolol tartrate Injectable 5 milliGRAM(s) IV Push every 4 hours PRN HR>120      Objective:  ICU Vital Signs Last 24 Hrs  T(C): 36.7 (10 Bret 2019 16:03), Max: 37 (10 Bret 2019 04:30)  T(F): 98.1 (10 Bret 2019 16:03), Max: 98.6 (10 Bret 2019 04:30)  HR: 96 (10 Bret 2019 15:47) (81 - 123)  BP: 99/72 (10 Bret 2019 15:17) (68/52 - 123/105)  BP(mean): 80 (10 Bret 2019 15:17) (58 - 113)  RR: 22 (10 Bret 2019 15:17) (17 - 32)  SpO2: 100% (10 Bret 2019 15:47) (84% - 100%)      Physical Exam:  GEN: Weak Awake  HEENT: normocephalic and atraumatic. EOMI. NITHYA. Moist mucosa. Clear Posterior pharynx.  NECK: Supple. No carotid bruits.  No lymphadenopathy or thyromegaly.  LUNGS: Decreased to auscultation.  HEART: Regular rate and rhythm without murmur.  ABDOMEN: Soft, nontender, and nondistended.  Positive bowel sounds.   EXTREMITIES: + edema.  NEUROLOGIC: Lethargic No focal neurological deficits        LABS:                             11.9   10.02 )-----------( 155      ( 08 Jan 2019 20:00 )             36.9   01-08    139  |  97  |  27<H>  ----------------------------<  113<H>  3.4<L>   |  33<H>  |  1.69<H>    Ca    8.7      08 Jan 2019 20:00  Phos  3.1     01-08  Mg     1.7     01-08    TPro  5.9<L>  /  Alb  1.9<L>  /  TBili  1.0  /  DBili  x   /  AST  21  /  ALT  21  /  AlkPhos  76  01-08      MICROBIOLOGY:    Culture - Fungal, Body Fluid (collected 09 Jan 2019 21:22)  Source: Pleural Fl Pleural Fluid  Preliminary Report (10 Bret 2019 08:40):    Testing in progress    Culture - Body Fluid with Gram Stain (collected 09 Jan 2019 21:22)  Source: Pleural Fl Pleural Fluid  Gram Stain (10 Bret 2019 01:20):    polymorphonuclear leukocytes seen    No organisms seen    by cytocentrifuge  Preliminary Report (10 Bret 2019 15:50):    No growth to date.    Culture - Acid Fast - Body Fluid w/Smear (collected 09 Jan 2019 21:22)  Source: Pleural Fl Pleural Fluid      Culture - Sputum (collected 03 Jan 2019 10:25)  Source: .Sputum Sputum - trap  Gram Stain (03 Jan 2019 15:36):    Numerous polymorphonuclear leukocytes seen per low power field    Few Squamous epithelial cells seen per low power field    Numerous Yeast like cells seen per oil power field  Preliminary Report (04 Jan 2019 09:19):    Normal Respiratory Abbie present    Culture - Blood (12.31.18 @ 12:33)    Specimen Source: .Blood Blood-Peripheral    Culture Results:   No growth to date.      RADIOLOGY & ADDITIONAL STUDIES:    EXAM:  CT CHEST                            PROCEDURE DATE:  12/31/2018          INTERPRETATION:  Clinical information: Respiratory distress, sepsis    Comparison CT chest study dated 4/25/2017. Comparison CT abdomen-pelvis   study dated 11/14/2014.    Axial images obtained, coronal and sagittal images computer reformatted.   Noncontrast exam. Neither intravenous or oral contrast material was   administered which limits the study.        CHEST: An NG tube is present. No thoracic aortic aneurysm or pericardial   effusion. Global cardiomegaly present. Coronary artery calcifications   present. Central airway intact. Thyroid gland not enlarged.    Minimal bibasilar effusions right greater than left. Pleural thickening   with pleural parenchymal scarring at the apices unchanged since the prior   exam. Calcifications evident within the right apical scarring.    Multifocal bilateral airspace disease present. Airspace disease present   in the left upper lobe, left lower lobe, right middle lobe, and right   lower lobe.    No acute osseous abnormalities visible in the thoracic skeleton.    Small anterior mediastinal lymph nodes present could be reactive. Hilar   regions obscured by the extensive airspace disease.        ABDOMEN-PELVIS: Postcholecystectomy clips present. Hepatic parenchyma   homogeneous. The spleen is not enlarged. No adrenal lesions evident. No   hydronephrotic changes identified. Traces bilateral perinephric stranding   noted. Unenhanced pancreas shows no lesions. No aortic aneurysm. No   retroperitoneal lymphadenopathy. No ascites. No biliary ductal   dilatation. Past history of gastric surgery type unspecified.    No bowel obstruction. Edematous appearance of the mesentery could be   related to volume overload. Diverticulosis present. Urinary bladder shows   a thickened wall. A calcification is visible posterior aspect of the   urinary bladder slightly to the right of midline, could represent a stone   in a diverticulum or localized to the wall of the urinary bladder.   Calcification measures 4 mm. The prostate is markedly enlarged. No free   pelvic fluid evident. Inguinal regions intact. Multilevel disc   degenerative disease lower lumbar region.      Assessment :  89M with hypothyroidism, BPH, afib, CKD, who presents with SOB with acute hypoxic respiratory  failure complicated by vomiting with hematemesis likely severe aspiration pneumonia with   pneumonitis  Likely also component of pulm edema  + troponin  Coagulopathy  GABO on CKD  Sp thoracentesis    Plan :   Cont Zosyn day 10/10  Dc antibiotic after tonight's dose  Trend temps and wbc  Asp precautions    Continue with present regiment.  Appropriate use of antibiotics and adverse effects reviewed.    I have discussed the above plan of care with patient and family in detail. They expressed understanding of the the treatment plan . Risks, benefits and alternatives discussed in detail. I have asked if they have any questions or concerns and appropriately addressed them to the best of my ability .    Critical care time greater then 45 minutes reviewing notes, labs data/ imaging , discussion with multidisciplinary team.    Thank you for allowing me to participate in care of your patient .      Anibal Saldaña MD  Infectious Disease  [phone]

## 2019-01-10 NOTE — PROGRESS NOTE ADULT - SUBJECTIVE AND OBJECTIVE BOX
Date/Time Patient Seen:  		  Referring MD:   Data Reviewed	       Patient is a 89y old  Male who presents with a chief complaint of SOB (09 Jan 2019 15:54)      Subjective/HPI     PAST MEDICAL & SURGICAL HISTORY:  Mitral valve disorder  Kidney stone  Diverticulosis  CKD (chronic kidney disease)  Afib  Hypothyroidism  BPH (benign prostatic hyperplasia)  Chronic peptic ulcer: S/P surgery more than 10 yrs ago  No significant past surgical history        Medication list         MEDICATIONS  (STANDING):  ALBUTerol    0.083% 2.5 milliGRAM(s) Nebulizer every 8 hours  ferrous    sulfate 325 milliGRAM(s) Oral daily  levothyroxine Injectable 37.5 MICROGram(s) IV Push at bedtime  metoprolol tartrate 25 milliGRAM(s) Oral two times a day  multivitamin 1 Tablet(s) Oral daily  oxymetazoline 0.05% Nasal Spray 1 Spray(s) Both Nostrils two times a day  pantoprazole  Injectable 40 milliGRAM(s) IV Push two times a day  piperacillin/tazobactam IVPB. 3.375 Gram(s) IV Intermittent every 8 hours  tamsulosin 0.4 milliGRAM(s) Oral at bedtime    MEDICATIONS  (PRN):  metoprolol tartrate Injectable 5 milliGRAM(s) IV Push every 4 hours PRN HR>120         Vitals log        ICU Vital Signs Last 24 Hrs  T(C): 36.8 (10 Bret 2019 00:23), Max: 36.8 (10 Bret 2019 00:23)  T(F): 98.3 (10 Bret 2019 00:23), Max: 98.3 (10 Bret 2019 00:23)  HR: 81 (10 Bret 2019 06:00) (81 - 123)  BP: 97/63 (10 Bret 2019 06:00) (97/63 - 125/73)  BP(mean): 76 (10 Bret 2019 06:00) (76 - 113)  ABP: --  ABP(mean): --  RR: 18 (10 Bret 2019 06:00) (17 - 25)  SpO2: 97% (10 Bret 2019 06:00) (93% - 100%)           Input and Output:  I&O's Detail    08 Jan 2019 07:01  -  09 Jan 2019 07:00  --------------------------------------------------------  IN:    Enteral Tube Flush: 300 mL    ns in tub fed vital15: 560 mL    Solution: 100 mL    Solution: 300 mL  Total IN: 1260 mL    OUT:    Indwelling Catheter - Urethral: 350 mL  Total OUT: 350 mL    Total NET: 910 mL      09 Jan 2019 07:01  -  10 Bret 2019 06:33  --------------------------------------------------------  IN:    Enteral Tube Flush: 420 mL    ns in tub fed vital15: 1220 mL    Solution: 600 mL    Solution: 200 mL  Total IN: 2440 mL    OUT:    Indwelling Catheter - Urethral: 100 mL  Total OUT: 100 mL    Total NET: 2340 mL          Lab Data                        11.9   10.02 )-----------( 155      ( 08 Jan 2019 20:00 )             36.9     01-08    139  |  97  |  27<H>  ----------------------------<  113<H>  3.4<L>   |  33<H>  |  1.69<H>    Ca    8.7      08 Jan 2019 20:00  Phos  3.1     01-08  Mg     1.7     01-08    TPro  5.9<L>  /  Alb  1.9<L>  /  TBili  1.0  /  DBili  x   /  AST  21  /  ALT  21  /  AlkPhos  76  01-08            Review of Systems	      Objective     Physical Examination  frail  weak  ng tube in place  lung dec BS  abd soft  verbal        Pertinent Lab findings & Imaging      Guerin:  NO   Adequate UO     I&O's Detail    08 Jan 2019 07:01  -  09 Jan 2019 07:00  --------------------------------------------------------  IN:    Enteral Tube Flush: 300 mL    ns in tub fed vital15: 560 mL    Solution: 100 mL    Solution: 300 mL  Total IN: 1260 mL    OUT:    Indwelling Catheter - Urethral: 350 mL  Total OUT: 350 mL    Total NET: 910 mL      09 Jan 2019 07:01  -  10 Jan 2019 06:33  --------------------------------------------------------  IN:    Enteral Tube Flush: 420 mL    ns in tub fed vital15: 1220 mL    Solution: 600 mL    Solution: 200 mL  Total IN: 2440 mL    OUT:    Indwelling Catheter - Urethral: 100 mL  Total OUT: 100 mL    Total NET: 2340 mL               Discussed with:     Cultures:	        Radiology

## 2019-01-10 NOTE — SWALLOW VFSS/MBS ASSESSMENT ADULT - NS SWALLOW VFSS REC ASPIR MON
pneumonia/throat clearing/upper respiratory infection/change of breathing pattern/oral hygiene/position upright (89Y)/cough/gurgly voice/fever

## 2019-01-10 NOTE — SWALLOW VFSS/MBS ASSESSMENT ADULT - RECOMMENDED FEEDING/EATING TECHNIQUES
maintain upright posture during/after eating for 30 mins/oral hygiene/allow for swallow between intakes/alternate food with liquid/position upright (90 degrees)/small sips/bites/crush medication (when feasible)/hard swallow w/ each bite or sip/no straws

## 2019-01-10 NOTE — PROGRESS NOTE ADULT - SUBJECTIVE AND OBJECTIVE BOX
Neurology follow up note    HARSHAL [id number]      Interval History:    Patient feels ok no new complaints.    MEDICATIONS    ALBUTerol    0.083% 2.5 milliGRAM(s) Nebulizer every 8 hours  ferrous    sulfate 325 milliGRAM(s) Oral daily  levothyroxine Injectable 37.5 MICROGram(s) IV Push at bedtime  metoprolol tartrate 25 milliGRAM(s) Oral two times a day  metoprolol tartrate Injectable 5 milliGRAM(s) IV Push every 4 hours PRN  multivitamin 1 Tablet(s) Oral daily  oxymetazoline 0.05% Nasal Spray 1 Spray(s) Both Nostrils two times a day  pantoprazole  Injectable 40 milliGRAM(s) IV Push two times a day  piperacillin/tazobactam IVPB. 3.375 Gram(s) IV Intermittent every 8 hours  tamsulosin 0.4 milliGRAM(s) Oral at bedtime      Allergies    No Known Allergies    Intolerances            Vital Signs Last 24 Hrs  T(C): 36.4 (10 Bret 2019 08:17), Max: 37 (10 Bret 2019 04:30)  T(F): 97.6 (10 Bret 2019 08:17), Max: 98.6 (10 Bret 2019 04:30)  HR: 86 (10 Bret 2019 08:00) (81 - 123)  BP: 91/74 (10 Bret 2019 08:00) (91/74 - 125/73)  BP(mean): 81 (10 Bret 2019 08:00) (76 - 113)  RR: 17 (10 Bret 2019 08:00) (17 - 25)  SpO2: 98% (10 Bret 2019 08:00) (93% - 100%)      REVIEW OF SYSTEMS:     Constitutional: No fever, chills, fatigue, weakness  Eyes: no eye pain, visual disturbances, or discharge  ENT:  No difficulty hearing, tinnitus, vertigo; No sinus or throat pain  Neck: No pain or stiffness  Respiratory: No cough, dyspnea, wheezing   Cardiovascular: No chest pain, palpitations,   Gastrointestinal: No abdominal or epigastric pain. No nausea, vomiting  No diarrhea or constipation.   Genitourinary: No dysuria, frequency, hematuria or incontinence  Neurological: No headaches, lightheadedness, vertigo, numbness or tremors  Psychiatric: No depression, anxiety, mood swings or difficulty sleeping  Musculoskeletal: No joint pain or swelling; No muscle, back or extremity pain  Skin: No itching, burning, rashes or lesions   Lymph Nodes: No enlarged glands  Endocrine: No heat or cold intolerance; No hair loss   Allergy and Immunologic: No hives or eczema    On Neurological Examination:    Mental Status - Patient is alert, awake,   [facility] 2019      Follow simple commands      Speech -   Fluent                         Cranial Nerves - Pupils 3 mm equal and reactive to light,   extraocular eye movements intact.   smile symmetric  intact bilateral NLF    Motor Exam -   With stimuli positive movement of all 4 extremities    Muscle tone - is normal all over.  No asymmetry is seen.      Sensory    Bilateral intact to light touch    GENERAL Exam: Nontoxic , No Acute Distress   	  HEENT:  normocephalic, atraumatic  		  LUNGS: Clear bilaterally  	  HEART: Normal S1S2   No murmur RRR        	  GI/ ABDOMEN:  Soft  Non tender    EXTREMITIES:   No Edema  No Clubbing  No Cyanosis   	   SKIN: Normal  No Ecchymosis               LABS:  CBC Full  -  ( 10 Bret 2019 06:54 )  WBC Count : 11.69 K/uL  Hemoglobin : 11.7 g/dL  Hematocrit : 36.1 %  Platelet Count - Automated : 188 K/uL  Mean Cell Volume : 94.8 fl  Mean Cell Hemoglobin : 30.7 pg  Mean Cell Hemoglobin Concentration : 32.4 gm/dL  Auto Neutrophil # : x  Auto Lymphocyte # : x  Auto Monocyte # : x  Auto Eosinophil # : x  Auto Basophil # : x  Auto Neutrophil % : x  Auto Lymphocyte % : x  Auto Monocyte % : x  Auto Eosinophil % : x  Auto Basophil % : x      01-10    138  |  98  |  29<H>  ----------------------------<  116<H>  3.9   |  35<H>  |  1.58<H>    Ca    8.7      10 Bret 2019 06:54  Phos  2.8     01-10  Mg     2.1     01-10      Hemoglobin A1C:       Vitamin B12   PT/INR - ( 08 Jan 2019 20:00 )   PT: 17.1 sec;   INR: 1.55 ratio         PTT - ( 08 Jan 2019 20:00 )  PTT:33.6 sec      RADIOLOGY    ASSESSMENT AND PLAN    change in mental status,   For changes in mental status and lethargy, suspect most likely this is metabolic, which is multifactorial secondary aspiration pneumonia with possible element of CHF.  For history of sepsis, antibiotics as needed.  monitor renal function  overall more awake   monitor sbp keep above 100 if possible   no new events   spoke to family in detail in past   Greater than 45 minutes of time was spent with the patient, plan of care, reviewing data, speaking to the family and multidisciplinary healthcare team

## 2019-01-10 NOTE — PROVIDER CONTACT NOTE (EICU) - RECOMMENDATIONS
- Move patient back to bed  - D/c lasix for now and reassess blood volume  - Check CBC and CMP to assess for possible anemia  - consider giving fluids if remains hypotensive,   - repeat chest xray given recent thoracentesis to re-eval for pleural effusion, r/o ptx and assess for new infiltrates  - Discussed with nurse at bedside and provider

## 2019-01-10 NOTE — PROVIDER CONTACT NOTE (EICU) - BACKGROUND
called by RN for dropping FS, now 83, patient NPO and not getting any dextrose. Not being fed. Ordered D5 at 75/hr.
pt with multilobar pna and CHF? LAST ECHO 2017 EF 55%, called by nurse for pt with episode of lethargy and hypoxia with pulse ox above 90
Admitted w/SOB now in A fib w/RVR on amio gtt,
89M choked on evening meds, when obstruction relieved, patient vomited and aspirated admitted to ICU.  Currently called by RN for hypotension in the setting of patient OOB to chair with a history of heart failure, acute blood loss anemia in the setting of GI bleed and supratherapeutic INR previously.

## 2019-01-10 NOTE — PROGRESS NOTE ADULT - ASSESSMENT
The patient is a 89 year old male with a history of hypothyroidism, BPH, PUD, atrial fibrillation, chronic diastolic heart failure who was admitted with respiratory failure, GI bleed, hypotension.    Plan:  - Echo from 2017 with grossly low normal LV systolic function, no significant valve issues  - Received furosemide 20 mg IV today. Will start furosemide 40 mg PO tomorrow.  - Troponin peaked at 0.292 in the setting of type 2 NSTEMI (demand ischemia) from respiratory failure and GI bleed  - Remain off of anticoagulation  - Continue metoprolol tartrate 25 mg bid  - Continue metoprolol 5 mg IV q4h prn HR>120  - If tolerating diet, d/c NGT

## 2019-01-10 NOTE — PROGRESS NOTE ADULT - SUBJECTIVE AND OBJECTIVE BOX
CC.  SOB  HPI.  Patient reports SOB, and cough are improving.  afebrile.  offers no other complaints    poor historian due to underline dementia  More awake today    REVIEW OF SYSTEMS:  unable to obtain    Vital Signs Last 24 Hrs  T(C): 36.7 (10 Bret 2019 12:23), Max: 37 (10 Bret 2019 04:30)  T(F): 98.1 (10 Bret 2019 12:23), Max: 98.6 (10 Bret 2019 04:30)  HR: 93 (10 Bret 2019 11:52) (81 - 123)  BP: 120/70 (10 Bret 2019 11:52) (91/74 - 125/73)  BP(mean): 82 (10 Bret 2019 10:00) (76 - 113)  RR: 28 (10 Bret 2019 11:52) (17 - 28)  SpO2: 97% (10 Bret 2019 11:52) (93% - 100%)    PHYSICAL EXAM:       PHYSICAL EXAM-  GENERAL: Chronic ill appearing male  HEAD:  Atraumatic, Normocephalic  EYES: EOMI, PERRLA, conjunctiva and sclera clear  NECK: Supple, No JVD, Normal thyroid  NERVOUS SYSTEM:  Alert & Oriented X 1.  Moving all extremities  CHEST/LUNG: + rhonchi with good air entry.  No retractions or accessory muscle usage  HEART: Regular rate and rhythm; No   gallops  ABDOMEN: Soft, Nontender, Nondistended; Bowel sounds present  EXTREMITIES:   No clubbing, cyanosis,   SKIN: No rashes or lesions                              11.7   11.69 )-----------( 188      ( 10 Bret 2019 06:54 )             36.1     01-10    138  |  98  |  29<H>  ----------------------------<  116<H>  3.9   |  35<H>  |  1.58<H>    Ca    8.7      10 Bret 2019 06:54  Phos  2.8     01-10  Mg     2.1     01-10              PT/INR - ( 08 Jan 2019 20:00 )   PT: 17.1 sec;   INR: 1.55 ratio         PTT - ( 08 Jan 2019 20:00 )  PTT:33.6 sec      MEDICATIONS  (STANDING):  ALBUTerol    0.083% 2.5 milliGRAM(s) Nebulizer every 8 hours  ferrous    sulfate 325 milliGRAM(s) Oral daily  levothyroxine Injectable 37.5 MICROGram(s) IV Push at bedtime  metoprolol tartrate 25 milliGRAM(s) Oral two times a day  multivitamin 1 Tablet(s) Oral daily  oxymetazoline 0.05% Nasal Spray 1 Spray(s) Both Nostrils two times a day  pantoprazole  Injectable 40 milliGRAM(s) IV Push two times a day  piperacillin/tazobactam IVPB. 3.375 Gram(s) IV Intermittent every 8 hours  tamsulosin 0.4 milliGRAM(s) Oral at bedtime    MEDICATIONS  (PRN):  metoprolol tartrate Injectable 5 milliGRAM(s) IV Push every 4 hours PRN HR>120    Imaging Personally Reviewed:     [x ] YES  [ ] NO      Consultant(s) Notes Reviewed:  [x ] YES  [ ] NO    Care Discussed with Consultants/Other Providers [x ] YES  [ ] NO

## 2019-01-10 NOTE — PROGRESS NOTE ADULT - SUBJECTIVE AND OBJECTIVE BOX
Chief Complaint: Shortness of breath, hematemesis    Interval Events: No events overnight. No complaints.    Review of Systems:  General: No fevers, chills, weight loss or gain  Skin: No rashes, color changes  Cardiovascular: No chest pain, orthopnea  Respiratory: No shortness of breath, cough  Gastrointestinal: No nausea, abdominal pain  Genitourinary: No incontinence, pain with urination  Musculoskeletal: No pain, swelling, decreased range of motion  Neurological: No headache, weakness  Psychiatric: No depression, anxiety  Endocrine: No weight loss or gain, increased thirst  All other systems are comprehensively negative.    Physical Exam:  Vital Signs Last 24 Hrs  T(C): 36.4 (10 Bret 2019 08:17), Max: 37 (10 Bret 2019 04:30)  T(F): 97.6 (10 Bret 2019 08:17), Max: 98.6 (10 Bret 2019 04:30)  HR: 86 (10 Bret 2019 08:00) (81 - 123)  BP: 91/74 (10 Bret 2019 08:00) (91/74 - 125/73)  BP(mean): 81 (10 Bret 2019 08:00) (76 - 113)  RR: 17 (10 Bret 2019 08:00) (17 - 25)  SpO2: 98% (10 Bret 2019 08:00) (93% - 100%)  General: NAD  HEENT: MMM  Neck: No JVD, no carotid bruit  Lungs: Decreased at bases  CV: Irregular, nl S1/S2, no M/R/G  Abdomen: S/NT/ND, +BS  Extremities: 1+ LE edema, no cyanosis  Neuro: AAOx3, non-focal  Skin: No rash    Labs:    01-10    138  |  98  |  29<H>  ----------------------------<  116<H>  3.9   |  35<H>  |  1.58<H>    Ca    8.7      10 Bret 2019 06:54  Phos  2.8     01-10  Mg     2.1     01-10                          11.7   11.69 )-----------( 188      ( 10 Bret 2019 06:54 )             36.1       Telemetry: AF 80-90, brief tachycardia

## 2019-01-10 NOTE — PROGRESS NOTE ADULT - ASSESSMENT
Patient is 89M with HTN, hypothyroidism, BPH, afib, CKD, who presents with SOB.  Patient was in his usual state of health with no recent illness when he tried taking two of his pills this evening.  SOB 2/2 aspiration pneumonitis.      1. Acute hypoxic respiratory failure:  - Likely due to aspiration pneumonia with possible element of CHF.  - S/P thoracentesis today.  Fluid analysis pending  - Echo shows grossly normal left ventricular size and function with a calculated ejection fraction of 58%. Mild mitral insufficiency. Mild pulmonary hypertension. A large pleural effusion was incidentally noted,    - Chest PT, suction as necessary  - See below for management of PNA.  -Continue with PO lasix   - Pulmonary to follow up     2. Aspiration pneumonia:   - Continue with SPCU level of care.    - Continue IV Zosyn  - Pulm/ID following.  - f/u cultures  -Continue with current diet as per speech therapy evaluation       3. Dysphagia:  - Continue with tube feeding.    -Further care as per GI  -Continue with current diet as per speech therapy evaluation        4. Metabolic encephalopathy:  -Likely due to acute infection.  -VBG noticed     5. GI bleed:  - History of Billroth II w/ gastrojejunal ulcer in 2017- now resolved.   - Daily CBC  - Protonix IV BID  -H/H stable  - AC and anti platelets on hold.   - GI following    6. Hypoglycemia  -Resolved.  Monitor POC on current diet    7. Coagulopathy   - s/p IV Vit K and FFP  - improved  - was on Warfarin at home for afib    8. Atrial fibrillation  - IV metoprolol until can take PO  - cardio f/u appreciated   - Will need to consider Eliquis vs. no AC    9. CKD 3  -Appears to be at baseline based on review of labs in HIE  -Dose meds renally  -Monitor renal indices    10 Hypothyroidism  -Continue with Levothyroxine.     11. Benign prostatic hyperplasia  -Continue Flomax    12. Hypothyroidism  -Continue with Synthroid.     13. VTE PPx   -Compression stocking in the setting of nasal bleed    14.  Nasal bleeding  -Resolved now.    Continue with Humified Oxygen and afrin  -Monitor closely  Hold heparin, compression stocking for now    overall prognosis poor    Plan of care was discussed with patient, wife and son in law in great details, All questions were answered to their satisfaction  Seems to understand, and in agreement

## 2019-01-11 LAB
ANION GAP SERPL CALC-SCNC: 6 MMOL/L — SIGNIFICANT CHANGE UP (ref 5–17)
BUN SERPL-MCNC: 34 MG/DL — HIGH (ref 7–23)
CALCIUM SERPL-MCNC: 8.6 MG/DL — SIGNIFICANT CHANGE UP (ref 8.4–10.5)
CHLORIDE SERPL-SCNC: 99 MMOL/L — SIGNIFICANT CHANGE UP (ref 96–108)
CO2 SERPL-SCNC: 35 MMOL/L — HIGH (ref 22–31)
CREAT SERPL-MCNC: 1.64 MG/DL — HIGH (ref 0.5–1.3)
GLUCOSE SERPL-MCNC: 86 MG/DL — SIGNIFICANT CHANGE UP (ref 70–99)
HCT VFR BLD CALC: 36.8 % — LOW (ref 39–50)
HGB BLD-MCNC: 11.5 G/DL — LOW (ref 13–17)
MCHC RBC-ENTMCNC: 29.9 PG — SIGNIFICANT CHANGE UP (ref 27–34)
MCHC RBC-ENTMCNC: 31.3 GM/DL — LOW (ref 32–36)
MCV RBC AUTO: 95.8 FL — SIGNIFICANT CHANGE UP (ref 80–100)
NON-GYNECOLOGICAL CYTOLOGY STUDY: SIGNIFICANT CHANGE UP
NRBC # BLD: 0 /100 WBCS — SIGNIFICANT CHANGE UP (ref 0–0)
PLATELET # BLD AUTO: 212 K/UL — SIGNIFICANT CHANGE UP (ref 150–400)
POTASSIUM SERPL-MCNC: 4.5 MMOL/L — SIGNIFICANT CHANGE UP (ref 3.5–5.3)
POTASSIUM SERPL-SCNC: 4.5 MMOL/L — SIGNIFICANT CHANGE UP (ref 3.5–5.3)
RBC # BLD: 3.84 M/UL — LOW (ref 4.2–5.8)
RBC # FLD: 13 % — SIGNIFICANT CHANGE UP (ref 10.3–14.5)
SODIUM SERPL-SCNC: 140 MMOL/L — SIGNIFICANT CHANGE UP (ref 135–145)
WBC # BLD: 11.31 K/UL — HIGH (ref 3.8–10.5)
WBC # FLD AUTO: 11.31 K/UL — HIGH (ref 3.8–10.5)

## 2019-01-11 PROCEDURE — 99233 SBSQ HOSP IP/OBS HIGH 50: CPT

## 2019-01-11 RX ORDER — LEVOTHYROXINE SODIUM 125 MCG
75 TABLET ORAL DAILY
Qty: 0 | Refills: 0 | Status: DISCONTINUED | OUTPATIENT
Start: 2019-01-11 | End: 2019-01-13

## 2019-01-11 RX ORDER — MIDODRINE HYDROCHLORIDE 2.5 MG/1
5 TABLET ORAL THREE TIMES A DAY
Qty: 0 | Refills: 0 | Status: DISCONTINUED | OUTPATIENT
Start: 2019-01-11 | End: 2019-01-13

## 2019-01-11 RX ADMIN — ALBUTEROL 2.5 MILLIGRAM(S): 90 AEROSOL, METERED ORAL at 07:37

## 2019-01-11 RX ADMIN — OXYMETAZOLINE HYDROCHLORIDE 1 SPRAY(S): 0.5 SPRAY NASAL at 18:02

## 2019-01-11 RX ADMIN — Medication 75 MICROGRAM(S): at 22:28

## 2019-01-11 RX ADMIN — OXYMETAZOLINE HYDROCHLORIDE 1 SPRAY(S): 0.5 SPRAY NASAL at 05:31

## 2019-01-11 RX ADMIN — Medication 1 TABLET(S): at 11:55

## 2019-01-11 RX ADMIN — PANTOPRAZOLE SODIUM 40 MILLIGRAM(S): 20 TABLET, DELAYED RELEASE ORAL at 05:30

## 2019-01-11 RX ADMIN — ALBUTEROL 2.5 MILLIGRAM(S): 90 AEROSOL, METERED ORAL at 15:30

## 2019-01-11 RX ADMIN — TAMSULOSIN HYDROCHLORIDE 0.4 MILLIGRAM(S): 0.4 CAPSULE ORAL at 22:28

## 2019-01-11 RX ADMIN — Medication 325 MILLIGRAM(S): at 11:55

## 2019-01-11 RX ADMIN — Medication 25 MILLIGRAM(S): at 06:25

## 2019-01-11 RX ADMIN — MIDODRINE HYDROCHLORIDE 5 MILLIGRAM(S): 2.5 TABLET ORAL at 22:27

## 2019-01-11 RX ADMIN — ALBUTEROL 2.5 MILLIGRAM(S): 90 AEROSOL, METERED ORAL at 20:10

## 2019-01-11 RX ADMIN — PANTOPRAZOLE SODIUM 40 MILLIGRAM(S): 20 TABLET, DELAYED RELEASE ORAL at 18:03

## 2019-01-11 RX ADMIN — Medication 25 MILLIGRAM(S): at 18:03

## 2019-01-11 RX ADMIN — MIDODRINE HYDROCHLORIDE 5 MILLIGRAM(S): 2.5 TABLET ORAL at 14:35

## 2019-01-11 NOTE — PROGRESS NOTE ADULT - SUBJECTIVE AND OBJECTIVE BOX
HARSHAL COLORADO is a 89yMale , patient examined and chart reviewed.     INTERVAL HPI/ OVERNIGHT EVENTS:  Afebrile. Resting comfortably.  No events.    Past Medical History--  PAST MEDICAL & SURGICAL HISTORY:  Mitral valve disorder  Kidney stone  Diverticulosis  CKD (chronic kidney disease)  Afib  Hypothyroidism  BPH (benign prostatic hyperplasia)  Chronic peptic ulcer: S/P surgery more than 10 yrs ago    For details regarding the patient's social history, family history, and other miscellaneous elements, please refer the initial infectious diseases consultation and/or the admitting history and physical examination for this admission.    ROS: All systems reviewed and are negative    Current inpatient medications :    ANTIBIOTICS/RELEVANT:    MEDICATIONS  (STANDING):  ALBUTerol    0.083% 2.5 milliGRAM(s) Nebulizer every 8 hours  ferrous    sulfate 325 milliGRAM(s) Oral daily  levothyroxine Injectable 37.5 MICROGram(s) IV Push at bedtime  metoprolol tartrate 25 milliGRAM(s) Oral two times a day  midodrine 5 milliGRAM(s) Oral three times a day  multivitamin 1 Tablet(s) Oral daily  oxymetazoline 0.05% Nasal Spray 1 Spray(s) Both Nostrils two times a day  pantoprazole  Injectable 40 milliGRAM(s) IV Push two times a day  tamsulosin 0.4 milliGRAM(s) Oral at bedtime    Objective:  ICU Vital Signs Last 24 Hrs  T(C): 36.2 (11 Jan 2019 12:45), Max: 36.7 (11 Jan 2019 00:03)  T(F): 97.1 (11 Jan 2019 12:45), Max: 98.1 (11 Jan 2019 00:03)  HR: 91 (11 Jan 2019 15:30) (85 - 109)  BP: 93/68 (11 Jan 2019 14:00) (84/65 - 105/62)  BP(mean): 77 (11 Jan 2019 14:00) (72 - 85)  RR: 21 (11 Jan 2019 14:00) (11 - 24)  SpO2: 97% (11 Jan 2019 15:30) (93% - 100%)      Physical Exam:  GEN: Weak Awake  HEENT: normocephalic and atraumatic. EOMI. NITHYA. Moist mucosa. Clear Posterior pharynx.  NECK: Supple. No carotid bruits.  No lymphadenopathy or thyromegaly.  LUNGS: Decreased to auscultation.  HEART: Regular rate and rhythm without murmur.  ABDOMEN: Soft, nontender, and nondistended.  Positive bowel sounds.   EXTREMITIES: + edema.  NEUROLOGIC: Lethargic No focal neurological deficits        LABS:             11.5   11.31 )-----------( 212      ( 11 Jan 2019 06:51 )             36.8   01-11    140  |  99  |  34<H>  ----------------------------<  86  4.5   |  35<H>  |  1.64<H>    Ca    8.6      11 Jan 2019 06:51  Phos  2.8     01-10  Mg     2.1     01-10    TPro  6.4  /  Alb  2.0<L>  /  TBili  0.6  /  DBili  x   /  AST  23  /  ALT  18  /  AlkPhos  89  01-10      MICROBIOLOGY:    Culture - Fungal, Body Fluid (collected 09 Jan 2019 21:22)  Source: Pleural Fl Pleural Fluid  Preliminary Report (10 Bret 2019 08:40):    Testing in progress    Culture - Body Fluid with Gram Stain (collected 09 Jan 2019 21:22)  Source: Pleural Fl Pleural Fluid  Gram Stain (10 Bret 2019 01:20):    polymorphonuclear leukocytes seen    No organisms seen    by cytocentrifuge  Preliminary Report (10 Bret 2019 15:50):    No growth to date.    Culture - Acid Fast - Body Fluid w/Smear (collected 09 Jan 2019 21:22)  Source: Pleural Fl Pleural Fluid    RADIOLOGY & ADDITIONAL STUDIES:    EXAM:  CT CHEST                            PROCEDURE DATE:  12/31/2018          INTERPRETATION:  Clinical information: Respiratory distress, sepsis    Comparison CT chest study dated 4/25/2017. Comparison CT abdomen-pelvis   study dated 11/14/2014.    Axial images obtained, coronal and sagittal images computer reformatted.   Noncontrast exam. Neither intravenous or oral contrast material was   administered which limits the study.        CHEST: An NG tube is present. No thoracic aortic aneurysm or pericardial   effusion. Global cardiomegaly present. Coronary artery calcifications   present. Central airway intact. Thyroid gland not enlarged.    Minimal bibasilar effusions right greater than left. Pleural thickening   with pleural parenchymal scarring at the apices unchanged since the prior   exam. Calcifications evident within the right apical scarring.    Multifocal bilateral airspace disease present. Airspace disease present   in the left upper lobe, left lower lobe, right middle lobe, and right   lower lobe.    No acute osseous abnormalities visible in the thoracic skeleton.    Small anterior mediastinal lymph nodes present could be reactive. Hilar   regions obscured by the extensive airspace disease.        ABDOMEN-PELVIS: Postcholecystectomy clips present. Hepatic parenchyma   homogeneous. The spleen is not enlarged. No adrenal lesions evident. No   hydronephrotic changes identified. Traces bilateral perinephric stranding   noted. Unenhanced pancreas shows no lesions. No aortic aneurysm. No   retroperitoneal lymphadenopathy. No ascites. No biliary ductal   dilatation. Past history of gastric surgery type unspecified.    No bowel obstruction. Edematous appearance of the mesentery could be   related to volume overload. Diverticulosis present. Urinary bladder shows   a thickened wall. A calcification is visible posterior aspect of the   urinary bladder slightly to the right of midline, could represent a stone   in a diverticulum or localized to the wall of the urinary bladder.   Calcification measures 4 mm. The prostate is markedly enlarged. No free   pelvic fluid evident. Inguinal regions intact. Multilevel disc   degenerative disease lower lumbar region.      Assessment :  89M with hypothyroidism, BPH, afib, CKD, who presents with SOB with acute hypoxic respiratory  failure complicated by vomiting with hematemesis likely severe aspiration pneumonia with   pneumonitis  Likely also component of pulm edema  + troponin  Coagulopathy  GABO on CKD  Sp thoracentesis  Overall improving clinically    Plan :   Off Zosyn   Monitor off antibiotics  Asp precautions  Trend temps and wbc  Increase activity    Continue with present regiment.  Appropriate use of antibiotics and adverse effects reviewed.    I have discussed the above plan of care with patient and family in detail. They expressed understanding of the the treatment plan . Risks, benefits and alternatives discussed in detail. I have asked if they have any questions or concerns and appropriately addressed them to the best of my ability .    Critical care time greater then 45 minutes reviewing notes, labs data/ imaging , discussion with multidisciplinary team.    Thank you for allowing me to participate in care of your patient .      Anibal Saldaña MD  Infectious Disease  [phone]

## 2019-01-11 NOTE — DISCHARGE NOTE ADULT - CARE PLAN
Principal Discharge DX:	Acute respiratory failure with hypoxia  Goal:	Resolved.  Finished a course of antibiotic  Assessment and plan of treatment:	Puree diet with honey thicken liquid  Secondary Diagnosis:	Aspiration pneumonia  Goal:	Finished antibiotic  Secondary Diagnosis:	Atrial fibrillation with RVR  Goal:	Continue with metoprolol for rate control  Secondary Diagnosis:	CKD (chronic kidney disease)  Goal:	Stable  Secondary Diagnosis:	Dysphagia  Goal:	Continue with current diet as above

## 2019-01-11 NOTE — DISCHARGE NOTE ADULT - CARE PROVIDER_API CALL
Zi Mendoza), Cardiovascular Disease; Internal Medicine  [address] [facility] [address]  [address]  [place] [address]  Phone: [phone]  Fax: [phone]    Jonathon Myers), Critical Care Medicine; Internal Medicine; Pulmonary Disease  [address] [place] [address]  Phone: [phone]  Fax: [phone]

## 2019-01-11 NOTE — PROGRESS NOTE ADULT - SUBJECTIVE AND OBJECTIVE BOX
CC.  SOB  HPI.  Patient reports SOB, and cough are improving.  afebrile.  offers no other complaints    poor historian due to underline dementia  More awake today    REVIEW OF SYSTEMS:  unable to obtain    Vital Signs Last 24 Hrs  T(C): 36.5 (11 Jan 2019 08:42), Max: 36.7 (10 Bret 2019 16:03)  T(F): 97.7 (11 Jan 2019 08:42), Max: 98.1 (10 Bret 2019 16:03)  HR: 103 (11 Jan 2019 12:09) (85 - 121)  BP: 101/63 (11 Jan 2019 12:09) (68/52 - 116/77)  BP(mean): 76 (11 Jan 2019 12:09) (58 - 85)  RR: 24 (11 Jan 2019 12:09) (10 - 32)  SpO2: 97% (11 Jan 2019 12:09) (84% - 100%)    PHYSICAL EXAM-  GENERAL: Chronic ill appearing male  HEAD:  Atraumatic, Normocephalic  EYES: EOMI, PERRLA, conjunctiva and sclera clear  NECK: Supple, No JVD, Normal thyroid  NERVOUS SYSTEM:  Alert & Oriented X 1.  Moving all extremities  CHEST/LUNG: + rhonchi with good air entry.  No retractions or accessory muscle usage  HEART: Regular rate and rhythm; No   gallops  ABDOMEN: Soft, Nontender, Nondistended; Bowel sounds present  EXTREMITIES:   No clubbing, cyanosis,   SKIN: No rashes or lesions                              11.5   11.31 )-----------( 212      ( 11 Jan 2019 06:51 )             36.8     01-11    140  |  99  |  34<H>  ----------------------------<  86  4.5   |  35<H>  |  1.64<H>    Ca    8.6      11 Jan 2019 06:51  Phos  2.8     01-10  Mg     2.1     01-10    TPro  6.4  /  Alb  2.0<L>  /  TBili  0.6  /  DBili  x   /  AST  23  /  ALT  18  /  AlkPhos  89  01-10      MEDICATIONS  (STANDING):  ALBUTerol    0.083% 2.5 milliGRAM(s) Nebulizer every 8 hours  ferrous    sulfate 325 milliGRAM(s) Oral daily  levothyroxine Injectable 37.5 MICROGram(s) IV Push at bedtime  metoprolol tartrate 25 milliGRAM(s) Oral two times a day  midodrine 5 milliGRAM(s) Oral three times a day  multivitamin 1 Tablet(s) Oral daily  oxymetazoline 0.05% Nasal Spray 1 Spray(s) Both Nostrils two times a day  pantoprazole  Injectable 40 milliGRAM(s) IV Push two times a day  tamsulosin 0.4 milliGRAM(s) Oral at bedtime    MEDICATIONS  (PRN):  metoprolol tartrate Injectable 5 milliGRAM(s) IV Push every 4 hours PRN HR>120                           Imaging Personally Reviewed:     [x ] YES  [ ] NO      Consultant(s) Notes Reviewed:  [x ] YES  [ ] NO    Care Discussed with Consultants/Other Providers [x ] YES  [ ] NO

## 2019-01-11 NOTE — PROGRESS NOTE ADULT - ASSESSMENT
Patient is 89M with HTN, hypothyroidism, BPH, afib, CKD, who presents with SOB.  Patient was in his usual state of health with no recent illness when he tried taking two of his pills this evening.  SOB 2/2 aspiration pneumonitis.      1. Acute hypoxic respiratory failure:  - Likely due to aspiration pneumonia with possible element of CHF.  - S/P thoracentesis.  Fluid culture negative.    - Echo shows grossly normal left ventricular size and function with a calculated ejection fraction of 58%. Mild mitral insufficiency. Mild pulmonary hypertension. A large pleural effusion was incidentally noted,    - Chest PT, suction as necessary  - See below for management of PNA.  -Hold lasix for now due to borderline BP  - Pulmonary to follow up     2. Aspiration pneumonia:   - Continue with SPCU level of care.    - Finished a course of Zosyn  - Pulm/ID following.  - f/u cultures  -Continue with current diet as per speech therapy evaluation       3. Dysphagia:   -Further care as per GI  -Continue with current diet as per speech therapy evaluation        4. Metabolic encephalopathy:  -Likely due to acute infection.  -VBG noticed     5. GI bleed:  - History of Billroth II w/ gastrojejunal ulcer in 2017- now resolved.   - Daily CBC  - Protonix IV BID  -H/H stable  - AC and anti platelets on hold.   - GI following    6. Hypoglycemia  -Resolved.  Monitor POC on current diet    7. Coagulopathy   - s/p IV Vit K and FFP  - improved  - was on Warfarin at home for afib    8. Atrial fibrillation  -Continue with PO metoprolol.    - cardio f/u appreciated   - Will need to consider Eliquis vs. no AC    9. CKD 3  -Appears to be at baseline based on review of labs in HIE  -Dose meds renally  -Monitor renal indices    10 Hypothyroidism  -Continue with Levothyroxine.     11. Benign prostatic hyperplasia  -Continue Flomax     12. VTE PPx   -Compression stocking in the setting of nasal bleed    13.  Nasal bleeding  -Resolved now.    Continue with Humified Oxygen and afrin  -Monitor closely  Hold heparin, compression stocking for now    overall prognosis poor    Plan of care was discussed with patient, wife and son in law in great details, All questions were answered to their satisfaction  Seems to understand, and in agreement Patient is 89M with HTN, hypothyroidism, BPH, afib, CKD, who presents with SOB.  Patient was in his usual state of health with no recent illness when he tried taking two of his pills this evening.  SOB 2/2 aspiration pneumonitis.      1. Acute hypoxic respiratory failure:  - Likely due to aspiration pneumonia with possible element of CHF.  - S/P thoracentesis.  Fluid culture negative.    - Echo shows grossly normal left ventricular size and function with a calculated ejection fraction of 58%. Mild mitral insufficiency. Mild pulmonary hypertension. A large pleural effusion was incidentally noted,    - Chest PT, suction as necessary  - See below for management of PNA.  -Hold lasix for now due to borderline BP  -Midodrine was added for borderline BP  - Pulmonary to follow up     2. Aspiration pneumonia:   - Continue with SPCU level of care.    - Finished a course of Zosyn  - Pulm/ID following.  - f/u cultures  -Continue with current diet as per speech therapy evaluation       3. Dysphagia:   -Further care as per GI  -Continue with current diet as per speech therapy evaluation        4. Metabolic encephalopathy:  -Likely due to acute infection.  -VBG noticed     5. GI bleed:  - History of Billroth II w/ gastrojejunal ulcer in 2017- now resolved.   - Daily CBC  - Protonix IV BID  -H/H stable  - AC and anti platelets on hold.   - GI following    6. Hypoglycemia  -Resolved.  Monitor POC on current diet    7. Coagulopathy   - s/p IV Vit K and FFP  - improved  - was on Warfarin at home for afib    8. Atrial fibrillation  -Continue with PO metoprolol.    - cardio f/u appreciated   - Will need to consider Eliquis vs. no AC    9. CKD 3  -Appears to be at baseline based on review of labs in HIE  -Dose meds renally  -Monitor renal indices    10 Hypothyroidism  -Continue with Levothyroxine.     11. Benign prostatic hyperplasia  -Continue Flomax     12. VTE PPx   -Compression stocking in the setting of nasal bleed    13.  Nasal bleeding  -Resolved now.    Continue with Humified Oxygen and afrin  -Monitor closely  Hold heparin, compression stocking for now    overall prognosis poor    Plan of care was discussed with patient, wife and son in law in great details, All questions were answered to their satisfaction  Seems to understand, and in agreement

## 2019-01-11 NOTE — DISCHARGE NOTE ADULT - PATIENT PORTAL LINK FT
You can access the [facility] Patient Portal, offered by [facility], by registering with the following website: [email or web address]

## 2019-01-11 NOTE — PROGRESS NOTE ADULT - ASSESSMENT
The patient is a 89 year old male with a history of hypothyroidism, BPH, PUD, atrial fibrillation, chronic diastolic heart failure who was admitted with respiratory failure, GI bleed, hypotension.    Plan:  - Echo from 2017 with grossly low normal LV systolic function, no significant valve issues  - Furosemide held  - Troponin peaked at 0.292 in the setting of type 2 NSTEMI (demand ischemia) from respiratory failure and GI bleed  - Remain off of anticoagulation  - Continue metoprolol tartrate 25 mg bid  - Continue metoprolol 5 mg IV q4h prn HR>120

## 2019-01-11 NOTE — PROGRESS NOTE ADULT - SUBJECTIVE AND OBJECTIVE BOX
Neurology follow up note    [facility] [address]      Interval History:    Patient feels ok no new complaints.    MEDICATIONS    ALBUTerol    0.083% 2.5 milliGRAM(s) Nebulizer every 8 hours  ferrous    sulfate 325 milliGRAM(s) Oral daily  levothyroxine Injectable 37.5 MICROGram(s) IV Push at bedtime  metoprolol tartrate 25 milliGRAM(s) Oral two times a day  metoprolol tartrate Injectable 5 milliGRAM(s) IV Push every 4 hours PRN  multivitamin 1 Tablet(s) Oral daily  oxymetazoline 0.05% Nasal Spray 1 Spray(s) Both Nostrils two times a day  pantoprazole  Injectable 40 milliGRAM(s) IV Push two times a day  tamsulosin 0.4 milliGRAM(s) Oral at bedtime      Allergies    No Known Allergies    Intolerances            Vital Signs Last 24 Hrs  T(C): 36.7 (11 Jan 2019 04:03), Max: 36.7 (10 Bret 2019 12:23)  T(F): 98 (11 Jan 2019 04:03), Max: 98.1 (10 Bret 2019 12:23)  HR: 96 (11 Jan 2019 06:01) (85 - 121)  BP: 94/62 (11 Jan 2019 06:01) (68/52 - 120/70)  BP(mean): 72 (11 Jan 2019 06:01) (58 - 87)  RR: 24 (11 Jan 2019 06:01) (10 - 32)  SpO2: 98% (11 Jan 2019 06:01) (84% - 100%)    REVIEW OF SYSTEMS:     Constitutional: No fever, chills, fatigue, weakness  Eyes: no eye pain, visual disturbances, or discharge  ENT:  No difficulty hearing, tinnitus, vertigo; No sinus or throat pain  Neck: No pain or stiffness  Respiratory: No cough, dyspnea, wheezing   Cardiovascular: No chest pain, palpitations,   Gastrointestinal: No abdominal or epigastric pain. No nausea, vomiting  No diarrhea or constipation.   Genitourinary: No dysuria, frequency, hematuria or incontinence  Neurological: No headaches, lightheadedness, vertigo, numbness or tremors  Psychiatric: No depression, anxiety, mood swings or difficulty sleeping  Musculoskeletal: No joint pain or swelling; No muscle, back or extremity pain  Skin: No itching, burning, rashes or lesions   Lymph Nodes: No enlarged glands  Endocrine: No heat or cold intolerance; No hair loss   Allergy and Immunologic: No hives or eczema    On Neurological Examination:    Mental Status - Patient is alert, awake,   [facility]      Follow simple commands      Speech -   Fluent                         Cranial Nerves - Pupils 3 mm equal and reactive to light,   extraocular eye movements intact.   smile symmetric  intact bilateral NLF    Motor Exam -   With stimuli positive movement of all 4 extremities    Muscle tone - is normal all over.  No asymmetry is seen.      Sensory    Bilateral intact to light touch    GENERAL Exam: Nontoxic , No Acute Distress   	  HEENT:  normocephalic, atraumatic  		  LUNGS: Clear bilaterally  	  HEART: Normal S1S2   No murmur RRR        	  GI/ ABDOMEN:  Soft  Non tender    EXTREMITIES:   No Edema  No Clubbing  No Cyanosis   	   SKIN: Normal  No Ecchymosis               LABS:  CBC Full  -  ( 11 Jan 2019 06:51 )  WBC Count : 11.31 K/uL  Hemoglobin : 11.5 g/dL  Hematocrit : 36.8 %  Platelet Count - Automated : 212 K/uL  Mean Cell Volume : 95.8 fl  Mean Cell Hemoglobin : 29.9 pg  Mean Cell Hemoglobin Concentration : 31.3 gm/dL  Auto Neutrophil # : x  Auto Lymphocyte # : x  Auto Monocyte # : x  Auto Eosinophil # : x  Auto Basophil # : x  Auto Neutrophil % : x  Auto Lymphocyte % : x  Auto Monocyte % : x  Auto Eosinophil % : x  Auto Basophil % : x      01-11    140  |  99  |  34<H>  ----------------------------<  86  4.5   |  35<H>  |  1.64<H>    Ca    8.6      11 Jan 2019 06:51  Phos  2.8     01-10  Mg     2.1     01-10    TPro  6.4  /  Alb  2.0<L>  /  TBili  0.6  /  DBili  x   /  AST  23  /  ALT  18  /  AlkPhos  89  01-10    Hemoglobin A1C:     LIVER FUNCTIONS - ( 10 Bret 2019 15:01 )  Alb: 2.0 g/dL / Pro: 6.4 g/dL / ALK PHOS: 89 U/L / ALT: 18 U/L DA / AST: 23 U/L / GGT: x           Vitamin B12         RADIOLOGY      ASSESSMENT AND PLAN    change in mental status,   For changes in mental status and lethargy, suspect most likely this is metabolic, which is multifactorial secondary aspiration pneumonia with possible element of CHF.  For history of sepsis, antibiotics as needed.  monitor renal function  overall more awake   monitor sbp keep above 100 if possible   no new events   aspiration  precautions   spoke to family in detail in past   Greater than 45 minutes of time was spent with the patient, plan of care, reviewing data, speaking to the family and multidisciplinary healthcare team

## 2019-01-11 NOTE — DISCHARGE NOTE ADULT - PLAN OF CARE
Resolved.  Finished a course of antibiotic Puree diet with honey thicken liquid Finished antibiotic Continue with metoprolol for rate control Stable Continue with current diet as above

## 2019-01-11 NOTE — PROGRESS NOTE ADULT - NSHPATTENDINGPLANDISCUSS_GEN_ALL_CORE
patient at bedside
PT/RN/
RN , ICU PA.
patient at bedside
patient's wife and HCP re: treatment plan going forward, IV abx, fluids, fluid overload, possible need for artificial nutrition

## 2019-01-11 NOTE — PROGRESS NOTE ADULT - SUBJECTIVE AND OBJECTIVE BOX
Date/Time Patient Seen:  		  Referring MD:   Data Reviewed	       Patient is a 89y old  Male who presents with a chief complaint of SOB (10 Bret 2019 17:06)      Subjective/HPI     PAST MEDICAL & SURGICAL HISTORY:  Mitral valve disorder  Kidney stone  Diverticulosis  CKD (chronic kidney disease)  Afib  Hypothyroidism  BPH (benign prostatic hyperplasia)  Chronic peptic ulcer: S/P surgery more than 10 yrs ago  No significant past surgical history        Medication list         MEDICATIONS  (STANDING):  ALBUTerol    0.083% 2.5 milliGRAM(s) Nebulizer every 8 hours  ferrous    sulfate 325 milliGRAM(s) Oral daily  levothyroxine Injectable 37.5 MICROGram(s) IV Push at bedtime  metoprolol tartrate 25 milliGRAM(s) Oral two times a day  multivitamin 1 Tablet(s) Oral daily  oxymetazoline 0.05% Nasal Spray 1 Spray(s) Both Nostrils two times a day  pantoprazole  Injectable 40 milliGRAM(s) IV Push two times a day  tamsulosin 0.4 milliGRAM(s) Oral at bedtime    MEDICATIONS  (PRN):  metoprolol tartrate Injectable 5 milliGRAM(s) IV Push every 4 hours PRN HR>120         Vitals log        ICU Vital Signs Last 24 Hrs  T(C): 36.7 (11 Jan 2019 04:03), Max: 36.7 (10 Bret 2019 12:23)  T(F): 98 (11 Jan 2019 04:03), Max: 98.1 (10 Bret 2019 12:23)  HR: 88 (11 Jan 2019 04:03) (85 - 121)  BP: 101/62 (11 Jan 2019 04:03) (68/52 - 120/70)  BP(mean): 75 (11 Jan 2019 04:03) (58 - 87)  ABP: --  ABP(mean): --  RR: 13 (11 Jan 2019 04:03) (10 - 32)  SpO2: 98% (11 Jan 2019 04:03) (84% - 100%)           Input and Output:  I&O's Detail    09 Jan 2019 07:01  -  10 Bret 2019 07:00  --------------------------------------------------------  IN:    Enteral Tube Flush: 420 mL    ns in tub fed vital15: 1220 mL    Solution: 600 mL    Solution: 200 mL  Total IN: 2440 mL    OUT:    Indwelling Catheter - Urethral: 100 mL  Total OUT: 100 mL    Total NET: 2340 mL      10 Bret 2019 07:01  -  11 Jan 2019 06:15  --------------------------------------------------------  IN:    Enteral Tube Flush: 40 mL    ns in tub fed vital15: 120 mL    Solution: 150 mL  Total IN: 310 mL    OUT:  Total OUT: 0 mL    Total NET: 310 mL          Lab Data                        12.7   13.32 )-----------( 219      ( 10 Bret 2019 15:02 )             39.6     01-10    137  |  97  |  31<H>  ----------------------------<  112<H>  4.1   |  34<H>  |  1.70<H>    Ca    8.9      10 Bret 2019 15:01  Phos  2.8     01-10  Mg     2.1     01-10    TPro  6.4  /  Alb  2.0<L>  /  TBili  0.6  /  DBili  x   /  AST  23  /  ALT  18  /  AlkPhos  89  01-10            Review of Systems	      Objective     Physical Examination    heart s1s2  lung dec BS  abd soft      Pertinent Lab findings & Imaging      Apoorva:  NO   Adequate UO     I&O's Detail    09 Jan 2019 07:01  -  10 Bret 2019 07:00  --------------------------------------------------------  IN:    Enteral Tube Flush: 420 mL    ns in tub fed vital15: 1220 mL    Solution: 600 mL    Solution: 200 mL  Total IN: 2440 mL    OUT:    Indwelling Catheter - Urethral: 100 mL  Total OUT: 100 mL    Total NET: 2340 mL      10 Bret 2019 07:01  -  11 Jan 2019 06:15  --------------------------------------------------------  IN:    Enteral Tube Flush: 40 mL    ns in tub fed vital15: 120 mL    Solution: 150 mL  Total IN: 310 mL    OUT:  Total OUT: 0 mL    Total NET: 310 mL               Discussed with:     Cultures:	        Radiology

## 2019-01-11 NOTE — DISCHARGE NOTE ADULT - HOSPITAL COURSE
Patient is 89M with HTN, hypothyroidism, BPH, afib, CKD, who presents with SOB.  Patient was in his usual state of health with no recent illness when he tried taking two of his pills this evening.  SOB 2/2 aspiration pneumonitis.      1. Acute hypoxic respiratory failure:  - Likely due to aspiration pneumonia with possible element of CHF.  - S/P thoracentesis.  Fluid culture negative.    - Echo shows grossly normal left ventricular size and function with a calculated ejection fraction of 58%. Mild mitral insufficiency. Mild pulmonary hypertension. A large pleural effusion was incidentally noted,    - Chest PT, suction as necessary  - See below for management of PNA.  -Hold lasix for now due to borderline BP  - Pulmonary to follow up     2. Aspiration pneumonia:   - Continue with [facility] level of care.    - Finished a course of Zosyn  - Pulm/ID following.  - f/u cultures  -Continue with current diet as per speech therapy evaluation       3. Dysphagia:   -Further care as per GI  -Continue with current diet as per speech therapy evaluation        4. Metabolic encephalopathy:  -Likely due to acute infection.  -VBG noticed     5. GI bleed:  - History of Billroth II w/ gastrojejunal ulcer in 2017- now resolved.   - Daily CBC  - Protonix IV BID  -H/H stable  - AC and anti platelets on hold.   - GI following    6. Hypoglycemia  -Resolved.  Monitor POC on current diet    7. Coagulopathy   - s/p IV Vit K and FFP  - improved  - was on Warfarin at home for afib    8. Atrial fibrillation  -Continue with PO metoprolol.    - cardio f/u appreciated   - Will need to consider Eliquis vs. no AC    9. CKD 3  -Appears to be at baseline based on review of labs in HIE  -Dose meds renally  -Monitor renal indices    10 Hypothyroidism  -Continue with Levothyroxine.     11. Benign prostatic hyperplasia  -Continue Flomax     12. VTE PPx   -Compression stocking in the setting of nasal bleed    13.  Nasal bleeding  -Resolved now.    Continue with Humified Oxygen and afrin  -Monitor closely  Hold heparin, compression stocking for now    overall prognosis poor    Plan of care was discussed with patient, wife and son in law in great details, All questions were answered to their satisfaction  Seems to understand, and in agreement

## 2019-01-11 NOTE — DISCHARGE NOTE ADULT - ADDITIONAL INSTRUCTIONS
Patient is being discharged to rehab in stable condition.  House physician to resume care  Please follow up with cardiology in one week  Please follow up with pulmonary in 1-2 weeks  Please come back to the hospital if you develop any fever, shortness of breath, cough, or any new symptoms or concerns

## 2019-01-11 NOTE — CHART NOTE - NSCHARTNOTEFT_GEN_A_CORE
Assessment: 89 yo male seen for F/U; pt is s/p MBS, in which SLP recommended puree consistency with honey liquids via spoon only; due to impaired cognition, pt requires spoonfeeding; plan for pt to transfer to [facility] with possible D/C pending; as per MD, overall prognosis is poor; s/p Aspiration PNA; s/p Rt thoracentesis on 1/9/19, which could affect wt status; wts reviewed with the following trend: 12/31- 161#, 1/1 - 168#, 1/3 - 166#, 1/5 - 159#, 1/8 - 149#, 1/10 - 148#, 1/11 - 142#; UBW ~160#, so wt loss of ~18# (11%); current BMI = 19.8; wt loss related to NPO status x 10+ days; MBS was unable to be performed sooner due to lethargy of pt; pt continues to present with s/s severe malnutrition; PO intake needs to be monitored and recommendations for alternate means of nutrition/hydration considered, if intake does not meet est needs; request made for Ensure pudding 4oz PO TID for addtl kcal/protein; following closely    Factors impacting intake: [ ] none [ ] nausea  [ ] vomiting [ ] diarrhea [ ] constipation  [ ]chewing problems [X ] swallowing issues  [X ] other: oral diet initiated on 1/10/19  per results of [facility]     Diet Prescription: Diet, Dysphagia 1 Pureed-Honey Consistency Fluid (01-10-19 @ 10:24)    Intake: pt started on PO of puree consistency with honey liquids as per results of MBS 1/10/19; following tolerance     Current Weight:   SEE ABOVE NOTE    Pertinent Medications: MEDICATIONS  (STANDING):  ALBUTerol    0.083% 2.5 milliGRAM(s) Nebulizer every 8 hours  ferrous    sulfate 325 milliGRAM(s) Oral daily  levothyroxine Injectable 37.5 MICROGram(s) IV Push at bedtime  metoprolol tartrate 25 milliGRAM(s) Oral two times a day  midodrine 5 milliGRAM(s) Oral three times a day  multivitamin 1 Tablet(s) Oral daily  oxymetazoline 0.05% Nasal Spray 1 Spray(s) Both Nostrils two times a day  pantoprazole  Injectable 40 milliGRAM(s) IV Push two times a day  tamsulosin 0.4 milliGRAM(s) Oral at bedtime    MEDICATIONS  (PRN):    Pertinent Labs: 01-11 Na140 mmol/L Glu 86 mg/dL K+ 4.5 mmol/L Cr  1.64 mg/dL<H> BUN 34 mg/dL<H> 01-10 Phos 2.8 mg/dL 01-10 Alb 2.0 g/dL<L>    CAPILLARY BLOOD GLUCOSE    POCT Blood Glucose.: 151 mg/dL (11 Jan 2019 12:36)  POCT Blood Glucose.: 90 mg/dL (11 Jan 2019 07:44)  POCT Blood Glucose.: 107 mg/dL (10 Bret 2019 22:00)  POCT Blood Glucose.: 122 mg/dL (10 Bret 2019 16:45)    Skin:     Estimated Needs:   [X ] no change since previous assessment  [ ] recalculated:     Previous Nutrition Diagnosis:   [ ] Inadequate Energy Intake [ ]Inadequate Oral Intake [ ] Excessive Energy Intake   [ ] Underweight [ ] Increased Nutrient Needs [ ] Overweight/Obesity   [ ] Altered GI Function [ ] Unintended Weight Loss [ ] Food & Nutrition Related Knowledge Deficit    [X] Malnutrition     Nutrition Diagnosis is [X ] ongoing  [ ] resolved [ ] not applicable     New Nutrition Diagnosis: [X ] not applicable       Interventions:   Recommend  [ ] Change Diet To:  [X ] Nutrition Supplement - Ensure pudding 4 oz PO TID   [ ] Nutrition Support  [ ] Other:     Monitoring and Evaluation:   [X ] PO intake [ x ] Tolerance to diet prescription [ x ] weights [ x ] labs[ x ] follow up per protocol  [ ] other:

## 2019-01-11 NOTE — PROGRESS NOTE ADULT - SUBJECTIVE AND OBJECTIVE BOX
Chief Complaint: Shortness of breath, hematemesis    Interval Events: No events overnight. No complaints.    Review of Systems:  General: No fevers, chills, weight loss or gain  Skin: No rashes, color changes  Cardiovascular: No chest pain, orthopnea  Respiratory: No shortness of breath, cough  Gastrointestinal: No nausea, abdominal pain  Genitourinary: No incontinence, pain with urination  Musculoskeletal: No pain, swelling, decreased range of motion  Neurological: No headache, weakness  Psychiatric: No depression, anxiety  Endocrine: No weight loss or gain, increased thirst  All other systems are comprehensively negative.    Physical Exam:  Vital Signs Last 24 Hrs  T(C): 36.4 (10 Bret 2019 08:17), Max: 37 (10 Bret 2019 04:30)  T(F): 97.6 (10 Bret 2019 08:17), Max: 98.6 (10 Bret 2019 04:30)  HR: 86 (10 Bret 2019 08:00) (81 - 123)  BP: 91/74 (10 Bret 2019 08:00) (91/74 - 125/73)  BP(mean): 81 (10 Bret 2019 08:00) (76 - 113)  RR: 17 (10 Bret 2019 08:00) (17 - 25)  SpO2: 98% (10 Bret 2019 08:00) (93% - 100%)  General: NAD  HEENT: MMM  Neck: No JVD, no carotid bruit  Lungs: Decreased at bases  CV: Irregular, nl S1/S2, no M/R/G  Abdomen: S/NT/ND, +BS  Extremities: 1+ LE edema, no cyanosis  Neuro: AAOx3, non-focal  Skin: No rash    Labs:    01-10    138  |  98  |  29<H>  ----------------------------<  116<H>  3.9   |  35<H>  |  1.58<H>    Ca    8.7      10 Bret 2019 06:54  Phos  2.8     01-10  Mg     2.1     01-10                          11.7   11.69 )-----------( 188      ( 10 Bret 2019 06:54 )             36.1       Telemetry: AF 80-90

## 2019-01-12 LAB
ALBUMIN SERPL ELPH-MCNC: 2.1 G/DL — LOW (ref 3.3–5)
ALP SERPL-CCNC: 83 U/L — SIGNIFICANT CHANGE UP (ref 30–120)
ALT FLD-CCNC: 20 U/L DA — SIGNIFICANT CHANGE UP (ref 10–60)
ANION GAP SERPL CALC-SCNC: 5 MMOL/L — SIGNIFICANT CHANGE UP (ref 5–17)
AST SERPL-CCNC: 26 U/L — SIGNIFICANT CHANGE UP (ref 10–40)
BILIRUB SERPL-MCNC: 0.6 MG/DL — SIGNIFICANT CHANGE UP (ref 0.2–1.2)
BUN SERPL-MCNC: 37 MG/DL — HIGH (ref 7–23)
CALCIUM SERPL-MCNC: 9.2 MG/DL — SIGNIFICANT CHANGE UP (ref 8.4–10.5)
CHLORIDE SERPL-SCNC: 101 MMOL/L — SIGNIFICANT CHANGE UP (ref 96–108)
CO2 SERPL-SCNC: 32 MMOL/L — HIGH (ref 22–31)
CREAT SERPL-MCNC: 1.88 MG/DL — HIGH (ref 0.5–1.3)
GLUCOSE SERPL-MCNC: 131 MG/DL — HIGH (ref 70–99)
HCT VFR BLD CALC: 37.5 % — LOW (ref 39–50)
HGB BLD-MCNC: 11.7 G/DL — LOW (ref 13–17)
MCHC RBC-ENTMCNC: 30.2 PG — SIGNIFICANT CHANGE UP (ref 27–34)
MCHC RBC-ENTMCNC: 31.2 GM/DL — LOW (ref 32–36)
MCV RBC AUTO: 96.9 FL — SIGNIFICANT CHANGE UP (ref 80–100)
NRBC # BLD: 0 /100 WBCS — SIGNIFICANT CHANGE UP (ref 0–0)
PLATELET # BLD AUTO: 277 K/UL — SIGNIFICANT CHANGE UP (ref 150–400)
POTASSIUM SERPL-MCNC: 4.3 MMOL/L — SIGNIFICANT CHANGE UP (ref 3.5–5.3)
POTASSIUM SERPL-SCNC: 4.3 MMOL/L — SIGNIFICANT CHANGE UP (ref 3.5–5.3)
PROT SERPL-MCNC: 6.6 G/DL — SIGNIFICANT CHANGE UP (ref 6–8.3)
RBC # BLD: 3.87 M/UL — LOW (ref 4.2–5.8)
RBC # FLD: 13 % — SIGNIFICANT CHANGE UP (ref 10.3–14.5)
SODIUM SERPL-SCNC: 138 MMOL/L — SIGNIFICANT CHANGE UP (ref 135–145)
WBC # BLD: 9.9 K/UL — SIGNIFICANT CHANGE UP (ref 3.8–10.5)
WBC # FLD AUTO: 9.9 K/UL — SIGNIFICANT CHANGE UP (ref 3.8–10.5)

## 2019-01-12 PROCEDURE — 99233 SBSQ HOSP IP/OBS HIGH 50: CPT

## 2019-01-12 RX ADMIN — TAMSULOSIN HYDROCHLORIDE 0.4 MILLIGRAM(S): 0.4 CAPSULE ORAL at 22:02

## 2019-01-12 RX ADMIN — Medication 1 TABLET(S): at 12:02

## 2019-01-12 RX ADMIN — OXYMETAZOLINE HYDROCHLORIDE 1 SPRAY(S): 0.5 SPRAY NASAL at 17:18

## 2019-01-12 RX ADMIN — Medication 25 MILLIGRAM(S): at 17:18

## 2019-01-12 RX ADMIN — MIDODRINE HYDROCHLORIDE 5 MILLIGRAM(S): 2.5 TABLET ORAL at 13:28

## 2019-01-12 RX ADMIN — PANTOPRAZOLE SODIUM 40 MILLIGRAM(S): 20 TABLET, DELAYED RELEASE ORAL at 17:18

## 2019-01-12 RX ADMIN — Medication 325 MILLIGRAM(S): at 12:02

## 2019-01-12 RX ADMIN — PANTOPRAZOLE SODIUM 40 MILLIGRAM(S): 20 TABLET, DELAYED RELEASE ORAL at 06:30

## 2019-01-12 RX ADMIN — ALBUTEROL 2.5 MILLIGRAM(S): 90 AEROSOL, METERED ORAL at 08:41

## 2019-01-12 RX ADMIN — Medication 25 MILLIGRAM(S): at 06:30

## 2019-01-12 RX ADMIN — Medication 75 MICROGRAM(S): at 06:35

## 2019-01-12 RX ADMIN — ALBUTEROL 2.5 MILLIGRAM(S): 90 AEROSOL, METERED ORAL at 15:22

## 2019-01-12 RX ADMIN — MIDODRINE HYDROCHLORIDE 5 MILLIGRAM(S): 2.5 TABLET ORAL at 06:30

## 2019-01-12 RX ADMIN — MIDODRINE HYDROCHLORIDE 5 MILLIGRAM(S): 2.5 TABLET ORAL at 22:02

## 2019-01-12 RX ADMIN — OXYMETAZOLINE HYDROCHLORIDE 1 SPRAY(S): 0.5 SPRAY NASAL at 06:31

## 2019-01-12 NOTE — PROGRESS NOTE ADULT - SUBJECTIVE AND OBJECTIVE BOX
Patient is a 89y old  Male who presents with a chief complaint of SOB (12 Jan 2019 09:05)      INTERVAL History of Present Illness/OVERNIGHT EVENTS: no new issues    MEDICATIONS  (STANDING):  ALBUTerol    0.083% 2.5 milliGRAM(s) Nebulizer every 8 hours  ferrous    sulfate 325 milliGRAM(s) Oral daily  levothyroxine 75 MICROGram(s) Oral daily  metoprolol tartrate 25 milliGRAM(s) Oral two times a day  midodrine 5 milliGRAM(s) Oral three times a day  multivitamin 1 Tablet(s) Oral daily  oxymetazoline 0.05% Nasal Spray 1 Spray(s) Both Nostrils two times a day  pantoprazole  Injectable 40 milliGRAM(s) IV Push two times a day  tamsulosin 0.4 milliGRAM(s) Oral at bedtime    MEDICATIONS  (PRN):      Allergies    No Known Allergies    Intolerances        REVIEW OF SYSTEMS:  Negative unless otherwise specified above.    Vital Signs Last 24 Hrs  T(C): 36.5 (12 Jan 2019 08:43), Max: 36.9 (11 Jan 2019 16:05)  T(F): 97.7 (12 Jan 2019 08:43), Max: 98.4 (11 Jan 2019 16:05)  HR: 91 (12 Jan 2019 09:48) (86 - 123)  BP: 109/80 (12 Jan 2019 08:00) (78/67 - 121/79)  BP(mean): 89 (12 Jan 2019 08:00) (68 - 99)  RR: 27 (12 Jan 2019 08:00) (17 - 27)  SpO2: 98% (12 Jan 2019 09:48) (93% - 100%)        PHYSICAL EXAM:  GENERAL: No apparent distress, FRAIL  HEAD:  Atraumatic, Normocephalic  EYES: conjunctiva and sclera clear  ENMT: Moist mucous membranes  NECK: Supple  CHEST/LUNG: Clear to auscultation anteriorly  HEART: Irregular rhythm  ABDOMEN: Soft, Nontender, Nondistended; Bowel sounds present  EXTREMITIES:  No clubbing, cyanosis or edema  SKIN: No rashes or lesions  NERVOUS SYSTEM:  Alert & Oriented X3; Bilateral Lower extremity mobile, sensation to light touch intact      LABS:      Ca    8.6        11 Jan 2019 06:51          CAPILLARY BLOOD GLUCOSE      POCT Blood Glucose.: 99 mg/dL (11 Jan 2019 22:07)  POCT Blood Glucose.: 103 mg/dL (11 Jan 2019 17:01)  POCT Blood Glucose.: 151 mg/dL (11 Jan 2019 12:36)      RADIOLOGY & ADDITIONAL TESTS:    Images reviewed personally    Consultant Notes Reviewed and Care Discussed with relevant Consultants/Other Providers.

## 2019-01-12 NOTE — PROGRESS NOTE ADULT - SUBJECTIVE AND OBJECTIVE BOX
HARSHAL COLORADO is a 89yMale , patient examined and chart reviewed.     INTERVAL HPI/ OVERNIGHT EVENTS:  Afebrile. No distress.  No events.    Past Medical History--  PAST MEDICAL & SURGICAL HISTORY:  Mitral valve disorder  Kidney stone  Diverticulosis  CKD (chronic kidney disease)  Afib  Hypothyroidism  BPH (benign prostatic hyperplasia)  Chronic peptic ulcer: S/P surgery more than 10 yrs ago    For details regarding the patient's social history, family history, and other miscellaneous elements, please refer the initial infectious diseases consultation and/or the admitting history and physical examination for this admission.    ROS: All systems reviewed and are negative    Current inpatient medications :    ANTIBIOTICS/RELEVANT:    MEDICATIONS  (STANDING):  ALBUTerol    0.083% 2.5 milliGRAM(s) Nebulizer every 8 hours  ferrous    sulfate 325 milliGRAM(s) Oral daily  levothyroxine Injectable 37.5 MICROGram(s) IV Push at bedtime  metoprolol tartrate 25 milliGRAM(s) Oral two times a day  midodrine 5 milliGRAM(s) Oral three times a day  multivitamin 1 Tablet(s) Oral daily  oxymetazoline 0.05% Nasal Spray 1 Spray(s) Both Nostrils two times a day  pantoprazole  Injectable 40 milliGRAM(s) IV Push two times a day  tamsulosin 0.4 milliGRAM(s) Oral at bedtime    Objective:  ICU Vital Signs Last 24 Hrs  T(C): 36.6 (12 Jan 2019 20:01), Max: 36.6 (12 Jan 2019 12:02)  T(F): 97.9 (12 Jan 2019 20:01), Max: 97.9 (12 Jan 2019 12:02)  HR: 112 (12 Jan 2019 22:00) (82 - 112)  BP: 109/80 (12 Jan 2019 22:00) (98/61 - 121/79)  BP(mean): 90 (12 Jan 2019 22:00) (57 - 91  RR: 18 (12 Jan 2019 22:00) (17 - 27)  SpO2: 99% (12 Jan 2019 22:00) (93% - 100%)      Physical Exam:  GEN: Weak Awake  HEENT: normocephalic and atraumatic. EOMI. NITHYA. Moist mucosa. Clear Posterior pharynx.  NECK: Supple. No carotid bruits.  No lymphadenopathy or thyromegaly.  LUNGS: Decreased to auscultation.  HEART: Regular rate and rhythm without murmur.  ABDOMEN: Soft, nontender, and nondistended.  Positive bowel sounds.   EXTREMITIES: + edema.  NEUROLOGIC: Lethargic No focal neurological deficits        LABS:                        11.7   9.90  )-----------( 277      ( 12 Jan 2019 11:50 )             37.5   01-12    138  |  101  |  37<H>  ----------------------------<  131<H>  4.3   |  32<H>  |  1.88<H>    Ca    9.2      12 Jan 2019 11:50    TPro  6.6  /  Alb  2.1<L>  /  TBili  0.6  /  DBili  x   /  AST  26  /  ALT  20  /  AlkPhos  83  01-12        MICROBIOLOGY:    Culture - Fungal, Body Fluid (collected 09 Jan 2019 21:22)  Source: Pleural Fl Pleural Fluid  Preliminary Report (10 Bret 2019 08:40):    Testing in progress    Culture - Body Fluid with Gram Stain (collected 09 Jan 2019 21:22)  Source: Pleural Fl Pleural Fluid  Gram Stain (10 Bret 2019 01:20):    polymorphonuclear leukocytes seen    No organisms seen    by cytocentrifuge  Preliminary Report (10 Bret 2019 15:50):    No growth to date.    Culture - Acid Fast - Body Fluid w/Smear (collected 09 Jan 2019 21:22)  Source: Pleural Fl Pleural Fluid    RADIOLOGY & ADDITIONAL STUDIES:    EXAM:  CT CHEST                            PROCEDURE DATE:  12/31/2018          INTERPRETATION:  Clinical information: Respiratory distress, sepsis    Comparison CT chest study dated 4/25/2017. Comparison CT abdomen-pelvis   study dated 11/14/2014.    Axial images obtained, coronal and sagittal images computer reformatted.   Noncontrast exam. Neither intravenous or oral contrast material was   administered which limits the study.        CHEST: An NG tube is present. No thoracic aortic aneurysm or pericardial   effusion. Global cardiomegaly present. Coronary artery calcifications   present. Central airway intact. Thyroid gland not enlarged.    Minimal bibasilar effusions right greater than left. Pleural thickening   with pleural parenchymal scarring at the apices unchanged since the prior   exam. Calcifications evident within the right apical scarring.    Multifocal bilateral airspace disease present. Airspace disease present   in the left upper lobe, left lower lobe, right middle lobe, and right   lower lobe.    No acute osseous abnormalities visible in the thoracic skeleton.    Small anterior mediastinal lymph nodes present could be reactive. Hilar   regions obscured by the extensive airspace disease.        ABDOMEN-PELVIS: Postcholecystectomy clips present. Hepatic parenchyma   homogeneous. The spleen is not enlarged. No adrenal lesions evident. No   hydronephrotic changes identified. Traces bilateral perinephric stranding   noted. Unenhanced pancreas shows no lesions. No aortic aneurysm. No   retroperitoneal lymphadenopathy. No ascites. No biliary ductal   dilatation. Past history of gastric surgery type unspecified.    No bowel obstruction. Edematous appearance of the mesentery could be   related to volume overload. Diverticulosis present. Urinary bladder shows   a thickened wall. A calcification is visible posterior aspect of the   urinary bladder slightly to the right of midline, could represent a stone   in a diverticulum or localized to the wall of the urinary bladder.   Calcification measures 4 mm. The prostate is markedly enlarged. No free   pelvic fluid evident. Inguinal regions intact. Multilevel disc   degenerative disease lower lumbar region.      Assessment :  89M with hypothyroidism, BPH, afib, CKD, who presents with SOB with acute hypoxic respiratory  failure complicated by vomiting with hematemesis likely severe aspiration pneumonia with   pneumonitis  Likely also component of pulm edema  + troponin  Coagulopathy  GABO on CKD  Sp thoracentesis  Overall improving clinically    Plan :   Monitor off antibiotics  Asp precautions  Trend temps and wbc  Pulm toileting  Increase activity    Continue with present regiment.  Appropriate use of antibiotics and adverse effects reviewed.    I have discussed the above plan of care with patient and family in detail. They expressed understanding of the the treatment plan . Risks, benefits and alternatives discussed in detail. I have asked if they have any questions or concerns and appropriately addressed them to the best of my ability .    Critical care time greater then 45 minutes reviewing notes, labs data/ imaging , discussion with multidisciplinary team.    Thank you for allowing me to participate in care of your patient .      Anibal Saldaña MD  Infectious Disease  [phone]

## 2019-01-12 NOTE — PROGRESS NOTE ADULT - ASSESSMENT
The patient is a 89 year old male with a history of hypothyroidism, BPH, PUD, atrial fibrillation, chronic diastolic heart failure who was admitted with respiratory failure, GI bleed, hypotension.    1/12/19  Patient sleeping, lying flat comfortably.  Easily arousable and no complaints offered.    Plan:  - Echo from 1/6/19 compatible with normal LV. Mild MR.  - Troponin peaked at 0.292 in the setting of type 2 NSTEMI (demand ischemia) from respiratory failure and GI bleed  - Remain off of anticoagulation  - Continue metoprolol tartrate 25 mg bid  - Continue metoprolol 5 mg IV q4h prn HR>120  - On midodrine  - Encourage nutrition  - Out of bed with assistance if possible

## 2019-01-12 NOTE — PROGRESS NOTE ADULT - SUBJECTIVE AND OBJECTIVE BOX
Date/Time Patient Seen:  		  Referring MD:   Data Reviewed	       Patient is a 89y old  Male who presents with a chief complaint of SOB (11 Jan 2019 16:26)      Subjective/HPI     PAST MEDICAL & SURGICAL HISTORY:  Mitral valve disorder  Kidney stone  Diverticulosis  CKD (chronic kidney disease)  Afib  Hypothyroidism  BPH (benign prostatic hyperplasia)  Chronic peptic ulcer: S/P surgery more than 10 yrs ago  No significant past surgical history        Medication list         MEDICATIONS  (STANDING):  ALBUTerol    0.083% 2.5 milliGRAM(s) Nebulizer every 8 hours  ferrous    sulfate 325 milliGRAM(s) Oral daily  levothyroxine 75 MICROGram(s) Oral daily  metoprolol tartrate 25 milliGRAM(s) Oral two times a day  midodrine 5 milliGRAM(s) Oral three times a day  multivitamin 1 Tablet(s) Oral daily  oxymetazoline 0.05% Nasal Spray 1 Spray(s) Both Nostrils two times a day  pantoprazole  Injectable 40 milliGRAM(s) IV Push two times a day  tamsulosin 0.4 milliGRAM(s) Oral at bedtime    MEDICATIONS  (PRN):         Vitals log        ICU Vital Signs Last 24 Hrs  T(C): 36.4 (12 Jan 2019 04:03), Max: 36.9 (11 Jan 2019 16:05)  T(F): 97.6 (12 Jan 2019 04:03), Max: 98.4 (11 Jan 2019 16:05)  HR: 95 (12 Jan 2019 04:00) (86 - 123)  BP: 121/79 (12 Jan 2019 04:00) (78/67 - 121/79)  BP(mean): 91 (12 Jan 2019 04:00) (68 - 99)  ABP: --  ABP(mean): --  RR: 17 (12 Jan 2019 04:00) (11 - 26)  SpO2: 100% (12 Jan 2019 02:00) (93% - 100%)           Input and Output:  I&O's Detail      Lab Data                        11.5   11.31 )-----------( 212      ( 11 Jan 2019 06:51 )             36.8     01-11    140  |  99  |  34<H>  ----------------------------<  86  4.5   |  35<H>  |  1.64<H>    Ca    8.6      11 Jan 2019 06:51    TPro  6.4  /  Alb  2.0<L>  /  TBili  0.6  /  DBili  x   /  AST  23  /  ALT  18  /  AlkPhos  89  01-10            Review of Systems	      Objective     Physical Examination    heart s1s2  lung dec BS  abd soft  cn grossly int  frail  weak      Pertinent Lab findings & Imaging      Guerin:  NO   Adequate UO     I&O's Detail           Discussed with:     Cultures:	        Radiology

## 2019-01-12 NOTE — PROGRESS NOTE ADULT - SUBJECTIVE AND OBJECTIVE BOX
Patient is a 89y old  Male who presents with a chief complaint of SOB (12 Jan 2019 19:02)      BRIEF HOSPITAL COURSE: ***    Events last 24 hours: ***    PAST MEDICAL & SURGICAL HISTORY:  Mitral valve disorder  Kidney stone  Diverticulosis  CKD (chronic kidney disease)  Afib  Hypothyroidism  BPH (benign prostatic hyperplasia)  Chronic peptic ulcer: S/P surgery more than 10 yrs ago    Allergies    No Known Allergies    Intolerances      FAMILY HISTORY:  No pertinent family history in first degree relatives      Social History:     Review of Systems:  CONSTITUTIONAL: No fever, chills, or fatigue  EYES: No eye pain, visual disturbances, or discharge  ENMT:  No difficulty hearing, tinnitus, vertigo; No sinus or throat pain  NECK: No pain or stiffness  RESPIRATORY: No cough, wheezing, chills or hemoptysis; No shortness of breath  CARDIOVASCULAR: No chest pain, palpitations, dizziness, or leg swelling  GASTROINTESTINAL: No abdominal or epigastric pain. No nausea, vomiting, or hematemesis; No diarrhea or constipation. No melena or hematochezia.  GENITOURINARY: No dysuria, frequency, hematuria, or incontinence  NEUROLOGICAL: No headaches, memory loss, loss of strength, numbness, or tremors  SKIN: No itching, burning, rashes, or lesions   MUSCULOSKELETAL: No joint pain or swelling; No muscle, back, or extremity pain  PSYCHIATRIC: No depression, anxiety, mood swings, or difficulty sleeping      Vitals During Exam:   HR: 134, irregular   BP: 128/98 mmHg  RR: 25  sPO2: 96% on NC    Physical Examination:    General: No acute distress.      HEENT: Pupils equal, reactive to light.  Symmetric.    PULM: Clear to auscultation bilaterally, no significant sputum production    CVS: Regular rate and rhythm, no murmurs, rubs, or gallops    ABD: Soft, nondistended, nontender, normoactive bowel sounds, no masses    EXT: No edema, nontender    SKIN: Warm and well perfused, no rashes noted.    NEURO: Alert, oriented, interactive, nonfocal      Medications:    metoprolol tartrate 25 milliGRAM(s) Oral two times a day  metoprolol tartrate Injectable 5 milliGRAM(s) IV Push once  midodrine 5 milliGRAM(s) Oral three times a day  tamsulosin 0.4 milliGRAM(s) Oral at bedtime    ALBUTerol    0.083% 2.5 milliGRAM(s) Nebulizer every 8 hours          pantoprazole  Injectable 40 milliGRAM(s) IV Push two times a day      levothyroxine 75 MICROGram(s) Oral daily    ferrous    sulfate 325 milliGRAM(s) Oral daily  multivitamin 1 Tablet(s) Oral daily      oxymetazoline 0.05% Nasal Spray 1 Spray(s) Both Nostrils two times a day            ICU Vital Signs Last 24 Hrs  T(C): 36.7 (13 Jan 2019 00:04), Max: 36.7 (13 Jan 2019 00:04)  T(F): 98 (13 Jan 2019 00:04), Max: 98 (13 Jan 2019 00:04)  HR: 112 (12 Jan 2019 22:00) (82 - 112)  BP: 109/80 (12 Jan 2019 22:00) (98/61 - 121/79)  BP(mean): 90 (12 Jan 2019 22:00) (57 - 91)  ABP: --  ABP(mean): --  RR: 18 (12 Jan 2019 22:00) (17 - 27)  SpO2: 99% (12 Jan 2019 22:00) (93% - 100%)    Vital Signs Last 24 Hrs  T(C): 36.7 (13 Jan 2019 00:04), Max: 36.7 (13 Jan 2019 00:04)  T(F): 98 (13 Jan 2019 00:04), Max: 98 (13 Jan 2019 00:04)  HR: 112 (12 Jan 2019 22:00) (82 - 112)  BP: 109/80 (12 Jan 2019 22:00) (98/61 - 121/79)  BP(mean): 90 (12 Jan 2019 22:00) (57 - 91)  RR: 18 (12 Jan 2019 22:00) (17 - 27)  SpO2: 99% (12 Jan 2019 22:00) (93% - 100%)        I&O's Detail    12 Jan 2019 07:01  -  13 Jan 2019 00:38  --------------------------------------------------------  IN:    Oral Fluid: 120 mL  Total IN: 120 mL    OUT:  Total OUT: 0 mL    Total NET: 120 mL            LABS:                        11.7   9.90  )-----------( 277      ( 12 Jan 2019 11:50 )             37.5     01-12    138  |  101  |  37<H>  ----------------------------<  131<H>  4.3   |  32<H>  |  1.88<H>    Ca    9.2      12 Jan 2019 11:50    TPro  6.6  /  Alb  2.1<L>  /  TBili  0.6  /  DBili  x   /  AST  26  /  ALT  20  /  AlkPhos  83  01-12          CAPILLARY BLOOD GLUCOSE      POCT Blood Glucose.: 109 mg/dL (12 Jan 2019 22:06)        CULTURES:  Culture Results:   No growth to date. (01-09 @ 21:22)  Culture Results:   Testing in progress (01-09 @ 21:22)        RADIOLOGY: ***      SUPPLEMENTAL O2:   LINES:  CECILIA:   PPx:   CONTACT: Patient is a 89y old  Male who presents with a chief complaint of SOB (12 Jan 2019 19:02)      BRIEF HOSPITAL COURSE:  89 yo m pmhx MVP, CKD, Afib on Coumadin, BPH, PUD s/p Bilroth, Diverticulosis and nephrolithiasis initially admitted on 12/31 with complaints of SOB s/p choking on his home medication.  Patient admitted with hypoxic respiratory failure with hospital course complicated by shock state requiring vasopressor therapy, GI bleed, GABO on CKD, Afib with RVR, worsening respiratory failure requiring intubation 01/02/19 s/p self extubation 01/03/19 to HFNC with copious amounts of secretions, volume overload s/p diuresis and thoracentesis 01/09/19, epistaxis on 01/08/19 and dysphagia s/p barium swallow on dysphagia diet.      Events last 24 hours:   Patient went into acute respiratory distress, initially given morphine 0.5mg IV x1 subsequently placed on BiPAP 12/5 FiO2 40%.  Patient with worsening hypoxia, desatted to low 80s, FIO2 titrated up to 100% with improvement in sPO2 to 91%.  Patient agitated, trying to climb out of bed, confused, precedex ordered.  Patient subsequently became tachycardic to the 130s-140s sustained, s/p Lopressor 5mg IV x2 with no relief.  Patient with low grade temp 100.3F rectally, tylenol suppository administered.      Addendum 01/13/2019: Patient still with increased work of breathing, patient's wife Nallely Whyte (HCP) called  and patient's condition discussed.  The patient's wife endorses that she wants her  to be comfortable, and does not want him to be intubated.  At this time patient received Morphine 1mg IV x1, and started on Morphine infusion.   Family on their way in.       PAST MEDICAL & SURGICAL HISTORY:  Mitral valve disorder  Kidney stone  Diverticulosis  CKD (chronic kidney disease)  Afib  Hypothyroidism  BPH (benign prostatic hyperplasia)  Chronic peptic ulcer: S/P surgery more than 10 yrs ago      Allergies  No Known Allergies      FAMILY HISTORY:  No pertinent family history in first degree relatives      Social History:   From Home.     Review of Systems:  Unable to obtain secondary to patient's mental status.       Vitals During Exam:   HR: 134, irregular   BP: 128/98 mmHg  RR: 25  sPO2: 96% on NC    Physical Examination:    General: Elderly male, lying in bed, +respiratory distress    HEENT: NC/AT, Pupils 2mm, equal, reactive to light.  Symmetric. NC in place.     PULM: Symmetrical thorax expansion upon respiration.  Coarse to auscultation bilaterally, no significant sputum production    CVS: irregularly irregular, no murmurs, rubs, or gallops appreciated.     ABD: Soft, nondistended, nontender, normoactive bowel sounds, no masses appreciated.     EXT: No edema, nontender, DP pulses appreciated.     SKIN: Warm and well perfused, no rashes noted.    NEURO: Agitated, confused, moving all 4 extremities.       Medications:  metoprolol tartrate 25 milliGRAM(s) Oral two times a day  metoprolol tartrate Injectable 5 milliGRAM(s) IV Push once  midodrine 5 milliGRAM(s) Oral three times a day  tamsulosin 0.4 milliGRAM(s) Oral at bedtime  ALBUTerol    0.083% 2.5 milliGRAM(s) Nebulizer every 8 hours  pantoprazole  Injectable 40 milliGRAM(s) IV Push two times a day  levothyroxine 75 MICROGram(s) Oral daily  ferrous    sulfate 325 milliGRAM(s) Oral daily  multivitamin 1 Tablet(s) Oral daily  oxymetazoline 0.05% Nasal Spray 1 Spray(s) Both Nostrils two times a day      ICU Vital Signs Last 24 Hrs  T(C): 36.7 (13 Jan 2019 00:04), Max: 36.7 (13 Jan 2019 00:04)  T(F): 98 (13 Jan 2019 00:04), Max: 98 (13 Jan 2019 00:04)  HR: 112 (12 Jan 2019 22:00) (82 - 112)  BP: 109/80 (12 Jan 2019 22:00) (98/61 - 121/79)  BP(mean): 90 (12 Jan 2019 22:00) (57 - 91)  ABP: --  ABP(mean): --  RR: 18 (12 Jan 2019 22:00) (17 - 27)  SpO2: 99% (12 Jan 2019 22:00) (93% - 100%)    Vital Signs Last 24 Hrs  T(C): 36.7 (13 Jan 2019 00:04), Max: 36.7 (13 Jan 2019 00:04)  T(F): 98 (13 Jan 2019 00:04), Max: 98 (13 Jan 2019 00:04)  HR: 112 (12 Jan 2019 22:00) (82 - 112)  BP: 109/80 (12 Jan 2019 22:00) (98/61 - 121/79)  BP(mean): 90 (12 Jan 2019 22:00) (57 - 91)  RR: 18 (12 Jan 2019 22:00) (17 - 27)  SpO2: 99% (12 Jan 2019 22:00) (93% - 100%)        I&O's Detail    12 Jan 2019 07:01  -  13 Jan 2019 00:38  --------------------------------------------------------  IN:    Oral Fluid: 120 mL  Total IN: 120 mL    OUT:  Total OUT: 0 mL  Total NET: 120 mL      LABS:                        11.7   9.90  )-----------( 277      ( 12 Jan 2019 11:50 )             37.5     01-12    138  |  101  |  37<H>  ----------------------------<  131<H>  4.3   |  32<H>  |  1.88<H>    Ca    9.2      12 Jan 2019 11:50    TPro  6.6  /  Alb  2.1<L>  /  TBili  0.6  /  DBili  x   /  AST  26  /  ALT  20  /  AlkPhos  83  01-12      CAPILLARY BLOOD GLUCOSE  POCT Blood Glucose.: 109 mg/dL (12 Jan 2019 22:06)      CULTURES:  Culture Results:   No growth to date. (01-09 @ 21:22)  Culture Results:   Testing in progress (01-09 @ 21:22)      RADIOLOGY:   < from: Xray Chest 1 View- PORTABLE-Urgent (01.10.19 @ 14:37) >  EXAM:  XR CHEST PORTABLE URGENT 1V                          PROCEDURE DATE:  01/10/2019      INTERPRETATION:  Single view chest    History postthoracentesis    Comparison yesterday    The left pleural effusion is been partially evacuated with mild residual   and persistent retrocardiac consolidation. There is no pneumothorax.   There is a trace right-sided effusion with development of mild   interstitial infiltrate in the medial base. Feeding tube is been removed.   The heart is normal in size.    < end of copied text >      SUPPLEMENTAL O2: NC, BIPAP   LINES: Peripheral   BROOKS: Y  PPx: Pantoprazole  CONTACT: N

## 2019-01-12 NOTE — PROGRESS NOTE ADULT - SUBJECTIVE AND OBJECTIVE BOX
Patient is a 89y Male with a known history of :  Pleural effusion (J90)  Dysphagia (R13.10)  Aspiration pneumonia (J69.0)  Hypoxemia (R09.02)  Atrial fibrillation with RVR (I48.91)  UGIB (upper gastrointestinal bleed) (K92.2)  Hypoglycemia (E16.2)  Shock (R57.9)  Pneumonia, lobar (J18.1)  Septic shock (A41.9)  Severe sepsis (A41.9)  CKD (chronic kidney disease) (N18.9)  Hypothyroidism (E03.9)  Hypoxemia requiring supplemental oxygen (R09.02)  Acute respiratory insufficiency (R06.89)  Lactate blood increased (R79.89)  Elevated INR (R79.1)  GABO (acute kidney injury) (N17.9)  Gastrointestinal hemorrhage with hematemesis (K92.0)  Acute respiratory failure with hypoxia (J96.01)  Preventive measure (Z29.9)  Atrial fibrillation, unspecified type (I48.91)  Hypothyroidism, unspecified type (E03.9)  Benign prostatic hyperplasia, unspecified whether lower urinary tract symptoms present (N40.0)  Aspiration pneumonitis (J69.0)    HPI:  89M with hypothyroidism, BPH, afib, CKD, who presents with SOB.  Patient was in his usual state of health with no recent illness when he tried taking two of his pills this evening.  Started to choke on his pills and his wife tried getting the pills out by hitting his back.  Patient then started to vomit through his mouth and nose and since then he has had trouble breathing.  Associated with chest pain and chest tightness.  Patient was brought here where he was found to be desat'ing despite O2 NC.   CXR showed diffuse infiltrates.  Patient placed on BiPAP where he started to vomit.  Patient placed on high flow O2.  Currently the patient still with difficulty breathing and complaining of his chest is hurting when he breathes.  Started on Zosyn empirically. (31 Dec 2018 01:25)      REVIEW OF SYSTEMS:    CONSTITUTIONAL: No fever, weight loss, or fatigue  EYES: No eye pain, visual disturbances, or discharge  ENMT:  No difficulty hearing, tinnitus, vertigo; No sinus or throat pain  NECK: No pain or stiffness  BREASTS: No pain, masses, or nipple discharge  RESPIRATORY: No cough, wheezing, chills or hemoptysis; No shortness of breath  CARDIOVASCULAR: No chest pain, palpitations, dizziness, or leg swelling  GASTROINTESTINAL: No abdominal or epigastric pain. No nausea, vomiting, or hematemesis; No diarrhea or constipation. No melena or hematochezia.  GENITOURINARY: No dysuria, frequency, hematuria, or incontinence  NEUROLOGICAL: No headaches, memory loss, loss of strength, numbness, or tremors  SKIN: No itching, burning, rashes, or lesions   LYMPH NODES: No enlarged glands  ENDOCRINE: No heat or cold intolerance; No hair loss  MUSCULOSKELETAL: No joint pain or swelling; No muscle, back, or extremity pain  PSYCHIATRIC: No depression, anxiety, mood swings, or difficulty sleeping  HEME/LYMPH: No easy bruising, or bleeding gums  ALLERGY AND IMMUNOLOGIC: No hives or eczema    MEDICATIONS  (STANDING):  ALBUTerol    0.083% 2.5 milliGRAM(s) Nebulizer every 8 hours  ferrous    sulfate 325 milliGRAM(s) Oral daily  levothyroxine 75 MICROGram(s) Oral daily  metoprolol tartrate 25 milliGRAM(s) Oral two times a day  midodrine 5 milliGRAM(s) Oral three times a day  multivitamin 1 Tablet(s) Oral daily  oxymetazoline 0.05% Nasal Spray 1 Spray(s) Both Nostrils two times a day  pantoprazole  Injectable 40 milliGRAM(s) IV Push two times a day  tamsulosin 0.4 milliGRAM(s) Oral at bedtime    MEDICATIONS  (PRN):      ALLERGIES: No Known Allergies      FAMILY HISTORY:  No pertinent family history in first degree relatives      Social history:  Alochol:   Smoking:   Drug Use:   Marital Status:     PHYSICAL EXAMINATION:  -----------------------------  T(C): 36.5 (01-12-19 @ 08:43), Max: 36.9 (01-11-19 @ 16:05)  HR: 97 (01-12-19 @ 12:00) (86 - 123)  BP: 111/67 (01-12-19 @ 12:00) (78/67 - 121/79)  RR: 22 (01-12-19 @ 12:00) (17 - 27)  SpO2: 98% (01-12-19 @ 12:00) (93% - 100%)  Wt(kg): --        Constitutional: well developed, normal appearance, well groomed, well nourished, no deformities and no acute distress.   Eyes: the conjunctiva exhibited no abnormalities and the eyelids demonstrated no xanthelasmas.   HEENT: normal oral mucosa, no oral pallor and no oral cyanosis.   Neck: normal jugular venous A waves present, normal jugular venous V waves present and no jugular venous geller A waves.   Pulmonary: no respiratory distress, normal respiratory rhythm and effort, no accessory muscle use and lungs were clear to auscultation bilaterally. Anteriorly  Cardiovascular: heart rate and rhythm were irreg/irreg, normal S1 and S2 and no murmur, gallop, rub, heave or thrill are present.    Musculoskeletal: the gait could not be assessed.   Extremities: no clubbing of the fingernails, no localized cyanosis, no petechial hemorrhages and no ischemic changes.   Skin: normal skin color and pigmentation, no rash, no venous stasis, no skin lesions, no skin ulcer and no xanthoma was observed.      LABS:   --------  01-12    138  |  101  |  37<H>  ----------------------------<  131<H>  4.3   |  32<H>  |  1.88<H>    Ca    9.2      12 Jan 2019 11:50    TPro  6.6  /  Alb  2.1<L>  /  TBili  0.6  /  DBili  x   /  AST  26  /  ALT  20  /  AlkPhos  83  01-12                         11.7   9.90  )-----------( 277      ( 12 Jan 2019 11:50 )             37.5                   Radiology:    < from: US Transthoracic Echocardiogram w/Doppler Complete (01.06.19 @ 09:56) >    EXAM:  US TTE W DOPPLER COMPLETE                                  PROCEDURE DATE:  01/06/2019          INTERPRETATION:  The patient is 5 feet 11 inches tall and weighs 158   pounds. The blood pressure is 111/84. Indication is for CHF. The   technician is Aggie.    The left atrial internal diameter is 3.6 cm. The aortic root diameters   3.2 cm. The left ventricular end-diastolic diameter is 4.0 cm. The septum   is 1.1 cm. The posterior wall is 1.1 cm. The calculated ejection fraction   was 58%.    The mitral valve apparatus demonstrates that the leaflets are thin and   the valve opens normally in diastole. The aortic valve demonstrates mild   calcification of its cusps with adequate opening of the valve in systole.   The left atrial and aortic root diameters were normal. The right atrial   and right ventricular internal diameters were normal. The left   ventricular free wall and interventricular septal thicknesses were   normal. The left ventricular internal diameters and contractility were   grossly normal with a calculated ejection fraction of 58%.    On the color flow Doppler portion of the examination 1+ mitral   insufficiency was noted the pulmonary artery pressure was measured at 40   mmHg by the technician in the IVC appeared to be mildly dilated. X    Impression: Grossly normal left ventricular size and function with a   calculated ejection fraction of 58%. Mild mitral insufficiency. Mild   pulmonary hypertension. A large pleural effusion was incidentally noted,    please correlate clinically.                  FAITH BARAHONA M.D., ATTENDING CARDIOLOGIST  This document has been electronically signed. Jan 6 2019 11:56AM                < end of copied text >

## 2019-01-12 NOTE — PROGRESS NOTE ADULT - ASSESSMENT
lopressor 5 iv x1 89 yo m pmhx MVP, CKD, Afib on Coumadin, BPH, PUD s/p Bilroth, Diverticulosis and nephrolithiasis initially admitted on 12/31 with complaints of SOB s/p choking on his home medication.  Patient admitted with hypoxic respiratory failure with hospital course complicated by shock state requiring vasopressor therapy, GI bleed, GABO on CKD, Afib with RVR, worsening respiratory failure requiring intubation 01/02/19 s/p self extubation 01/03/19 to HFNC with copious amounts of secretions, volume overload s/p diuresis and thoracentesis 01/09/19, epistaxis on 01/08/19 and dysphagia s/p barium swallow on dysphagia diet now with worsening hypoxic respiratory failure.      NEURO: Agitation on Precedex gtt.    CV: Afib with metoprolol PO for rate control.  Afib with rvr, received Lopressor 5mg IVx 1 with no relief.  Midodrine for BP support.    RESP: Acute hypoxic respiratory failure, BIPAP 12/5, FIO2 now 100%, titrate for sPO2 >90%, wean as tolerated.  Keep HOB >30 degrees.  Aspiration precaution. Continue nebs   RENAL: BPH on tamsulosin.   GI: NPO. PPI  ENDO: Hypothyroidism on synthroid   ID: Completed abx course.  Monitor off abx for now.   HEME: No A/C due to Gi bleed.   DISPO: Patient with JAUN SHAFERR.     Critical Care time: 65 mins assessing presenting problems of acute illness that poses high probability of life threatening deterioration or end organ damage/dysfunction.  Medical decision making inculding Initiating plan of care, reviewing data, reviewing radiology, discussing with multidisciplinary team, non inclusive of procedures, discussing goals of care with patient/family     Addendum 01/13/2019 0400:  Patient's wife Nallely Whyte (HCP) contacted to update and discuss patient's case further.  Discussed that despite being placed on BiPAP, the patient appears to be failing due to persistent respiratory distress and hypoxia.  Nallely endorsed that she does not think her  would benefit from having the breathing tube placed that we aren't sure if it would come out and they do not want to be in the position of deciding to remove the ET tube if he were to not improve.  Nallely endorses she does not want Markell Whyte to be intubated and that she wants him to be comfortable.  Patient now DNR/DNI, comfort measures only.

## 2019-01-12 NOTE — PROGRESS NOTE ADULT - SUBJECTIVE AND OBJECTIVE BOX
Neurology follow up note    HARSHAL [id number]      Interval History:    Patient feels ok no new complaints.    MEDICATIONS    ALBUTerol    0.083% 2.5 milliGRAM(s) Nebulizer every 8 hours  ferrous    sulfate 325 milliGRAM(s) Oral daily  levothyroxine 75 MICROGram(s) Oral daily  metoprolol tartrate 25 milliGRAM(s) Oral two times a day  midodrine 5 milliGRAM(s) Oral three times a day  multivitamin 1 Tablet(s) Oral daily  oxymetazoline 0.05% Nasal Spray 1 Spray(s) Both Nostrils two times a day  pantoprazole  Injectable 40 milliGRAM(s) IV Push two times a day  tamsulosin 0.4 milliGRAM(s) Oral at bedtime      Allergies    No Known Allergies    Intolerances            Vital Signs Last 24 Hrs  T(C): 36.5 (12 Jan 2019 08:43), Max: 36.9 (11 Jan 2019 16:05)  T(F): 97.7 (12 Jan 2019 08:43), Max: 98.4 (11 Jan 2019 16:05)  HR: 91 (12 Jan 2019 08:00) (86 - 123)  BP: 109/80 (12 Jan 2019 08:00) (78/67 - 121/79)  BP(mean): 89 (12 Jan 2019 08:00) (68 - 99)  RR: 27 (12 Jan 2019 08:00) (17 - 27)    REVIEW OF SYSTEMS:     Constitutional: No fever, chills, fatigue, weakness  Eyes: no eye pain, visual disturbances, or discharge  ENT:  No difficulty hearing, tinnitus, vertigo; No sinus or throat pain  Neck: No pain or stiffness  Respiratory: No cough, dyspnea, wheezing   Cardiovascular: No chest pain, palpitations,   Gastrointestinal: No abdominal or epigastric pain. No nausea, vomiting  No diarrhea or constipation.   Genitourinary: No dysuria, frequency, hematuria or incontinence  Neurological: No headaches, lightheadedness, vertigo, numbness or tremors  Psychiatric: No depression, anxiety, mood swings or difficulty sleeping  Musculoskeletal: No joint pain or swelling; No muscle, back or extremity pain  Skin: No itching, burning, rashes or lesions   Lymph Nodes: No enlarged glands  Endocrine: No heat or cold intolerance; No hair loss   Allergy and Immunologic: No hives or eczema    On Neurological Examination:    Mental Status - Patient is alert, awake,   [facility] hospital  year 2019      Follow simple commands      Speech -   Fluent                         Cranial Nerves - Pupils 3 mm equal and reactive to light,   extraocular eye movements intact.   smile symmetric  intact bilateral NLF    Motor Exam -   With stimuli positive movement of all 4 extremities    Muscle tone - is normal all over.  No asymmetry is seen.      Sensory    Bilateral intact to light touch    GENERAL Exam: Nontoxic , No Acute Distress   	  HEENT:  normocephalic, atraumatic  		  LUNGS: Clear bilaterally  	  HEART: Normal S1S2   No murmur RRR        	  GI/ ABDOMEN:  Soft  Non tender    EXTREMITIES:   No Edema  No Clubbing  No Cyanosis   	   SKIN: Normal  No Ecchymosis SpO2: 98% (12 Jan 2019 08:00) (93% - 100%)                  LABS:  CBC Full  -  ( 11 Jan 2019 06:51 )  WBC Count : 11.31 K/uL  Hemoglobin : 11.5 g/dL  Hematocrit : 36.8 %  Platelet Count - Automated : 212 K/uL  Mean Cell Volume : 95.8 fl  Mean Cell Hemoglobin : 29.9 pg  Mean Cell Hemoglobin Concentration : 31.3 gm/dL  Auto Neutrophil # : x  Auto Lymphocyte # : x  Auto Monocyte # : x  Auto Eosinophil # : x  Auto Basophil # : x  Auto Neutrophil % : x  Auto Lymphocyte % : x  Auto Monocyte % : x  Auto Eosinophil % : x  Auto Basophil % : x      01-11    140  |  99  |  34<H>  ----------------------------<  86  4.5   |  35<H>  |  1.64<H>    Ca    8.6      11 Jan 2019 06:51    TPro  6.4  /  Alb  2.0<L>  /  TBili  0.6  /  DBili  x   /  AST  23  /  ALT  18  /  AlkPhos  89  01-10    Hemoglobin A1C:     LIVER FUNCTIONS - ( 10 Bret 2019 15:01 )  Alb: 2.0 g/dL / Pro: 6.4 g/dL / ALK PHOS: 89 U/L / ALT: 18 U/L DA / AST: 23 U/L / GGT: x           Vitamin B12         RADIOLOGY      ASSESSMENT AND PLAN    change in mental status,   For changes in mental status and lethargy, suspect most likely this is metabolic, which is multifactorial secondary aspiration pneumonia with possible element of CHF.  For history of sepsis, antibiotics as needed.  monitor renal function  overall more awake   monitor sbp keep above 100 if possible   no new events   monitor oral intake   aspiration  precautions   spoke to family in detail in past   Greater than 40 minutes of time was spent with the patient, plan of care, reviewing data, speaking to the family and multidisciplinary healthcare team

## 2019-01-12 NOTE — PROGRESS NOTE ADULT - ASSESSMENT
Patient is 89M with HTN, hypothyroidism, BPH, afib, CKD, who presents with SOB.  Patient was in his usual state of health with no recent illness when he tried taking two of his pills this evening.  SOB 2/2 aspiration pneumonitis.      1. Acute hypoxic respiratory failure:  - Likely due to aspiration pneumonia with possible element of CHF.  - S/P thoracentesis.  Fluid culture negative.    - Echo shows grossly normal left ventricular size and function with a calculated ejection fraction of 58%. Mild mitral insufficiency. Mild pulmonary hypertension. A large pleural effusion was incidentally noted,    - Chest PT, suction as necessary  - finished abx for aspiration pneumonia  -Hold lasix for now due to borderline BP  -Midodrine was added for borderline BP  - Pulmonary ongoing follow up     2. Aspiration pneumonia:   - Continue with [facility]PC[facility] level of care.    - Finished a course of Zosyn  - Pulm/ID following.  - f/u cultures  -Continue with current diet as per speech therapy evaluation       3. Dysphagia:   -Further care as per GI  -Continue with current diet as per speech therapy evaluation        4. Metabolic encephalopathy:  -resolved    5. GI bleed:  - History of Billroth II w/ gastrojejunal ulcer in 2017- now resolved.   - Daily CBC  - Protonix BID  -H/H stable  - AC and anti platelets on hold - resume when ok with GI.   - GI following    6. Hypoglycemia  -Resolved.  Monitor POC on current diet    7. Coagulopathy   - s/p IV Vit K and FFP  - improved  - was on Warfarin at home for afib  - resume AC when ok with GI    8. Atrial fibrillation  -Continue with PO metoprolol.    - cardio f/u appreciated   - Will need to consider Eliquis vs. no AC  - f/up cardio recs    9. CKD 3  -Appears to be at baseline based on review of labs in HIE  -Dose meds renally  -Monitor renal indices    10 Hypothyroidism  -Continue with Levothyroxine.     11. Benign prostatic hyperplasia  -Continue Flomax     12. VTE PPx   -Compression stocking in the setting of nasal bleed    13.  Nasal bleeding  -Resolved now.    Continue with Humified Oxygen and afrin  -Monitor closely  Hold heparin, compression stocking for now    overall prognosis poor    Plan of care was discussed with RN

## 2019-01-13 VITALS — TEMPERATURE: 99 F

## 2019-01-13 LAB
BLOOD GAS COMMENTS: SIGNIFICANT CHANGE UP
BLOOD GAS SOURCE: SIGNIFICANT CHANGE UP

## 2019-01-13 PROCEDURE — 86850 RBC ANTIBODY SCREEN: CPT

## 2019-01-13 PROCEDURE — 36415 COLL VENOUS BLD VENIPUNCTURE: CPT

## 2019-01-13 PROCEDURE — 84436 ASSAY OF TOTAL THYROXINE: CPT

## 2019-01-13 PROCEDURE — 85730 THROMBOPLASTIN TIME PARTIAL: CPT

## 2019-01-13 PROCEDURE — 74230 X-RAY XM SWLNG FUNCJ C+: CPT

## 2019-01-13 PROCEDURE — 83615 LACTATE (LD) (LDH) ENZYME: CPT

## 2019-01-13 PROCEDURE — 71250 CT THORAX DX C-: CPT

## 2019-01-13 PROCEDURE — 84484 ASSAY OF TROPONIN QUANT: CPT

## 2019-01-13 PROCEDURE — 85610 PROTHROMBIN TIME: CPT

## 2019-01-13 PROCEDURE — 82962 GLUCOSE BLOOD TEST: CPT

## 2019-01-13 PROCEDURE — 82272 OCCULT BLD FECES 1-3 TESTS: CPT

## 2019-01-13 PROCEDURE — 12345: CPT | Mod: NC

## 2019-01-13 PROCEDURE — 32555 ASPIRATE PLEURA W/ IMAGING: CPT

## 2019-01-13 PROCEDURE — 97110 THERAPEUTIC EXERCISES: CPT

## 2019-01-13 PROCEDURE — 97161 PT EVAL LOW COMPLEX 20 MIN: CPT

## 2019-01-13 PROCEDURE — 83880 ASSAY OF NATRIURETIC PEPTIDE: CPT

## 2019-01-13 PROCEDURE — 97530 THERAPEUTIC ACTIVITIES: CPT

## 2019-01-13 PROCEDURE — 71045 X-RAY EXAM CHEST 1 VIEW: CPT

## 2019-01-13 PROCEDURE — 87449 NOS EACH ORGANISM AG IA: CPT

## 2019-01-13 PROCEDURE — 88305 TISSUE EXAM BY PATHOLOGIST: CPT

## 2019-01-13 PROCEDURE — 36600 WITHDRAWAL OF ARTERIAL BLOOD: CPT

## 2019-01-13 PROCEDURE — 76604 US EXAM CHEST: CPT

## 2019-01-13 PROCEDURE — 83986 ASSAY PH BODY FLUID NOS: CPT

## 2019-01-13 PROCEDURE — 82945 GLUCOSE OTHER FLUID: CPT

## 2019-01-13 PROCEDURE — 97535 SELF CARE MNGMENT TRAINING: CPT

## 2019-01-13 PROCEDURE — 83735 ASSAY OF MAGNESIUM: CPT

## 2019-01-13 PROCEDURE — 87205 SMEAR GRAM STAIN: CPT

## 2019-01-13 PROCEDURE — 87070 CULTURE OTHR SPECIMN AEROBIC: CPT

## 2019-01-13 PROCEDURE — 85027 COMPLETE CBC AUTOMATED: CPT

## 2019-01-13 PROCEDURE — 94002 VENT MGMT INPAT INIT DAY: CPT

## 2019-01-13 PROCEDURE — 87040 BLOOD CULTURE FOR BACTERIA: CPT

## 2019-01-13 PROCEDURE — 87075 CULTR BACTERIA EXCEPT BLOOD: CPT

## 2019-01-13 PROCEDURE — 36430 TRANSFUSION BLD/BLD COMPNT: CPT

## 2019-01-13 PROCEDURE — 84157 ASSAY OF PROTEIN OTHER: CPT

## 2019-01-13 PROCEDURE — 88108 CYTOPATH CONCENTRATE TECH: CPT

## 2019-01-13 PROCEDURE — P9017: CPT

## 2019-01-13 PROCEDURE — 87631 RESP VIRUS 3-5 TARGETS: CPT

## 2019-01-13 PROCEDURE — 93306 TTE W/DOPPLER COMPLETE: CPT

## 2019-01-13 PROCEDURE — 94640 AIRWAY INHALATION TREATMENT: CPT

## 2019-01-13 PROCEDURE — 82550 ASSAY OF CK (CPK): CPT

## 2019-01-13 PROCEDURE — 82553 CREATINE MB FRACTION: CPT

## 2019-01-13 PROCEDURE — 87116 MYCOBACTERIA CULTURE: CPT

## 2019-01-13 PROCEDURE — 74176 CT ABD & PELVIS W/O CONTRAST: CPT

## 2019-01-13 PROCEDURE — 86900 BLOOD TYPING SEROLOGIC ABO: CPT

## 2019-01-13 PROCEDURE — 97116 GAIT TRAINING THERAPY: CPT

## 2019-01-13 PROCEDURE — 84480 ASSAY TRIIODOTHYRONINE (T3): CPT

## 2019-01-13 PROCEDURE — 86140 C-REACTIVE PROTEIN: CPT

## 2019-01-13 PROCEDURE — 83605 ASSAY OF LACTIC ACID: CPT

## 2019-01-13 PROCEDURE — 82042 OTHER SOURCE ALBUMIN QUAN EA: CPT

## 2019-01-13 PROCEDURE — 87015 SPECIMEN INFECT AGNT CONCNTJ: CPT

## 2019-01-13 PROCEDURE — 84100 ASSAY OF PHOSPHORUS: CPT

## 2019-01-13 PROCEDURE — 87206 SMEAR FLUORESCENT/ACID STAI: CPT

## 2019-01-13 PROCEDURE — 99285 EMERGENCY DEPT VISIT HI MDM: CPT | Mod: 25

## 2019-01-13 PROCEDURE — 82803 BLOOD GASES ANY COMBINATION: CPT

## 2019-01-13 PROCEDURE — 97165 OT EVAL LOW COMPLEX 30 MIN: CPT

## 2019-01-13 PROCEDURE — 93005 ELECTROCARDIOGRAM TRACING: CPT

## 2019-01-13 PROCEDURE — 89051 BODY FLUID CELL COUNT: CPT

## 2019-01-13 PROCEDURE — 84443 ASSAY THYROID STIM HORMONE: CPT

## 2019-01-13 PROCEDURE — 87102 FUNGUS ISOLATION CULTURE: CPT

## 2019-01-13 PROCEDURE — 80053 COMPREHEN METABOLIC PANEL: CPT

## 2019-01-13 PROCEDURE — 80048 BASIC METABOLIC PNL TOTAL CA: CPT

## 2019-01-13 PROCEDURE — 86901 BLOOD TYPING SEROLOGIC RH(D): CPT

## 2019-01-13 RX ORDER — MORPHINE SULFATE 50 MG/1
0.5 CAPSULE, EXTENDED RELEASE ORAL ONCE
Qty: 0 | Refills: 0 | Status: DISCONTINUED | OUTPATIENT
Start: 2019-01-13 | End: 2019-01-13

## 2019-01-13 RX ORDER — MORPHINE SULFATE 50 MG/1
2 CAPSULE, EXTENDED RELEASE ORAL
Qty: 100 | Refills: 0 | Status: DISCONTINUED | OUTPATIENT
Start: 2019-01-13 | End: 2019-01-13

## 2019-01-13 RX ORDER — ATROPINE SULFATE 1 %
2 DROPS OPHTHALMIC (EYE)
Qty: 0 | Refills: 0 | Status: DISCONTINUED | OUTPATIENT
Start: 2019-01-13 | End: 2019-01-13

## 2019-01-13 RX ORDER — ACETAMINOPHEN 500 MG
650 TABLET ORAL ONCE
Qty: 0 | Refills: 0 | Status: COMPLETED | OUTPATIENT
Start: 2019-01-13 | End: 2019-01-13

## 2019-01-13 RX ORDER — MORPHINE SULFATE 50 MG/1
2 CAPSULE, EXTENDED RELEASE ORAL
Qty: 0 | Refills: 0 | Status: DISCONTINUED | OUTPATIENT
Start: 2019-01-13 | End: 2019-01-13

## 2019-01-13 RX ORDER — DEXMEDETOMIDINE HYDROCHLORIDE IN 0.9% SODIUM CHLORIDE 4 UG/ML
0.3 INJECTION INTRAVENOUS
Qty: 200 | Refills: 0 | Status: DISCONTINUED | OUTPATIENT
Start: 2019-01-13 | End: 2019-01-13

## 2019-01-13 RX ORDER — METOPROLOL TARTRATE 50 MG
5 TABLET ORAL ONCE
Qty: 0 | Refills: 0 | Status: COMPLETED | OUTPATIENT
Start: 2019-01-13 | End: 2019-01-13

## 2019-01-13 RX ORDER — MORPHINE SULFATE 50 MG/1
4 CAPSULE, EXTENDED RELEASE ORAL
Qty: 100 | Refills: 0 | Status: DISCONTINUED | OUTPATIENT
Start: 2019-01-13 | End: 2019-01-13

## 2019-01-13 RX ADMIN — ALBUTEROL 2.5 MILLIGRAM(S): 90 AEROSOL, METERED ORAL at 00:55

## 2019-01-13 RX ADMIN — MORPHINE SULFATE 2 MG/HR: 50 CAPSULE, EXTENDED RELEASE ORAL at 05:19

## 2019-01-13 RX ADMIN — Medication 5 MILLIGRAM(S): at 00:52

## 2019-01-13 RX ADMIN — MORPHINE SULFATE 4 MG/HR: 50 CAPSULE, EXTENDED RELEASE ORAL at 06:44

## 2019-01-13 RX ADMIN — Medication 650 MILLIGRAM(S): at 02:44

## 2019-01-13 RX ADMIN — MORPHINE SULFATE 0.5 MILLIGRAM(S): 50 CAPSULE, EXTENDED RELEASE ORAL at 01:20

## 2019-01-13 RX ADMIN — MORPHINE SULFATE 0.5 MILLIGRAM(S): 50 CAPSULE, EXTENDED RELEASE ORAL at 01:00

## 2019-01-13 RX ADMIN — DEXMEDETOMIDINE HYDROCHLORIDE IN 0.9% SODIUM CHLORIDE 5.45 MICROGRAM(S)/KG/HR: 4 INJECTION INTRAVENOUS at 01:43

## 2019-01-13 RX ADMIN — MORPHINE SULFATE 4 MG/HR: 50 CAPSULE, EXTENDED RELEASE ORAL at 05:21

## 2019-01-13 RX ADMIN — Medication 650 MILLIGRAM(S): at 01:44

## 2019-01-13 NOTE — DISCHARGE NOTE FOR THE EXPIRED PATIENT - HOSPITAL COURSE
89M with hypothyroidism, BPH, afib, CKD, who presents with SOB.  Patient was in his usual state of health with no recent illness when he tried taking two of his pills this evening.  Started to choke on his pills and his wife tried getting the pills out by hitting his back.  Patient then started to vomit through his mouth and nose and since then he has had trouble breathing.  Associated with chest pain and chest tightness.  Patient was brought here where he was found to be desat'ing despite O2 NC.   CXR showed diffuse infiltrates.  Patient placed on BiPAP where he started to vomit.  Patient placed on high flow O2.  Currently the patient still with difficulty breathing and complaining of his chest is hurting when he breathes.  Started on Zosyn empirically.     Hypoxic respiratory failure due to aspiration pneumonia.  Multiple other chronic medical conditions.  Prolonged hospital course.  Went into acute respiratory distress  night - placed on bipap.  Family refused intubation.  COMFORT CARE ONLY.  Patient .

## 2019-01-13 NOTE — PROGRESS NOTE ADULT - PROVIDER SPECIALTY LIST ADULT
Cardiology
Critical Care
Gastroenterology
Hospitalist
Infectious Disease
Intervent Radiology
Neurology
Palliative Care
Pulmonology
Hospitalist
Infectious Disease
Critical Care
Critical Care
Hospitalist
MICU
Hospitalist

## 2019-01-13 NOTE — CHART NOTE - NSCHARTNOTEFT_GEN_A_CORE
Notified by staff that pt has passed. Pt was comfort care.     Pt seen and examined.   Pupils are non reactive.  No respirations visible or audible.   No cardiac pulse or cardiac sounds on ausculation.   No movements visible.   Non reactive to stimuli    Patients son and son in law at bedside notified.   Time of death 0830am.

## 2019-01-13 NOTE — PROGRESS NOTE ADULT - SUBJECTIVE AND OBJECTIVE BOX
Date/Time Patient Seen:  		  Referring MD:   Data Reviewed	       Patient is a 89y old  Male who presents with a chief complaint of SOB (12 Jan 2019 23:55)      Subjective/HPI     PAST MEDICAL & SURGICAL HISTORY:  Mitral valve disorder  Kidney stone  Diverticulosis  CKD (chronic kidney disease)  Afib  Hypothyroidism  BPH (benign prostatic hyperplasia)  Chronic peptic ulcer: S/P surgery more than 10 yrs ago  No significant past surgical history        Medication list         MEDICATIONS  (STANDING):  ALBUTerol    0.083% 2.5 milliGRAM(s) Nebulizer every 8 hours  ferrous    sulfate 325 milliGRAM(s) Oral daily  levothyroxine 75 MICROGram(s) Oral daily  metoprolol tartrate 25 milliGRAM(s) Oral two times a day  midodrine 5 milliGRAM(s) Oral three times a day  morphine  Infusion 4 mG/Hr (4 mL/Hr) IV Continuous <Continuous>  multivitamin 1 Tablet(s) Oral daily  oxymetazoline 0.05% Nasal Spray 1 Spray(s) Both Nostrils two times a day  pantoprazole  Injectable 40 milliGRAM(s) IV Push two times a day  tamsulosin 0.4 milliGRAM(s) Oral at bedtime    MEDICATIONS  (PRN):  LORazepam   Injectable 2 milliGRAM(s) IV Push every 2 hours PRN Respiratory Distress/Agitation  morphine  - Injectable 2 milliGRAM(s) IV Push every 2 hours PRN Respiratory Distress         Vitals log        ICU Vital Signs Last 24 Hrs  T(C): 37.1 (13 Jan 2019 04:13), Max: 37.9 (13 Jan 2019 01:30)  T(F): 98.8 (13 Jan 2019 04:13), Max: 100.3 (13 Jan 2019 01:30)  HR: 122 (13 Jan 2019 04:00) (82 - 136)  BP: 76/48 (13 Jan 2019 04:00) (76/48 - 132/87)  BP(mean): 57 (13 Jan 2019 04:00) (57 - 102)  ABP: --  ABP(mean): --  RR: 25 (13 Jan 2019 04:00) (17 - 39)  SpO2: 97% (13 Jan 2019 04:00) (87% - 99%)           Input and Output:  I&O's Detail    12 Jan 2019 07:01  -  13 Jan 2019 05:37  --------------------------------------------------------  IN:    morphine  Infusion: 8 mL    Oral Fluid: 120 mL  Total IN: 128 mL    OUT:  Total OUT: 0 mL    Total NET: 128 mL          Lab Data                        11.7   9.90  )-----------( 277      ( 12 Jan 2019 11:50 )             37.5     01-12    138  |  101  |  37<H>  ----------------------------<  131<H>  4.3   |  32<H>  |  1.88<H>    Ca    9.2      12 Jan 2019 11:50    TPro  6.6  /  Alb  2.1<L>  /  TBili  0.6  /  DBili  x   /  AST  26  /  ALT  20  /  AlkPhos  83  01-12            Review of Systems	      Objective     Physical Examination    on bipap  heart s1s2  lung dec BS      Pertinent Lab findings & Imaging      Apoorva:  NO   Adequate UO     I&O's Detail    12 Jan 2019 07:01  -  13 Jan 2019 05:37  --------------------------------------------------------  IN:    morphine  Infusion: 8 mL    Oral Fluid: 120 mL  Total IN: 128 mL    OUT:  Total OUT: 0 mL    Total NET: 128 mL               Discussed with:     Cultures:	        Radiology

## 2019-01-13 NOTE — PROGRESS NOTE ADULT - PROBLEM SELECTOR PROBLEM 1
Acute respiratory failure with hypoxia
Acute respiratory insufficiency
Acute respiratory insufficiency
Aspiration pneumonia
Dysphagia
Dysphagia
Hypoxemia requiring supplemental oxygen
Septic shock
Acute respiratory failure with hypoxia
Hypoxemia
Acute respiratory insufficiency

## 2019-01-13 NOTE — PROGRESS NOTE ADULT - REASON FOR ADMISSION
SOB

## 2019-01-13 NOTE — PROGRESS NOTE ADULT - PROBLEM SELECTOR PLAN 1
asp pna  atelectasis  dysphagia  pleural eff  on NG tube feed  I and O  for pleurocentesis today  on emp ABX  ID following  oral and skin care  on HFNC - ABG from yesterday reviewed - cont titration of fio2, cxr reviewed,   supportive ICU care and monitor vs and HD and Sat, keep sat > 88 pct  replete lytes  chest PT  suction PRN  assist with ADL  will follow and monitor  overall prognosis remains guarded
asp pna  completed ABX  cont NEBS to help expectorate   assist with ADL  PT - as tolerated  assess for DMITRY  cm notes reviewed  nutrition as tolerated  cvs regimen, wean Midodrine as tolerated, keep MAP > 60  cardio follow up noted  I and O not documented, would prefer to keep I and O in balance  s/p pleural eff - thoracentesis - 1 L drained - right side - transudate
asp pna  dysphagia  atelectasis  pleural eff  s/p thoracentesis - transudate - 1 L drained  for MBS to eval swallow -   cont emp ABX - Zosyn - ID following  pt is positive on I and O - needs Diuresis - would keep I and O in balance  serial labs  serial PE  GOC conversation ongoing, discussion about prognosis ongoing  may need DMITRY on DC from Hospital  PT  I daniel  cvs regimen optimization  pt is volume overloaded - will benefit from DIURESIS !
asp pna  dysphagia  planned for MBS  NG tube in for now, Nutrition TF  I and O  off High Flow NC - keep sat > 88 pct  on emp ABX - Zosyn  cont NEBs  to help mobilize secretions  out of bed as tolerated  monitor vs and HD and Sat  off IVF, monitor I and O and need for Diuresis   will follow and monitor  prognosis guarded - however, appears better  am labs pending  will need CXR in 6 - 8 weeks to eval PNA resolution
asp pna  resp distress  effusions, pulm HTN and HFpEF, GI bleed eval  serial Hgb  I and O noted, will give LASIX one dose now to keep in balance  high flow NC - keep sat > 88 pct, titrate fio2 support, chest PT, suction PRN,   emp ABX regimen - for asp pna, monitor vs and HD and Sat,   will check US chest bilateral - eval for effusions, may need thoracentesis  will follow and monitor  prognosis remains guarded
dysphagia  atelectasis  aspiration  frail and weak  pleural eff - s/p thoracentesis - 1 L  completed Zosyn ABX  keep sat > 88 pct - oral hygiene - skin care - assist with ADL - planned for DMITRY  cont NEBS to help mobilize secretions  I and O - noted - appears volume overload  at risk for future aspiration events  out of bed - PT -   will follow and monitor  DC planning
eval GI bleed, anemia, Sepsis, shock, asp pna, CKD GABO  I and O  on gentle IVF, caution with volume overload  oral and skin hygiene  monitor vs and HD and Sat  on Zosyn ABX  serial labs, serial PE, replete lytes  VBG noted this am  pt has delirium - monitor and prevent self harm  prognosis remains guarded to poor - discussed with family, cautious optimism expressed
overnight events reviewed  family at the bedside  pt is now comfort measures  discussed goals of care  they want comfort only  will DC Bipap  will DC Precedex  will increase Morphine gtt to 4 mg   prognosis very poor  discussed condition and outlook  pt is DNR DNI
resp distress  pulm edema  GI bleed  asp pna  atelectasis  AF  I and O noted - 3 L positive  CKD   valv heart disease and HFpEF and pulm HTN  oral and skin care  PPI  GI follow up  emp ABX Zosyn   on Amio for AF  will check CXR this am  will give LASIX 20 mg IVP x 1 now  will give NEBS x 1 now  cont ICU management
resp distress, AFIB, eval GI bleed, off pressors, on IVF, CKD - serial labs, replete lytes  I and O  keep MAP > 60  monitor vs and HD and Sat  on emp ABX for poss ASP PNA / Sepsis  NG tube in place - GI follow up  cont ICU care and management of cardiac, resp and GI acute issues  CT scan reviewed  pt remains alert and interactive  will follow  am labs pending
s/p shock  sepsis  asp pna  oral and skin care  asp prec  HOB elev  cont emp ABX regimen, monitor vs and HD and Sat  appears clinically fluid overloaded  will consider a trial of Lasix this am  cont NEBS  cont High Flow NC - titrate down fio2 as tolerated, keep sat > 88 pct  I daniel as tolerated  cont ICU care and support  serial labs and serial PE
sepsis  shock   asp pna  resp distress  s/p self extubated this am  aerosol mask and high flow NC on standby  NO BIPAP - secretions copious and asp event earlier  cont ABX  ID follow up  keep I and O in balance  taper off pressors as tolerated, keep MAP > 60  I daniel if able to tolerate  NEBS TID  chest pt  suction PRN  assist with ADL  will dc Propofol, may need Precedex PRN for anxiety and agitation  serial labs  serial PE  prognosis remains guarded
sepsis  shock  asp pna  atelectasis  CKD  GABO  Volume overload  Dysphagia  frail and weak elderly  resp distress  cont High Flow NC for now, keep sat > 88 pct, titrate L / M  suction prn  oral hygiene  chest pt  NEBS  will give LASIX one dose - pt is on IVF - NPO - however, will attempt to balance out I and O /Volume status  serial labs  serial PE  will check VBG this am, Mg and ProBNP  emp ABX - Zosyn in progress
-likely secondary to aspiration event while on bipap as excess secretions in airway noted during intubation.  -intubated on AC 16/450/5/.5. repeat ABG one hour after intubation and adjust settings as appropriate. lung protective ventilatory strategy at 4-6 cc/kg. titrate FiO2 and PEEP to maintain spo2 > 92%. keep pplat <30.   -continue duonebs  -daily SAT and SBT. chlorhexidine for VAP prophylaxis. already on protonix bid.
-remains on high flow nasal canula  -continue to titrate FIO2 and flow rate to maintain SaO2 >92%  -aggressive chest PT and pulmonary toilet
Secondary to recurrent aspiration events, most significant of which was when he had vomited into the BiPAP mask. There appears to have been an acute CHF/pulmonary edema component as well. Patient self extubated this morning. Continue HFNC, actively titrating to maintain SaO2 > 92%. Respiratory status remains tenuous and patient has periodic gurgling respirations. Remains NPO given aspiration risk- will have formal speech and swallow eval tomorrow. Will proceed with low threshold to reintubate given poor candidacy for NIPPV. Keep HOB elevated > 30 degrees. D/c'd Precedex as he is not requiring sedation at this time.
Secondary to recurrent aspiration events, most significant of which was when he had vomited into the BiPAP mask. There appears to have been an acute CHF/pulmonary edema component as well. Self extubated on 1/3. Continue HFNC, actively titrating to maintain SaO2 > 92%. Respiratory status remains very unstable- patient profoundly hypoxemic without supplemental O2 and is having difficulty managing copious secretions with weak cough and overall debilitation. Remains NPO given aspiration risk- scheduled for video esophagram. Remains at high risk for reintubation given poor candidacy for NIPPV. May ultimately require this + bronchoscopy. Keep HOB elevated > 30 degrees.
improving, now more comfortable without significant work of breathing  secondary to acute aspiration event  CT shows bilateral infiltrates consistent with aspiration  cont IV abx  will continue support with pulmonary toilet/IS  titrating FiO2 for sats>90%  if decompensates given his previous episodes of vomiting and likelihood of potential further gastric distention and aspiration will avoid NIPPV and move toward intubation.  Currently appears comfortable.

## 2019-01-14 LAB
CULTURE RESULTS: SIGNIFICANT CHANGE UP
SPECIMEN SOURCE: SIGNIFICANT CHANGE UP

## 2019-02-09 LAB
CULTURE RESULTS: SIGNIFICANT CHANGE UP
SPECIMEN SOURCE: SIGNIFICANT CHANGE UP

## 2019-03-02 LAB
CULTURE RESULTS: SIGNIFICANT CHANGE UP
SPECIMEN SOURCE: SIGNIFICANT CHANGE UP

## 2019-04-09 ENCOUNTER — APPOINTMENT (OUTPATIENT)
Dept: CARDIOLOGY | Facility: CLINIC | Age: 84
End: 2019-04-09

## 2021-09-17 NOTE — OCCUPATIONAL THERAPY INITIAL EVALUATION ADULT - SHORT TERM MEMORY, REHAB EVAL
intact Propranolol Pregnancy And Lactation Text: This medication is Pregnancy Category C and it isn't known if it is safe during pregnancy. It is excreted in breast milk.

## 2022-06-30 NOTE — PATIENT PROFILE ADULT. - FUNCTIONAL SCREEN CURRENT LEVEL: TRANSFERRING, MLM
[FreeTextEntry1] : No cognitive or communication deficits.  No focal sensorimotor deficits observed on video. (2) assistive person

## 2022-07-13 NOTE — CONSULT NOTE ADULT - PROBLEM SELECTOR PROBLEM 1
Gastrointestinal hemorrhage, unspecified gastrointestinal hemorrhage type SSKI Counseling:  I discussed with the patient the risks of SSKI including but not limited to thyroid abnormalities, metallic taste, GI upset, fever, headache, acne, arthralgias, paraesthesias, lymphadenopathy, easy bleeding, arrhythmias, and allergic reaction.

## 2022-08-03 NOTE — PHYSICAL THERAPY INITIAL EVALUATION ADULT - IMPAIRMENTS CONTRIBUTING TO GAIT DEVIATIONS, PT EVAL
ID discharge recs:   Cipro 500mg via PEG twice a day for 5 more days, end date 8/8/22  Probiotics  Aspiration precautions     D/w Dr Finn, Dr Andrews    
impaired balance/decreased strength

## 2022-09-27 NOTE — PATIENT PROFILE ADULT. - LAST TOBACCO USE (DD-MM-YY)
Consent: The patient's consent was obtained including but not limited to risks of crusting, scabbing, blistering, scarring, darker or lighter pigmentary change, recurrence, incomplete removal and infection. The patient understands that the procedure is cosmetic in nature and is not covered by insurance. Show Spray Paint Technique Variable?: Yes Spray Paint Technique: No Spray Paint Text: The liquid nitrogen was applied to the skin utilizing a spray paint frosting technique. Detail Level: Simple Billing Information: Bill by Static Price Post-Care Instructions: I reviewed with the patient in detail post-care instructions. Patient is to wear sunprotection, and avoid picking at any of the treated lesions. Pt may apply Vaseline to crusted or scabbing areas. 01-Jan-1930

## 2023-04-03 NOTE — PATIENT PROFILE ADULT. - PMH
How Severe Is Your Atopic Dermatitis?: moderate
Is This A New Presentation, Or A Follow-Up?: Follow Up Atopic Dermatitis
Afib    BPH (benign prostatic hyperplasia)    CKD (chronic kidney disease)    Diverticulosis    Hypothyroidism    Kidney stone    Mitral valve disorder

## 2023-04-07 NOTE — CONSULT NOTE ADULT - PROBLEM/RECOMMENDATION-1
Psychiatric progress note    CC: Psych F/U/ reason for consultation-alcohol withdrawal, MDD.  Treatment recommendations    Subjective:    Patient seen and chart reviewed, case discussed staff.  Reviewed patient most recent labs, vital signs and current medication list.    Patient denies any active or passive suicidal homicidal ideation, denies any visual auditory hallucinations or paranoid delusions.  Reviewed patient most recent labs, vital signs and current medication list    Complex case received multiple calls on this patient significant time spent in treatment planning greater than 50% coordination of care.  Patient currently four-point restraints he is required 6 mg Ativan since midnight doing somewhat better since initiation of scheduled phenobarbital which he is tolerating well.  He is alert and oriented to person and place less confused today not fully aware of situation.  His mood is depressed he has a moderate to high level anxiety with no panic attacks.  Patient states his detox is better still feels shaky and sweaty denies any nausea vomit headache or diarrhea.  Patient sleep and appetite are normal.  We are optimizing Lexapro for underlying depression and anxiety using combination with Lyrica which can also help with any chronic pain tolerating well current dose of trazodone for sleep.  Provided brief supportive psychotherapy focused on psychoeducation.    Labs:  Recent Results (from the past 24 hour(s))   Magnesium    Collection Time: 04/07/23 10:28 AM   Result Value Ref Range    Magnesium 1.7 1.7 - 2.4 mg/dL   Basic Metabolic Panel    Collection Time: 04/07/23 10:28 AM   Result Value Ref Range    Fasting Status      Sodium 129 (L) 135 - 145 mmol/L    Potassium 3.4 3.4 - 5.1 mmol/L    Chloride 100 97 - 110 mmol/L    Carbon Dioxide 22 21 - 32 mmol/L    Anion Gap 10 7 - 19 mmol/L    Glucose 176 (H) 70 - 99 mg/dL    BUN 14 6 - 20 mg/dL    Creatinine 0.61 (L) 0.67 - 1.17 mg/dL    Glomerular Filtration 
Rate >90 >=60    BUN/Cr 23 7 - 25    Calcium 9.2 8.4 - 10.2 mg/dL         Medications:    Current Facility-Administered Medications   Medication Dose Route Frequency Provider Last Rate Last Admin   • PHENobarbital (LUMINAL) 65 MG/ML injection 32.4 mg  32.4 mg Intravenous 3 times per day Marco A Power MD   32.4 mg at 04/07/23 0522   • magnesium oxide (MAG-OX) tablet 400 mg  400 mg Oral Once Dell Markham MD       • LORazepam (ATIVAN) tablet 2 mg  2 mg Oral Q1H PRN Marco A Power MD        Or   • LORazepam (ATIVAN) injection 2 mg  2 mg Intravenous Q1H PRN Marco A Power MD   2 mg at 04/07/23 1203   • escitalopram (LEXAPRO) tablet 5 mg  5 mg Oral Daily with breakfast Marco A Power MD   5 mg at 04/07/23 0902   • LORazepam (ATIVAN) injection 2 mg  2 mg Intravenous Q4H PRN Marco A Power MD       • pregabalin (LYRICA) capsule 75 mg  75 mg Oral TID Marco A Power MD   75 mg at 04/07/23 0902   • traZODone (DESYREL) tablet 100 mg  100 mg Oral Nightly Marco A Pwoer MD   100 mg at 04/06/23 2118   • metoPROLOL (LOPRESSOR) injection 2.5 mg  2.5 mg Intravenous Once Sree Maxwell MD       • HYDROcodone-acetaminophen (NORCO)  MG per tablet 1 tablet  1 tablet Oral Q8H PRN DARRYL Gomez   1 tablet at 04/06/23 0109   • sodium chloride 0.9 % flush bag 25 mL  25 mL Intravenous PRN Harmony Huff CNP       • sodium chloride (PF) 0.9 % injection 2 mL  2 mL Intracatheter 2 times per day Harmony Huff CNP   2 mL at 04/07/23 0901   • sodium chloride (NORMAL SALINE) 0.9 % bolus 500 mL  500 mL Intravenous PRN Harmony Huff CNP       • sodium chloride 0.9 % flush bag 25 mL  25 mL Intravenous PRN Harmony Huff CNP       • Potassium Standard Replacement Protocol (Levels 3.5 and lower)   Does not apply See Admin Instructions Harmony Huff CNP       • Potassium Replacement (Levels 3.6 - 4)   Does not apply See Admin Instructions Harmony Huff CNP       • Magnesium Standard Replacement Protocol   Does 
not apply See Admin Instructions Harmony Huff CNP       • ondansetron (ZOFRAN) injection 4 mg  4 mg Intravenous BID PRN Harmony Huff CNP       • acetaminophen (TYLENOL) tablet 650 mg  650 mg Oral Q4H PRN Harmony Huff CNP   650 mg at 04/01/23 2001   • levothyroxine (SYNTHROID, LEVOTHROID) tablet 175 mcg  175 mcg Oral QAM AC Rene Hinojosa, APNP   175 mcg at 04/07/23 0522       Vitals:  Vitals with min/max:      Vital Last Value 24 Hour Range   Temperature 97.7 °F (36.5 °C) (04/07/23 1144) Temp  Min: 97.7 °F (36.5 °C)  Max: 99.9 °F (37.7 °C)   Pulse 97 (04/07/23 1144) Pulse  Min: 74  Max: 104   Respiratory 17 (04/07/23 1144) Resp  Min: 17  Max: 20   Non-Invasive  Blood Pressure 115/79 (04/07/23 1144) BP  Min: 101/68  Max: 155/97   Pulse Oximetry 98 % (04/07/23 1144) SpO2  Min: 95 %  Max: 100 %   Arterial   Blood Pressure   No data recorded              Mental Status Exam:     APPEARANCE: appears stated age, fair grooming/hygiene, no acute distress    BEHAVIOR: Agitated at times, engaged, cooperative with interview. Fair eye contact.    SPEECH: RRR, normal volume, normal tone.  Spontaneous speech without stuttering/stammering.    MOTOR: No PMR or PMA noted.  No tics/tremors or other abnormal motor movements visible.    MOOD: Not good    AFFECT: Anxious    THOUGHT PROCESS: Linear, logical, goal directed in conversation towards treatment    THOUGHT CONTENT: Patient denies SI/HI, paranoia, or delusions.    PERCEPTIONS: Denies AVH, does not appear to be responding to internal stimuli    INSIGHT: Fair    JUDGMENT: Fair    COGNITION: A and Ox1, confused    Assessment:     Adriel Phillips is a 66 year old  male with past psychiatric history of alcohol use disorder who presents to [facility] for concerns of alcohol detox.  Patient has a long history of alcohol use dating back to his teenage years, additionally it seems that his wife may has passed away from alcohol use as well.  He does have a 
family history of alcohol use disorder as well.  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  Psychosocially patient's wife of over 40 years passed away in the fall, since that time patient has been feeling more down and hopeless while grieving.  He does endorse periods in the past where he has felt more down depressed and hopeless as well.  He denies any SI HI or AVH.  Patient using a moderately high amount of PRN lorazepam per CIWA, LFTs improving but still elevated. We will hold phenobarbital for now, can consider should patient continue to require high volumes of ativan.       Diagnosis:     Major depressive disorder, recurrent, moderate    Alcohol use disorder, currently in withdrawal, severe    Unspecified anxiety disorder     Plan:    1) After evaluating patient, it is my impression that patient does not require 1:1 care companion for safety     2) At this time patient does not meet criteria for inpatient psychiatric hospitalization     3) Phenobarbital 32.4 mg IV 3 times daily scheduled    4) Ativan 2 mg every hour as needed per CIWA protocol    5) Lyrica 75 mg p.o. 3 times daily scheduled    6) Trazodone 100 mg p.o. nightly    7) Lexapro 5 mg p.o. every morning, titrate up to optimal dose.  Informed consent obtained from medication    Moderate level medical decision make utilized during patient visit today  
DISPLAY PLAN FREE TEXT
DISPLAY PLAN FREE TEXT

## 2024-04-23 NOTE — ED PROVIDER NOTE - OBJECTIVE STATEMENT
89 y/o M pt with PMHx of A-Fib and __ presents to the ED c/o gradually worsening weakness x 1 month. Pt reports that with exertion, such as walking up steps, he  develops shortness of breath, secondary to weakness. Denies pain. No further complaints at this time. [id number] 89 y/o M pt with PMHx of A-Fib and hypothyroidism (medications: Coumadin) presents to the ED c/o gradually worsening weakness x 1 month. Pt reports that with exertion, such as walking up steps, he develops shortness of breath, secondary to weakness. Denies pain. No further complaints at this time.

## 2024-05-29 NOTE — DIETITIAN INITIAL EVALUATION ADULT. - WEIGHT IN KG
DISCONTINUE medications from [place] including Nifedipine.     CONTINUE Losartan 100 mg daily, Metoprolol/Toprol XL 50 mg daily, Bumex 1 mg half tablet twice daily, and Imdur 30 mg daily.  CONTINUE Farxiga  Continue all other medications.     At [facility], one important tool we use to improve our patient services is our Patient Survey.  Following your visit you may receive our survey in the mail.    Please take the time to complete the survey.    If your visit with us was great, we want to hear about it.    If we can improve, please let us know how.       Get your Prescription filled at [facility]!    [facility] uses the same medical record your doctor uses. More accurate and timely information for your doctor and your pharmacy means more effective and safer health care for you and your family.  [facility] accepts all of the major insurance plans which means you pay the same copay wherever you go.  [facility]ists take the time to explain your medication regimen to you.  [facility] will mail your medications to you free of postage and handling charges. We love karlos.      
[phone]

## 2025-02-18 NOTE — CONSULT NOTE ADULT - NSPROBSELRECBLANK_7_GEN
DISPLAY PLAN FREE TEXT Preop instructions and chlorhexidine soap given  Labs CBC BMP A1c T&S performed in PST  ABO DOS  May continue with Aspirin

## 2025-04-10 NOTE — DISCHARGE NOTE ADULT - NS AS DC FU [facility] EJECTION FRACTION >40 YES
AVS reviewed, patient and  verbalized understanding. Patient wheeled down to lobby for discharge to home.    Ejection Fraction(%).../normal LV function

## 2025-06-06 NOTE — PROGRESS NOTE ADULT - PROBLEM SELECTOR PLAN 1
EGD shows Gastrojej ulcer w/o active bleeding s/p injection w/ epi.  S/P 2 units of RBC, 3 unit of FFP, and Po Vit K.  INR is 1.44.  H/H dropped overnight.  Will transfuse one more unit of RBC today.  Risks, benefits, and alternative of blood transfusion were discussed with patient in details.  Seems to understand, and in agreement .  Continue with IV PPI, and will advance diet as tolerated.  Monitor H/H No